# Patient Record
Sex: MALE | Race: WHITE | NOT HISPANIC OR LATINO | Employment: FULL TIME | URBAN - METROPOLITAN AREA
[De-identification: names, ages, dates, MRNs, and addresses within clinical notes are randomized per-mention and may not be internally consistent; named-entity substitution may affect disease eponyms.]

---

## 2017-01-03 ENCOUNTER — APPOINTMENT (OUTPATIENT)
Dept: PHYSICAL THERAPY | Facility: CLINIC | Age: 57
End: 2017-01-03
Payer: COMMERCIAL

## 2017-01-03 PROCEDURE — 97140 MANUAL THERAPY 1/> REGIONS: CPT

## 2017-01-03 PROCEDURE — 97110 THERAPEUTIC EXERCISES: CPT

## 2017-01-05 ENCOUNTER — APPOINTMENT (OUTPATIENT)
Dept: PHYSICAL THERAPY | Facility: CLINIC | Age: 57
End: 2017-01-05
Payer: COMMERCIAL

## 2017-01-05 PROCEDURE — 97140 MANUAL THERAPY 1/> REGIONS: CPT

## 2017-01-05 PROCEDURE — 97110 THERAPEUTIC EXERCISES: CPT

## 2017-01-10 ENCOUNTER — APPOINTMENT (OUTPATIENT)
Dept: PHYSICAL THERAPY | Facility: CLINIC | Age: 57
End: 2017-01-10
Payer: COMMERCIAL

## 2017-01-10 PROCEDURE — 97140 MANUAL THERAPY 1/> REGIONS: CPT

## 2017-01-10 PROCEDURE — 97110 THERAPEUTIC EXERCISES: CPT

## 2017-01-12 ENCOUNTER — APPOINTMENT (OUTPATIENT)
Dept: PHYSICAL THERAPY | Facility: CLINIC | Age: 57
End: 2017-01-12
Payer: COMMERCIAL

## 2017-01-16 ENCOUNTER — APPOINTMENT (OUTPATIENT)
Dept: PHYSICAL THERAPY | Facility: CLINIC | Age: 57
End: 2017-01-16
Payer: COMMERCIAL

## 2017-01-16 PROCEDURE — 97110 THERAPEUTIC EXERCISES: CPT

## 2017-01-16 PROCEDURE — 97140 MANUAL THERAPY 1/> REGIONS: CPT

## 2017-01-19 ENCOUNTER — APPOINTMENT (OUTPATIENT)
Dept: PHYSICAL THERAPY | Facility: CLINIC | Age: 57
End: 2017-01-19
Payer: COMMERCIAL

## 2017-01-19 PROCEDURE — 97140 MANUAL THERAPY 1/> REGIONS: CPT

## 2017-01-19 PROCEDURE — 97110 THERAPEUTIC EXERCISES: CPT

## 2017-01-20 ENCOUNTER — APPOINTMENT (OUTPATIENT)
Dept: PHYSICAL THERAPY | Facility: CLINIC | Age: 57
End: 2017-01-20
Payer: COMMERCIAL

## 2017-01-20 PROCEDURE — 97110 THERAPEUTIC EXERCISES: CPT

## 2017-01-20 PROCEDURE — 97140 MANUAL THERAPY 1/> REGIONS: CPT

## 2017-01-24 ENCOUNTER — APPOINTMENT (OUTPATIENT)
Dept: PHYSICAL THERAPY | Facility: CLINIC | Age: 57
End: 2017-01-24
Payer: COMMERCIAL

## 2017-01-24 PROCEDURE — 97140 MANUAL THERAPY 1/> REGIONS: CPT

## 2017-01-24 PROCEDURE — 97110 THERAPEUTIC EXERCISES: CPT

## 2017-01-26 ENCOUNTER — APPOINTMENT (OUTPATIENT)
Dept: PHYSICAL THERAPY | Facility: CLINIC | Age: 57
End: 2017-01-26
Payer: COMMERCIAL

## 2017-01-26 PROCEDURE — 97110 THERAPEUTIC EXERCISES: CPT

## 2017-01-26 PROCEDURE — 97012 MECHANICAL TRACTION THERAPY: CPT

## 2017-01-26 PROCEDURE — 97140 MANUAL THERAPY 1/> REGIONS: CPT

## 2017-02-10 ENCOUNTER — ALLSCRIPTS OFFICE VISIT (OUTPATIENT)
Dept: OTHER | Facility: OTHER | Age: 57
End: 2017-02-10

## 2017-02-10 DIAGNOSIS — R73.01 IMPAIRED FASTING GLUCOSE: ICD-10-CM

## 2017-02-10 DIAGNOSIS — E78.5 HYPERLIPIDEMIA: ICD-10-CM

## 2017-02-10 DIAGNOSIS — Z23 ENCOUNTER FOR IMMUNIZATION: ICD-10-CM

## 2017-02-10 DIAGNOSIS — Z12.5 ENCOUNTER FOR SCREENING FOR MALIGNANT NEOPLASM OF PROSTATE: ICD-10-CM

## 2017-02-11 ENCOUNTER — LAB CONVERSION - ENCOUNTER (OUTPATIENT)
Dept: OTHER | Facility: OTHER | Age: 57
End: 2017-02-11

## 2017-02-11 LAB
CHOLEST SERPL-MCNC: 265 MG/DL (ref 125–200)
CHOLEST/HDLC SERPL: 3.2 (CALC)
GLUCOSE (HISTORICAL): 88 MG/DL (ref 65–99)
HBA1C MFR BLD HPLC: 5.6 % OF TOTAL HGB
HDLC SERPL-MCNC: 83 MG/DL
LDL CHOLESTEROL (HISTORICAL): 168 MG/DL (CALC)
NON-HDL-CHOL (CHOL-HDL) (HISTORICAL): 182 MG/DL (CALC)
PROSTATE SPECIFIC ANTIGEN TOTAL (HISTORICAL): 0.6 NG/ML
TRIGL SERPL-MCNC: 70 MG/DL

## 2017-02-13 ENCOUNTER — GENERIC CONVERSION - ENCOUNTER (OUTPATIENT)
Dept: OTHER | Facility: OTHER | Age: 57
End: 2017-02-13

## 2018-01-13 NOTE — RESULT NOTES
Verified Results  (Q) CULTURE, VIRAL, BODY FLUIDS, TISSUES [id number] 12:00AM Brandi Carry     Test Name Result Flag Reference   SOURCE: NOT GIVEN     RESULT: NO VIRUS ISOLATED

## 2018-01-14 NOTE — PROGRESS NOTES
Active Problems    1  Hyperlipidemia (272 4) (E78 5)   2  Impaired fasting glucose (790 21) (R73 01)   3  Need for prophylactic vaccination and inoculation against influenza (V04 81) (Z23)   4  Screening for prostate cancer (V76 44) (Z12 5)    Current Meds   1  Advair -21 MCG/ACT Inhalation Aerosol; inhale 1 puff twice daily; Therapy: [id number] to (Last Rx:[id number])  Requested for: [id number] Ordered   2  Daily Value Multivitamin TABS; Take 1 tablet daily Recorded    Allergies    1  No Known Drug Allergies    Future Appointments    Date/Time Provider Specialty Site   02/10/2017 09:00 AM ALEXSANDER Weinstein  Family Medicine [facility]   07/15/2016 02:00 PM [facility] practice, Nurse Schedule  [facility]     Message     PT PRESENTS FOR BP CHECK,READINGS TODAY 146/98 LEFT ARM,150/100 RT ARM  HE DOES NOT TAKE ANY MEDS,HIS ONLY COMPLAINT IS FEELING A LITTLE ANXIOUS AND STRESSED  HE IS GOING ON VACATION TOMORROW AND WILL COME IN FOR A VISIT IN A FEW WEEKS  NO TX AT THIS TIME PER DR BARAJAS  Recorded as Task   Date: 06/28/2016 03:23 PM, Created By: Theron Hampton   Task Name: Medical Complaint Callback   Assigned To: [facility]   Regarding Patient: Darlin Race, Status: Complete   CommentGray Part - 28 Jun 2016 3:23 PM     TASK CREATED  Caller: SYDNEY DHALIWAL DERM; Medical Complaint  [facility] CALLED ABOUT A MUTUAL PT  HIS BP IN THE OFFICE TODAY /100  SHOULD WE BRING HIM IN FOR AN APPT OR A BP CHECK CELL [phone]   Nish Barajas - 28 Jun 2016 5:56 PM     TASK REPLIED TO: Previously Assigned To Nish Barajas  schedule nurse visit this week for BP check   Bianca Chun - 29 Jun 2016 11:15 AM     TASK EDITED  LMOM TO CALL TO SCHEDULE A BP CHECK   Suzanne Yusuf - 29 Jun 2016 11:35 AM     TASK REASSIGNED: Previously Assigned To Guille Floyd - 29 Jun 2016 11:45 AM     TASK EDITED  PT,WILL COME FOR A BP CHECK AT 1:30 TODAY     Gay Tanner - 29 Jun 2016 11:45 AM     TASK COMPLETED     Signatures   Electronically signed by : Alexandra Carrizales, ; Jun 29 2016  2:15PM EST                       (Author)    Electronically signed by : ALEXSANDER Salinas ; Jun 29 2016  3:35PM EST                       (Author)

## 2018-01-15 NOTE — RESULT NOTES
Message  Bob I have enclosed a copy of your recent labs  fasting blood sugar and PSA normal  cholesterol elevated at 265 normal less than 200  no change from 12/2015  your HDL or "good" cholesterol is high  this is beneficial  watch diet  I would not recommend cholesterol medication at this time        Results/Data     (1) LIPID PANEL, FASTING   CHOLESTEROL, TOTAL: 265 mg/dL Abnormal High Reference Range 125-200  HDL CHOLESTEROL: 83 mg/dL Reference Range > OR = 40  TRIGLICERIDES: 70 mg/dL Reference Range <150  LDL-CHOLESTEROL: 168 mg/dL (calc) Abnormal High Reference Range <130  CHOL/HDLC RATIO: 3 2 (calc) Reference Range < OR = 5 0  NON HDL CHOLESTEROL: 182 mg/dL (calc) Abnormal High  (1) PSA (SCREEN) (Dx V76 44 Screen for Prostate Cancer)   PSA, TOTAL: 0 6 ng/mL Reference Range < OR = 4 0  (Q) GLUCOSE   GLUCOSE: 88 mg/dL Reference Range 65-99  (Q) HEMOGLOBIN A1c   HEMOGLOBIN A1c: 5 6 % of total Hgb Reference Range <5 7    Signatures   Electronically signed by : ALEXSANDER Cowart ; Feb 13 2017  6:03AM EST                       (Author)

## 2018-01-15 NOTE — MISCELLANEOUS
Message  Return to work or school:   Chato Whitmore is under my professional care   He was seen in my office on 06-29-16             Signatures   Electronically signed by : Rhoda Leon, ; Jun 29 2016  1:58PM EST                       (Author)

## 2018-01-16 NOTE — PROGRESS NOTES
Chief Complaint  PT  PRESENTS FOR BP CHECK  TODAY HIS BP /80,HE DOES NOT C/O ANY SYMPTOMS AND HE DOES NOT TAKE ANY BP MEDICATION  HE HAD SOME STRESSORS AT WORK HOWEVER THAT IS RESOLVED  HE WILL CALL IF ANY CHANGES  HE DID NOT MAKE A F/U VISIT  Active Problems    1  Hyperlipidemia (272 4) (E78 5)   2  Impaired fasting glucose (790 21) (R73 01)   3  Need for prophylactic vaccination and inoculation against influenza (V04 81) (Z23)   4  Screening for prostate cancer (V76 44) (Z12 5)    Current Meds   1  Advair -21 MCG/ACT Inhalation Aerosol; inhale 1 puff twice daily; Therapy: 76QOE2459 to (Last Rx:[id number])  Requested for: [id number] Ordered   2  Daily Value Multivitamin TABS; Take 1 tablet daily Recorded    Allergies    1  No Known Drug Allergies    Vitals  Signs [Data Includes: Current Encounter]    Temperature: 98 8 F  Heart Rate: 80  Respiration: 16  Systolic: 110  Diastolic: 80  Height: 5 ft 11 in  Weight: 201 lb 0 16 oz  BMI Calculated: 28 03  BSA Calculated: 2 11    Future Appointments    Date/Time Provider Specialty Site   02/10/2017 09:00 AM ALEXSANDER Curry   Family Medicine [address] [facility]     Signatures   Electronically signed by : Angy French, ; Jul 15 2016  2:26PM EST                       (Author)    Electronically signed by : ALEXSANDER Glaser ; Jul 15 2016  2:38PM EST                       (Author)

## 2018-01-16 NOTE — PROGRESS NOTES
Assessment    1  Encounter for preventive health examination (V70 0) (Z00 00)    Plan  Hyperlipidemia, Impaired fasting glucose, Need for prophylactic vaccination and  inoculation against influenza, Screening for prostate cancer    · (1) GLUCOSE,  FASTING; Status:Active; Requested for:08Oeg4269;    · (1) HEMOGLOBIN A1C; Status:Active; Requested for:[id number];    · (1) LIPID PANEL, FASTING; Status:Active; Requested for:[id number];    · (1) PSA (SCREEN) (Dx V76 44 Screen for Prostate Cancer); Status:Active; Requested  for:62Bgo6645;     Discussion/Summary  Impression: health maintenance visit  Currently, he eats a healthy diet and has an adequate exercise regimen  Prostate cancer screening: PSA was ordered  Colorectal cancer screening: the next colonoscopy is due 2017  The immunizations are up to date  Advice and education were given regarding nutrition, aerobic exercise, weight loss and cardiovascular risk reduction  Current with flu vaccine  repeat labs  follow up visit with rehab medicine  consider chiropractor, acupuncture and/or pain management evaluation for recurrent neck pain  no treatment at this time for hyperlipidemia  History of Present Illness  HM, Adult Male: The patient is being seen for a health maintenance evaluation  The last health maintenance visit was >1 year(s) ago  Social History: Household members include spouse, 1 daughter(s) and 2 son(s)  He is   Work status: working full time and occupation:   The patient has never smoked cigarettes  He reports occasional alcohol use  General Health: The patient's health since the last visit is described as good  He has regular dental visits  He denies vision problems  Vision care includes wearing glasses and an eye examination more than a year ago  He denies hearing loss  Immunizations status: up to date  Lifestyle:  He consumes a diverse and healthy diet  He does not have any weight concerns   He exercises regularly  He exercises 3 or more times per week  Exercise includes walking  He does not use tobacco    Screening: Prostate cancer screening includes last prostate-specific antigen testing 12/2015  Colorectal cancer screening includes a colonoscopy within the past ten years  Metabolic screening includes lipid profile performed 12/2015, glucose screening performed 90/2430 and uncertain timing of his last thyroid function test      General health risks: family history of prostate cancer  HPI: 62year old male here for wellness exam  active problems and PMH see chart  labs 12/2015 see note   no change from 2014  A1c 5  6  hyperlipidemia with high HDL  total cholesterol 265 increased from 225, HDL 98, , TGs 40  non   Review of Systems    Constitutional: recent 6 lb weight gain  Eyes: no eyesight problems  ENT: no earache, no sore throat and no nasal discharge  Cardiovascular: no chest pain, no palpitations and no extremity edema  Respiratory: no cough, no orthopnea, no wheezing, no shortness of breath during exertion and no PND  Gastrointestinal: colonoscopy at age 48 , but no abdominal pain, no nausea, no vomiting, no constipation, no diarrhea and no blood in stools  Genitourinary: no urinary hesitancy and no nocturia  Musculoskeletal: recurrent neck pain and right arm radicular pain  right arm symptoms resolved with PT  x rays cervical spine 12/2016  he is being followed by rehab medicine  , but no arthralgias and no myalgias  Integumentary: no rashes and no skin lesions  Neurological: no headache and no dizziness  Psychiatric: no anxiety and no depression  Endocrine: no muscle weakness  Active Problems    1  Hyperlipidemia (272 4) (E78 5)   2  Impaired fasting glucose (790 21) (R73 01)   3   Screening for prostate cancer (V76 44) (Z12 5)    Past Medical History    · History of hemorrhoids (V13 89) (Z87 19)   · History of Wound of right lower extremity, initial encounter (894 0) (S80 36A)    Family History  Mother    · Family history of Hyperlipidemia  Father    · Family history of Acute Myocardial Infarction (V17 3)   · Family history of Diabetes Mellitus (V18 0)  Maternal Grandfather    · Family history of Multiple Sclerosis  Paternal Grandfather    · Family history of Prostate Cancer (V16 42)    Social History    · Never A Smoker    Current Meds   1  Daily Value Multivitamin TABS; Take 1 tablet daily Recorded    Allergies    1  No Known Drug Allergies    Vitals   Recorded: 10KTA3643 09:14AM   Temperature 97 5 F   Heart Rate 82   Respiration 18   Systolic 037   Diastolic 72   Height 5 ft 11 in   Weight 205 lb    BMI Calculated 28 59   BSA Calculated 2 13     Physical Exam    Constitutional   General appearance: No acute distress, well appearing and well nourished  Eyes   Conjunctiva and lids: No erythema, swelling or discharge  Pupils and irises: Equal, round, reactive to light  Ophthalmoscopic examination: Normal fundi and optic discs  Ears, Nose, Mouth, and Throat   Otoscopic examination: Tympanic membranes translucent with normal light reflex  Canals patent without erythema  Nasal mucosa, septum, and turbinates: Normal without edema or erythema  Oropharynx: Normal with no erythema, edema, exudate or lesions  Neck   Neck: Supple, symmetric, trachea midline, no masses  Thyroid: Normal, no thyromegaly  There were no thyroid nodules  Pulmonary   Auscultation of lungs: Clear to auscultation  Cardiovascular   Auscultation of heart: Normal rate and rhythm, normal S1 and S2, no murmurs  Heart sounds: no gallop heard  Carotid pulses: 2+ bilaterally  no bruit heard over the right carotid and no bruit heard over the left carotid  Abdominal aorta: Normal   Abdominal aorta: no bruit heard  Examination of extremities for edema and/or varicosities: Normal     Abdomen   Abdomen: Non-tender, no masses      Liver and spleen: No hepatomegaly or splenomegaly  Lymphatic   Palpation of lymph nodes in neck: No lymphadenopathy  Musculoskeletal   Inspection/palpation of joints, bones, and muscles: Abnormal   deformity left AC joint  Range of motion: Normal     Muscle strength/tone: Normal   Motor Strength Findings: normal upper extremity strength  Skin   Skin and subcutaneous tissue: Normal without rashes or lesions  Neurologic   Cranial nerves: Cranial nerves 2-12 intact  Reflexes: 2+ and symmetric  Sensation: No sensory loss      Psychiatric   Mood and affect: Normal        Results/Data  (Q) CULTURE, VIRAL, BODY FLUIDS, TISSUES [id number] 12:00AM Brandi Artis     Test Name Result Flag Reference   SOURCE: NOT GIVEN     RESULT: NO VIRUS ISOLATED       (1) CBC/PLT/DIFF [id number] 12:00AM Lili Coy     Test Name Result Flag Reference   WBC 6 5 x10E3/uL  3 4-10 8   RBC 4 87 x10E6/uL  4 14-5 80   Hemoglobin 14 8 g/dL  12 6-17 7   Hematocrit 45 9 %  37 5-51 0   MCV 94 fL  79-97   MCH 30 4 pg  26 6-33 0   MCHC 32 2 g/dL  31 5-35 7   RDW 13 1 %  12 3-15 4   Platelets 793 E37U1/PP  150-379   Neutrophils 58 %     Lymphs 31 %     Monocytes 10 %     Eos 1 %     Basos 0 %     Neutrophils (Absolute) 3 7 x10E3/uL  1 4-7 0   Lymphs (Absolute) 2 0 x10E3/uL  0 7-3 1   Monocytes(Absolute) 0 7 x10E3/uL  0 1-0 9   Eos (Absolute) 0 1 x10E3/uL  0 0-0 4   Baso (Absolute) 0 0 x10E3/uL  0 0-0 2   Immature Granulocytes 0 %     Immature Grans (Abs) 0 0 x10E3/uL  0 0-0 1     (1) COMPREHENSIVE METABOLIC PANEL [id number] 26:80NE Lili Coy     Test Name Result Flag Reference   Glucose, Serum 103 mg/dL H 65-99   BUN 18 mg/dL  6-24   Creatinine, Serum 0 96 mg/dL  0 76-1 27   eGFR If NonAfricn Am 89 mL/min/1 73  >59   eGFR If Africn Am 102 mL/min/1 73  >59   BUN/Creatinine Ratio 19  9-20   Sodium, Serum 141 mmol/L  134-144   Potassium, Serum 5 1 mmol/L  3 5-5 2   Chloride, Serum 99 mmol/L     Carbon Dioxide, Total 23 mmol/L  18-29   Calcium, Serum 9 6 mg/dL 8  7-10 2   Protein, Total, Serum 6 7 g/dL  6 0-8 5   Albumin, Serum 4 7 g/dL  3 5-5 5   Globulin, Total 2 0 g/dL  1 5-4 5   A/G Ratio 2 4  1 1-2 5   Bilirubin, Total 0 7 mg/dL  0 0-1 2   Alkaline Phosphatase, S 53 IU/L     AST (SGOT) 25 IU/L  0-40   ALT (SGPT) 28 IU/L  0-44     (LC) Lipid Panel [id number] 12:00AM Les Legions     Test Name Result Flag Reference   Cholesterol, Total 265 mg/dL H 100-199   Triglycerides 40 mg/dL  0-149   HDL Cholesterol 98 mg/dL  >39   According to ATP-III Guidelines, HDL-C >59 mg/dL is considered a  negative risk factor for CHD  VLDL Cholesterol Reinaldo 8 mg/dL  5-40   LDL Cholesterol Calc 159 mg/dL H 0-99     (1) HEMOGLOBIN A1C [id number] 12:00AM Les Legions     Test Name Result Flag Reference   Hemoglobin A1c 5 7 % H 4 8-5 6   Pre-diabetes: 5 7 - 6 4           Diabetes: >6 4           Glycemic control for adults with diabetes: <7 0     (1) PSA (SCREEN) (Dx V76 44 Screen for Prostate Cancer) [id number] 12:00AM Les Legions     Test Name Result Flag Reference   Prostate Specific Ag, Serum 0 6 ng/mL  0 0-4 0   Roche ECLIA methodology  According to the American Urological Association, Serum PSA should  decrease and remain at undetectable levels after radical  prostatectomy  The AUA defines biochemical recurrence as an initial  PSA value 0 2 ng/mL or greater followed by a subsequent confirmatory  PSA value 0 2 ng/mL or greater  Values obtained with different assay methods or kits cannot be used  interchangeably  Results cannot be interpreted as absolute evidence  of the presence or absence of malignant disease         Signatures   Electronically signed by : ALEXSANDER Posadas ; Feb 10 2017 10:05AM EST                       (Author)

## 2018-01-22 VITALS
TEMPERATURE: 97.5 F | BODY MASS INDEX: 28.7 KG/M2 | WEIGHT: 205 LBS | DIASTOLIC BLOOD PRESSURE: 72 MMHG | HEIGHT: 71 IN | RESPIRATION RATE: 18 BRPM | HEART RATE: 82 BPM | SYSTOLIC BLOOD PRESSURE: 122 MMHG

## 2018-01-30 ENCOUNTER — TELEPHONE (OUTPATIENT)
Dept: FAMILY MEDICINE CLINIC | Facility: CLINIC | Age: 58
End: 2018-01-30

## 2018-01-31 DIAGNOSIS — Z12.5 SCREENING FOR PROSTATE CANCER: Primary | ICD-10-CM

## 2018-01-31 DIAGNOSIS — R73.01 IMPAIRED FASTING GLUCOSE: ICD-10-CM

## 2018-01-31 DIAGNOSIS — E78.00 PURE HYPERCHOLESTEROLEMIA: ICD-10-CM

## 2018-01-31 RX ORDER — ERGOCALCIFEROL (VITAMIN D2) 10 MCG
1 TABLET ORAL DAILY
COMMUNITY
End: 2018-02-12

## 2018-01-31 RX ORDER — CLOTRIMAZOLE AND BETAMETHASONE DIPROPIONATE 10; .64 MG/G; MG/G
CREAM TOPICAL
COMMUNITY
Start: 2017-10-20 | End: 2018-02-12

## 2018-02-08 LAB
ALBUMIN SERPL-MCNC: 4.3 G/DL (ref 3.6–5.1)
ALBUMIN/GLOB SERPL: 1.8 (CALC) (ref 1–2.5)
ALP SERPL-CCNC: 54 U/L (ref 40–115)
ALT SERPL-CCNC: 24 U/L (ref 9–46)
AST SERPL-CCNC: 21 U/L (ref 10–35)
BASOPHILS # BLD AUTO: 54 CELLS/UL (ref 0–200)
BASOPHILS NFR BLD AUTO: 0.8 %
BILIRUB SERPL-MCNC: 0.9 MG/DL (ref 0.2–1.2)
BUN SERPL-MCNC: 20 MG/DL (ref 7–25)
BUN/CREAT SERPL: NORMAL (CALC) (ref 6–22)
CALCIUM SERPL-MCNC: 9.8 MG/DL (ref 8.6–10.3)
CHLORIDE SERPL-SCNC: 103 MMOL/L (ref 98–110)
CHOLEST SERPL-MCNC: 258 MG/DL
CHOLEST/HDLC SERPL: 3 (CALC)
CO2 SERPL-SCNC: 29 MMOL/L (ref 20–31)
CREAT SERPL-MCNC: 1.05 MG/DL (ref 0.7–1.33)
EOSINOPHIL # BLD AUTO: 121 CELLS/UL (ref 15–500)
EOSINOPHIL NFR BLD AUTO: 1.8 %
ERYTHROCYTE [DISTWIDTH] IN BLOOD BY AUTOMATED COUNT: 12 % (ref 11–15)
GLOBULIN SER CALC-MCNC: 2.4 G/DL (CALC) (ref 1.9–3.7)
GLUCOSE SERPL-MCNC: 98 MG/DL (ref 65–99)
HBA1C MFR BLD: 5.2 % OF TOTAL HGB
HCT VFR BLD AUTO: 45.6 % (ref 38.5–50)
HDLC SERPL-MCNC: 86 MG/DL
HGB BLD-MCNC: 15.4 G/DL (ref 13.2–17.1)
LDLC SERPL CALC-MCNC: 154 MG/DL (CALC)
LYMPHOCYTES # BLD AUTO: 1829 CELLS/UL (ref 850–3900)
LYMPHOCYTES NFR BLD AUTO: 27.3 %
MCH RBC QN AUTO: 31.2 PG (ref 27–33)
MCHC RBC AUTO-ENTMCNC: 33.8 G/DL (ref 32–36)
MCV RBC AUTO: 92.3 FL (ref 80–100)
MONOCYTES # BLD AUTO: 717 CELLS/UL (ref 200–950)
MONOCYTES NFR BLD AUTO: 10.7 %
NEUTROPHILS # BLD AUTO: 3980 CELLS/UL (ref 1500–7800)
NEUTROPHILS NFR BLD AUTO: 59.4 %
NONHDLC SERPL-MCNC: 172 MG/DL (CALC)
PLATELET # BLD AUTO: 340 THOUSAND/UL (ref 140–400)
PMV BLD REES-ECKER: 8.8 FL (ref 7.5–12.5)
POTASSIUM SERPL-SCNC: 4.5 MMOL/L (ref 3.5–5.3)
PROT SERPL-MCNC: 6.7 G/DL (ref 6.1–8.1)
PSA SERPL-MCNC: 0.6 NG/ML
RBC # BLD AUTO: 4.94 MILLION/UL (ref 4.2–5.8)
SL AMB EGFR AFRICAN AMERICAN: 90 ML/MIN/1.73M2
SL AMB EGFR NON AFRICAN AMERICAN: 78 ML/MIN/1.73M2
SODIUM SERPL-SCNC: 140 MMOL/L (ref 135–146)
TRIGL SERPL-MCNC: 74 MG/DL
WBC # BLD AUTO: 6.7 THOUSAND/UL (ref 3.8–10.8)

## 2018-02-12 ENCOUNTER — OFFICE VISIT (OUTPATIENT)
Dept: FAMILY MEDICINE CLINIC | Facility: CLINIC | Age: 58
End: 2018-02-12
Payer: COMMERCIAL

## 2018-02-12 VITALS
SYSTOLIC BLOOD PRESSURE: 130 MMHG | HEART RATE: 74 BPM | BODY MASS INDEX: 28.36 KG/M2 | WEIGHT: 202.6 LBS | TEMPERATURE: 98.6 F | HEIGHT: 71 IN | RESPIRATION RATE: 16 BRPM | DIASTOLIC BLOOD PRESSURE: 82 MMHG

## 2018-02-12 DIAGNOSIS — Z00.00 WELL ADULT EXAM: Primary | ICD-10-CM

## 2018-02-12 PROCEDURE — 99396 PREV VISIT EST AGE 40-64: CPT | Performed by: FAMILY MEDICINE

## 2018-02-12 RX ORDER — CHLORAL HYDRATE 500 MG
1000 CAPSULE ORAL DAILY
COMMUNITY
End: 2020-12-15 | Stop reason: ALTCHOICE

## 2018-02-12 NOTE — PROGRESS NOTES
Assessment/Plan:         Diagnoses and all orders for this visit:    Well adult exam    Other orders  -     Omega-3 Fatty Acids (FISH OIL) 1,000 mg; Take 1,000 mg by mouth daily        Watch diet  Continue with regular exercise  Flu vaccine at work  Patient ID: Paxton Richmond is a 62 y o  male  49-year-old male here for wellness exam   Active medical problems and past medical history see chart  Hyperlipidemia with high HDL   02/2018 Lipid profile cholesterol 238  Triglycerides 74  HDL 86    Impaired fasting glucose  FBS 98  A1c 5 2  + FH type 2 Dm  Creatinine 1 05  LFTs normal  Exercises regularly    HS in [address] [place] [address] Way   3 children  Social History     Social History    Marital status: /Civil Union     Spouse name: N/A    Number of children: N/A    Years of education: N/A     Occupational History    Not on file  Social History Main Topics    Smoking status: Never Smoker    Smokeless tobacco: Never Used    Alcohol use Not on file    Drug use: No    Sexual activity: Not on file     Other Topics Concern    Not on file     Social History Narrative    No narrative on file     Family History   Problem Relation Age of Onset    Diabetes Father        Review of Systems   Constitutional: Negative for activity change, appetite change, chills, fever and unexpected weight change  HENT: Negative for congestion, ear pain, postnasal drip, rhinorrhea, sinus pain, sinus pressure, sore throat and trouble swallowing  Eyes: Negative for visual disturbance  Current with eye exam     Respiratory: Negative for cough, chest tightness, shortness of breath and wheezing  Cardiovascular: Negative for chest pain, palpitations and leg swelling  Gastrointestinal: Negative for abdominal pain, blood in stool, constipation, diarrhea, nausea and vomiting  Colonoscopy 8 years ago  Genitourinary: Negative for difficulty urinating          02/2018 PSA 0 8 Musculoskeletal: Negative for arthralgias and myalgias  Skin: Negative for rash  Neurological: Negative for dizziness and headaches  Hematological: Negative for adenopathy  Psychiatric/Behavioral: Negative for behavioral problems, dysphoric mood and sleep disturbance  /82 (BP Location: Left arm, Patient Position: Sitting, Cuff Size: Large)   Pulse 74   Temp 98 6 °F (37 °C) (Tympanic)   Resp 16   Ht 5' 11" (1 803 m)   Wt 91 9 kg (202 lb 9 6 oz)   BMI 28 26 kg/m²        Physical Exam   Constitutional: He is oriented to person, place, and time  He appears well-developed and well-nourished  HENT:   Right Ear: External ear normal    Left Ear: External ear normal    Mouth/Throat: Oropharynx is clear and moist  No oropharyngeal exudate  Eyes: Conjunctivae and EOM are normal  Pupils are equal, round, and reactive to light  No scleral icterus  Neck: Normal range of motion  Neck supple  No JVD present  No tracheal deviation present  No thyromegaly present  Cardiovascular: Normal rate, regular rhythm, normal heart sounds and intact distal pulses  Exam reveals no gallop  No murmur heard  Pulmonary/Chest: Effort normal and breath sounds normal  No respiratory distress  He has no wheezes  He has no rales  Abdominal: Soft  Bowel sounds are normal  He exhibits no distension and no mass  There is no tenderness  There is no rebound and no guarding  Musculoskeletal: Normal range of motion  He exhibits no edema or deformity  Lymphadenopathy:     He has no cervical adenopathy  Neurological: He is alert and oriented to person, place, and time  He has normal reflexes  No cranial nerve deficit  Skin: Skin is warm and dry  No rash noted  Psychiatric: He has a normal mood and affect  His behavior is normal    Nursing note and vitals reviewed        Recent Results (from the past 1008 hour(s))   CBC and differential    Collection Time: 02/07/18  7:03 AM   Result Value Ref Range    SL AMB LAB WHITE BLOOD CELL COUNT 6 7 3 8 - 10 8 Thousand/uL    SL AMB LAB RED BLOOD CELLS 4 94 4 20 - 5 80 Million/uL    Hemoglobin 15 4 13 2 - 17 1 g/dL    Hematocrit 45 6 38 5 - 50 0 %    MCV 92 3 80 0 - 100 0 fL    MCH 31 2 27 0 - 33 0 pg    MCHC 33 8 32 0 - 36 0 g/dL    RDW 12 0 11 0 - 15 0 %    Platelet Count 147 253 - 400 Thousand/uL    SL AMB MPV 8 8 7 5 - 12 5 fL    Neutrophils (Absolute) 3,980 1,500 - 7,800 cells/uL    Lymphocytes (Absolute) 1,829 850 - 3,900 cells/uL    Monocytes (Absolute) 717 200 - 950 cells/uL    Eosinophils (Absolute) 121 15 - 500 cells/uL    Basophils (Absolute) 54 0 - 200 cells/uL    Neutrophils 59 4 %    Lymphocytes 27 3 %    Monocytes 10 7 %    Eosinophils 1 8 %    Basophils 0 8 %   Comprehensive metabolic panel    Collection Time: 02/07/18  7:03 AM   Result Value Ref Range    SL AMB GLUCOSE 98 65 - 99 mg/dL    BUN 20 7 - 25 mg/dL    Creatinine, Serum 1 05 0 70 - 1 33 mg/dL    eGFR Non  78 > OR = 60 mL/min/1 73m2    SL AMB EGFR  90 > OR = 60 mL/min/1 73m2    SL AMB BUN/CREATININE RATIO NOT APPLICABLE 6 - 22 (calc)    SL AMB SODIUM 140 135 - 146 mmol/L    SL AMB POTASSIUM 4 5 3 5 - 5 3 mmol/L    SL AMB CHLORIDE 103 98 - 110 mmol/L    SL AMB CARBON DIOXIDE 29 20 - 31 mmol/L    SL AMB CALCIUM 9 8 8 6 - 10 3 mg/dL    SL AMB PROTEIN, TOTAL 6 7 6 1 - 8 1 g/dL    Serum Albumin 4 3 3 6 - 5 1 g/dL    SL AMB GLOBULIN 2 4 1 9 - 3 7 g/dL (calc)    SL AMB ALBUMIN/GLOBULIN RATIO 1 8 1 0 - 2 5 (calc)    SL AMB BILIRUBIN, TOTAL 0 9 0 2 - 1 2 mg/dL    SL AMB ALKALINE PHOSPHATASE 54 40 - 115 U/L    SL AMB AST 21 10 - 35 U/L    SL AMB ALT 24 9 - 46 U/L   Lipid panel    Collection Time: 02/07/18  7:03 AM   Result Value Ref Range    Total Cholesterol 258 (H) <200 mg/dL    SL AMB HDL CHOLESTEROL 86 >40 mg/dL    Triglycerides 74 <150 mg/dL    SL AMB LDL-CHOLESTEROL 154 (H) mg/dL (calc)    SL AMB CHOL/HDLC RATIO 3 0 <5 0 (calc)    SL AMB NON HDL CHOLESTEROL 172 (H) <130 mg/dL (calc)   Hemoglobin A1c    Collection Time: 02/07/18  7:03 AM   Result Value Ref Range    Hemoglobin A1C 5 2 <5 7 % of total Hgb   PSA    Collection Time: 02/07/18  7:03 AM   Result Value Ref Range    Prostate Specific Antigen Total 0 6 < OR = 4 0 ng/mL

## 2018-10-11 ENCOUNTER — IMMUNIZATION (OUTPATIENT)
Dept: FAMILY MEDICINE CLINIC | Facility: CLINIC | Age: 58
End: 2018-10-11
Payer: COMMERCIAL

## 2018-10-11 DIAGNOSIS — Z23 NEED FOR IMMUNIZATION AGAINST INFLUENZA: Primary | ICD-10-CM

## 2018-10-11 PROCEDURE — 90686 IIV4 VACC NO PRSV 0.5 ML IM: CPT

## 2018-10-11 PROCEDURE — 90471 IMMUNIZATION ADMIN: CPT

## 2019-02-01 ENCOUNTER — TELEPHONE (OUTPATIENT)
Dept: FAMILY MEDICINE CLINIC | Facility: CLINIC | Age: 59
End: 2019-02-01

## 2019-02-01 NOTE — TELEPHONE ENCOUNTER
Patient called stating he has an apt with you on 02- and would like to know if he needed to have any blood work done before hand  Please advise  Patient would like a return call and he  Will  scripts    [phone]

## 2019-02-02 DIAGNOSIS — Z12.5 SCREENING FOR PROSTATE CANCER: ICD-10-CM

## 2019-02-02 DIAGNOSIS — R73.01 IMPAIRED FASTING GLUCOSE: Primary | ICD-10-CM

## 2019-02-02 DIAGNOSIS — E78.00 HYPERCHOLESTEREMIA: ICD-10-CM

## 2019-02-06 LAB
CHOLEST SERPL-MCNC: 251 MG/DL
CHOLEST/HDLC SERPL: 3.1 (CALC)
GLUCOSE P FAST SERPL-MCNC: 102 MG/DL (ref 65–99)
HBA1C MFR BLD: 5.3 % OF TOTAL HGB
HDLC SERPL-MCNC: 81 MG/DL
LDLC SERPL CALC-MCNC: 154 MG/DL (CALC)
NONHDLC SERPL-MCNC: 170 MG/DL (CALC)
PSA SERPL-MCNC: 0.5 NG/ML
TRIGL SERPL-MCNC: 68 MG/DL

## 2019-02-12 ENCOUNTER — OFFICE VISIT (OUTPATIENT)
Dept: FAMILY MEDICINE CLINIC | Facility: CLINIC | Age: 59
End: 2019-02-12
Payer: COMMERCIAL

## 2019-02-12 VITALS
BODY MASS INDEX: 29.4 KG/M2 | SYSTOLIC BLOOD PRESSURE: 140 MMHG | WEIGHT: 210 LBS | HEART RATE: 76 BPM | HEIGHT: 71 IN | DIASTOLIC BLOOD PRESSURE: 90 MMHG | TEMPERATURE: 98.1 F | RESPIRATION RATE: 16 BRPM

## 2019-02-12 DIAGNOSIS — Z00.00 WELL ADULT EXAM: Primary | ICD-10-CM

## 2019-02-12 DIAGNOSIS — B00.1 COLD SORE: ICD-10-CM

## 2019-02-12 DIAGNOSIS — Z23 NEED FOR SHINGLES VACCINE: ICD-10-CM

## 2019-02-12 DIAGNOSIS — M25.511 CHRONIC RIGHT SHOULDER PAIN: ICD-10-CM

## 2019-02-12 DIAGNOSIS — R03.0 ELEVATED BLOOD PRESSURE READING: ICD-10-CM

## 2019-02-12 DIAGNOSIS — R73.01 IMPAIRED FASTING GLUCOSE: ICD-10-CM

## 2019-02-12 DIAGNOSIS — E78.00 PURE HYPERCHOLESTEROLEMIA: ICD-10-CM

## 2019-02-12 DIAGNOSIS — G89.29 CHRONIC RIGHT SHOULDER PAIN: ICD-10-CM

## 2019-02-12 DIAGNOSIS — E66.3 OVERWEIGHT (BMI 25.0-29.9): ICD-10-CM

## 2019-02-12 PROCEDURE — 99396 PREV VISIT EST AGE 40-64: CPT | Performed by: FAMILY MEDICINE

## 2019-02-12 RX ORDER — VALACYCLOVIR HYDROCHLORIDE 1 G/1
2000 TABLET, FILM COATED ORAL 2 TIMES DAILY
Qty: 4 TABLET | Refills: 5 | Status: SHIPPED | OUTPATIENT
Start: 2019-02-12 | End: 2020-12-15 | Stop reason: ALTCHOICE

## 2019-02-12 RX ORDER — METHOCARBAMOL 750 MG/1
750 TABLET, FILM COATED ORAL 4 TIMES DAILY PRN
COMMUNITY
Start: 2017-05-16 | End: 2020-12-15 | Stop reason: ALTCHOICE

## 2019-02-12 NOTE — PROGRESS NOTES
Assessment/Plan:     Diagnoses and all orders for this visit:    Well adult exam    Elevated blood pressure reading    Pure hypercholesterolemia    Impaired fasting glucose    Overweight (BMI 25 0-29  9)    Cold sore  -     valACYclovir (VALTREX) 1,000 mg tablet; Take 2 tablets (2,000 mg total) by mouth 2 (two) times a day for 1 day    Chronic right shoulder pain    Need for shingles vaccine  -     Zoster Vac Recomb Adjuvanted 50 MCG/0 5ML SUSR; Inject 50 mcg into a muscle once for 1 dose    Other orders  -     methocarbamol (ROBAXIN) 750 mg tablet; Take 750 mg by mouth 4 (four) times a day as needed        Diet, weight loss and exercise  Monitor blood pressures at home  Repeat labs in one year  Referral for colonoscopy  Script for shingles vaccine  Up to date with flu vaccine  Script for prn Valtrex  Re right shoulder pain patient is not interested in imaging studies, PT or steroid injections  Advised to call if any changes  BMI Counseling: Body mass index is 29 29 kg/m²  Discussed the patient's BMI with him  The BMI is above average  BMI counseling and education was provided to the patient  Nutrition recommendations include reducing portion sizes, decreasing overall calorie intake, consuming healthier snacks, moderation in carbohydrate intake, reducing intake of saturated fat and trans fat and reducing intake of cholesterol  Exercise recommendations include exercising 3-5 times per week  Patient ID: Jae Vasquez is a 61 y o  male  59-year-old male here for wellness exam   Active medical problems and past medical history see chart  Hyperlipidemia with high HDL   02/2019 Lipid profile cholesterol 251  Triglycerides 68  HDL 81    Impaired fasting glucose    A1c 5 3  + FH type 2 DM father  He has not been exercising    HS in [place]   3 children  Non smoker         The following portions of the patient's history were reviewed and updated as appropriate: allergies, current medications, past family history, past medical history, past social history, past surgical history and problem list     Review of Systems   Constitutional: Negative for appetite change, chills, fever and unexpected weight change  HENT: Negative for congestion, ear pain, rhinorrhea, sore throat and trouble swallowing  Eyes: Negative for visual disturbance  Current with eye exam     Respiratory: Negative for cough, shortness of breath and wheezing  Cardiovascular: Negative for chest pain, palpitations and leg swelling  Gastrointestinal: Negative for abdominal pain, blood in stool, constipation, diarrhea, nausea and vomiting  Colonoscopy at age 48  Genitourinary: Negative for difficulty urinating  02/2019 PSA 0 5   Musculoskeletal: Positive for arthralgias  Negative for myalgias  S/p fall this summer with injury to right shoulder  Persistent pain although symptoms have gradually improved  Skin: Negative for rash  Intermittent cold sores  Allergic/Immunologic: Negative for environmental allergies  Neurological: Negative for dizziness and headaches  Hematological: Negative for adenopathy  Does not bruise/bleed easily  Psychiatric/Behavioral: Negative for behavioral problems, dysphoric mood and sleep disturbance  Objective:       /90 (BP Location: Left arm, Patient Position: Sitting, Cuff Size: Large)   Pulse 76   Temp 98 1 °F (36 7 °C)   Resp 16   Ht 5' 11" (1 803 m)   Wt 95 3 kg (210 lb)   BMI 29 29 kg/m²          Physical Exam   Constitutional: He is oriented to person, place, and time  He appears well-developed and well-nourished  No distress  HENT:   Right Ear: Tympanic membrane normal    Left Ear: Tympanic membrane normal    Mouth/Throat: Oropharynx is clear and moist    Eyes: Pupils are equal, round, and reactive to light  Conjunctivae and EOM are normal  No scleral icterus  Neck: No JVD present   Carotid bruit is not present  No tracheal deviation present  No thyroid mass and no thyromegaly present  Cardiovascular: Normal rate, regular rhythm and normal heart sounds  Exam reveals no gallop  No murmur heard  Pulses:       Carotid pulses are 2+ on the right side, and 2+ on the left side  Pulmonary/Chest: Effort normal and breath sounds normal  No respiratory distress  He has no wheezes  He has no rales  Abdominal: Soft  Bowel sounds are normal  He exhibits no distension, no abdominal bruit and no mass  There is no hepatosplenomegaly  There is no tenderness  There is no rebound and no guarding  Musculoskeletal:        Right shoulder: He exhibits tenderness and bony tenderness  He exhibits normal range of motion, no swelling, no effusion, no crepitus, no deformity and normal strength    + crepitus over right AC joint  + cross arm test  + tenderness right subacromial area  Mildly positive impingement sign  Negative empty can sign  Negative Speed test  Negative sulcus sign  Negative [place]'s    Lymphadenopathy:     He has no cervical adenopathy  Neurological: He is alert and oriented to person, place, and time  He has normal reflexes  No cranial nerve deficit  Skin: No rash noted  Psychiatric: He has a normal mood and affect  Nursing note and vitals reviewed          Recent Results (from the past 1344 hour(s))   Lipid panel    Collection Time: 02/05/19  6:41 AM   Result Value Ref Range    Total Cholesterol 251 (H) <200 mg/dL    HDL 81 >40 mg/dL    Triglycerides 68 <150 mg/dL    LDL Direct 154 (H) mg/dL (calc)    Chol HDLC Ratio 3 1 <5 0 (calc)    Non-HDL Cholesterol 170 (H) <130 mg/dL (calc)   Glucose, Plasma    Collection Time: 02/05/19  6:41 AM   Result Value Ref Range    Glucose, Plasma 102 (H) 65 - 99 mg/dL   PSA, Total Screen    Collection Time: 02/05/19  6:41 AM   Result Value Ref Range    Prostate Specific Antigen Total 0 5 < OR = 4 0 ng/mL   Hemoglobin A1c (w/out EAG)    Collection Time: 02/05/19  6:41 AM Result Value Ref Range    Hemoglobin A1C 5 3 <5 7 % of total Hgb

## 2019-03-08 ENCOUNTER — TELEPHONE (OUTPATIENT)
Dept: FAMILY MEDICINE CLINIC | Facility: CLINIC | Age: 59
End: 2019-03-08

## 2019-03-08 NOTE — TELEPHONE ENCOUNTER
Patient called wanting an antibiotic prescribed for him prior to going out of town  Stated he felt like he was getting the flu  I offered him an appt with the NP at [facility] and he declined  I suggested urgent care where he can be evaluated and he declined as well  I did tell him we would be unable to send in an antibiotic without him being seen by anyone

## 2019-10-08 ENCOUNTER — TELEPHONE (OUTPATIENT)
Dept: FAMILY MEDICINE CLINIC | Facility: CLINIC | Age: 59
End: 2019-10-08

## 2019-10-08 NOTE — TELEPHONE ENCOUNTER
Patient made phys appt for February  He wants to know if you can order labs prior   He uses [facility] [place]  I will mail them to him

## 2019-10-10 DIAGNOSIS — Z12.5 SCREENING FOR PROSTATE CANCER: ICD-10-CM

## 2019-10-10 DIAGNOSIS — R73.01 IMPAIRED FASTING GLUCOSE: ICD-10-CM

## 2019-10-10 DIAGNOSIS — E78.00 PURE HYPERCHOLESTEROLEMIA: Primary | ICD-10-CM

## 2019-10-14 ENCOUNTER — IMMUNIZATIONS (OUTPATIENT)
Dept: FAMILY MEDICINE CLINIC | Facility: CLINIC | Age: 59
End: 2019-10-14
Payer: COMMERCIAL

## 2019-10-14 DIAGNOSIS — Z23 NEED FOR IMMUNIZATION AGAINST INFLUENZA: Primary | ICD-10-CM

## 2019-10-14 PROCEDURE — 90471 IMMUNIZATION ADMIN: CPT

## 2019-10-14 PROCEDURE — 90682 RIV4 VACC RECOMBINANT DNA IM: CPT

## 2020-01-21 LAB
ALBUMIN SERPL-MCNC: 4.1 G/DL (ref 3.6–5.1)
ALBUMIN/GLOB SERPL: 1.6 (CALC) (ref 1–2.5)
ALP SERPL-CCNC: 54 U/L (ref 40–115)
ALT SERPL-CCNC: 26 U/L (ref 9–46)
AST SERPL-CCNC: 19 U/L (ref 10–35)
BILIRUB SERPL-MCNC: 0.8 MG/DL (ref 0.2–1.2)
BUN SERPL-MCNC: 16 MG/DL (ref 7–25)
BUN/CREAT SERPL: NORMAL (CALC) (ref 6–22)
CALCIUM SERPL-MCNC: 9.4 MG/DL (ref 8.6–10.3)
CHLORIDE SERPL-SCNC: 105 MMOL/L (ref 98–110)
CHOLEST SERPL-MCNC: 229 MG/DL
CHOLEST/HDLC SERPL: 3.3 (CALC)
CO2 SERPL-SCNC: 29 MMOL/L (ref 20–32)
CREAT SERPL-MCNC: 1.01 MG/DL (ref 0.7–1.33)
GLOBULIN SER CALC-MCNC: 2.5 G/DL (CALC) (ref 1.9–3.7)
GLUCOSE SERPL-MCNC: 93 MG/DL (ref 65–99)
HBA1C MFR BLD: 5.4 % OF TOTAL HGB
HDLC SERPL-MCNC: 69 MG/DL
LDLC SERPL CALC-MCNC: 145 MG/DL (CALC)
NONHDLC SERPL-MCNC: 160 MG/DL (CALC)
POTASSIUM SERPL-SCNC: 4.3 MMOL/L (ref 3.5–5.3)
PROT SERPL-MCNC: 6.6 G/DL (ref 6.1–8.1)
PSA SERPL-MCNC: 0.6 NG/ML
SL AMB EGFR AFRICAN AMERICAN: 94 ML/MIN/1.73M2
SL AMB EGFR NON AFRICAN AMERICAN: 81 ML/MIN/1.73M2
SODIUM SERPL-SCNC: 141 MMOL/L (ref 135–146)
TRIGL SERPL-MCNC: 49 MG/DL

## 2020-03-06 ENCOUNTER — OFFICE VISIT (OUTPATIENT)
Dept: FAMILY MEDICINE CLINIC | Facility: CLINIC | Age: 60
End: 2020-03-06
Payer: COMMERCIAL

## 2020-03-06 VITALS
HEIGHT: 71 IN | RESPIRATION RATE: 16 BRPM | HEART RATE: 74 BPM | BODY MASS INDEX: 29.26 KG/M2 | DIASTOLIC BLOOD PRESSURE: 78 MMHG | SYSTOLIC BLOOD PRESSURE: 132 MMHG | WEIGHT: 209 LBS | TEMPERATURE: 97.5 F

## 2020-03-06 DIAGNOSIS — Z12.11 SCREENING FOR COLON CANCER: ICD-10-CM

## 2020-03-06 DIAGNOSIS — R25.1 TREMOR: ICD-10-CM

## 2020-03-06 DIAGNOSIS — Z00.00 WELL ADULT EXAM: Primary | ICD-10-CM

## 2020-03-06 DIAGNOSIS — R73.01 IMPAIRED FASTING GLUCOSE: ICD-10-CM

## 2020-03-06 DIAGNOSIS — E78.00 PURE HYPERCHOLESTEROLEMIA: ICD-10-CM

## 2020-03-06 PROCEDURE — 99396 PREV VISIT EST AGE 40-64: CPT | Performed by: FAMILY MEDICINE

## 2020-03-06 RX ORDER — COLLAGEN, HYDROLYSATE (BOVINE) 100 %
1 POWDER (GRAM) MISCELLANEOUS DAILY
COMMUNITY

## 2020-03-06 NOTE — PROGRESS NOTES
Assessment/Plan:     Diagnoses and all orders for this visit:    Well adult exam    Pure hypercholesterolemia    Impaired fasting glucose    Tremor  -     Ambulatory referral to Neurology; Future    Screening for colon cancer  -     Ambulatory referral to Gastroenterology; Future    Other orders  -     Cancel: Cologuard; Future  -     Collagen Hydrolysate POWD; 1 Dose by Does not apply route daily            Continue with diet, weight loss and exercise  He was not interested in statin therapy  Could consider CT coronary artery Ca++ score  GI referral for colon CA screening  Neurology evaluation for tremor  Up to date with flu vaccine and shingles vaccine  BMI Counseling: Body mass index is 29 56 kg/m²  The BMI is above normal  Nutrition recommendations include reducing portion sizes, decreasing overall calorie intake, consuming healthier snacks, moderation in carbohydrate intake, reducing intake of saturated fat and trans fat and reducing intake of cholesterol  Exercise recommendations include exercising 3-5 times per week  The 10-year ASCVD risk score (Emma Koroma et al , 2013) is: 8 2%    Values used to calculate the score:      Age: 61 years      Sex: Male      Is Non- : No      Diabetic: No      Tobacco smoker: No      Systolic Blood Pressure: 286 mmHg      Is BP treated: No      HDL Cholesterol: 69 mg/dL      Total Cholesterol: 229 mg/dL       Patient ID: Leonel Erwin is a 61 y o  male  59-year-old male here for wellness exam  Medications reviewed Hyperlipidemia with high HDL   01/2020 Lipid profile cholesterol 229 decreased from 251  Triglycerides 49  HDL 69    Impaired fasting glucose  01/2020 FBS 93 decreased from 102  A1c 5 4  + FH type 2 DM father    HS in [place]   3 children  Non smoker           Current Outpatient Medications:     Collagen Hydrolysate POWD, 1 Dose by Does not apply route daily, Disp: , Rfl:     Omega-3 Fatty Acids (FISH OIL) 1,000 mg, Take 1,000 mg by mouth daily, Disp: , Rfl:     valACYclovir (VALTREX) 1,000 mg tablet, Take 2 tablets (2,000 mg total) by mouth 2 (two) times a day for 1 day, Disp: 4 tablet, Rfl: 5    methocarbamol (ROBAXIN) 750 mg tablet, Take 750 mg by mouth 4 (four) times a day as needed, Disp: , Rfl:     The following portions of the patient's history were reviewed and updated as appropriate: allergies, current medications, past family history, past medical history, past social history, past surgical history and problem list     Review of Systems   Constitutional: Negative for appetite change, chills, fever and unexpected weight change  HENT: Negative for congestion, ear pain, rhinorrhea, sore throat and trouble swallowing  Eyes: Negative for visual disturbance  Current with eye exam     Respiratory: Negative for cough, shortness of breath and wheezing  Cardiovascular: Negative for chest pain, palpitations and leg swelling  Gastrointestinal: Negative for abdominal pain, blood in stool, constipation, diarrhea, nausea and vomiting  Colonoscopy at age 48  No polyps  Endocrine: Negative for polydipsia and polyuria  Genitourinary: Negative for difficulty urinating  01/2020 PSA 0 6   Musculoskeletal: Negative for arthralgias and myalgias  S/p fall this summer with injury to right shoulder  Persistent pain although symptoms have gradually improved  Skin: Negative for rash  Intermittent cold sores  Allergic/Immunologic: Negative for environmental allergies  Neurological: Positive for tremors  Negative for dizziness and headaches  Tremor hands R > L worse with writing, holding a cup  No head tremor  + FH tremor maternal GM  At times reports feeling off balance  Episode of getting lost driving home  Another episode where he could not locate his car in the parking lot  Hematological: Negative for adenopathy  Does not bruise/bleed easily  Psychiatric/Behavioral: Negative for behavioral problems, dysphoric mood and sleep disturbance  Objective:      /78   Pulse 74   Temp 97 5 °F (36 4 °C)   Resp 16   Ht 5' 10 5" (1 791 m)   Wt 94 8 kg (209 lb)   BMI 29 56 kg/m²     Wt Readings from Last 3 Encounters:   03/06/20 94 8 kg (209 lb)   02/12/19 95 3 kg (210 lb)   02/12/18 91 9 kg (202 lb 9 6 oz)        Physical Exam   Constitutional: He is oriented to person, place, and time  He appears well-developed and well-nourished  No distress  HENT:   Right Ear: Tympanic membrane normal    Left Ear: Tympanic membrane normal    Mouth/Throat: Oropharynx is clear and moist    Eyes: Pupils are equal, round, and reactive to light  Conjunctivae and EOM are normal  No scleral icterus  Neck: No JVD present  Carotid bruit is not present  No tracheal deviation present  No thyroid mass and no thyromegaly present  Cardiovascular: Normal rate, regular rhythm and normal heart sounds  Exam reveals no gallop  No murmur heard  Pulses:       Carotid pulses are 2+ on the right side, and 2+ on the left side  Pulmonary/Chest: Effort normal and breath sounds normal  No respiratory distress  He has no wheezes  He has no rales  Abdominal: Soft  Bowel sounds are normal  He exhibits no distension, no abdominal bruit and no mass  There is no hepatosplenomegaly  There is no tenderness  There is no rebound and no guarding  Musculoskeletal: Normal range of motion  He exhibits no edema  Lymphadenopathy:     He has no cervical adenopathy  Neurological: He is alert and oriented to person, place, and time  He has normal strength  He displays no tremor and normal reflexes  No cranial nerve deficit or sensory deficit  He exhibits normal muscle tone  Coordination and gait normal    Hand  normal  No pronator drift  No rest tremor  + action tremor  No cog wheeling or rigidity  Finger to nose normal     Skin: No rash noted  No cyanosis  Nails show no clubbing  Psychiatric: He has a normal mood and affect  Nursing note and vitals reviewed          Recent Results (from the past 1344 hour(s))   Comprehensive metabolic panel    Collection Time: 01/20/20 11:35 AM   Result Value Ref Range    Glucose, Random 93 65 - 99 mg/dL    BUN 16 7 - 25 mg/dL    Creatinine 1 01 0 70 - 1 33 mg/dL    eGFR Non  81 > OR = 60 mL/min/1 73m2    eGFR  94 > OR = 60 mL/min/1 73m2    SL AMB BUN/CREATININE RATIO NOT APPLICABLE 6 - 22 (calc)    Sodium 141 135 - 146 mmol/L    Potassium 4 3 3 5 - 5 3 mmol/L    Chloride 105 98 - 110 mmol/L    CO2 29 20 - 32 mmol/L    Calcium 9 4 8 6 - 10 3 mg/dL    Protein, Total 6 6 6 1 - 8 1 g/dL    Albumin 4 1 3 6 - 5 1 g/dL    Globulin 2 5 1 9 - 3 7 g/dL (calc)    Albumin/Globulin Ratio 1 6 1 0 - 2 5 (calc)    TOTAL BILIRUBIN 0 8 0 2 - 1 2 mg/dL    Alkaline Phosphatase 54 40 - 115 U/L    AST 19 10 - 35 U/L    ALT 26 9 - 46 U/L   Lipid panel    Collection Time: 01/20/20 11:35 AM   Result Value Ref Range    Total Cholesterol 229 (H) <200 mg/dL    HDL 69 >40 mg/dL    Triglycerides 49 <150 mg/dL    LDL Direct 145 (H) mg/dL (calc)    Chol HDLC Ratio 3 3 <5 0 (calc)    Non-HDL Cholesterol 160 (H) <130 mg/dL (calc)   PSA, Total Screen    Collection Time: 01/20/20 11:35 AM   Result Value Ref Range    Prostate Specific Antigen Total 0 6 < OR = 4 0 ng/mL   Hemoglobin A1c (w/out EAG)    Collection Time: 01/20/20 11:35 AM   Result Value Ref Range    Hemoglobin A1C 5 4 <5 7 % of total Hgb         Lab Results   Component Value Date    CHOLESTEROL 229 (H) 01/20/2020    CHOLESTEROL 251 (H) 02/05/2019    CHOLESTEROL 258 (H) 02/07/2018     Lab Results   Component Value Date    HDL 69 01/20/2020    HDL 81 02/05/2019    HDL 86 02/07/2018     Lab Results   Component Value Date    TRIG 49 01/20/2020    TRIG 68 02/05/2019    TRIG 74 02/07/2018     Lab Results   Component Value Date    NONHDLC 182 (H) 02/10/2017

## 2020-04-22 ENCOUNTER — TELEPHONE (OUTPATIENT)
Dept: NEUROLOGY | Facility: CLINIC | Age: 60
End: 2020-04-22

## 2020-04-27 ENCOUNTER — APPOINTMENT (EMERGENCY)
Dept: RADIOLOGY | Facility: HOSPITAL | Age: 60
End: 2020-04-27
Payer: COMMERCIAL

## 2020-04-27 ENCOUNTER — HOSPITAL ENCOUNTER (EMERGENCY)
Facility: HOSPITAL | Age: 60
Discharge: HOME | End: 2020-04-27
Attending: EMERGENCY MEDICINE
Payer: COMMERCIAL

## 2020-04-27 VITALS
SYSTOLIC BLOOD PRESSURE: 158 MMHG | RESPIRATION RATE: 18 BRPM | OXYGEN SATURATION: 96 % | BODY MASS INDEX: 27.72 KG/M2 | WEIGHT: 195.99 LBS | HEART RATE: 76 BPM | DIASTOLIC BLOOD PRESSURE: 92 MMHG | TEMPERATURE: 97.9 F

## 2020-04-27 DIAGNOSIS — R05.9 COUGH: Primary | ICD-10-CM

## 2020-04-27 LAB
ANION GAP SERPL CALCULATED.3IONS-SCNC: 8 MMOL/L (ref 4–13)
ATRIAL RATE: 70 BPM
BASOPHILS # BLD AUTO: 0.05 THOUSANDS/ΜL (ref 0–0.1)
BASOPHILS NFR BLD AUTO: 1 % (ref 0–1)
BUN SERPL-MCNC: 13 MG/DL (ref 5–25)
CALCIUM SERPL-MCNC: 8.8 MG/DL (ref 8.3–10.1)
CHLORIDE SERPL-SCNC: 104 MMOL/L (ref 100–108)
CO2 SERPL-SCNC: 27 MMOL/L (ref 21–32)
CREAT SERPL-MCNC: 0.94 MG/DL (ref 0.6–1.3)
EOSINOPHIL # BLD AUTO: 0.2 THOUSAND/ΜL (ref 0–0.61)
EOSINOPHIL NFR BLD AUTO: 2 % (ref 0–6)
ERYTHROCYTE [DISTWIDTH] IN BLOOD BY AUTOMATED COUNT: 12.2 % (ref 11.6–15.1)
GFR SERPL CREATININE-BSD FRML MDRD: 88 ML/MIN/1.73SQ M
GLUCOSE SERPL-MCNC: 94 MG/DL (ref 65–140)
HCT VFR BLD AUTO: 43.9 % (ref 36.5–49.3)
HGB BLD-MCNC: 14.5 G/DL (ref 12–17)
IMM GRANULOCYTES # BLD AUTO: 0.06 THOUSAND/UL (ref 0–0.2)
IMM GRANULOCYTES NFR BLD AUTO: 1 % (ref 0–2)
LYMPHOCYTES # BLD AUTO: 1.51 THOUSANDS/ΜL (ref 0.6–4.47)
LYMPHOCYTES NFR BLD AUTO: 18 % (ref 14–44)
MCH RBC QN AUTO: 30.3 PG (ref 26.8–34.3)
MCHC RBC AUTO-ENTMCNC: 33 G/DL (ref 31.4–37.4)
MCV RBC AUTO: 92 FL (ref 82–98)
MONOCYTES # BLD AUTO: 0.83 THOUSAND/ΜL (ref 0.17–1.22)
MONOCYTES NFR BLD AUTO: 10 % (ref 4–12)
NEUTROPHILS # BLD AUTO: 5.85 THOUSANDS/ΜL (ref 1.85–7.62)
NEUTS SEG NFR BLD AUTO: 68 % (ref 43–75)
NRBC BLD AUTO-RTO: 0 /100 WBCS
P AXIS: 43 DEGREES
PLATELET # BLD AUTO: 420 THOUSANDS/UL (ref 149–390)
PMV BLD AUTO: 7.9 FL (ref 8.9–12.7)
POTASSIUM SERPL-SCNC: 3.8 MMOL/L (ref 3.5–5.3)
PR INTERVAL: 166 MS
QRS AXIS: 77 DEGREES
QRSD INTERVAL: 92 MS
QT INTERVAL: 378 MS
QTC INTERVAL: 408 MS
RBC # BLD AUTO: 4.78 MILLION/UL (ref 3.88–5.62)
SARS-COV-2 RNA RESP QL NAA+PROBE: NEGATIVE
SODIUM SERPL-SCNC: 139 MMOL/L (ref 136–145)
T WAVE AXIS: 32 DEGREES
TROPONIN I SERPL-MCNC: <0.02 NG/ML
VENTRICULAR RATE: 70 BPM
WBC # BLD AUTO: 8.5 THOUSAND/UL (ref 4.31–10.16)

## 2020-04-27 PROCEDURE — 99284 EMERGENCY DEPT VISIT MOD MDM: CPT

## 2020-04-27 PROCEDURE — 93005 ELECTROCARDIOGRAM TRACING: CPT

## 2020-04-27 PROCEDURE — 80048 BASIC METABOLIC PNL TOTAL CA: CPT | Performed by: NURSE PRACTITIONER

## 2020-04-27 PROCEDURE — 87635 SARS-COV-2 COVID-19 AMP PRB: CPT | Performed by: NURSE PRACTITIONER

## 2020-04-27 PROCEDURE — 93010 ELECTROCARDIOGRAM REPORT: CPT | Performed by: INTERNAL MEDICINE

## 2020-04-27 PROCEDURE — 85025 COMPLETE CBC W/AUTO DIFF WBC: CPT | Performed by: NURSE PRACTITIONER

## 2020-04-27 PROCEDURE — 99283 EMERGENCY DEPT VISIT LOW MDM: CPT | Performed by: NURSE PRACTITIONER

## 2020-04-27 PROCEDURE — 71045 X-RAY EXAM CHEST 1 VIEW: CPT

## 2020-04-27 PROCEDURE — 84484 ASSAY OF TROPONIN QUANT: CPT | Performed by: NURSE PRACTITIONER

## 2020-04-27 PROCEDURE — 36415 COLL VENOUS BLD VENIPUNCTURE: CPT | Performed by: NURSE PRACTITIONER

## 2020-05-14 ENCOUNTER — TELEPHONE (OUTPATIENT)
Dept: FAMILY MEDICINE CLINIC | Facility: CLINIC | Age: 60
End: 2020-05-14

## 2020-05-15 DIAGNOSIS — J06.9 ACUTE URI: Primary | ICD-10-CM

## 2020-05-15 RX ORDER — AZITHROMYCIN 250 MG/1
TABLET, FILM COATED ORAL
Qty: 6 TABLET | Refills: 0 | Status: SHIPPED | OUTPATIENT
Start: 2020-05-15 | End: 2020-05-19

## 2020-05-15 RX ORDER — BENZONATATE 200 MG/1
200 CAPSULE ORAL 3 TIMES DAILY PRN
Qty: 20 CAPSULE | Refills: 0 | Status: SHIPPED | OUTPATIENT
Start: 2020-05-15 | End: 2020-12-15 | Stop reason: ALTCHOICE

## 2020-06-17 ENCOUNTER — TELEPHONE (OUTPATIENT)
Dept: NEUROLOGY | Facility: CLINIC | Age: 60
End: 2020-06-17

## 2020-07-29 ENCOUNTER — CONSULT (OUTPATIENT)
Dept: NEUROLOGY | Facility: CLINIC | Age: 60
End: 2020-07-29
Payer: COMMERCIAL

## 2020-07-29 VITALS
WEIGHT: 201.7 LBS | TEMPERATURE: 98.1 F | DIASTOLIC BLOOD PRESSURE: 90 MMHG | SYSTOLIC BLOOD PRESSURE: 160 MMHG | BODY MASS INDEX: 28.88 KG/M2 | HEIGHT: 70 IN

## 2020-07-29 DIAGNOSIS — R25.1 TREMOR: ICD-10-CM

## 2020-07-29 PROCEDURE — 3008F BODY MASS INDEX DOCD: CPT | Performed by: PSYCHIATRY & NEUROLOGY

## 2020-07-29 PROCEDURE — 99204 OFFICE O/P NEW MOD 45 MIN: CPT | Performed by: PSYCHIATRY & NEUROLOGY

## 2020-07-29 PROCEDURE — 1036F TOBACCO NON-USER: CPT | Performed by: PSYCHIATRY & NEUROLOGY

## 2020-07-29 NOTE — PATIENT INSTRUCTIONS
Very nice to meet you today  Let us know if you end up wanting to go for the MARÍA scan       Before your MARÍA scan, stop taking these medicines for the length of time shown:    Name of Drug Stop Taking   Amoxapine 4 days before   Benztropine   Cogentin (anticholinergic drugs)  3 days before   Bupropion (Wellbutrin, Zyban) 48 hours before   Buspirone 15 hours before   Citalopram/Escitalopram 24 hours before   Cocaine 6 hours before   Methamphetamine 24 hours before   Methylphenidate (Concerta, Metadate, Methylin, Ritalin) 20 hours before   Paroxetine 24 hours before   Selegilene 48 hours before   Sertraline  (other antidepressants)   3 days before   Modafinil    Opioids

## 2020-07-29 NOTE — ASSESSMENT & PLAN NOTE
Marcos German is a 61year-old highschool  who presents for evaluation tremor, symptom onset around 2018 with right> left action tremor  On examination the patient had a rest and postural tremor on the right, minimal if any kinetic tremor  Also had mild rigidity on the right and slight bradykinesia bilaterally with decrement on the right  His gait was fairly normal with good postural reflexes  No typical non motor symptoms associated with Parkinson's disease  We discussed today that the patient has a mixture of tremor signs and symptoms  Some aspects of his history are suggestive of essential tremor, specifically a tremor that is most bothersome with action, such as writing and eating  However on examination he does have subtle right-sided parkinsonism  We discussed that there are few different strategies to increase our certainty regarding a diagnosis  We discussed different options regarding medication management of tremor  Eventually we agreed to acquire a MARÍA scan, if covered by his insurance, to differentiate between essential tremor and Parkinson's disease  Was not interested in any medication trials at this time which is very reasonable

## 2020-07-29 NOTE — PROGRESS NOTES
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  who presents for evaluation tremor, symptom onset around 2018 with right> left action tremor  On examination the patient had a rest and postural tremor on the right, minimal if any kinetic tremor  Also had mild rigidity on the right and slight bradykinesia bilaterally with decrement on the right  His gait was fairly normal with good postural reflexes  No typical non motor symptoms associated with Parkinson's disease  We discussed today that the patient has a mixture of tremor signs and symptoms  Some aspects of his history are suggestive of essential tremor, specifically a tremor that is most bothersome with action, such as writing and eating  However on examination he does have subtle right-sided parkinsonism  We discussed that there are few different strategies to increase our certainty regarding a diagnosis  We discussed different options regarding medication management of tremor  Eventually we agreed to acquire a ADEN scan, if covered by his insurance, to differentiate between essential tremor and Parkinson's disease  Was not interested in any medication trials at this time which is very reasonable  Diagnoses and all orders for this visit:    Tremor  -     Ambulatory referral to Neurology  -     NM brain aden scan w/Rx; Future  -     NM consultation for aden scan; Future    Other orders  -     ibuprofen (ADVIL,MOTRIN) 100 MG tablet; Take 100 mg by mouth every 6 (six) hours as needed for mild pain        Subjective:     Patient ID: Marifer Lara is a 61 y o  male  I had the pleasure of seeing your patient, Marifer Lara in the Movement Disorders Clinic at the [facility] for Neuroscience  The patient is a 61 y o  right handed male who presents for evaluation of tremor  The patient reports that he 1st noticed a tremor about 2 years ago    The tremor was evident after exerting himself such as working out or doing yard work  Slowly over time the tremor started to present more often  He now notices the tremor when holding a pen or trying to eat  The patient is a  of a high school in [place]  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 noticed the tremor and recommended he bring it up to his primary care doctor, prompting today's referral   The patient has never seen a neurologist in the past   He has never been on medications for tremor in the past   No exposure to neuroleptics in the past   No family history of tremor or other neurologic disease other than multiple sclerosis in his maternal grandfather  Regarding motor symptoms:   Tremor: bl hands  No where else  Drops things randomly  Never at rest    Slowness: balance limits dressing  Not otherwise   Stiffness: not really  Arthritis in the hands  Dystonia: maybe striatal posture on the right  Changes in facial expression: no   Changes in voice: no  Changes in writing: messy  Changes in gait: balance is a little off  Falls: close calls on the steps  Freezing: no   Trouble with swallowing: no   Clumsiness/dexterity: some limitation with buttons - arthritis in the hands      Regarding non-motor symptoms:   Anosmia: no   Constipation: no   Lightheadedness: occasionally   Urine troubles: some urgency   Changes in Mood: nothing major   Trouble with sleep: nocturia 3-4x     REM behavior Disorder: no   Memory trouble: takes notes  Coffee: 2 cups in the morning  Reduced dose helped tremor   Alcohol: 2 beers per day with dinner   Small reduction in tremor with alcohol       The following portions of the patient's history were reviewed and updated as appropriate: allergies, current medications, past family history, past medical history, past social history, past surgical history and problem list       Objective:  /90   Temp 98 1 °F (36 7 °C)   Ht 5' 10" (1 778 m)   Wt 91 5 kg (201 lb 11 2 oz)   BMI 28 94 kg/m²     Physical Exam    Neurological Exam   GENERAL MEDICAL EXAMINATION:  General appearance: alert, in no apparent distress  Appropriately dressed and groomed  Conversing and interacting appropriately  Eyes: Sclera are non-injected  Ears, nose, Mouth, Throat: Mucous membranes are moist    Resp: Breathing comfortably on RA   Musculoskeletal: No evidence of deformities  No contractures  No Edema  Skin: No visible rashes  Warm and well perfused  Psych: normal and appropriate affect     Mental Status:  Alert and oriented to person place and time  Able to relate history without difficulty  Attentive to conversation  Language is fluent and appropriate with normal prosody  There were no paraphasic errors  Speech was not dysarthric  Able to follow both midline and appendicular commands  General Neurology examination:   PERRL, EOMI, Face activates symmetrically  Normal bulk throughout  Patient moves all 4 extremities equally and antigravity  No pronator drift  Normal sensation to light touch and temperature in all 4 extremities  Finger to nose without dysmetria bilaterally  No intention tremor  JAZZ were accurate and symmetric with regard to rm and speed      UPDRS motor:                              Time since last dose:  x     Speech  0     Facial Expression  1     Rigidity - Neck  0     Rigidity - Upper Extremity (Right)  2     Rigidity - Upper Extremity (Left)   1     Rigidity - Lower Extremity (Right)  0     Rigidity - Lower Extremity (Left)   0     Finger Taps (Right)   1 dec    Finger Taps (Left)   1  hes   Hand Movement (Right)  0 mirror    Hand Movement (Left)   0     Pronation/Supination (Right)  0     Pronation/Supination (Left)   0     Toe Tapping (Right) 0     Toe Tapping (Left) 0     Leg Agility (Right)  0     Leg Agility (Left)   1     Arising from Chair   0     Gait   0     Freezing of Gait 0     Postural Stability   0     Posture 0     Global spontaneity of movement 1     Postural Tremor (Right) 2     Postural Tremor (Left) 1     Kinetic Tremor (Right)  1     Kinetic Tremor (Left)  1     Rest tremor amplitude RUE 1 distraction     Rest tremor amplitude LUE 0     Rest tremor amplitude RLE 0     Reset tremor amplitude LLE 0     Lip/Jaw Tremor  0     Consistency of tremor 1     Motor Exam Total:          Head tremor 0  Voice tremor 0    Dysmetria: none on FNF  Dyskinesia: none  Dystonia: none     Stance: narrow-based and stable   Stride: normal speed, stride length, step height, walks with no assistive device   Arm swing: symmetric, not reduced   Turn: stable, 2-3 steps     Strength:   Delt Bi Tri We FE FF    C5 C6 C7 C6 C7 C8/T1   R 5 5 5 5 5 5   L 5 5 5 5 5 5      IP Quad Hamst TA    L2 L3 L4-S1 L4   R 5 5 5 5   L 5 5 5 5     Reflexes:  2+ and symmetric throughout      Review of Systems   Constitutional: Negative  Negative for appetite change and fever  HENT: Negative  Negative for hearing loss, tinnitus, trouble swallowing and voice change  Eyes: Negative  Negative for photophobia and pain  Respiratory: Negative  Negative for shortness of breath  Cardiovascular: Negative  Negative for palpitations  Gastrointestinal: Negative  Negative for nausea and vomiting  Endocrine: Negative  Negative for cold intolerance  Genitourinary: Negative  Negative for dysuria, frequency and urgency  Musculoskeletal: Positive for gait problem  Negative for myalgias and neck pain  Skin: Negative  Negative for rash  Neurological: Positive for tremors (bilateral hands)  Negative for dizziness, seizures, syncope, facial asymmetry, speech difficulty, weakness, light-headedness, numbness and headaches  Hematological: Negative  Does not bruise/bleed easily  Psychiatric/Behavioral: Positive for confusion and sleep disturbance (trouble staying asleep)  Negative for hallucinations  The patient is nervous/anxious  The above ROS was reviewed and updated       Luciana Burks MD  Medical Director Movement Disorders Center  Movement and Memory Specialist       Current Outpatient Medications on File Prior to Visit   Medication Sig Dispense Refill    Collagen Hydrolysate POWD 1 Dose by Does not apply route daily      ibuprofen (ADVIL,MOTRIN) 100 MG tablet Take 100 mg by mouth every 6 (six) hours as needed for mild pain      benzonatate (TESSALON) 200 MG capsule Take 1 capsule (200 mg total) by mouth 3 (three) times a day as needed for cough (Patient not taking: Reported on 7/29/2020) 20 capsule 0    methocarbamol (ROBAXIN) 750 mg tablet Take 750 mg by mouth 4 (four) times a day as needed      Omega-3 Fatty Acids (FISH OIL) 1,000 mg Take 1,000 mg by mouth daily      valACYclovir (VALTREX) 1,000 mg tablet Take 2 tablets (2,000 mg total) by mouth 2 (two) times a day for 1 day 4 tablet 5     No current facility-administered medications on file prior to visit

## 2020-07-30 ENCOUNTER — HOSPITAL ENCOUNTER (OUTPATIENT)
Dept: RADIOLOGY | Facility: HOSPITAL | Age: 60
Discharge: HOME | End: 2020-07-30

## 2020-07-30 DIAGNOSIS — R25.1 TREMOR: ICD-10-CM

## 2020-07-31 ENCOUNTER — TELEPHONE (OUTPATIENT)
Dept: NEUROLOGY | Facility: CLINIC | Age: 60
End: 2020-07-31

## 2020-07-31 NOTE — TELEPHONE ENCOUNTER
pt called and states that he spoke to a dr at [facility] who treated his son and they would like office note for review    advised that he can call medical records to make this request   call transfered to medical records

## 2020-07-31 NOTE — TELEPHONE ENCOUNTER
Pt called back and states that he spoke to medical records and they told him that they don't get records for 6 months as they are changing computer systems  I made him aware that we were not told about this  They advised that he call back our office and ask to speak to an   I made him aware that he would need to sign ANNA to have our office to fax this info  He would like his office note faxed to     attn dr Khris West  [phone]  He would like an [address] [place] [address] emailed to him at  [email or web address]  Faxed blank ANNA    He will either fax back or bring signed form to the office

## 2020-08-11 ENCOUNTER — TELEPHONE (OUTPATIENT)
Dept: FAMILY MEDICINE CLINIC | Facility: CLINIC | Age: 60
End: 2020-08-11

## 2020-08-11 ENCOUNTER — CLINICAL SUPPORT (OUTPATIENT)
Dept: FAMILY MEDICINE CLINIC | Facility: CLINIC | Age: 60
End: 2020-08-11
Payer: COMMERCIAL

## 2020-08-11 DIAGNOSIS — Z23 NEED FOR TDAP VACCINATION: ICD-10-CM

## 2020-08-11 DIAGNOSIS — S61.411A LACERATION OF RIGHT HAND WITHOUT FOREIGN BODY, INITIAL ENCOUNTER: Primary | ICD-10-CM

## 2020-08-11 PROCEDURE — 90715 TDAP VACCINE 7 YRS/> IM: CPT

## 2020-08-11 PROCEDURE — 90471 IMMUNIZATION ADMIN: CPT

## 2020-08-11 NOTE — TELEPHONE ENCOUNTER
Patient was in office today to receive Tdap  Patient stated that he was cleaning up after rain and cut his hands on rust nail  Hands have small scabbed areas on them, slight redness no swelling or drainage  Patient states that he has been applying Bacitracin  Informed patient to call office if he should develop increase redness, swelling, drainage or fever  Patient agreed to instructions

## 2020-08-26 ENCOUNTER — PREP FOR PROCEDURE (OUTPATIENT)
Dept: GASTROENTEROLOGY | Facility: CLINIC | Age: 60
End: 2020-08-26

## 2020-08-26 DIAGNOSIS — Z12.11 SCREEN FOR COLON CANCER: Primary | ICD-10-CM

## 2020-08-26 NOTE — TELEPHONE ENCOUNTER
08/26/20  Screened by: Eileen Javier    Referring Provider Dr Rachana Rome: If patient answers NO to medical questions, then schedule procedure  If patient answers YES to medical questions, then schedule office appointment  Previous Colonoscopy no  Date and Facility of last colonoscopy? n/a    Comments: n/a        Pre- Screening: There is no height or weight on file to calculate BMI  Has patient been referred for a routine screening Colonoscopy? yes  Is the patient between 39-70 years old? yes    SCHEDULING STAFF   If the patient is between 45yrs-49yrs, please advise patient to confirm benefits/coverage with their insurance company for a routine screening colonoscopy, some insurance carriers will only cover at [phone] or older   If the patient is over 76years old, please schedule an office visit   If the patient had a previous colonoscopy send to the procedure  before continuing    Have you been diagnosed with a bleeding disorder or anemia? no    Do you take Other blood thinning medications no    Have you been diagnosed with Diabetes or are you taking any   Diabetic medications? no    Do you have any of the following symptoms? NO    Have you had a coronary or vascular stent within the last year? no    Have you had a heart attack or stroke in the last 6 months? no    Have you had intestinal surgery in the last 3 months? no    Do you have problems with: NO    Do you use: NO    Have you been hospitalized in the last Month? no    Have you had chest pain (angina) or breathing problems  (COPD) in the last 3 months? no    Do you have any difficulty walking up a flight of stairs? no    Have you had Kidney failure or insufficiency? no    Have you had heart valve surgery? no    Are you confined to a wheelchair?  no

## 2020-08-27 RX ORDER — SODIUM, POTASSIUM,MAG SULFATES 17.5-3.13G
SOLUTION, RECONSTITUTED, ORAL ORAL
Qty: 360 ML | Refills: 0 | Status: SHIPPED | OUTPATIENT
Start: 2020-08-27 | End: 2020-12-15 | Stop reason: ALTCHOICE

## 2020-08-28 NOTE — TELEPHONE ENCOUNTER
OA colon rescheduled to 10/19/20 with Dr Quincy Lang @[facility] due to a change in the schedule, patient aware

## 2020-09-10 ENCOUNTER — HOSPITAL ENCOUNTER (OUTPATIENT)
Dept: RADIOLOGY | Facility: HOSPITAL | Age: 60
Discharge: HOME | End: 2020-09-10
Attending: PSYCHIATRY & NEUROLOGY
Payer: COMMERCIAL

## 2020-09-10 ENCOUNTER — TELEPHONE (OUTPATIENT)
Dept: NEUROLOGY | Facility: CLINIC | Age: 60
End: 2020-09-10

## 2020-09-10 DIAGNOSIS — R25.1 TREMOR: ICD-10-CM

## 2020-09-10 PROCEDURE — 78803 RP LOCLZJ TUM SPECT 1 AREA: CPT

## 2020-09-10 PROCEDURE — A9584 IODINE I-123 IOFLUPANE: HCPCS

## 2020-09-10 RX ADMIN — POTASSIUM IODIDE 130 MG: 1 SOLUTION ORAL at 08:58

## 2020-09-10 NOTE — TELEPHONE ENCOUNTER
Patient stated he just had his MARÍA scan today  Is requesting a call back once the report is back and reviewed by Dr Yaa Feliz  Okay to leave message

## 2020-09-11 NOTE — TELEPHONE ENCOUNTER
GABBY  I called and spoke to the patient today to review his MARÍA scan which is consistent with the diagnosis of parkinson's disease  We discussed the importance of staying physically active and eating healthy diet  Answered all of his questions the best my ability  I will see him in follow-up in a couple of months to discuss medication options

## 2020-09-21 ENCOUNTER — OFFICE VISIT (OUTPATIENT)
Dept: NEUROLOGY | Facility: CLINIC | Age: 60
End: 2020-09-21
Payer: COMMERCIAL

## 2020-09-21 VITALS
HEART RATE: 73 BPM | DIASTOLIC BLOOD PRESSURE: 82 MMHG | WEIGHT: 201 LBS | TEMPERATURE: 97.3 F | SYSTOLIC BLOOD PRESSURE: 164 MMHG | BODY MASS INDEX: 28.77 KG/M2 | HEIGHT: 70 IN

## 2020-09-21 DIAGNOSIS — G20 PARKINSON DISEASE (HCC): Primary | ICD-10-CM

## 2020-09-21 PROBLEM — G20.A1 PARKINSON DISEASE: Status: ACTIVE | Noted: 2020-09-21

## 2020-09-21 PROCEDURE — 99215 OFFICE O/P EST HI 40 MIN: CPT | Performed by: PSYCHIATRY & NEUROLOGY

## 2020-09-21 PROCEDURE — 1036F TOBACCO NON-USER: CPT | Performed by: PSYCHIATRY & NEUROLOGY

## 2020-09-21 NOTE — PATIENT INSTRUCTIONS
Parkinson's Disease Resources     Helpful web sites   -  www [employer] [facility]  org   -  www parkinson  org (the [place])   -  www [employer] ([address] for Appy Couple) - Order the "Every Victory Counts" fela under the "resources" tab  Or visit [facility] org/EVC or call [phone]  - [facility]  org/resources/parkinsons-exercise-essentials   - Contact the [employer] for an "Aware in care kit" @ 4243.459.2910 (this is a kit to take with you if you ever have to go to the hospital)   - www pmdalliance  org  -  parkinsons  marisela edu/exercise_videos  html  - [address] [facility] Exercise helpline: [phone]  - [facility]

## 2020-09-21 NOTE — PROGRESS NOTES
Assessment/Plan:    Parkinson disease [facility])  71-year-old man presents in follow-up for tremor  We reviewed his MARÍA scan which does show clear decreased uptake in the bilateral basal ganglia, more so on the left  This reflects his right greater than left parkinsonism in tremor, which is very subtle on examination today  We reviewed his MARÍA scan images together and compare them to controls  We discussed the prognosis diagnosis and treatment of Parkinson's disease  We reviewed some resources literature together  We discussed conversations with his family and employer  We reviewed different options regarding medication management  This point given the patient has very mild signs and symptoms and his personal goals of minimizing prescription medications will hold off on initiating any treatment at this point  Will continue to monitor him clinically over time initiate medications when appropriate  We also discussed the patient's emerging anxiety which can be related to his Parkinson's disease  We discussed potential treatments for this is well including our mindfulness program for newly diagnosed Parkinson's patients per      Diagnoses and all orders for this visit:    Parkinson disease ([facility] [address] )    I have spent 40 minutes with Patient  today in which greater than 50% of this time was spent in counseling/coordination of care regarding Diagnostic results, Prognosis, Risks and benefits of tx options, Intructions for management, Patient and family education, Risk factor reductions and Impressions  Subjective:     Patient ID: Maria Eugenia Morillo 61 y o  male    I had the pleasure of seeing your patient, Maria Eugenia Morillo in the Movement Disorders Clinic at the [facility] for [facility]science  Ann Wadsworth is a 61year-old highschool  who presents for evaluation tremor, symptom onset around 2018 with right> left action tremor    On examination the patient had a rest and postural tremor on the right, minimal if any kinetic tremor  Also had mild rigidity on the right and slight bradykinesia bilaterally with decrement on the right  His gait was fairly normal with good postural reflexes  No typical non motor symptoms associated with Parkinson's disease  Interval history:  No changes since he was last here  The following portions of the patient's history were reviewed and updated as appropriate: allergies, current medications, past family history, past medical history, past social history, past surgical history and problem list     Objective:  /82 (BP Location: Right arm, Patient Position: Sitting, Cuff Size: Large)   Pulse 73   Temp (!) 97 3 °F (36 3 °C) (Temporal)   Ht 5' 10" (1 778 m)   Wt 91 2 kg (201 lb)   BMI 28 84 kg/m²     Physical Exam    Neurological Exam   GENERAL MEDICAL EXAMINATION:  General appearance: alert, in no apparent distress  Appropriately dressed and groomed  Conversing and interacting appropriately  Eyes: Sclera are non-injected  Ears, nose, Mouth, Throat: Mucous membranes are moist    Resp: Breathing comfortably on RA   Musculoskeletal: No evidence of deformities  No contractures  No Edema  Skin: No visible rashes  Warm and well perfused  Psych: normal and appropriate affect     Mental Status:  Alert and oriented to person place and time  Able to relate history without difficulty  Attentive to conversation  Language is fluent and appropriate with normal prosody  There were no paraphasic errors  Speech was not dysarthric  Able to follow both midline and appendicular commands       General Neurology examination:     UPDRS motor:                              Time since last dose:       Speech  0     Facial Expression  0     Rigidity - Neck       Rigidity - Upper Extremity (Right)  1     Rigidity - Upper Extremity (Left)   0     Rigidity - Lower Extremity (Right)  0     Rigidity - Lower Extremity (Left)   0     Finger Taps (Right)   1     Finger Taps (Left)   1     Hand Movement (Right)  1     Hand Movement (Left)   0     Pronation/Supination (Right)  0     Pronation/Supination (Left)   0 0    Toe Tapping (Right) 0  0   Toe Tapping (Left) 0  0   Leg Agility (Right)  0  0   Leg Agility (Left)   0     Arising from Chair   0     Gait   0     Freezing of Gait 0     Postural Stability        Posture 0     Global spontaneity of movement 0 Less spontanioius on the left     Postural Tremor (Right) 1     Postural Tremor (Left) 0     Kinetic Tremor (Right)  1     Kinetic Tremor (Left)  0     Rest tremor amplitude RUE 0     Rest tremor amplitude LUE 0     Rest tremor amplitude RLE 0     Reset tremor amplitude LLE 0     Lip/Jaw Tremor  0     Consistency of tremor      Motor Exam Total:          Dysmetria: none on FNF  Dyskinesia: none  Dystonia: none     Stance: narrow-based and stable   Stride: normal speed, stride length, step height, walks with no assistive device   Arm swing: symmetric, not reduced   Turn: stable, 2-3 steps       Review of Systems   Constitutional: Negative  Negative for appetite change and fever  HENT: Negative  Negative for hearing loss, tinnitus, trouble swallowing and voice change  Eyes: Negative  Negative for photophobia and pain  Respiratory: Negative  Negative for shortness of breath  Cardiovascular: Negative  Negative for palpitations  Gastrointestinal: Negative  Negative for nausea and vomiting  Endocrine: Negative  Negative for cold intolerance  Genitourinary: Negative  Negative for dysuria, frequency and urgency  Musculoskeletal: Positive for gait problem (balance problems)  Negative for myalgias and neck pain  Skin: Negative  Negative for rash  Allergic/Immunologic: Negative  Neurological: Positive for tremors  Negative for dizziness, seizures, syncope, facial asymmetry, speech difficulty, weakness, light-headedness, numbness and headaches  Hematological: Negative  Does not bruise/bleed easily  Psychiatric/Behavioral: Negative  Negative for confusion, hallucinations and sleep disturbance  The above ROS was reviewed and updated  Current Outpatient Medications on File Prior to Visit   Medication Sig Dispense Refill    Collagen Hydrolysate POWD 1 Dose by Does not apply route daily      ibuprofen (ADVIL,MOTRIN) 100 MG tablet Take 100 mg by mouth every 6 (six) hours as needed for mild pain      Omega-3 Fatty Acids (FISH OIL) 1,000 mg Take 1,000 mg by mouth daily      benzonatate (TESSALON) 200 MG capsule Take 1 capsule (200 mg total) by mouth 3 (three) times a day as needed for cough (Patient not taking: Reported on 7/29/2020) 20 capsule 0    methocarbamol (ROBAXIN) 750 mg tablet Take 750 mg by mouth 4 (four) times a day as needed      Na Sulfate-K Sulfate-Mg Sulf (Suprep Bowel Prep Kit) 17 5-3 13-1 6 GM/177ML SOLN Take as directed by the office (Patient not taking: Reported on 9/21/2020) 360 mL 0    valACYclovir (VALTREX) 1,000 mg tablet Take 2 tablets (2,000 mg total) by mouth 2 (two) times a day for 1 day 4 tablet 5     No current facility-administered medications on file prior to visit          Deven Solis MD  Medical Director   Movement Disorders Center  Movement and Memory Specialist

## 2020-09-21 NOTE — ASSESSMENT & PLAN NOTE
51-year-old man presents in follow-up for tremor  We reviewed his MARÍA scan which does show clear decreased uptake in the bilateral basal ganglia, more so on the left  This reflects his right greater than left parkinsonism in tremor, which is very subtle on examination today  We reviewed his MARÍA scan images together and compare them to controls  We discussed the prognosis diagnosis and treatment of Parkinson's disease  We reviewed some resources literature together  We discussed conversations with his family and employer  We reviewed different options regarding medication management  This point given the patient has very mild signs and symptoms and his personal goals of minimizing prescription medications will hold off on initiating any treatment at this point  Will continue to monitor him clinically over time initiate medications when appropriate  We also discussed the patient's emerging anxiety which can be related to his Parkinson's disease    We discussed potential treatments for this is well including our mindfulness program for newly diagnosed Parkinson's patients per

## 2020-10-09 ENCOUNTER — IMMUNIZATIONS (OUTPATIENT)
Dept: FAMILY MEDICINE CLINIC | Facility: CLINIC | Age: 60
End: 2020-10-09
Payer: COMMERCIAL

## 2020-10-09 DIAGNOSIS — Z23 NEED FOR IMMUNIZATION AGAINST INFLUENZA: Primary | ICD-10-CM

## 2020-10-09 PROCEDURE — 90471 IMMUNIZATION ADMIN: CPT

## 2020-10-09 PROCEDURE — 90682 RIV4 VACC RECOMBINANT DNA IM: CPT

## 2020-10-16 NOTE — TELEPHONE ENCOUNTER
Patient called to reschedule procedure due to a death in the family  Patient is rescheduled for 11/04/20 with Dr Maddy Sterling   Patient still has prep and instructions

## 2020-10-19 ENCOUNTER — HOSPITAL ENCOUNTER (OUTPATIENT)
Dept: GASTROENTEROLOGY | Facility: AMBULARY SURGERY CENTER | Age: 60
Setting detail: OUTPATIENT SURGERY
Discharge: HOME | End: 2020-10-19
Attending: INTERNAL MEDICINE

## 2020-10-21 ENCOUNTER — ANESTHESIA (OUTPATIENT)
Dept: ANESTHESIOLOGY | Facility: HOSPITAL | Age: 60
End: 2020-10-21

## 2020-10-21 ENCOUNTER — ANESTHESIA EVENT (OUTPATIENT)
Dept: ANESTHESIOLOGY | Facility: HOSPITAL | Age: 60
End: 2020-10-21

## 2020-11-03 ENCOUNTER — TELEPHONE (OUTPATIENT)
Dept: GASTROENTEROLOGY | Facility: AMBULARY SURGERY CENTER | Age: 60
End: 2020-11-03

## 2020-11-04 ENCOUNTER — HOSPITAL ENCOUNTER (OUTPATIENT)
Dept: GASTROENTEROLOGY | Facility: AMBULARY SURGERY CENTER | Age: 60
Setting detail: OUTPATIENT SURGERY
Discharge: HOME | End: 2020-11-04
Attending: INTERNAL MEDICINE

## 2020-11-09 NOTE — TELEPHONE ENCOUNTER
OA colon rescheduled for 12/15/20 with Dr Metcalf Model   Patient states he has prep and instructions

## 2020-12-01 ENCOUNTER — ANESTHESIA EVENT (OUTPATIENT)
Dept: GASTROENTEROLOGY | Facility: AMBULARY SURGERY CENTER | Age: 60
End: 2020-12-01

## 2020-12-01 ENCOUNTER — TELEPHONE (OUTPATIENT)
Dept: FAMILY MEDICINE CLINIC | Facility: CLINIC | Age: 60
End: 2020-12-01

## 2020-12-01 DIAGNOSIS — J31.0 CHRONIC RHINITIS: Primary | ICD-10-CM

## 2020-12-01 RX ORDER — FLUTICASONE PROPIONATE 50 MCG
2 SPRAY, SUSPENSION (ML) NASAL DAILY
Qty: 16 G | Refills: 2 | Status: SHIPPED | OUTPATIENT
Start: 2020-12-01 | End: 2022-06-20 | Stop reason: SDUPTHER

## 2020-12-15 ENCOUNTER — ANESTHESIA (OUTPATIENT)
Dept: GASTROENTEROLOGY | Facility: AMBULARY SURGERY CENTER | Age: 60
End: 2020-12-15

## 2020-12-15 ENCOUNTER — HOSPITAL ENCOUNTER (OUTPATIENT)
Dept: GASTROENTEROLOGY | Facility: AMBULARY SURGERY CENTER | Age: 60
Setting detail: OUTPATIENT SURGERY
Discharge: HOME | End: 2020-12-15
Attending: INTERNAL MEDICINE
Payer: COMMERCIAL

## 2020-12-15 VITALS
HEIGHT: 71 IN | SYSTOLIC BLOOD PRESSURE: 129 MMHG | RESPIRATION RATE: 18 BRPM | WEIGHT: 197 LBS | TEMPERATURE: 97.4 F | DIASTOLIC BLOOD PRESSURE: 74 MMHG | BODY MASS INDEX: 27.58 KG/M2 | HEART RATE: 61 BPM | OXYGEN SATURATION: 98 %

## 2020-12-15 VITALS — HEART RATE: 77 BPM

## 2020-12-15 DIAGNOSIS — Z12.11 SCREEN FOR COLON CANCER: ICD-10-CM

## 2020-12-15 PROCEDURE — 88305 TISSUE EXAM BY PATHOLOGIST: CPT | Performed by: PATHOLOGY

## 2020-12-15 PROCEDURE — 45385 COLONOSCOPY W/LESION REMOVAL: CPT | Performed by: INTERNAL MEDICINE

## 2020-12-15 PROCEDURE — 45380 COLONOSCOPY AND BIOPSY: CPT | Performed by: INTERNAL MEDICINE

## 2020-12-15 RX ORDER — SIMETHICONE 20 MG/.3ML
EMULSION ORAL CODE/TRAUMA/SEDATION MEDICATION
Status: COMPLETED | OUTPATIENT
Start: 2020-12-15 | End: 2020-12-15

## 2020-12-15 RX ORDER — PROPOFOL 10 MG/ML
INJECTION, EMULSION INTRAVENOUS AS NEEDED
Status: DISCONTINUED | OUTPATIENT
Start: 2020-12-15 | End: 2020-12-15

## 2020-12-15 RX ORDER — PROPOFOL 10 MG/ML
INJECTION, EMULSION INTRAVENOUS CONTINUOUS PRN
Status: DISCONTINUED | OUTPATIENT
Start: 2020-12-15 | End: 2020-12-15

## 2020-12-15 RX ORDER — SODIUM CHLORIDE, SODIUM LACTATE, POTASSIUM CHLORIDE, CALCIUM CHLORIDE 600; 310; 30; 20 MG/100ML; MG/100ML; MG/100ML; MG/100ML
125 INJECTION, SOLUTION INTRAVENOUS CONTINUOUS
Status: DISCONTINUED | OUTPATIENT
Start: 2020-12-15 | End: 2020-12-19 | Stop reason: HOSPADM

## 2020-12-15 RX ORDER — LIDOCAINE HYDROCHLORIDE 10 MG/ML
0.5 INJECTION, SOLUTION EPIDURAL; INFILTRATION; INTRACAUDAL; PERINEURAL ONCE AS NEEDED
Status: DISCONTINUED | OUTPATIENT
Start: 2020-12-15 | End: 2020-12-19 | Stop reason: HOSPADM

## 2020-12-15 RX ADMIN — Medication 40 MG: at 11:10

## 2020-12-15 RX ADMIN — SODIUM CHLORIDE, SODIUM LACTATE, POTASSIUM CHLORIDE, AND CALCIUM CHLORIDE: .6; .31; .03; .02 INJECTION, SOLUTION INTRAVENOUS at 10:59

## 2020-12-15 RX ADMIN — PROPOFOL 140 MCG/KG/MIN: 10 INJECTION, EMULSION INTRAVENOUS at 11:04

## 2020-12-15 RX ADMIN — PROPOFOL 120 MG: 10 INJECTION, EMULSION INTRAVENOUS at 11:04

## 2021-02-05 ENCOUNTER — VBI (OUTPATIENT)
Dept: ADMINISTRATIVE | Facility: OTHER | Age: 61
End: 2021-02-05

## 2021-02-15 ENCOUNTER — TELEPHONE (OUTPATIENT)
Dept: NEUROLOGY | Facility: CLINIC | Age: 61
End: 2021-02-15

## 2021-02-17 ENCOUNTER — OFFICE VISIT (OUTPATIENT)
Dept: NEUROLOGY | Facility: CLINIC | Age: 61
End: 2021-02-17
Payer: COMMERCIAL

## 2021-02-17 VITALS
SYSTOLIC BLOOD PRESSURE: 134 MMHG | BODY MASS INDEX: 30.01 KG/M2 | DIASTOLIC BLOOD PRESSURE: 84 MMHG | WEIGHT: 209.6 LBS | HEIGHT: 70 IN

## 2021-02-17 DIAGNOSIS — G20 PARKINSON DISEASE (HCC): Primary | ICD-10-CM

## 2021-02-17 PROCEDURE — 99214 OFFICE O/P EST MOD 30 MIN: CPT | Performed by: PSYCHIATRY & NEUROLOGY

## 2021-02-17 RX ORDER — MELATONIN
5000 DAILY
COMMUNITY

## 2021-02-17 RX ORDER — SELEGILINE HYDROCHLORIDE 5 MG/1
5 TABLET ORAL 2 TIMES DAILY WITH MEALS
Qty: 60 TABLET | Refills: 3 | Status: SHIPPED | OUTPATIENT
Start: 2021-02-17 | End: 2021-06-16

## 2021-02-17 NOTE — ASSESSMENT & PLAN NOTE
64year-old presents in follow up for Parkinson's disease  He has seen some progression in symptoms and is open to initiating a medication trial   We discussed a few options regarding medication management including an MAO B inhibitor, Sinemet and Mirapex  The patient is chiefly concerned about his cognitive faculties so we agreed on a trial of selegiline given its activating properties  They will call with an update in a few weeks      Follow up in 4 months with Frances Schneider PA-C

## 2021-02-17 NOTE — PATIENT INSTRUCTIONS
Parkinson's Disease Resources     Helpful web sites   -  www [employer]  org   -  www parkinson  org (the [place])   -  www [employer] ([address] for [employer]) - Order the "Every Victory Counts" fela under the "resources" tab  Or visit [facility] org/EVC or call 780-414-5214  - [facility]  org/resources/parkinsons-exercise-essentials   - Contact the [employer] for an "Aware in care kit" @ 0983.191.9204 (this is a kit to take with you if you ever have to go to the hospital)   - www pmdalliance  org  -  parkinsons  marisela edu/exercise_videos  html  - [address] [facility] Exercise helpline: [phone]  - [employer]    - Check out "The [address]" for help paying for your medications: www [employer]afc[employer]  org    MIND diet score:  1 point for each component      - Green leafy vegetables: at least 6 per week  - Other vegetable: at least 1 per day   - Berries: at least 2 per week  - Red meat: fewer than 4 per week   - Fish: at least 1 per week   - Poultry: at least 2 per week  - Beans: at least 3 per week  - Nuts: at least 5 per week  - Fast or fried food: less than 1 per week  - Olive oil   - Butter less: less than 1 table-spoon per day   - Cheese: less than 1 serving per week   - Pastries/sweets: less than 5 servings per week  - Alcohol: no more than 1 serving/ day

## 2021-02-17 NOTE — PROGRESS NOTES
Assessment/Plan:    Parkinson disease ([facility] [address] )  64year-old presents in follow up for Parkinson's disease  He has seen some progression in symptoms and is open to initiating a medication trial   We discussed a few options regarding medication management including an MAO B inhibitor, Sinemet and Mirapex  The patient is chiefly concerned about his cognitive faculties so we agreed on a trial of selegiline given its activating properties  They will call with an update in a few weeks  Follow up in 4 months with Gabe Carmen PA-C      Diagnoses and all orders for this visit:    Parkinson disease [facility])  -     Ambulatory referral to Physical Therapy; Future  -     selegiline (ELDEPRYL) 5 mg tablet; Take 1 tablet (5 mg total) by mouth 2 (two) times a day with meals    Other orders  -     cholecalciferol (VITAMIN D3) 1,000 units tablet; Take 5,000 Units by mouth daily        Subjective:     Patient ID: Morteza Valenzuela 64 y o  male    I had the pleasure of seeing your patient, Morteza Valenzuela in the [facility] at the [address] [place] [address] Neuroscience        To review, Dana Roblero is a 64year-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  who presents in follow up MARÍA-positive parkinsonism, symptom onset around 2018 with right>left tremor   On initial presentation the patient had a rest and postural tremor on the right, minimal if any kinetic tremor   Also had mild rigidity on the right and slight bradykinesia bilaterally with decrement on the right   His gait was fairly normal with good postural reflexes   No typical non motor symptoms associated with Parkinson's disease      Interval history: presents today with his wife (or first meeting)   Slower in general  Everything is slower   More anxious  ADLs are slower  Staying active  Stationary bike  Does something 2-3 days per week   Work has been very stressful  Noted more cognitive slowing   Trouble with rapid decision making and multitasking  The following portions of the patient's history were reviewed and updated as appropriate: allergies, current medications, past family history, past medical history, past social history, past surgical history and problem list     Objective:  /84 (BP Location: Right arm, Patient Position: Sitting, Cuff Size: Standard)   Ht 5' 10" (1 778 m)   Wt 95 1 kg (209 lb 9 6 oz)   BMI 30 07 kg/m²     Physical Exam    Neurological Exam  GENERAL MEDICAL EXAMINATION:  General appearance: alert, in no apparent distress  Appropriately dressed and groomed  Conversing and interacting appropriately  Eyes: Sclera are non-injected  Ears, nose, Mouth, Throat: Mucous membranes are moist    Resp: Breathing comfortably on RA   Musculoskeletal: No evidence of deformities  No contractures  No Edema  Skin: No visible rashes  Warm and well perfused  Psych: normal and appropriate affect     Mental Status:  Alert and oriented to person place and time  Able to relate history without difficulty  Attentive to conversation  Language is fluent and appropriate with normal prosody  There were no paraphasic errors  Speech was not dysarthric  Able to follow both midline and appendicular commands       General Neurology examination:     UPDRS motor:                              Time since last dose:       Speech  0     Facial Expression  1     Rigidity - Neck       Rigidity - Upper Extremity (Right)  1     Rigidity - Upper Extremity (Left)   1-2     Rigidity - Lower Extremity (Right)  0     Rigidity - Lower Extremity (Left)   0     Finger Taps (Right)   1     Finger Taps (Left)   2     Hand Movement (Right)  0     Hand Movement (Left)   1     Pronation/Supination (Right)  0     Pronation/Supination (Left)   1     Toe Tapping (Right) 0     Toe Tapping (Left) 1     Leg Agility (Right)  0     Leg Agility (Left)   1     Arising from Chair   0     Gait   0     Freezing of Gait 0     Postural Stability   0     Posture 0     Global spontaneity of movement 0     Postural Tremor (Right) 0     Postural Tremor (Left) 0     Kinetic Tremor (Right)  0     Kinetic Tremor (Left)  0     Rest tremor amplitude RUE 0     Rest tremor amplitude LUE 0     Rest tremor amplitude RLE 0     Reset tremor amplitude LLE 0     Lip/Jaw Tremor  0     Consistency of tremor 0     Motor Exam Total:          Dysmetria: none on FNF  Dyskinesia: none  Dystonia: none   Myoclonus: none     Stance: narrow-based and stable   Stride: normal speed, stride length, step height, walks with no assistive device   Arm swing: slightly reduced on the left   Turn: stable, 2-3 steps       Review of Systems   Constitutional: Negative  Negative for appetite change and fever  HENT: Negative  Negative for hearing loss, tinnitus, trouble swallowing and voice change  Eyes: Negative  Negative for photophobia and pain  Respiratory: Negative  Negative for shortness of breath  Cardiovascular: Negative  Negative for palpitations  Gastrointestinal: Negative  Negative for nausea and vomiting  Endocrine: Negative  Negative for cold intolerance  Genitourinary: Negative  Negative for dysuria, frequency and urgency  Musculoskeletal: Positive for gait problem  Negative for myalgias and neck pain  Freezing hands   Skin: Negative  Negative for rash  Neurological: Positive for tremors  Negative for dizziness, seizures, syncope, facial asymmetry, speech difficulty, weakness, light-headedness, numbness and headaches  Hematological: Negative  Does not bruise/bleed easily  Psychiatric/Behavioral: Negative  Negative for confusion, hallucinations and sleep disturbance  All other systems reviewed and are negative  The above ROS was reviewed and updated       Current Outpatient Medications on File Prior to Visit   Medication Sig Dispense Refill    cholecalciferol (VITAMIN D3) 1,000 units tablet Take 5,000 Units by mouth daily      Collagen Hydrolysate POWD 1 Dose by Does not apply route daily      fluticasone (FLONASE) 50 mcg/act nasal spray 2 sprays into each nostril daily (Patient taking differently: 2 sprays into each nostril as needed ) 16 g 2    ibuprofen (ADVIL,MOTRIN) 100 MG tablet Take 100 mg by mouth every 6 (six) hours as needed for mild pain       No current facility-administered medications on file prior to visit          Sahil Garber MD  Medical Director   [facility]ment [address]  [facility]

## 2021-02-19 ENCOUNTER — EVALUATION (OUTPATIENT)
Dept: PHYSICAL THERAPY | Facility: CLINIC | Age: 61
End: 2021-02-19
Payer: COMMERCIAL

## 2021-02-19 DIAGNOSIS — G20 PARKINSON DISEASE (HCC): ICD-10-CM

## 2021-02-19 PROCEDURE — 97110 THERAPEUTIC EXERCISES: CPT | Performed by: PHYSICAL THERAPIST

## 2021-02-19 PROCEDURE — 97162 PT EVAL MOD COMPLEX 30 MIN: CPT | Performed by: PHYSICAL THERAPIST

## 2021-02-19 NOTE — PROGRESS NOTES
PT Evaluation     Today's date: 2021  Patient name: Rhea Davis  : 1960  MRN: [id number]  Referring provider: Chanelle Brown MD  Dx:   Encounter Diagnosis     ICD-10-CM    1  Parkinson disease ([facility] [address] )  [address] Ambulatory referral to Physical Therapy       Start Time: 1050  Stop Time: 1150  Total time in clinic (min): 60 minutes    Assessment  Assessment details: Rhea Davis is a 64 y o  male who presents with signs and symptoms consistent of Parkinson's disease  Patient presents with abnormal coordination, dynamic mobility deficits, decreased dynamic balance, decreased reciprocal arm swing, rigidity throughout functional tasks  During all testing patient demonstrated slightly delayed balance reactions with both the hip and ankle strategies  Patient's balance is challenged the most with dynamic surfaces and with eye closed  This indicated that patient relies on an integrated balance system with all systems working together and when one is taken out patient has more difficulty with recruiting balance reactions  Patient also demonstrated decrease in mobility tasks when challenged with dual tasking as evidenced by increase in TUG time when counting backwards  Overall, patient is doing well in the initial stages of Parkinson's Disease by demosntrating only mild symptoms  Patient would benefit from progressive strengthening and balance training in order to establish a strong base as the neurodegenerative disease progresses  Patient would benefit the most from progressive LSVT Big programming as well as dual tasking with functional balance activities  Due to these impairments, Patient has difficulty performing a/iadls, recreational activities and work-related activities  Patient would benefit from skilled physical therapy to address the impairments, improve their level of function, and to improve their overall quality of life      Impairments: abnormal coordination, abnormal gait, abnormal muscle firing, abnormal muscle tone, abnormal or restricted ROM, abnormal movement, impaired balance, impaired physical strength and lacks appropriate home exercise program    Symptom irritability: moderateUnderstanding of Dx/Px/POC: good   Prognosis: good    Goals  Short Term Goals: to be achieved by 4 weeks  1) Patient to be independent with basic HEP  2) Increase LE strength by 1/2 MMT grade in all deficient planes  3) Patient will demonstrate improvement with MCTSIB indicating improvement with static and dynamic balance  Long Term Goals: to be achieved by discharge  1) FOTO equal to or greater than target score indicating improvements with overall function  2) Patient will demonstrate an improvement in FRANCIS score to 56/56 in order to decrease fall risk and improvement dynamic mobility  3) Patient will demonstrate an improvement in FGA score by 3 pts in order to decrease fall risk and improvement dynamic mobility  4) Patient will demonstrate an improvement in TUG cognitive score to 3-4 seconds in order to decrease fall risk and improvement dynamic mobility with dual tasking  5) Patient will be independent in comprehensive HEP  Plan  Plan details: 2x week for 6 weeks     Patient would benefit from: PT eval and skilled physical therapy  Planned therapy interventions: manual therapy, neuromuscular re-education, patient education, therapeutic activities, therapeutic exercise and home exercise program  Treatment plan discussed with: patient        Subjective Evaluation    History of Present Illness  Mechanism of injury: History of Current Injury: Patient notes that he was officially diagnosed with Parkinson's in September  Patient notes the tremors have been going on for a couple years  Patient notes that the occasionally he does have moments that he feels off balance especially when standing up and goes to walk or when he is standing   Patient notes that he has lost his balance sometimes with stairs and stepping out curbs  Patient denies freezing episodes  My patient notes that he is having the most difficult with multiple tasking specifically with as rapidly and spontaneously  Patient notes that he has trouble with writing, putting shirts or sweatshirts on, tying shoes, and putting socks on  Patient notes that just small activities like that he will lose his balance with these  Patient notes that this wife says that he shuffles a bit when he is walking  Pain location/Descriptors: no pain reported  Special Questions: Patient denies numbness or tingling  Patient goals: Patient reports that his goals for physical therapy would like to improve overall function  Hobbies/Interest: staying active, going for walks with his dog  Occupation: [id number] (previous )     Pain  Current pain ratin  At best pain ratin  At worst pain ratin    Social Support  Steps to enter house: yes (1-2)  Stairs in house: yes (full flight inside)   Lives in: multiple-level home  Lives with: spouse    Patient Goals  Patient goals for therapy: improved balance and independence with ADLs/IADLs  Patient goal: Patient reports that his goals for physical therapy would like to improve overall function          Objective     Strength/Myotome Testing     Left Shoulder     Planes of Motion   Flexion: 5     Right Shoulder     Planes of Motion   Flexion: 5     Left Elbow   Flexion: 4+  Extension: 4+    Right Elbow   Flexion: 4+  Extension: 4+    Left Wrist/Hand   Wrist extension: 5  Wrist flexion: 5    Right Wrist/Hand   Wrist extension: 5  Wrist flexion: 5    Left Hip   Planes of Motion   Flexion: 4+    Right Hip   Planes of Motion   Flexion: 4+    Left Knee   Flexion: 5  Extension: 5    Right Knee   Flexion: 5  Extension: 5    Left Ankle/Foot   Dorsiflexion: 4+  Plantar flexion: 4+    Right Ankle/Foot   Dorsiflexion: 4+  Plantar flexion: 4+    Ambulation     Observational Gait   Left stance time, right stance time, left swing time, right swing time, left step length and right step length within functional limits     Left foot contact pattern: heel to toe  Right foot contact pattern: heel to toe  Left arm swing: decreased  Right arm swing: decreased  Base of support: increased  Left foot contact pattern comments: more weight toward forefoot  Right foot contact pattern comments: more weight toward forefoot  Base of support comments: mild wider JOSE  Neuro Exam:     Sensation   Light touch LE: left WNL and right WNL    Coordination   Heel to shin: left WNL and right WNL  Rapid alternating movements: UE WNL and LE impaired     Functional outcomes   TU (seconds)  TUG cognitive: 12 (seconds)  Functional outcome comment: MCSTIB: biodex  Firm Eyes Open: 0 73  Firm Eyes Closed: 2 17  Foam Eyes Open: 1 13  Foam Eyes Closed:3 92  Composite Score: 1 98          Flowsheet Rows      Most Recent Value   PT/OT G-Codes   Current Score  83   Projected Score  85             Precautions: Parkinson's Disease      Neuro Re-Ed             Rock and reach (LSVT BIG)             FWD step and reach  (LSVT BIG)             BWD  step and reach  (LSVT BIG)             Lateral step and reach  (LSVT BIG)             Lateral rock and reach with twist  (LSVT BIG)             Wobble Board             Lateral walking foam with cones             FWD foam beam with cones             Dual Task Training                                                    Ther Ex             NuStep with high resistance             Side stepping with weights                                                                                            Ther Activity             Sit to stands             Step-ups                          Gait Training             Retrowalking Dierks              FWD weighted walking Dierks             Hurdles              Big walking with weights (UE and LE)             Cane walking for reciprocal arm swing             Modalities

## 2021-02-22 NOTE — PROGRESS NOTES
Daily Note     Today's date: 2021  Patient name: Sweta De Guzman  : 1960  MRN: [id number]  Referring provider: Rachid Chau MD  Dx:   Encounter Diagnosis     ICD-10-CM    1  Parkinson disease ([facility] [address] )  G20        Start Time: 1700  Stop Time: 1745  Total time in clinic (min): 45 minutes    Subjective: Patient reports, "I am doing well and the exercises were fine at home "      Objective: See treatment diary below      Assessment: Patient tolerated treatment well  Patient responded well to the introduction of exercise program this date  Introduced high level LSVT Big exercises to promote high amplitude postures in order to normalize functional movements  Patient challenge with progressive dynamic surfaces with appropriate but delayed balance reactions requiring primarily stepping balance reaction  Observed patient's gait in more detail this date which demonstrated decreased trunk rotation, decreased reciprocal armswing L>R, and decreased foot clearance on L>R, and slight knee flexion throughout stance phase  Updated HEP with LSVT exercises to be completed daily at home for continued practice of big movements  Patient would benefit from continued PT      Plan: Continue per plan of care        Precautions: Parkinson's Disease      Neuro Re-Ed             Rock and reach (LSVT BIG) 10x bi and dynadisks            FWD step and reach  (LSVT BIG) 10x bi and dynadisks            BWD  step and reach  (LSVT BIG) 10x bi and dynadisks            Lateral step and reach  (LSVT BIG) 10x bi and dynadisks            Lateral rock and reach with twist  (LSVT BIG) 10x bi and dynadisks            Wobble Board 1 min ea             Lateral walking foam with cones 4x             FWD foam beam with cones 4x            Cone taps                                                     Ther Ex             NuStep with high resistance             Side stepping with weights Ther Activity             Sit to stands On bosu with dynadisk underneath  2x10            Step-ups                          Gait Training             Jamie Henderson  20# 20x            FWD weighted walking Kenan 15# 10x             Hurdles              Big walking with weights (UE and LE)             Cane walking for reciprocal arm swing             Modalities

## 2021-02-23 ENCOUNTER — OFFICE VISIT (OUTPATIENT)
Dept: PHYSICAL THERAPY | Facility: CLINIC | Age: 61
End: 2021-02-23
Payer: COMMERCIAL

## 2021-02-23 DIAGNOSIS — G20 PARKINSON DISEASE (HCC): Primary | ICD-10-CM

## 2021-02-23 PROCEDURE — 97112 NEUROMUSCULAR REEDUCATION: CPT | Performed by: PHYSICAL THERAPIST

## 2021-02-23 PROCEDURE — 97116 GAIT TRAINING THERAPY: CPT | Performed by: PHYSICAL THERAPIST

## 2021-02-23 PROCEDURE — 97530 THERAPEUTIC ACTIVITIES: CPT | Performed by: PHYSICAL THERAPIST

## 2021-02-25 ENCOUNTER — OFFICE VISIT (OUTPATIENT)
Dept: PHYSICAL THERAPY | Facility: CLINIC | Age: 61
End: 2021-02-25
Payer: COMMERCIAL

## 2021-02-25 DIAGNOSIS — G20 PARKINSON DISEASE (HCC): Primary | ICD-10-CM

## 2021-02-25 PROCEDURE — 97530 THERAPEUTIC ACTIVITIES: CPT | Performed by: PHYSICAL THERAPIST

## 2021-02-25 PROCEDURE — 97110 THERAPEUTIC EXERCISES: CPT | Performed by: PHYSICAL THERAPIST

## 2021-02-25 PROCEDURE — 97112 NEUROMUSCULAR REEDUCATION: CPT | Performed by: PHYSICAL THERAPIST

## 2021-02-25 NOTE — PROGRESS NOTES
Daily Note     Today's date: 2021  Patient name: Sharri Graham  : 1960  MRN: [id number]  Referring provider: Moody Amaya MD  Dx:   Encounter Diagnosis     ICD-10-CM    1  Parkinson disease ([address] )  [place]        Start Time: 174  Stop Time: 1830  Total time in clinic (min): 45 minutes    Subjective: Patient reports, "I am doing well and the exercises were fine at home "      Objective: See treatment diary below      Assessment: Patient tolerated treatment well  Patient demonstrated improved stability with all dynamic balance activities this date  Patient able to self-correct 75% of the time  Patient demonstrates slight decreased joint position awareness when on a dynamic surface  Patient requires verbal cueing for consistent big amplitude movements specifically in the UE L>R during LSVT exercises  Patient would benefit from continued PT      Plan: Continue per plan of care        Precautions: Parkinson's Disease      Neuro Re-Ed            Rock and reach (LSVT BIG) 10x bi and dynadisks 10x bi and dynadisks           FWD step and reach  (LSVT BIG) 10x bi and dynadisks 10x bi and dynadisks           BWD  step and reach  (LSVT BIG) 10x bi and dynadisks 10x bi and dynadisks           Lateral step and reach  (LSVT BIG) 10x bi and dynadisks 10x bi and dynadisks           Lateral rock and reach with twist  (LSVT BIG) 10x bi and dynadisks 10x bi and dynadisks           Wobble Board 1 min ea             Lateral walking foam with cones 4x  4x           FWD foam beam with cones 4x 4x           Cone taps                                                     Ther Ex             NuStep with high resistance             Side stepping with weights                                                                                            Ther Activity             Sit to stands On bosu with dynadisk underneath  2x10 On bosu with black ovals underneath  On 6" plybox RKB  2x10           Step-ups  2x10 4# with lift 2x10                         Gait Training             Lisa Silverio  20# 20x 20# 20x           FWD weighted walking Kenan 15# 10x  15# 10x            Hurdles              Big walking with weights (UE and LE)             Cane walking for reciprocal arm swing             Modalities

## 2021-03-01 ENCOUNTER — OFFICE VISIT (OUTPATIENT)
Dept: PHYSICAL THERAPY | Facility: CLINIC | Age: 61
End: 2021-03-01
Payer: COMMERCIAL

## 2021-03-01 DIAGNOSIS — G20 PARKINSON DISEASE (HCC): Primary | ICD-10-CM

## 2021-03-01 PROCEDURE — 97110 THERAPEUTIC EXERCISES: CPT | Performed by: PHYSICAL THERAPIST

## 2021-03-01 PROCEDURE — 97530 THERAPEUTIC ACTIVITIES: CPT | Performed by: PHYSICAL THERAPIST

## 2021-03-01 PROCEDURE — 97112 NEUROMUSCULAR REEDUCATION: CPT | Performed by: PHYSICAL THERAPIST

## 2021-03-01 NOTE — PROGRESS NOTES
Daily Note     Today's date: 3/1/2021  Patient name: Annel Islas  : 1960  MRN: [id number]  Referring provider: Kyleigh Abdi MD  Dx:   Encounter Diagnosis     ICD-10-CM    1  Parkinson disease ([facility])  Jul[facility]        Start Time: 1615  Stop Time: 1700  Total time in clinic (min): 45 minutes    Subjective: Patient reports, "I am doing well and the exercises were fine at home "      Objective: See treatment diary below      Assessment: Patient tolerated treatment well  Patient demonstrates slight increased knee flexion with gait and static weight bearing exercises  Patient demonstrated improved stability with dynamic balance activities this date compared to the two previous treatment sessions  Patient being challenged appropriately  Patient fatigued post session  Patient would benefit from continued PT      Plan: Continue per plan of care        Precautions: Parkinson's Disease      Neuro Re-Ed 2/23 2/25 3/1          Rock and reach (LSVT BIG) 10x bi and dynadisks 10x bi and dynadisks 10x bi and dynadisks          FWD step and reach  (LSVT BIG) 10x bi and dynadisks 10x bi and dynadisks 10x bi and dynadisks          BWD  step and reach  (LSVT BIG) 10x bi and dynadisks 10x bi and dynadisks 10x bi and dynadisks          Lateral step and reach  (LSVT BIG) 10x bi and dynadisks 10x bi and dynadisks 10x bi and dynadisks          Lateral rock and reach with twist  (LSVT BIG) 10x bi and dynadisks 10x bi and dynadisks 10x bi and dynadisks          Wobble Board 1 min ea   1 min ea           Lateral walking foam with cones 4x  4x 4x          FWD foam beam with cones 4x 4x 4x          Cone taps                                                     Ther Ex             NuStep with high resistance             Side stepping with weights              Elliptical    4 min                                                                            Ther Activity Sit to stands On bosu with dynadisk underneath  2x10 On bosu with black ovals underneath  On 6" plybox   RKB  2x10 On bosu with black ovals underneath  On 6" plybox   RKB  2x10          Step-ups  2x10 4# with lift 2x10  2x10 4# with lift 2x10                       Gait Training             Elsa Johnson  20# 20x 20# 20x           FWD weighted walking Kenan 15# 10x  15# 10x  15# 10x           Hurdles              Big walking with weights (UE and LE) with hurdles    NV          Cane walking for reciprocal arm swing             Modalities

## 2021-03-04 ENCOUNTER — OFFICE VISIT (OUTPATIENT)
Dept: PHYSICAL THERAPY | Facility: CLINIC | Age: 61
End: 2021-03-04
Payer: COMMERCIAL

## 2021-03-04 DIAGNOSIS — G20 PARKINSON DISEASE (HCC): Primary | ICD-10-CM

## 2021-03-04 PROCEDURE — 97110 THERAPEUTIC EXERCISES: CPT | Performed by: PHYSICAL THERAPIST

## 2021-03-04 PROCEDURE — 97112 NEUROMUSCULAR REEDUCATION: CPT | Performed by: PHYSICAL THERAPIST

## 2021-03-04 PROCEDURE — 97530 THERAPEUTIC ACTIVITIES: CPT | Performed by: PHYSICAL THERAPIST

## 2021-03-04 PROCEDURE — 97116 GAIT TRAINING THERAPY: CPT | Performed by: PHYSICAL THERAPIST

## 2021-03-04 NOTE — PROGRESS NOTES
Daily Note     Today's date: 3/4/2021  Patient name: Leonel Erwin  : 1960  MRN: [id number]  Referring provider: Kurt Reyes MD  Dx:   Encounter Diagnosis     ICD-10-CM    1  Parkinson disease ([facility] [address] )  [place]        Start Time: 1610  Stop Time: 1202  Total time in clinic (min): 55 minutes    Subjective: Patient reports, "I am doing well  I have been trying to walk Big walking through the halls at school  Last session was good I felt like I got a good workout "      Objective: See treatment diary below      Assessment: Patient tolerated treatment well  Patient did well with introduction of dual tasking during some of the balance activities  Patient was able to maintain good balance however demonstrated decreased speed of tasks and slightly delayed balance reactions  Introduced big walking over hurdles for reciprocal arm swing, trunk rotation, and promote high amplitude movements  Patient did well but had a few occasions of taking time to think about the reciprocal movement  Patient being challenged appropriately  Patient fatigued post session  Patient would benefit from continued PT      Plan: Continue per plan of care        Precautions: Parkinson's Disease      Neuro Re-Ed 2/23 2/25 3/1 3/4          Rock and reach ([facility] BIG) 10x bi and dynadisks 10x bi and dynadisks 10x bi and dynadisks 10x bi and dynadisks  2# in UE          FWD step and reach  ([facility] BIG) 10x bi and dynadisks 10x bi and dynadisks 10x bi and dynadisks 10x bi and dynadisks         BWD  step and reach  ([facility] BIG) 10x bi and dynadisks 10x bi and dynadisks 10x bi and dynadisks 10x bi and dynadisks         Lateral step and reach  ([facility] BIG) 10x bi and dynadisks 10x bi and dynadisks 10x bi and dynadisks 10x bi and dynadisks         Lateral rock and reach with twist  ([facility] BIG) 10x bi and dynadisks 10x bi and dynadisks 10x bi and dynadisks 10x bi and dynadisks Wobble Board 1 min ea   1 min ea  1 min ea   Therapist says color and patient says which direction is associated with color (NSEW)         Lateral walking foam with cones 4x  4x 4x 4x with naming [employer] teams          FWD foam beam with cones 4x 4x 4x 4x         Cone taps                                                     Ther Ex             NuStep with high resistance             Side stepping with weights              Elliptical    4 min  5 min          Alt UE LE on pball     2x10                                                             Ther Activity             Sit to stands On bosu with dynadisk underneath  2x10 On bosu with black ovals underneath  On 6" plybox   RKB  2x10 On bosu with black ovals underneath  On 6" plybox   RKB  2x10 On bosu with with red dynadisk black ovals underneath  On 6" plybox   RKB  2x10         Step-ups  2x10 4# with lift 2x10  2x10 4# with lift 2x10 2x10 4# with lift 2x10                      Gait Training             Retrowalking L[place]elly[place]  20# 20x 20# 20x  20# 20x         FWD weighted walking Kenan 15# 10x  15# 10x  15# 10x  20# 20x          Hurdles              Big walking with weights (UE and LE) with hurdles    NV 6x   5# LE   2 5# UE         Cane walking for reciprocal arm swing             Modalities

## 2021-03-08 ENCOUNTER — OFFICE VISIT (OUTPATIENT)
Dept: PHYSICAL THERAPY | Facility: CLINIC | Age: 61
End: 2021-03-08
Payer: COMMERCIAL

## 2021-03-08 DIAGNOSIS — G20 PARKINSON DISEASE (HCC): Primary | ICD-10-CM

## 2021-03-08 PROCEDURE — 97110 THERAPEUTIC EXERCISES: CPT | Performed by: PHYSICAL THERAPIST

## 2021-03-08 PROCEDURE — 97530 THERAPEUTIC ACTIVITIES: CPT | Performed by: PHYSICAL THERAPIST

## 2021-03-08 PROCEDURE — 97112 NEUROMUSCULAR REEDUCATION: CPT | Performed by: PHYSICAL THERAPIST

## 2021-03-08 NOTE — PROGRESS NOTES
Daily Note     Today's date: 3/8/2021  Patient name: Ruthy Romero  : 1960  MRN: [id number]  Referring provider: Baldomero Payton MD  Dx:   Encounter Diagnosis     ICD-10-CM    1  Parkinson disease ([address] )  [address]        Start Time: 161  Stop Time: 1700  Total time in clinic (min): 45 minutes    Subjective: Patient reports, "Last session was good I felt like I got a good workout "      Objective: See treatment diary below      Assessment: Patient tolerated treatment well  Patient continues to have difficulty with reciprocal armswing with big walking and demonstrates delayed timing for how to initiate it which is consistent with Parkinson symptoms  Patient is doing well with dynamic balance challenges and demonstrated improved stability throughout  Patient responded well with progressions in resistance with most exercises this date  Patient being challenged appropriately  Patient fatigued post session  Patient would benefit from continued PT      Plan: Continue per plan of care        Precautions: Parkinson's Disease      Neuro Re-Ed 2/23 2/25 3/1 3/4  3/8        Rock and reach ([facility] BIG) 10x bi and dynadisks 10x bi and dynadisks 10x bi and dynadisks 10x bi and dynadisks  2# in UE  10x bi and dynadisks  2# in UE         FWD step and reach  ([facility] BIG) 10x bi and dynadisks 10x bi and dynadisks 10x bi and dynadisks 10x bi and dynadisks 10x bi and dynadisks Ankle # NV       BWD  step and reach  ([facility] BIG) 10x bi and dynadisks 10x bi and dynadisks 10x bi and dynadisks 10x bi and dynadisks 10x bi and dynadisks Ankle # NV       Lateral step and reach  (LS[facility] BIG) 10x bi and dynadisks 10x bi and dynadisks 10x bi and dynadisks 10x bi and dynadisks 10x bi and dynadisks Ankle # NV       Lateral rock and reach with twist  (LSVT BIG) 10x bi and dynadisks 10x bi and dynadisks 10x bi and dynadisks 10x bi and dynadisks 10x bi and dynadisks Ankle # NV       Wobble Board 1 min ea   1 min ea  1 min ea   Therapist says color and patient says which direction is associated with color (NSEW) 1 min ea   Therapist says color and patient says which direction is associated with color (NSEW)        Lateral walking foam with cones 4x  4x 4x 4x with naming [employer] teams          FWD foam beam with cones 4x 4x 4x 4x         Cone taps                                                     Ther Ex             NuStep with high resistance             Side stepping with weights              Elliptical    4 min  5 min  5 min         Alt UE LE on pball     2x10                                                             Ther Activity             Sit to stands On bosu with dynadisk underneath  2x10 On bosu with black ovals underneath  On 6" plybox   RKB  2x10 On bosu with black ovals underneath  On 6" plybox   RKB  2x10 On bosu with with red dynadisk black ovals underneath  On 6" plybox   RKB  2x10         Step-ups  2x10 4# with lift 2x10  2x10 4# with lift 2x10 2x10 4# with lift 2x10 2x10 4# in UE and 5# on LE with lift 2x10                     Gait Training             Retrowalking [place]  20# 20x 20# 20x  20# 20x 20# 20x         FWD weighted walking Kenan 15# 10x  15# 10x  15# 10x  20# 20x  20# 20x         Hurdles              Big walking with weights (UE and LE) with hurdles    NV 6x   5# LE   2 5# UE 6x   5# LE   2 5# UE    Naming [employer]         Cane walking for reciprocal arm swing             Modalities

## 2021-03-11 ENCOUNTER — TELEPHONE (OUTPATIENT)
Dept: FAMILY MEDICINE CLINIC | Facility: CLINIC | Age: 61
End: 2021-03-11

## 2021-03-11 ENCOUNTER — OFFICE VISIT (OUTPATIENT)
Dept: PHYSICAL THERAPY | Facility: CLINIC | Age: 61
End: 2021-03-11
Payer: COMMERCIAL

## 2021-03-11 DIAGNOSIS — G20 PARKINSON DISEASE (HCC): Primary | ICD-10-CM

## 2021-03-11 PROCEDURE — 97112 NEUROMUSCULAR REEDUCATION: CPT | Performed by: PHYSICAL THERAPIST

## 2021-03-11 PROCEDURE — 97530 THERAPEUTIC ACTIVITIES: CPT | Performed by: PHYSICAL THERAPIST

## 2021-03-11 PROCEDURE — 97116 GAIT TRAINING THERAPY: CPT | Performed by: PHYSICAL THERAPIST

## 2021-03-11 NOTE — PROGRESS NOTES
Daily Note     Today's date: 3/11/2021  Patient name: Tera Carlin  : 1960  MRN: [id number]  Referring provider: Joan Manzo MD  Dx:   Encounter Diagnosis     ICD-10-CM    1  Parkinson disease ([address] )  [place]        Start Time: 1615  Stop Time: 1700  Total time in clinic (min): 45 minutes    Subjective: Patient reports, "Last session was good I felt like I got a good workout "      Objective: See treatment diary below      Assessment: Patient tolerated treatment well  Patient demonstrated improved reciprocal  armswong with BIG walking this date requiring now verbal cueing for correction  Secondary to this added dual tasking with naming cities  Patient did well with maintaining proper sequence however it was evident decrease in speed of walking as well as decreased amplitude with Big movements  Patient did well with progressions in Big exercises with the addition of weights on both the UE and LE  Patient did have little more instability which may be from adjusting to the added resistance with movement  Patient being challenged appropriately  Patient fatigued post session  Patient would benefit from continued PT      Plan: Continue per plan of care        Precautions: Parkinson's Disease      Neuro Re-Ed 2/23 2/25 3/1 3/4  3/8 3/11       Rock and reach (LS[facility] BIG) 10x bi and dynadisks 10x bi and dynadisks 10x bi and dynadisks 10x bi and dynadisks  2# in UE  10x bi and dynadisks  2# in UE  10x ib and dynadisks  2# in UE        FWD step and reach  (LS[facility] BIG) 10x bi and dynadisks 10x bi and dynadisks 10x bi and dynadisks 10x bi and dynadisks 10x bi and dynadisks 10x bi and dynadisks 2# LE  2# UE       BWD  step and reach  ([facility] BIG) 10x bi and dynadisks 10x bi and dynadisks 10x bi and dynadisks 10x bi and dynadisks 10x bi and dynadisks 10x bi and dynadisks 2# LE  2# UE       Lateral step and reach  (LS[facility] BIG) 10x bi and dynadisks 10x bi and dynadisks 10x bi and dynadisks 10x bi and dynadisks 10x bi and dynadisks 10x bi and dynadisks 2# LE  2# UE       Lateral rock and reach with twist  (LS[facility] BIG) 10x bi and dynadisks 10x bi and dynadisks 10x bi and dynadisks 10x bi and dynadisks 10x bi and dynadisks 10x bi and dynadisks 2# LE  2# UE       Wobble Board 1 min ea   1 min ea  1 min ea   Therapist says color and patient says which direction is associated with color (NSEW) 1 min ea   Therapist says color and patient says which direction is associated with color (NSEW)        Lateral walking foam with cones 4x  4x 4x 4x with naming [employer] teams          FWD foam beam with cones 4x 4x 4x 4x         Cone taps                                                     Ther Ex             NuStep with high resistance             Side stepping with weights              Elliptical    4 min  5 min  5 min  3 min        Alt UE LE on pball     2x10                                                             Ther Activity             Sit to stands On bosu with dynadisk underneath  2x10 On bosu with black ovals underneath  On 6" plybox   RKB  2x10 On bosu with black ovals underneath  On 6" plybox   RKB  2x10 On bosu with with red dynadisk black ovals underneath  On 6" plybox   RKB  2x10         Step-ups  2x10 4# with lift 2x10  2x10 4# with lift 2x10 2x10 4# with lift 2x10 2x10 4# in UE and 5# on LE with lift 2x10 2x10 4# in UE and 5# on LE with lift 2x10                    Gait Training             Retrowalking Kenan  20# 20x 20# 20x  20# 20x 20# 20x         FWD weighted walking Kenan 15# 10x  15# 10x  15# 10x  20# 20x  20# 20x         Hurdles              Big walking with weights (UE and LE) with hurdles    NV 6x   5# LE   2 5# UE 6x   5# LE   2 5# UE    Naming [employer]  6x   5# LE   2# UE  Naming [facility] walking for reciprocal arm swing             Modalities

## 2021-03-12 DIAGNOSIS — R73.01 IMPAIRED FASTING GLUCOSE: ICD-10-CM

## 2021-03-12 DIAGNOSIS — E78.00 PURE HYPERCHOLESTEROLEMIA: Primary | ICD-10-CM

## 2021-03-12 DIAGNOSIS — D75.839 THROMBOCYTOSIS: ICD-10-CM

## 2021-03-12 DIAGNOSIS — Z12.5 SCREENING FOR PROSTATE CANCER: ICD-10-CM

## 2021-03-15 ENCOUNTER — OFFICE VISIT (OUTPATIENT)
Dept: FAMILY MEDICINE CLINIC | Facility: CLINIC | Age: 61
End: 2021-03-15
Payer: COMMERCIAL

## 2021-03-15 VITALS
TEMPERATURE: 97.2 F | WEIGHT: 204 LBS | HEART RATE: 62 BPM | SYSTOLIC BLOOD PRESSURE: 142 MMHG | BODY MASS INDEX: 29.2 KG/M2 | RESPIRATION RATE: 16 BRPM | HEIGHT: 70 IN | DIASTOLIC BLOOD PRESSURE: 70 MMHG

## 2021-03-15 DIAGNOSIS — E78.00 PURE HYPERCHOLESTEROLEMIA: ICD-10-CM

## 2021-03-15 DIAGNOSIS — Z00.00 WELL ADULT EXAM: Primary | ICD-10-CM

## 2021-03-15 DIAGNOSIS — R73.01 IMPAIRED FASTING GLUCOSE: ICD-10-CM

## 2021-03-15 DIAGNOSIS — G20 PARKINSON DISEASE (HCC): ICD-10-CM

## 2021-03-15 DIAGNOSIS — R68.82 LOW LIBIDO: ICD-10-CM

## 2021-03-15 DIAGNOSIS — D75.839 THROMBOCYTOSIS: ICD-10-CM

## 2021-03-15 DIAGNOSIS — M17.12 PRIMARY OSTEOARTHRITIS OF LEFT KNEE: ICD-10-CM

## 2021-03-15 PROBLEM — R25.1 TREMOR: Status: RESOLVED | Noted: 2020-07-29 | Resolved: 2021-03-15

## 2021-03-15 PROCEDURE — 3008F BODY MASS INDEX DOCD: CPT | Performed by: FAMILY MEDICINE

## 2021-03-15 PROCEDURE — 3725F SCREEN DEPRESSION PERFORMED: CPT | Performed by: FAMILY MEDICINE

## 2021-03-15 PROCEDURE — 1036F TOBACCO NON-USER: CPT | Performed by: FAMILY MEDICINE

## 2021-03-15 PROCEDURE — 99396 PREV VISIT EST AGE 40-64: CPT | Performed by: FAMILY MEDICINE

## 2021-03-15 NOTE — PROGRESS NOTES
Assessment/Plan:     Diagnoses and all orders for this visit:    Well adult exam    Pure hypercholesterolemia    Impaired fasting glucose    Parkinson disease ([address] )    Primary osteoarthritis of left knee    Thrombocytosis (HCC)    Low libido  -     Testosterone; Future  -     Testosterone          Continue with current medications, PT  Follow up with Neurology  Repeat labs  He is not interested in x rays left knee at this time  I discussed the use of visco supplementation  He has completed the COV 19 vaccine series  BMI Counseling: Body mass index is 29 27 kg/m²  The BMI is above normal  Nutrition recommendations include reducing portion sizes, decreasing overall calorie intake, consuming healthier snacks, moderation in carbohydrate intake, reducing intake of saturated fat and trans fat and reducing intake of cholesterol  Exercise recommendations include exercising 3-5 times per week  The 10-year ASCVD risk score (Junaid Kahn et al , 2013) is: 10%    Values used to calculate the score:      Age: 64 years      Sex: Male      Is Non- : No      Diabetic: No      Tobacco smoker: No      Systolic Blood Pressure: 445 mmHg      Is BP treated: No      HDL Cholesterol: 69 mg/dL      Total Cholesterol: 229 mg/dL       Patient ID: Lakeisha Matthew is a 64 y o  male  51-year-old male here for wellness exam  Medications reviewed  Hospitalizations/surgeries s/p reconstructive left knee surgery  + FH type 2 DM father  CAD father  Hyperlipidemia mother  MS maternal GF  Prostate CA paternal GF  [address] [facility]    HS in [address] [place] [address]   3 children  Non smoker  Hyperlipidemia with high HDL   01/2020 Lipid profile cholesterol 229 decreased from 251  Triglycerides 49  HDL 69    Impaired fasting glucose  01/2020 FBS 93 decreased from 102  A1c 5 4      Lab Results   Component Value Date    WBC 8 50 04/27/2020    HGB 14 5 04/27/2020    HCT 43 9 04/27/2020    MCV 92 04/27/2020     (H) 04/27/2020     Lab Results   Component Value Date    SODIUM 139 04/27/2020    K 3 8 04/27/2020     04/27/2020    CO2 27 04/27/2020    BUN 13 04/27/2020    CREATININE 0 94 04/27/2020    GLUC 94 04/27/2020    CALCIUM 8 8 04/27/2020     Lab Results   Component Value Date    CHOLESTEROL 229 (H) 01/20/2020    CHOLESTEROL 251 (H) 02/05/2019    CHOLESTEROL 258 (H) 02/07/2018     Lab Results   Component Value Date    HDL 69 01/20/2020    HDL 81 02/05/2019    HDL 86 02/07/2018     Lab Results   Component Value Date    TRIG 49 01/20/2020    TRIG 68 02/05/2019    TRIG 74 02/07/2018     Lab Results   Component Value Date    LDLCALC 145 (H) 01/20/2020     Lab Results   Component Value Date    HGBA1C 5 4 01/20/2020             The following portions of the patient's history were reviewed and updated as appropriate: allergies, current medications, past family history, past medical history, past social history, past surgical history and problem list     Review of Systems   Constitutional: Negative for appetite change, chills, fever and unexpected weight change  HENT: Negative for congestion, ear pain, rhinorrhea, sore throat and trouble swallowing  Eyes: Negative for visual disturbance  Respiratory: Negative for cough, shortness of breath and wheezing  Cardiovascular: Negative for chest pain, palpitations and leg swelling  12/2020 colonoscopy    Gastrointestinal: Negative for abdominal pain, blood in stool, constipation, diarrhea, nausea and vomiting  Colonoscopy at age 48  No polyps  Endocrine: Negative for polydipsia and polyuria  Genitourinary: Negative for difficulty urinating          Lab Results       Component                Value               Date                       PSA                      0 6                 01/20/2020                 PSA                      0 5                 02/05/2019                 PSA                      0 6                 02/07/2018            + decreased libido    Musculoskeletal: Positive for arthralgias and gait problem  Negative for myalgias  Balance problems/falls secondary to Parkinson's  He is currently receiving PT  Chronic left knee pain  Remote history of left knee reconstructive surgery  Skin: Negative for rash  Allergic/Immunologic: Negative for environmental allergies  Neurological: Positive for tremors  Negative for dizziness and headaches  Patient was seen and evaluated by Neurology 07/2020 for tremor hands R > L  09/2020 Nuclear medicine aden brain scan showed abnormal decreased radiotracer activity in the posterior basal ganglia bilaterally  Asymmetrically decreased activity is also noted in the left anterior basal ganglia  Radiotracer distribution is otherwise unremarkable  Abnormal examination most concerning for underlying parkinsonian syndrome  He has been started on Selegeline  He is receiving PT for gait and balance  He reports difficulty with his writing-right handed  He finds he has difficulty multi tasking  Hematological: Negative for adenopathy  Does not bruise/bleed easily          Lab Results       Component                Value               Date                       WBC                      8 50                04/27/2020                 HGB                      14 5                04/27/2020                 HCT                      43 9                04/27/2020                 MCV                      92                  04/27/2020                 PLT                      420 (H)             04/27/2020              Platelet Count       Date                     Value               Ref Range           Status                02/07/2018               340                 140 - 400 Th[facility]*     Final              Platelets       Date                     Value               Ref Range           Status                04/27/2020               420 (H)             149 - 390 [facility]*     Final                 11/25/2014 304                 149 - 390 [facility]*     Final                 Psychiatric/Behavioral: Negative for behavioral problems, dysphoric mood and sleep disturbance  Objective:      /70   Pulse 62   Temp (!) 97 2 °F (36 2 °C)   Resp 16   Ht 5' 10" (1 778 m)   Wt 92 5 kg (204 lb)   BMI 29 27 kg/m²     BP Readings from Last 3 Encounters:   03/15/21 142/70   02/17/21 134/84   12/15/20 129/74     Wt Readings from Last 3 Encounters:   03/15/21 92 5 kg (204 lb)   02/17/21 95 1 kg (209 lb 9 6 oz)   12/15/20 89 4 kg (197 lb)        Physical Exam  Vitals signs and nursing note reviewed  Constitutional:       General: He is not in acute distress  Appearance: He is well-developed  HENT:      Right Ear: Tympanic membrane normal       Left Ear: Tympanic membrane normal    Eyes:      General: No scleral icterus  Extraocular Movements: Extraocular movements intact  Conjunctiva/sclera: Conjunctivae normal       Pupils: Pupils are equal, round, and reactive to light  Neck:      Thyroid: No thyroid mass or thyromegaly  Vascular: No carotid bruit or JVD  Trachea: No tracheal deviation  Cardiovascular:      Rate and Rhythm: Normal rate and regular rhythm  Pulses:           Carotid pulses are 2+ on the right side and 2+ on the left side  Heart sounds: Normal heart sounds  No murmur  No gallop  Pulmonary:      Effort: Pulmonary effort is normal  No respiratory distress  Breath sounds: Normal breath sounds  No wheezing or rales  Abdominal:      General: Bowel sounds are normal  There is no distension or abdominal bruit  Palpations: Abdomen is soft  There is no hepatomegaly, splenomegaly or mass  Tenderness: There is no abdominal tenderness  There is no guarding or rebound  Musculoskeletal:         General: Deformity present  No tenderness  Right lower leg: No edema  Left lower leg: No edema  Comments: + varus deformity left knee  + crepitus   No joint line tenderness or effusion  No warmth or redness  Full ROM  Lymphadenopathy:      Cervical: No cervical adenopathy  Upper Body:      Right upper body: No supraclavicular adenopathy  Left upper body: No supraclavicular adenopathy  Skin:     Findings: No rash  Nails: There is no clubbing  Neurological:      General: No focal deficit present  Mental Status: He is alert and oriented to person, place, and time  Motor: Tremor present  Gait: Gait is intact  Comments: Fine rest tremor hands  No rigidity       Psychiatric:         Mood and Affect: Mood normal

## 2021-03-16 ENCOUNTER — OFFICE VISIT (OUTPATIENT)
Dept: PHYSICAL THERAPY | Facility: CLINIC | Age: 61
End: 2021-03-16
Payer: COMMERCIAL

## 2021-03-16 DIAGNOSIS — G20 PARKINSON DISEASE (HCC): Primary | ICD-10-CM

## 2021-03-16 PROCEDURE — 97112 NEUROMUSCULAR REEDUCATION: CPT | Performed by: PHYSICAL THERAPIST

## 2021-03-16 PROCEDURE — 97530 THERAPEUTIC ACTIVITIES: CPT | Performed by: PHYSICAL THERAPIST

## 2021-03-16 PROCEDURE — 97110 THERAPEUTIC EXERCISES: CPT | Performed by: PHYSICAL THERAPIST

## 2021-03-16 PROCEDURE — 97116 GAIT TRAINING THERAPY: CPT | Performed by: PHYSICAL THERAPIST

## 2021-03-16 NOTE — PROGRESS NOTES
PT Evaluation     Today's date: 3/18/2021  Patient name: Merissa Sandoval  : 1960  MRN: [id number]  Referring provider: Maya Neal MD  Dx:   Encounter Diagnosis     ICD-10-CM    1  Parkinson disease ([address] )  Adis Martinez        Start Time: 1615  Stop Time: 1700  Total time in clinic (min): 45 minutes    Assessment  Assessment details: Re-Evaluation 3/16/2021:  Merissa Sandoval is a 64 y o  male who presents with signs and symptoms consistent of Parkinson's disease  Upon evaluation, patient demonstrated improvements with dynamic balance throughout the MCSTIB and dynamic movement screenings  Patient demonstrated improvements with internal balance reactions with being able to strategically recruit the ankle reactions in order to hold balance more steady  Patient has demonstrated slight increases in amplitude in movement specifically the reciprocal armswing and stepping  However, patient does continue to demonstrated asymetrical armswing with less swing on the R>L  Patient continues to have difficultly with trunk rotation and occasionally narrowed and shorten steps thorughout gait on forefoot  Secondary to the progressive nature of his diagnosis, if his deficits are left unaddressed, these symptoms will markedly worsen, leading to decreased independence with functional tasks an inability to maintain current roles that life including employment  Patient is appropriate for continued LSVT BIG treatment and dynamic balance treatment in order to promote movement with increased (more normal) amplitude and improved quality of movement in everyday lives  Patient has met all of his short term goals and is progressing appropriately toward long term goals  Patient would benefit from continued skilled physical therapy to address the impairments, improve their level of function, and to improve their overall quality of life            Impairments: abnormal coordination, abnormal gait, abnormal muscle firing, abnormal muscle tone, abnormal or restricted ROM, abnormal movement, impaired balance, impaired physical strength and lacks appropriate home exercise program    Symptom irritability: moderateUnderstanding of Dx/Px/POC: good   Prognosis: good    Goals  Short Term Goals: to be achieved by 4 weeks  1) Patient to be independent with basic HEP  MET  2) Increase LE strength by 1/2 MMT grade in all deficient planes  MET  3) Patient will demonstrate improvement with MCTSIB indicating improvement with static and dynamic balance  MET    Long Term Goals: to be achieved by discharge  1) FOTO equal to or greater than target score indicating improvements with overall function  PARTIALLY MET   2) Patient will demonstrate an improvement in FRANCIS score to 56/56 in order to decrease fall risk and improvement dynamic mobility  MET   3) Patient will demonstrate an improvement in FGA score by 3 pts in order to decrease fall risk and improvement dynamic mobility  MET   4) Patient will demonstrate an improvement in TUG cognitive score to 3-4 seconds in order to decrease fall risk and improvement dynamic mobility with dual tasking  MET   5) Patient will be independent in comprehensive HEP  PARTIALLY MET     NEW GOALS 3/18/2021:   Long Term Goals: to be achieved by discharge  1) Patient will demonstrate an improvement in 6 MWT by the SONYA of 269 ft in order to decrease fall risk and improvement dynamic mobility     2) Patient will demonstrate improvement in gait mechanics such as reciprocal armswing and increased trunk rotation 75% of the time or greater             Plan  Plan details: 2x week for 6 weeks     Patient would benefit from: PT eval and skilled physical therapy  Planned therapy interventions: manual therapy, neuromuscular re-education, patient education, therapeutic activities, therapeutic exercise and home exercise program  Treatment plan discussed with: patient        Subjective Evaluation  I have been doing my exercises and trying to be conscious of my armswing especially at work  I am concerned that its get worse since people at work are noticing things with my get and my facial expressions  But I do feel my balance getting better  Pain  Current pain ratin  At best pain ratin  At worst pain ratin    Social Support  Steps to enter house: yes (1-2)  Stairs in house: yes (full flight inside)   Lives in: multiple-level home  Lives with: spouse    Patient Goals  Patient goals for therapy: improved balance and independence with ADLs/IADLs  Patient goal: Patient reports that his goals for physical therapy would like to improve overall function  Objective     Strength/Myotome Testing     Left Shoulder     Planes of Motion   Flexion: 5     Right Shoulder     Planes of Motion   Flexion: 5     Left Elbow   Flexion: 5  Extension: 5    Right Elbow   Flexion: 5  Extension: 5    Left Wrist/Hand   Wrist extension: 5  Wrist flexion: 5    Right Wrist/Hand   Wrist extension: 5  Wrist flexion: 5    Left Hip   Planes of Motion   Flexion: 5    Right Hip   Planes of Motion   Flexion: 5    Left Knee   Flexion: 5  Extension: 5    Right Knee   Flexion: 5  Extension: 5    Left Ankle/Foot   Dorsiflexion: 5  Plantar flexion: 5    Right Ankle/Foot   Dorsiflexion: 5  Plantar flexion: 5    Ambulation     Observational Gait   Left stance time, right stance time, left swing time, right swing time, left step length and right step length within functional limits     Left foot contact pattern: heel to toe  Right foot contact pattern: heel to toe  Left arm swing: decreased (still decreased but improved from initial evaluation)  Right arm swing: decreased (right are decreased posterior swing compared to left)  Base of support: increased  Left foot contact pattern comments: more weight toward forefoot; decreased foot clearance   Right foot contact pattern comments: more weight toward forefoot; decreased foot clearance   Base of support comments: mild wider JOSE; increased knee flexion during stance phases      Neuro Exam:     Sensation   Light touch LE: left WNL and right WNL    Coordination   Heel to shin: left WNL and right WNL  Rapid alternating movements: UE WNL and LE impaired     IE 2021 Functional outcomes   TU (seconds)  TUG cognitive: 12 (seconds)  Functional outcome comment: MCSTIB: biodex  Firm Eyes Open: 0 73  Firm Eyes Closed: 2 17  Foam Eyes Open: 1 13  Foam Eyes Closed:3 92  Composite Score: 1 98    MiniBest: 21  FGA:   FRANCIS/56    Functional outcomes RE-EVAL 3/18/2021:   TU (seconds)  TUG cognitive: 9 (seconds)    Functional outcome comment: MCSTIB: biodex  Firm Eyes Open: 0 64  Firm Eyes Closed: 1 43  Foam Eyes Open: 0 99  Foam Eyes Closed: 2 74  Composite Score: 1 45    MiniBest:28  FGA:   FRANCIS/56    6 MWT: 1,636 ft               Precautions: Parkinson's Disease    Neuro Re-Ed 2/23 2/25 3/1 3/4  3/8 3/11       Rock and reach (LSVT BIG) 10x bi and dynadisks 10x bi and dynadisks 10x bi and dynadisks 10x bi and dynadisks  2# in UE  10x bi and dynadisks  2# in UE  10x bi and dynadisks  2# in UE        FWD step and reach  (LSVT BIG) 10x bi and dynadisks 10x bi and dynadisks 10x bi and dynadisks 10x bi and dynadisks 10x bi and dynadisks 10x bi and dynadisks 2# LE  2# UE       BWD  step and reach  (LSVT BIG) 10x bi and dynadisks 10x bi and dynadisks 10x bi and dynadisks 10x bi and dynadisks 10x bi and dynadisks 10x bi and dynadisks 2# LE  2# UE       Lateral step and reach  (LSVT BIG) 10x bi and dynadisks 10x bi and dynadisks 10x bi and dynadisks 10x bi and dynadisks 10x bi and dynadisks 10x bi and dynadisks 2# LE  2# UE       Lateral rock and reach with twist  (LSVT BIG) 10x bi and dynadisks 10x bi and dynadisks 10x bi and dynadisks 10x bi and dynadisks 10x bi and dynadisks 10x bi and dynadisks 2# LE  2# UE Wobble Board 1 min ea   1 min ea  1 min ea   Therapist says color and patient says which direction is associated with color (NSEW) 1 min ea   Therapist says color and patient says which direction is associated with color (NSEW)        Lateral walking foam with cones 4x  4x 4x 4x with naming [employer] teams          FWD foam beam with cones 4x 4x 4x 4x         Cone taps                                                     Ther Ex             NuStep with high resistance             Side stepping with weights              Elliptical    4 min  5 min  5 min  3 min        Alt UE LE on pball     2x10                                                             Ther Activity             Sit to stands On bosu with dynadisk underneath  2x10 On bosu with black ovals underneath  On 6" plybox   RKB  2x10 On bosu with black ovals underneath  On 6" plybox   RKB  2x10 On bosu with with red dynadisk black ovals underneath  On 6" plybox   RKB  2x10         Step-ups  2x10 4# with lift 2x10  2x10 4# with lift 2x10 2x10 4# with lift 2x10 2x10 4# in UE and 5# on LE with lift 2x10 2x10 4# in UE and 5# on LE with lift 2x10                    Gait Training             Retrowalking Kenan  20# 20x 20# 20x  20# 20x 20# 20x         FWD weighted walking Kenan 15# 10x  15# 10x  15# 10x  20# 20x  20# 20x         Hurdles              Big walking with weights (UE and LE) with hurdles    [place] 6x   5# LE   2 5# UE 6x   5# LE   2 5# UE    Naming [employer]  6x   5# LE   2# UE  Naming [employer]       Susan walking for reciprocal arm swing             Modalities

## 2021-03-16 NOTE — PROGRESS NOTES
Daily Note     Today's date: 3/16/2021  Patient name: Niels Atkinson  : 1960  MRN: [id number]  Referring provider: Florencia Andrews MD  Dx:   Encounter Diagnosis     ICD-10-CM    1  Parkinson disease ([address] )  Chepe Jacinto        Start Time: 1615  Stop Time: 1700  Total time in clinic (min): 45 minutes    Subjective: Patient reports, "I am doing well "      Objective: See treatment diary below      Assessment: Patient tolerated treatment well  Patient demonstrated improved reciprocal armswing with BIG walking this date requiring now verbal cueing for correction  Challenged patient with this task by adding more obstacles and changing environment  Patient had some instances of delayed sequencing as well as decreased speed of walking  If patient did sequence incorrectly he was able to self-correct with maybe once instance of verbal cueing  Patient showing greater amplitude with Big exercises with only initial cueing and then able to maintain throughout the remaining sets  Patient being challenged appropriately  Patient fatigued post session  Patient would benefit from continued PT      Plan: Continue per plan of care        Precautions: Parkinson's Disease      Neuro Re-Ed 2/23 2/25 3/1 3/4  3/8 3/11 3/16      Rock and reach (LSVT BIG) 10x bi and dynadisks 10x bi and dynadisks 10x bi and dynadisks 10x bi and dynadisks  2# in UE  10x bi and dynadisks  2# in UE  10x bi and dynadisks  2# in UE  10x bi and dynadisks  2# in UE      FWD step and reach  (LSVT BIG) 10x bi and dynadisks 10x bi and dynadisks 10x bi and dynadisks 10x bi and dynadisks 10x bi and dynadisks 10x bi and dynadisks 2# LE  2# UE 10x bi and dynadisks   2# LE  2# UE      BWD  step and reach  (LSVT BIG) 10x bi and dynadisks 10x bi and dynadisks 10x bi and dynadisks 10x bi and dynadisks 10x bi and dynadisks 10x bi and dynadisks 2# LE  2# UE 10x bi and dynadisks   2# LE  2# UE Lateral step and reach  (LSVT BIG) 10x bi and dynadisks 10x bi and dynadisks 10x bi and dynadisks 10x bi and dynadisks 10x bi and dynadisks 10x ib and dynadisks 2# LE  2# UE 10x bi and dynadisks   2# LE  2# UE      Lateral rock and reach with twist  (LSVT BIG) 10x bi and dynadisks 10x bi and dynadisks 10x bi and dynadisks 10x bi and dynadisks 10x bi and dynadisks 10x bi and dynadisks 2# LE  2# UE 10x bi and dynadisks   2# LE  2# UE      Wobble Board 1 min ea   1 min ea  1 min ea   Therapist says color and patient says which direction is associated with color (NSEW) 1 min ea   Therapist says color and patient says which direction is associated with color (NSEW)  1 min ea   With cone reaching outside JOSE and multidirectional reaching     NV use cane for more dynamics      Lateral walking foam with cones 4x  4x 4x 4x with naming [employer] teams          FWD foam beam with cones 4x 4x 4x 4x   6x      Cone taps                                                     Ther Ex             NuStep with high resistance             Side stepping with weights              Elliptical    4 min  5 min  5 min  3 min  5 min       Alt UE LE on pball     2x10                                                             Ther Activity             Sit to stands On bosu with dynadisk underneath  2x10 On bosu with black ovals underneath  On 6" plybox   RKB  2x10 On bosu with black ovals underneath  On 6" plybox   RKB  2x10 On bosu with with red dynadisk black ovals underneath  On 6" plybox   RKB  2x10         Step-ups  2x10 4# with lift 2x10  2x10 4# with lift 2x10 2x10 4# with lift 2x10 2x10 4# in UE and 5# on LE with lift 2x10 2x10 4# in UE and 5# on LE with lift 2x10 2x10 4# in UE and 5# on LE with lift 2x10                   Gait Training             Retrowalking Sylva  20# 20x 20# 20x  20# 20x 20# 20x   20# 20x      FWD weighted walking Syl[place] 15# 10x  15# 10x  15# 10x  20# 20x  20# 20x   20# 20x      Hurdles              Big walking with weights (UE and LE) with hurdles    [place] 6x   5# LE   2 5# UE 6x   5# LE   2 5# UE    Naming [employer]  6x   5# LE   2# UE  Naming cities 8x   5# LE   2# UE  With bi; weabing thru cones; and up incline         Cane walking for reciprocal arm swing             Modalities

## 2021-03-18 ENCOUNTER — EVALUATION (OUTPATIENT)
Dept: PHYSICAL THERAPY | Facility: CLINIC | Age: 61
End: 2021-03-18
Payer: COMMERCIAL

## 2021-03-18 DIAGNOSIS — G20 PARKINSON DISEASE (HCC): Primary | ICD-10-CM

## 2021-03-18 LAB
ALBUMIN SERPL-MCNC: 4.2 G/DL (ref 3.6–5.1)
ALBUMIN/GLOB SERPL: 1.8 (CALC) (ref 1–2.5)
ALP SERPL-CCNC: 58 U/L (ref 35–144)
ALT SERPL-CCNC: 24 U/L (ref 9–46)
AST SERPL-CCNC: 19 U/L (ref 10–35)
BASOPHILS # BLD AUTO: 42 CELLS/UL (ref 0–200)
BASOPHILS NFR BLD AUTO: 0.7 %
BILIRUB SERPL-MCNC: 0.8 MG/DL (ref 0.2–1.2)
BUN SERPL-MCNC: 19 MG/DL (ref 7–25)
BUN/CREAT SERPL: NORMAL (CALC) (ref 6–22)
CALCIUM SERPL-MCNC: 9.5 MG/DL (ref 8.6–10.3)
CHLORIDE SERPL-SCNC: 104 MMOL/L (ref 98–110)
CHOLEST SERPL-MCNC: 243 MG/DL
CHOLEST/HDLC SERPL: 3.4 (CALC)
CO2 SERPL-SCNC: 29 MMOL/L (ref 20–32)
CREAT SERPL-MCNC: 0.94 MG/DL (ref 0.7–1.25)
EOSINOPHIL # BLD AUTO: 72 CELLS/UL (ref 15–500)
EOSINOPHIL NFR BLD AUTO: 1.2 %
ERYTHROCYTE [DISTWIDTH] IN BLOOD BY AUTOMATED COUNT: 12.3 % (ref 11–15)
GLOBULIN SER CALC-MCNC: 2.4 G/DL (CALC) (ref 1.9–3.7)
GLUCOSE SERPL-MCNC: 99 MG/DL (ref 65–99)
HBA1C MFR BLD: 5.3 % OF TOTAL HGB
HCT VFR BLD AUTO: 46.1 % (ref 38.5–50)
HDLC SERPL-MCNC: 72 MG/DL
HGB BLD-MCNC: 15.4 G/DL (ref 13.2–17.1)
LDLC SERPL CALC-MCNC: 153 MG/DL (CALC)
LYMPHOCYTES # BLD AUTO: 1680 CELLS/UL (ref 850–3900)
LYMPHOCYTES NFR BLD AUTO: 28 %
MCH RBC QN AUTO: 31 PG (ref 27–33)
MCHC RBC AUTO-ENTMCNC: 33.4 G/DL (ref 32–36)
MCV RBC AUTO: 92.8 FL (ref 80–100)
MONOCYTES # BLD AUTO: 666 CELLS/UL (ref 200–950)
MONOCYTES NFR BLD AUTO: 11.1 %
NEUTROPHILS # BLD AUTO: 3540 CELLS/UL (ref 1500–7800)
NEUTROPHILS NFR BLD AUTO: 59 %
NONHDLC SERPL-MCNC: 171 MG/DL (CALC)
PLATELET # BLD AUTO: 314 THOUSAND/UL (ref 140–400)
PMV BLD REES-ECKER: 8.7 FL (ref 7.5–12.5)
POTASSIUM SERPL-SCNC: 4.4 MMOL/L (ref 3.5–5.3)
PROT SERPL-MCNC: 6.6 G/DL (ref 6.1–8.1)
PSA SERPL-MCNC: 0.6 NG/ML
RBC # BLD AUTO: 4.97 MILLION/UL (ref 4.2–5.8)
SL AMB EGFR AFRICAN AMERICAN: 101 ML/MIN/1.73M2
SL AMB EGFR NON AFRICAN AMERICAN: 87 ML/MIN/1.73M2
SODIUM SERPL-SCNC: 140 MMOL/L (ref 135–146)
TESTOST SERPL-MCNC: 564 NG/DL (ref 250–827)
TRIGL SERPL-MCNC: 76 MG/DL
WBC # BLD AUTO: 6 THOUSAND/UL (ref 3.8–10.8)

## 2021-03-18 PROCEDURE — 97110 THERAPEUTIC EXERCISES: CPT | Performed by: PHYSICAL THERAPIST

## 2021-03-18 PROCEDURE — 97112 NEUROMUSCULAR REEDUCATION: CPT | Performed by: PHYSICAL THERAPIST

## 2021-03-18 PROCEDURE — 97530 THERAPEUTIC ACTIVITIES: CPT | Performed by: PHYSICAL THERAPIST

## 2021-03-18 PROCEDURE — 97116 GAIT TRAINING THERAPY: CPT | Performed by: PHYSICAL THERAPIST

## 2021-03-23 ENCOUNTER — OFFICE VISIT (OUTPATIENT)
Dept: PHYSICAL THERAPY | Facility: CLINIC | Age: 61
End: 2021-03-23
Payer: COMMERCIAL

## 2021-03-23 DIAGNOSIS — G20 PARKINSON DISEASE (HCC): Primary | ICD-10-CM

## 2021-03-23 PROCEDURE — 97112 NEUROMUSCULAR REEDUCATION: CPT | Performed by: PHYSICAL THERAPIST

## 2021-03-23 PROCEDURE — 97116 GAIT TRAINING THERAPY: CPT | Performed by: PHYSICAL THERAPIST

## 2021-03-23 PROCEDURE — 97530 THERAPEUTIC ACTIVITIES: CPT | Performed by: PHYSICAL THERAPIST

## 2021-03-23 PROCEDURE — 97110 THERAPEUTIC EXERCISES: CPT | Performed by: PHYSICAL THERAPIST

## 2021-03-23 NOTE — PROGRESS NOTES
Daily Note     Today's date: 3/23/2021  Patient name: Dilshad Marcano  : 1960  MRN: [id number]  Referring provider: Naya Guzman MD  Dx:   Encounter Diagnosis     ICD-10-CM    1  Parkinson disease ([address] )  Dieudonne Juarez        Start Time: 1615  Stop Time: 1700  Total time in clinic (min): 45 minutes    Subjective: Patient reports, "I am doing well "      Objective: See treatment diary below      Assessment: Patient tolerated treatment well  Patient incorporated hand-eye coordination into dynamic balance exercises this date secondary to complaints with these recreational activities that he has trouble with  Patient being challenged appropriately  Patient fatigued post session  Patient would benefit from continued PT      Plan: Continue per plan of care        Precautions: Parkinson's Disease      Neuro Re-Ed 2/23 2/25 3/1 3/4  3/8 3/11 3/16 3/23     Rock and reach (LSVT BIG) 10x bi and dynadisks 10x bi and dynadisks 10x bi and dynadisks 10x bi and dynadisks  2# in UE  10x bi and dynadisks  2# in UE  10x bi and dynadisks  2# in UE  10x bi and dynadisks  2# in UE 10x bi and dynadisks   2# LE  2# UE     FWD step and reach  (LSVT BIG) 10x bi and dynadisks 10x bi and dynadisks 10x bi and dynadisks 10x bi and dynadisks 10x bi and dynadisks 10x bi and dynadisks 2# LE  2# UE 10x bi and dynadisks   2# LE  2# UE 10x bi and dynadisks   2# LE  2# UE     BWD  step and reach  (LSVT BIG) 10x bi and dynadisks 10x bi and dynadisks 10x bi and dynadisks 10x bi and dynadisks 10x bi and dynadisks 10x bi and dynadisks 2# LE  2# UE 10x bi and dynadisks   2# LE  2# UE 10x bi and dynadisks   2# LE  2# UE     Lateral step and reach  (LSVT BIG) 10x bi and dynadisks 10x bi and dynadisks 10x bi and dynadisks 10x bi and dynadisks 10x bi and dynadisks 10x bi and dynadisks 2# LE  2# UE 10x bi and dynadisks   2# LE  2# UE 10x bi and dynadisks   2# LE  2# UE     Lateral rock and reach with twist  (LSVT BIG) 10x bi and dynadisks 10x bi and dynadisks 10x bi and dynadisks 10x bi and dynadisks 10x bi and dynadisks 10x bi and dynadisks 2# LE  2# UE 10x bi and dynadisks   2# LE  2# UE 10x bi and dynadisks   2# LE  2# UE     Wobble Board 1 min ea   1 min ea  1 min ea   Therapist says color and patient says which direction is associated with color (NSEW) 1 min ea   Therapist says color and patient says which direction is associated with color (NSEW)  1 min ea   With cone reaching outside JOSE and multidirectional reaching     NV use cane for more dynamics 1 min ea   With cone reaching outside JOSE and multidirectional reaching with cane     Lateral walking foam with cones 4x  4x 4x 4x with naming [employer] teams          FWD foam beam with cones 4x 4x 4x 4x   6x      Cone taps              Corn hole toss on bosu         2x1 min Incline bolster with basket and 1# weight bean bags                                Ther Ex             NuStep with high resistance             Side stepping with weights              Elliptical    4 min  5 min  5 min  3 min  5 min  6 min      Alt UE LE on pball     2x10                                                             Ther Activity             Sit to stands On bosu with dynadisk underneath  2x10 On bosu with black ovals underneath  On 6" plybox   RKB  2x10 On bosu with black ovals underneath  On 6" plybox   RKB  2x10 On bosu with with red dynadisk black ovals underneath  On 6" plybox   RKB  2x10         Step-ups  2x10 4# with lift 2x10  2x10 4# with lift 2x10 2x10 4# with lift 2x10 2x10 4# in UE and 5# on LE with lift 2x10 2x10 4# in UE and 5# on LE with lift 2x10 2x10 4# in UE and 5# on LE with lift 2x10 2x10 4# in UE and 5# on LE with lift 2x10                  Gait Training             Retrowalking Kenan  20# 20x 20# 20x  20# 20x 20# 20x   20# 20x 25# 20x     FWD weighted walking Kenan 15# 10x  15# 10x  15# 10x  20# 20x  20# 20x   20# 20x 25# 20x     Hurdles              Big walking with weights (UE and LE) with hurdles    NV 6x   5# LE   2 5# UE 6x   5# LE   2 5# UE    Naming [employer]  6x   5# LE   2# UE  Naming cities [place]      [place] walking for reciprocal arm swing             Modalities

## 2021-03-30 ENCOUNTER — OFFICE VISIT (OUTPATIENT)
Dept: PHYSICAL THERAPY | Facility: CLINIC | Age: 61
End: 2021-03-30
Payer: COMMERCIAL

## 2021-03-30 DIAGNOSIS — G20 PARKINSON DISEASE (HCC): Primary | ICD-10-CM

## 2021-03-30 PROCEDURE — 97116 GAIT TRAINING THERAPY: CPT | Performed by: PHYSICAL THERAPIST

## 2021-03-30 PROCEDURE — 97530 THERAPEUTIC ACTIVITIES: CPT | Performed by: PHYSICAL THERAPIST

## 2021-03-30 PROCEDURE — 97110 THERAPEUTIC EXERCISES: CPT | Performed by: PHYSICAL THERAPIST

## 2021-03-30 PROCEDURE — 97112 NEUROMUSCULAR REEDUCATION: CPT | Performed by: PHYSICAL THERAPIST

## 2021-03-30 NOTE — PROGRESS NOTES
Daily Note     Today's date: 3/30/2021  Patient name: Wade Lanier  : 1960  MRN: [id number]  Referring provider: Kisha Bowden MD  Dx:   Encounter Diagnosis     ICD-10-CM    1  Parkinson disease ([address] )  Freeman Lanes        Start Time: 1615  Stop Time: 1700  Total time in clinic (min): 45 minutes    Subjective: Patient reports, "I am doing well  My knee is bothering me a little bit "      Objective: See treatment diary below      Assessment: Patient tolerated treatment well  Continued with BIG exercises and focus on trunk rotation and reciprocal arm swing  With increased obstacles patient demonstrated delayed patterning with a few seconds until self-correction  Patient continues to demonstrated slight shuffling and forefoot contact during gait  Patient fatigued post session  Patient would benefit from continued PT      Plan: Continue per plan of care        Precautions: Parkinson's Disease      Neuro Re-Ed 2/23 2/25 3/1 3/4  3/8 3/11 3/16 3/23 3/30    Rock and reach (LSVT BIG) 10x bi and dynadisks 10x bi and dynadisks 10x bi and dynadisks 10x bi and dynadisks  2# in UE  10x bi and dynadisks  2# in UE  10x bi and dynadisks  2# in UE  10x bi and dynadisks  2# in UE 10x bi and dynadisks   2# LE  2# UE 10x bi and dynadisks   2# LE  4# UE    FWD step and reach  (LSVT BIG) 10x bi and dynadisks 10x bi and dynadisks 10x bi and dynadisks 10x bi and dynadisks 10x bi and dynadisks 10x bi and dynadisks 2# LE  2# UE 10x bi and dynadisks   2# LE  2# UE 10x bi and dynadisks   2# LE  2# UE 10x bi and dynadisks   2# LE  4# UE    BWD  step and reach  (LSVT BIG) 10x bi and dynadisks 10x bi and dynadisks 10x bi and dynadisks 10x bi and dynadisks 10x bi and dynadisks 10x bi and dynadisks 2# LE  2# UE 10x bi and dynadisks   2# LE  2# UE 10x bi and dynadisks   2# LE  2# UE 10x bi and dynadisks   2# LE  4# UE    Lateral step and reach  (LSVT BIG) 10x bi and dynadisks 10x bi and dynadisks 10x bi and dynadisks 10x bi and dynadisks 10x bi and dynadisks 10x bi and dynadisks 2# LE  2# UE 10x bi and dynadisks   2# LE  2# UE 10x bi and dynadisks   2# LE  2# UE 10x bi and dynadisks   2# LE  4# UE    Lateral rock and reach with twist  (LSVT BIG) 10x bi and dynadisks 10x bi and dynadisks 10x bi and dynadisks 10x bi and dynadisks 10x bi and dynadisks 10x bi and dynadisks 2# LE  2# UE 10x bi and dynadisks   2# LE  2# UE 10x bi and dynadisks   2# LE  2# UE 10x bi and dynadisks   2# LE  4# UE    Wobble Board 1 min ea   1 min ea  1 min ea   Therapist says color and patient says which direction is associated with color (NSEW) 1 min ea   Therapist says color and patient says which direction is associated with color (NSEW)  1 min ea   With cone reaching outside JOSE and multidirectional reaching     NV use cane for more dynamics 1 min ea   With cone reaching outside JOSE and multidirectional reaching with cane 1 min ea s    Lateral walking foam with cones 4x  4x 4x 4x with naming [employer] teams          FWD foam beam with cones 4x 4x 4x 4x   6x      Cone taps              Corn hole toss on bosu         2x1 min Incline bolster with basket and 1# weight bean bags                                Ther Ex             NuStep with high resistance             Side stepping with weights              Elliptical    4 min  5 min  5 min  3 min  5 min  6 min  6 min     Alt UE LE on pball     2x10                                                             Ther Activity             Sit to stands On bosu with dynadisk underneath  2x10 On bosu with black ovals underneath  On 6" plybox   RKB  2x10 On bosu with black ovals underneath  On 6" plybox   RKB  2x10 On bosu with with red dynadisk black ovals underneath  On 6" plybox   RKB  2x10         Step-ups  2x10 4# with lift 2x10  2x10 4# with lift 2x10 2x10 4# with lift 2x10 2x10 4# in UE and 5# on LE with lift 2x10 2x10 4# in UE and 5# on LE with lift 2x10 2x10 4# in UE and 5# on LE with lift 2x10 2x10 4# in UE and 5# on LE with lift 2x10 2x10 4# in UE and 5# on LE with lift 2x10                 Gait Training             Retrowalking [place]  20# 20x 20# 20x  20# 20x 20# 20x   20# 20x 25# 20x 25# 20x    FWD weighted walking Kenan 15# 10x  15# 10x  15# 10x  20# 20x  20# 20x   20# 20x 25# 20x 25# 20x    Hurdles              Big walking with weights (UE and LE) with hurdles    NV 6x   5# LE   2 5# UE 6x   5# LE   2 5# UE    Naming [employer]  6x   5# LE   2# UE  Naming cities [place]  6x   5# LE   2# UE  With hurdles cone weaving and step up and down     Cane walking for reciprocal arm swing             Modalities

## 2021-04-01 ENCOUNTER — OFFICE VISIT (OUTPATIENT)
Dept: PHYSICAL THERAPY | Facility: CLINIC | Age: 61
End: 2021-04-01
Payer: COMMERCIAL

## 2021-04-01 DIAGNOSIS — G20 PARKINSON DISEASE (HCC): Primary | ICD-10-CM

## 2021-04-01 PROCEDURE — 97112 NEUROMUSCULAR REEDUCATION: CPT | Performed by: PHYSICAL THERAPIST

## 2021-04-01 PROCEDURE — 97530 THERAPEUTIC ACTIVITIES: CPT | Performed by: PHYSICAL THERAPIST

## 2021-04-01 PROCEDURE — 97110 THERAPEUTIC EXERCISES: CPT | Performed by: PHYSICAL THERAPIST

## 2021-04-13 ENCOUNTER — APPOINTMENT (OUTPATIENT)
Dept: PHYSICAL THERAPY | Facility: CLINIC | Age: 61
End: 2021-04-13
Payer: COMMERCIAL

## 2021-04-15 ENCOUNTER — OFFICE VISIT (OUTPATIENT)
Dept: PHYSICAL THERAPY | Facility: CLINIC | Age: 61
End: 2021-04-15
Payer: COMMERCIAL

## 2021-04-15 DIAGNOSIS — G20 PARKINSON DISEASE (HCC): Primary | ICD-10-CM

## 2021-04-15 PROCEDURE — 97530 THERAPEUTIC ACTIVITIES: CPT | Performed by: PHYSICAL THERAPIST

## 2021-04-15 PROCEDURE — 97110 THERAPEUTIC EXERCISES: CPT | Performed by: PHYSICAL THERAPIST

## 2021-04-15 PROCEDURE — 97112 NEUROMUSCULAR REEDUCATION: CPT | Performed by: PHYSICAL THERAPIST

## 2021-04-15 PROCEDURE — 97116 GAIT TRAINING THERAPY: CPT | Performed by: PHYSICAL THERAPIST

## 2021-04-15 NOTE — PROGRESS NOTES
Daily Note     Today's date: 4/15/2021  Patient name: Hector Muñoz  : 1960  MRN: [id number]  Referring provider: Rocco Morton MD  Dx:   Encounter Diagnosis     ICD-10-CM    1  Parkinson disease ([address] )  Shirly Snellen        Start Time: 161  Stop Time: 1700  Total time in clinic (min): 45 minutes    Subjective: Patient reports, "I am doing well  I brought the quoit boards out the other day and it was disappointing with how bad my coordination was "      Objective: See treatment diary below      Assessment: Patient tolerated treatment well  Continued with BIG exercises and focus on trunk rotation and reciprocal arm swing  Introduced self ball toss this day for dual tasking with UE manipulation and ambulation  Patient demonstrated difficulty with coordinating throwing and walking with occasional misjudgment of distance as well as decrease speed of ambulation  Patient fatigued post session  Patient would benefit from continued PT       Plan: Continue per plan of care        Precautions: Parkinson's Disease      Neuro Re-Ed 4/15     3/11 3/16 3/23 3/30 4/1   Rock and reach (LSVT BIG) 10x bi and dynadisks   3# LE  5# UE     10x bi and dynadisks  2# in UE  10x bi and dynadisks  2# in UE 10x bi and dynadisks   2# LE  2# UE 10x bi and dynadisks   2# LE  4# UE 10x bi and dynadisks   3# LE  5# UE   FWD step and reach  (LSVT BIG) 10x bi and dynadisks   3# LE  5# UE     10x bi and dynadisks 2# LE  2# UE 10x bi and dynadisks   2# LE  2# UE 10x bi and dynadisks   2# LE  2# UE 10x bi and dynadisks   2# LE  4# UE 10x bi and dynadisks   3# LE  5# UE   BWD  step and reach  (LSVT BIG) 10x bi and dynadisks   3# LE  5# UE     10x bi and dynadisks 2# LE  2# UE 10x bi and dynadisks   2# LE  2# UE 10x bi and dynadisks   2# LE  2# UE 10x bi and dynadisks   2# LE  4# UE 10x bi and dynadisks   3# LE  5# UE   Lateral step and reach  (LSVT BIG) 10x bi and dynadisks 3# LE  5# UE     10x bi and dynadisks 2# LE  2# UE 10x bi and dynadisks   2# LE  2# UE 10x bi and dynadisks   2# LE  2# UE 10x bi and dynadisks   2# LE  4# UE 10x bi and dynadisks   3# LE  5# UE   Lateral rock and reach with twist  (LSVT BIG) 10x bi and dynadisks   3# LE  5# UE     10x bi and dynadisks 2# LE  2# UE 10x bi and dynadisks   2# LE  2# UE 10x bi and dynadisks   2# LE  2# UE 10x bi and dynadisks   2# LE  4# UE 10x bi and dynadisks   3# LE  5# UE   Wobble Board       1 min ea   With cone reaching outside OJSE and multidirectional reaching     NV use cane for more dynamics 1 min ea   With cone reaching outside JOSE and multidirectional reaching with cane 1 min ea     Lateral walking foam with cones             FWD foam beam with cones       6x      Cone taps              Corn hole toss on bosu         2x1 min Incline bolster with basket and 1# weight bean bags                                Ther Ex             NuStep with high resistance             Side stepping with weights              Elliptical  6 min      3 min  5 min  6 min  6 min  6 min    Alt UE LE on pball                                                                  Ther Activity             Sit to stands             Step-ups      2x10 4# in UE and 5# on LE with lift 2x10 2x10 4# in UE and 5# on LE with lift 2x10 2x10 4# in UE and 5# on LE with lift 2x10 2x10 4# in UE and 5# on LE with lift 2x10 2x10 4# in UE and 5# on LE with lift 2x10                Gait Training             Retrowalking [place]        20# 20x 25# 20x 25# 20x    FWD weighted walking [place] 25# 20x      20# 20x 25# 20x 25# 20x    Hurdles              Big walking with weights (UE and LE) with hurdles  8x   5# LE   3# UE   Hurdles only      6x   5# LE   2# UE  [place]  6x   5# LE   2# UE  With hurdles cone weaving and step up and down  8x   5# LE   3# UE  Cone weaving     Cane walking for reciprocal arm swing Modalities

## 2021-04-20 ENCOUNTER — OFFICE VISIT (OUTPATIENT)
Dept: PHYSICAL THERAPY | Facility: CLINIC | Age: 61
End: 2021-04-20
Payer: COMMERCIAL

## 2021-04-20 DIAGNOSIS — G20 PARKINSON DISEASE (HCC): Primary | ICD-10-CM

## 2021-04-20 PROCEDURE — 97530 THERAPEUTIC ACTIVITIES: CPT | Performed by: PHYSICAL THERAPIST

## 2021-04-20 PROCEDURE — 97112 NEUROMUSCULAR REEDUCATION: CPT | Performed by: PHYSICAL THERAPIST

## 2021-04-20 PROCEDURE — 97110 THERAPEUTIC EXERCISES: CPT | Performed by: PHYSICAL THERAPIST

## 2021-04-20 PROCEDURE — 97116 GAIT TRAINING THERAPY: CPT | Performed by: PHYSICAL THERAPIST

## 2021-04-20 NOTE — PROGRESS NOTES
Daily Note     Today's date: 2021  Patient name: Phani Bhatt  : 1960  MRN: [id number]  Referring provider: Lake Negrete MD  Dx:   Encounter Diagnosis     ICD-10-CM    1  Parkinson disease ([address] )  Kim Steele        Start Time: 1615  Stop Time: 1700  Total time in clinic (min): 45 minutes    Subjective: Patient reports, "I am doing well  I brought the quoit boards out the other day and it was disappointing with how bad my coordination was "      Objective: See treatment diary below      Assessment: Patient tolerated treatment well  Continued with BIG exercises and focus on trunk rotation, reciprocal arm swing, and dynamic balance  Patient occasionally challenged with coordinating height over hurdles but doing well  Patient challenged with strength training this date  Patient fatigued post session  Patient would benefit from continued PT       Plan: Continue per plan of care        Precautions: Parkinson's Disease      Neuro Re-Ed 4/15 4/20    3/11 3/16 3/23 3/30 4/1   Rock and reach (LSVT BIG) 10x bi and dynadisks   3# LE  5# UE 10x bi and dynadisks   3# LE  5# UE    10x bi and dynadisks  2# in UE  10x bi and dynadisks  2# in UE 10x bi and dynadisks   2# LE  2# UE 10x bi and dynadisks   2# LE  4# UE 10x bi and dynadisks   3# LE  5# UE   FWD step and reach  (LSVT BIG) 10x bi and dynadisks   3# LE  5# UE 10x bi and dynadisks   3# LE  5# UE    10x bi and dynadisks 2# LE  2# UE 10x bi and dynadisks   2# LE  2# UE 10x bi and dynadisks   2# LE  2# UE 10x bi and dynadisks   2# LE  4# UE 10x bi and dynadisks   3# LE  5# UE   BWD  step and reach  (LSVT BIG) 10x bi and dynadisks   3# LE  5# UE 10x bi and dynadisks   3# LE  5# UE    10x bi and dynadisks 2# LE  2# UE 10x bi and dynadisks   2# LE  2# UE 10x bi and dynadisks   2# LE  2# UE 10x bi and dynadisks   2# LE  4# UE 10x bi and dynadisks   3# LE  5# UE   Lateral step and reach (LSVT BIG) 10x bi and dynadisks   3# LE  5# UE 10x bi and dynadisks   3# LE  5# UE    10x bi and dynadisks 2# LE  2# UE 10x bi and dynadisks   2# LE  2# UE 10x bi and dynadisks   2# LE  2# UE 10x bi and dynadisks   2# LE  4# UE 10x bi and dynadisks   3# LE  5# UE   Lateral rock and reach with twist  (LSVT BIG) 10x bi and dynadisks   3# LE  5# UE 10x bi and dynadisks   3# LE  5# UE    10x bi and dynadisks 2# LE  2# UE 10x bi and dynadisks   2# LE  2# UE 10x bi and dynadisks   2# LE  2# UE 10x bi and dynadisks   2# LE  4# UE 10x bi and dynadisks   3# LE  5# UE   Wobble Board       1 min ea   With cone reaching outside JOSE and multidirectional reaching     NV use cane for more dynamics 1 min ea   With cone reaching outside JOSE and multidirectional reaching with cane 1 min ea     Lateral walking foam with cones             FWD foam beam with cones       6x      Cone taps              Corn hole toss on bosu         2x1 min Incline bolster with basket and 1# weight bean bags                                Ther Ex             NuStep with high resistance             Side stepping with weights              Elliptical  6 min  6 min     3 min  5 min  6 min  6 min  6 min    Alt UE LE on pball                                                                  Ther Activity             Sit to stands  On bosu with with red dynadisk black ovals underneath  On 6" plybox   RKB  2x10           Step-ups  2x10 4# in UE and 5# on LE with lift 2x10    2x10 4# in UE and 5# on LE with lift 2x10 2x10 4# in UE and 5# on LE with lift 2x10 2x10 4# in UE and 5# on LE with lift 2x10 2x10 4# in UE and 5# on LE with lift 2x10 2x10 4# in UE and 5# on LE with lift 2x10                Gait Training             Retrowalking [place]        20# 20x 25# 20x 25# 20x    FWD weighted walking [place] 25# 20x 25# 20x     20# 20x 25# 20x 25# 20x    Hurdles              Big walking with weights (UE and LE) with hurdles  8x   5# LE   3# UE   Hurdles only      6x   5# LE   2# UE  [place] [facility] [place]  6x   5# LE   2# UE  With hurdles cone weaving and step up and down  8x   5# LE   3# UE  Cone weaving     Cane walking for reciprocal arm swing             Modalities

## 2021-04-22 ENCOUNTER — EVALUATION (OUTPATIENT)
Dept: PHYSICAL THERAPY | Facility: CLINIC | Age: 61
End: 2021-04-22
Payer: COMMERCIAL

## 2021-04-22 DIAGNOSIS — G20 PARKINSON DISEASE (HCC): Primary | ICD-10-CM

## 2021-04-22 PROCEDURE — 97116 GAIT TRAINING THERAPY: CPT | Performed by: PHYSICAL THERAPIST

## 2021-04-22 PROCEDURE — 97110 THERAPEUTIC EXERCISES: CPT | Performed by: PHYSICAL THERAPIST

## 2021-04-22 PROCEDURE — 97112 NEUROMUSCULAR REEDUCATION: CPT | Performed by: PHYSICAL THERAPIST

## 2021-04-22 NOTE — LETTER
2021    Jose Mejia, Rosio [address]    Patient: Elijah Prince   YOB: 1960   Date of Visit: 2021     Encounter Diagnosis     ICD-10-CM    1  Parkinson disease [facility])  G20        Dear Dr Amee Chin: Thank you for your recent referral of Elijah Prince  Please review the attached evaluation summary from Bob's recent visit  Please verify that you agree with the plan of care by signing the attached order  If you have any questions or concerns, please do not hesitate to call  I sincerely appreciate the opportunity to share in the care of one of your patients and hope to have another opportunity to work with you in the near future  Sincerely,    Iveth Moctezuma, PT      Referring Provider:      I certify that I have read the below Plan of Care and certify the need for these services furnished under this plan of treatment while under my care  Jose Mejia MD  [address] [place] [address]  Via Fax: [phone]          PT Re-Evaluation    Today's date: 2021  Patient name: Elijah Prince  : 1960  MRN: [id number]  Referring provider: Joycelyn Cleaning MD   Dx:   Encounter Diagnosis     ICD-10-CM    1  Parkinson disease ([facility] )  Elvia Record        Start Time: 1630  Stop Time: 5939  Total time in clinic (min): 45 minutes    Assessment  Assessment details: Re-Evaluation 2021:  Elijah Prince is a 64 y o  male who presents with signs and symptoms consistent of Parkinson's disease  Upon evaluation, patient demonstrated slight improvements with dynamic balance throughout the MCSTIB and dynamic movement screenings  Patient demonstrated improvements with internal balance reactions with being able to strategically recruit the ankle reactions in order to hold balance more steady  Patient has demonstrated slight increases in amplitude in movement specifically the reciprocal armswing and stepping  However, patient does continue to demonstrated asymetrical armswing with less swing on the R>L  Patient continues to have difficulty with dual tasking activities  When challenged with a mental taks patient looses reciprocal armswing or speed of performance  Secondary to the progressive nature of his diagnosis, if his deficits are left unaddressed, these symptoms will markedly worsen, leading to decreased independence with functional tasks an inability to maintain current roles that life including employment  Patient is appropriate for continued LSVT BIG treatment and dynamic balance treatment in order to promote movement with increased (more normal) amplitude and improved quality of movement in everyday lives  Patient has met all of his short term goals and is progressing appropriately toward long term goals  Patient would benefit from continued skilled physical therapy to address the impairments, improve their level of function, and to improve their overall quality of life  Impairments: abnormal coordination, abnormal gait, abnormal muscle firing, abnormal muscle tone, abnormal or restricted ROM, abnormal movement, impaired balance, impaired physical strength and lacks appropriate home exercise program    Symptom irritability: moderateUnderstanding of Dx/Px/POC: good   Prognosis: good    Goals  Short Term Goals: to be achieved by 4 weeks  1) Patient to be independent with basic HEP  MET  2) Increase LE strength by 1/2 MMT grade in all deficient planes  MET  3) Patient will demonstrate improvement with MCTSIB indicating improvement with static and dynamic balance  MET    Long Term Goals: to be achieved by discharge  1) FOTO equal to or greater than target score indicating improvements with overall function  PARTIALLY MET   2) Patient will demonstrate an improvement in FRANCIS score to 56/56 in order to decrease fall risk and improvement dynamic mobility    MET   3) Patient will demonstrate an improvement in FGA score by 3 pts in order to decrease fall risk and improvement dynamic mobility  MET   4) Patient will demonstrate an improvement in TUG cognitive score to 3-4 seconds in order to decrease fall risk and improvement dynamic mobility with dual tasking  MET   5) Patient will be independent in comprehensive HEP  PARTIALLY MET     NEW GOALS 3/18/2021:   Long Term Goals: to be achieved by discharge  1) Patient will demonstrate an improvement in 6 MWT by the SONYA of 269 ft in order to decrease fall risk and improvement dynamic mobility  PROGRESSING  2) Patient will demonstrate improvement in gait mechanics such as reciprocal armswing and increased trunk rotation 75% of the time or greater [address] details: 2x week for 6 weeks     Patient would benefit from: PT eval and skilled physical therapy  Planned therapy interventions: manual therapy, neuromuscular re-education, patient education, therapeutic activities, therapeutic exercise and home exercise program  Treatment plan discussed with: patient        Subjective Evaluation  I have been doing my exercises and trying to be conscious of my armswing especially at work  I am concerned that its get worse since people at work are noticing things with my get and my facial expressions  But I do feel my balance getting better  Pain  Current pain ratin  At best pain ratin  At worst pain ratin    Social Support  Steps to enter house: yes (1-2)  Stairs in house: yes (full flight inside)   Lives in: multiple-level home  Lives with: spouse    Patient Goals  Patient goals for therapy: improved balance and independence with ADLs/IADLs  Patient goal: Patient reports that his goals for physical therapy would like to improve overall function          Objective     Strength/Myotome Testing     Left Shoulder     Planes of Motion   Flexion: 5     Right Shoulder     Planes of Motion   Flexion: 5     Left Elbow   Flexion: 5  Extension: 5    Right Elbow Flexion: 5  Extension: 5    Left Wrist/Hand   Wrist extension: 5  Wrist flexion: 5    Right Wrist/Hand   Wrist extension: 5  Wrist flexion: 5    Left Hip   Planes of Motion   Flexion: 5    Right Hip   Planes of Motion   Flexion: 5    Left Knee   Flexion: 5  Extension: 5    Right Knee   Flexion: 5  Extension: 5    Left Ankle/Foot   Dorsiflexion: 5  Plantar flexion: 5    Right Ankle/Foot   Dorsiflexion: 5  Plantar flexion: 5    Ambulation     Observational Gait   Left stance time, right stance time, left swing time, right swing time, left step length and right step length within functional limits     Left foot contact pattern: heel to toe  Right foot contact pattern: heel to toe  Left arm swing: decreased (still decreased but improved from initial evaluation)  Right arm swing: decreased (right are decreased posterior swing compared to left)  Base of support: increased  Left foot contact pattern comments: more weight toward forefoot; decreased foot clearance   Right foot contact pattern comments: more weight toward forefoot; decreased foot clearance   Base of support comments: mild wider JOSE; increased knee flexion during stance phases      Neuro Exam:     Sensation   Light touch LE: left WNL and right WNL    Coordination   Heel to shin: left WNL and right WNL  Rapid alternating movements: UE WNL and LE impaired     IE 2021 Functional outcomes   TU (seconds)  TUG cognitive: 12 (seconds)  Functional outcome comment: MCSTIB: biodex  Firm Eyes Open: 0 73  Firm Eyes Closed: 2 17  Foam Eyes Open: 1 13  Foam Eyes Closed:3 92  Composite Score: 1 98    MiniBest: 21  FGA:   FRANCIS/56    --------------------------------------------------------------  Functional outcomes RE-EVAL 3/18/2021:   TU (seconds)  TUG cognitive: 9 (seconds)    Functional outcome comment: MCSTIB: biodex  Firm Eyes Open: 0 63  Firm Eyes Closed: 1 42  Foam Eyes Open: 1 02  Foam Eyes Closed: 2 73  Composite Score: 1 48    MiniBest:28  FGA:   FRANCIS56    6 MWT: 1,636 ft    --------------------------------------------------------------  Functional outcomes RE-EVAL 2021:   TU (seconds)  TUG cognitive: 9 (seconds)     Functional outcome comment: MCSTIB: biodex  Firm Eyes Open: 0 64  Firm Eyes Closed: 1 43  Foam Eyes Open: 0 99  Foam Eyes Closed: 2 74   Composite Score: 1 45    MiniBest: 28  FGA:   FRANCIS/56     6 MWT: 1,698 ft         Precautions: Parkinson's Disease      Neuro Re-Ed 4/15 4/20 4/22   3/11 3/16 3/23 3/30 4/1   Rock and reach (LSVT BIG) 10x bi and dynadisks   3# LE  5# UE 10x bi and dynadisks   3# LE  5# UE 10x bi and dynadisks   3# LE  5# UE   10x bi and dynadisks  2# in UE  10x bi and dynadisks  2# in UE 10x bi and dynadisks   2# LE  2# UE 10x bi and dynadisks   2# LE  4# UE 10x bi and dynadisks   3# LE  5# UE   FWD step and reach  (LSVT BIG) 10x bi and dynadisks   3# LE  5# UE 10x bi and dynadisks   3# LE  5# UE 10x bi and dynadisks   3# LE  5# UE   10x bi and dynadisks 2# LE  2# UE 10x bi and dynadisks   2# LE  2# UE 10x bi and dynadisks   2# LE  2# UE 10x bi and dynadisks   2# LE  4# UE 10x ib and dynadisks   3# LE  5# UE   BWD  step and reach  (LSVT BIG) 10x bi and dynadisks   3# LE  5# UE 10x bi and dynadisks   3# LE  5# UE 10x bi and dynadisks   3# LE  5# UE   10x bi and dynadisks 2# LE  2# UE 10x bi and dynadisks   2# LE  2# UE 10x bi and dynadisks   2# LE  2# UE 10x bi and dynadisks   2# LE  4# UE 10x bi and dynadisks   3# LE  5# UE   Lateral step and reach  (LSVT BIG) 10x bi and dynadisks   3# LE  5# UE 10x bi and dynadisks   3# LE  5# UE 10x bi and dynadisks   3# LE  5# UE   10x bi and dynadisks 2# LE  2# UE 10x bi and dynadisks   2# LE  2# UE 10x bi and dynadisks   2# LE  2# UE 10x bi and dynadisks   2# LE  4# UE 10x bi and dynadisks   3# LE  5# UE Lateral rock and reach with twist  (LSVT BIG) 10x bi and dynadisks   3# LE  5# UE 10x bi and dynadisks   3# LE  5# UE 10x bi and dynadisks   3# LE  5# UE   10x bi and dynadisks 2# LE  2# UE 10x bi and dynadisks   2# LE  2# UE 10x bi and dynadisks   2# LE  2# UE 10x bi and dynadisks   2# LE  4# UE 10x bi and dynadisks   3# LE  5# UE   Wobble Board       1 min ea   With cone reaching outside JOSE and multidirectional reaching     NV use cane for more dynamics 1 min ea   With cone reaching outside JOSE and multidirectional reaching with cane 1 min ea     Lateral walking foam with cones   6x with 5# LE          FWD foam beam with cones   6x with 5# LE    6x      Cone taps              Corn hole toss on bosu         2x1 min Incline bolster with basket and 1# weight bean bags                                Ther Ex             NuStep with high resistance             Side stepping with weights              Elliptical  6 min  6 min  5 min    3 min  5 min  6 min  6 min  6 min    Alt UE LE on pball                                                                  Ther Activity             Sit to stands  On bosu with with red dynadisk black ovals underneath  On 6" plybox   RKB  2x10           Step-ups  2x10 4# in UE and 5# on LE with lift 2x10 2x10 4# in UE and 5# on LE with lift 2x10    In 12" step    2x10 4# in UE and 5# on LE with lift 2x10 2x10 4# in UE and 5# on LE with lift 2x10 2x10 4# in UE and 5# on LE with lift 2x10 2x10 4# in UE and 5# on LE with lift 2x10 2x10 4# in UE and 5# on LE with lift 2x10                Gait Training             Retrowalking [place]   30# 20x     20# 20x 25# 20x 25# 20x    FWD weighted walking [place] 25# 20x 30# 20x     20# 20x 25# 20x 25# 20x    Hurdles              Big walking with weights (UE and LE) with hurdles  8x   5# LE   3# UE   Hurdles only      6x   5# LE   2# UE  [place]  6x   5# LE   2# UE  With hurdles cone weaving and step up and down  8x 5# LE   3# UE  Cone weaving     Cane walking for reciprocal arm swing             Modalities

## 2021-04-22 NOTE — PROGRESS NOTES
PT Re-Evaluation    Today's date: 2021  Patient name: Sagrario Alejo  : 1960  MRN: [id number]  Referring provider: Quinn Can MD   Dx:   Encounter Diagnosis     ICD-10-CM    1  Parkinson disease ([facility] [address] )  Zainab Chacon        Start Time: 1630  Stop Time: 3523  Total time in clinic (min): 45 minutes    Assessment  Assessment details: Re-Evaluation 2021:  Sagrario Alejo is a 64 y o  male who presents with signs and symptoms consistent of Parkinson's disease  Upon evaluation, patient demonstrated slight improvements with dynamic balance throughout the MCSTIB and dynamic movement screenings  Patient demonstrated improvements with internal balance reactions with being able to strategically recruit the ankle reactions in order to hold balance more steady  Patient has demonstrated slight increases in amplitude in movement specifically the reciprocal armswing and stepping  However, patient does continue to demonstrated asymetrical armswing with less swing on the R>L  Patient continues to have difficulty with dual tasking activities  When challenged with a mental taks patient looses reciprocal armswing or speed of performance  Secondary to the progressive nature of his diagnosis, if his deficits are left unaddressed, these symptoms will markedly worsen, leading to decreased independence with functional tasks an inability to maintain current roles that life including employment  Patient is appropriate for continued LSVT BIG treatment and dynamic balance treatment in order to promote movement with increased (more normal) amplitude and improved quality of movement in everyday lives  Patient has met all of his short term goals and is progressing appropriately toward long term goals  Patient would benefit from continued skilled physical therapy to address the impairments, improve their level of function, and to improve their overall quality of life            Impairments: abnormal coordination, abnormal gait, abnormal muscle firing, abnormal muscle tone, abnormal or restricted ROM, abnormal movement, impaired balance, impaired physical strength and lacks appropriate home exercise program    Symptom irritability: moderateUnderstanding of Dx/Px/POC: good   Prognosis: good    Goals  Short Term Goals: to be achieved by 4 weeks  1) Patient to be independent with basic HEP  MET  2) Increase LE strength by 1/2 MMT grade in all deficient planes  MET  3) Patient will demonstrate improvement with MCTSIB indicating improvement with static and dynamic balance  MET    Long Term Goals: to be achieved by discharge  1) FOTO equal to or greater than target score indicating improvements with overall function  PARTIALLY MET   2) Patient will demonstrate an improvement in FRANCIS score to 56/56 in order to decrease fall risk and improvement dynamic mobility  MET   3) Patient will demonstrate an improvement in FGA score by 3 pts in order to decrease fall risk and improvement dynamic mobility  MET   4) Patient will demonstrate an improvement in TUG cognitive score to 3-4 seconds in order to decrease fall risk and improvement dynamic mobility with dual tasking  MET   5) Patient will be independent in comprehensive HEP  PARTIALLY MET     NEW GOALS 3/18/2021:   Long Term Goals: to be achieved by discharge  1) Patient will demonstrate an improvement in 6 MWT by the SONYA of 269 ft in order to decrease fall risk and improvement dynamic mobility   PROGRESSING  2) Patient will demonstrate improvement in gait mechanics such as reciprocal armswing and increased trunk rotation 75% of the time or greater [address] details: 2x week for 6 weeks     Patient would benefit from: PT eval and skilled physical therapy  Planned therapy interventions: manual therapy, neuromuscular re-education, patient education, therapeutic activities, therapeutic exercise and home exercise program  Treatment plan discussed with: patient        Subjective Evaluation  I have been doing my exercises and trying to be conscious of my armswing especially at work  I am concerned that its get worse since people at work are noticing things with my get and my facial expressions  But I do feel my balance getting better  Pain  Current pain ratin  At best pain ratin  At worst pain ratin    Social Support  Steps to enter house: yes (1-2)  Stairs in house: yes (full flight inside)   Lives in: multiple-level home  Lives with: spouse    Patient Goals  Patient goals for therapy: improved balance and independence with ADLs/IADLs  Patient goal: Patient reports that his goals for physical therapy would like to improve overall function  Objective     Strength/Myotome Testing     Left Shoulder     Planes of Motion   Flexion: 5     Right Shoulder     Planes of Motion   Flexion: 5     Left Elbow   Flexion: 5  Extension: 5    Right Elbow   Flexion: 5  Extension: 5    Left Wrist/Hand   Wrist extension: 5  Wrist flexion: 5    Right Wrist/Hand   Wrist extension: 5  Wrist flexion: 5    Left Hip   Planes of Motion   Flexion: 5    Right Hip   Planes of Motion   Flexion: 5    Left Knee   Flexion: 5  Extension: 5    Right Knee   Flexion: 5  Extension: 5    Left Ankle/Foot   Dorsiflexion: 5  Plantar flexion: 5    Right Ankle/Foot   Dorsiflexion: 5  Plantar flexion: 5    Ambulation     Observational Gait   Left stance time, right stance time, left swing time, right swing time, left step length and right step length within functional limits     Left foot contact pattern: heel to toe  Right foot contact pattern: heel to toe  Left arm swing: decreased (still decreased but improved from initial evaluation)  Right arm swing: decreased (right are decreased posterior swing compared to left)  Base of support: increased  Left foot contact pattern comments: more weight toward forefoot; decreased foot clearance   Right foot contact pattern comments: more weight toward forefoot; decreased foot clearance   Base of support comments: mild wider JOSE; increased knee flexion during stance phases      Neuro Exam:     Sensation   Light touch LE: left WNL and right WNL    Coordination   Heel to shin: left WNL and right WNL  Rapid alternating movements: UE WNL and LE impaired     IE 2021 Functional outcomes   TU (seconds)  TUG cognitive: 12 (seconds)  Functional outcome comment: MCSTIB: biodex  Firm Eyes Open: 0 73  Firm Eyes Closed: 2 17  Foam Eyes Open: 1 13  Foam Eyes Closed:3 92  Composite Score: 1 98    MiniBest: 21  FGA:   FRANCIS/56    --------------------------------------------------------------  Functional outcomes RE-EVAL 3/18/2021:   TU (seconds)  TUG cognitive: 9 (seconds)    Functional outcome comment: MCSTIB: biodex  Firm Eyes Open: 0 63  Firm Eyes Closed: 1 42  Foam Eyes Open: 1 02  Foam Eyes Closed: 2 73  Composite Score: 1 48    MiniBest:28  FGA:   FRANCIS/56    6 MWT: 1,636 ft    --------------------------------------------------------------  Functional outcomes RE-EVAL 2021:   TU (seconds)  TUG cognitive: 9 (seconds)     Functional outcome comment: MCSTIB: biodex  Firm Eyes Open: 0 64  Firm Eyes Closed: 1 43  Foam Eyes Open: 0 99  Foam Eyes Closed: 2 74   Composite Score: 1 45    MiniBest: 28  FGA:   FRANCIS56     6 MWT: 1,698 ft         Precautions: Parkinson's Disease      Neuro Re-Ed 4/15 4/20 4/22   3/11 3/16 3/23 3/30 4/1   Rock and reach (LSVT BIG) 10x bi and dynadisks   3# LE  5# UE 10x bi and dynadisks   3# LE  5# UE 10x bi and dynadisks   3# LE  5# UE   10x bi and dynadisks  2# in UE  10x bi and dynadisks  2# in UE 10x bi and dynadisks   2# LE  2# UE 10x bi and dynadisks   2# LE  4# UE 10x bi and dynadisks   3# LE  5# UE   FWD step and reach  (LSVT BIG) 10x bi and dynadisks   3# LE  5# UE 10x bi and dynadisks   3# LE  5# UE 10x bi and dynadisks   3# LE  5# UE   10x bi and dynadisks 2# LE  2# UE 10x bi and dynadisks   2# LE  2# UE 10x bi and dynadisks   2# LE  2# UE 10x bi and dynadisks   2# LE  4# UE 10x bi and dynadisks   3# LE  5# UE   BWD  step and reach  (LSVT BIG) 10x bi and dynadisks   3# LE  5# UE 10x bi and dynadisks   3# LE  5# UE 10x bi and dynadisks   3# LE  5# UE   10x bi and dynadisks 2# LE  2# UE 10x bi and dynadisks   2# LE  2# UE 10x bi and dynadisks   2# LE  2# UE 10x bi and dynadisks   2# LE  4# UE 10x bi and dynadisks   3# LE  5# UE   Lateral step and reach  (LSVT BIG) 10x bi and dynadisks   3# LE  5# UE 10x bi and dynadisks   3# LE  5# UE 10x bi and dynadisks   3# LE  5# UE   10x bi and dynadisks 2# LE  2# UE 10x bi and dynadisks   2# LE  2# UE 10x bi and dynadisks   2# LE  2# UE 10x bi and dynadisks   2# LE  4# UE 10x bi and dynadisks   3# LE  5# UE   Lateral rock and reach with twist  (LSVT BIG) 10x bi and dynadisks   3# LE  5# UE 10x bi and dynadisks   3# LE  5# UE 10x bi and dynadisks   3# LE  5# UE   10x bi and dynadisks 2# LE  2# UE 10x bi and dynadisks   2# LE  2# UE 10x bi and dynadisks   2# LE  2# UE 10x bi and dynadisks   2# LE  4# UE 10x bi and dynadisks   3# LE  5# UE   Wobble Board       1 min ea   With cone reaching outside JOSE and multidirectional reaching     NV use cane for more dynamics 1 min ea   With cone reaching outside JOSE and multidirectional reaching with cane 1 min ea     Lateral walking foam with cones   6x with 5# LE          FWD foam beam with cones   6x with 5# LE    6x      Cone taps              Corn hole toss on bosu         2x1 min Incline bolster with basket and 1# weight bean bags                                Ther Ex             NuStep with high resistance             Side stepping with weights              Elliptical  6 min  6 min  5 min    3 min  5 min  6 min  6 min  6 min    Alt UE LE on pball Ther Activity             Sit to stands  On bosu with with red dynadisk black ovals underneath  On 6" plybox   RKB  2x10           Step-ups  2x10 4# in UE and 5# on LE with lift 2x10 2x10 4# in UE and 5# on LE with lift 2x10    In 12" step    2x10 4# in UE and 5# on LE with lift 2x10 2x10 4# in UE and 5# on LE with lift 2x10 2x10 4# in UE and 5# on LE with lift 2x10 2x10 4# in UE and 5# on LE with lift 2x10 2x10 4# in UE and 5# on LE with lift 2x10                Gait Training             Retrowalking [place]   30# 20x     20# 20x 25# 20x 25# 20x    FWD weighted walking [place] 25# 20x 30# 20x     20# 20x 25# 20x 25# 20x    Hurdles              Big walking with weights (UE and LE) with hurdles  8x   5# LE   3# UE   Hurdles only      6x   5# LE   2# UE  [place]  6x   5# LE   2# UE  With hurdles cone weaving and step up and down  8x   5# LE   3# UE  Cone weaving     Cane walking for reciprocal arm swing             Modalities

## 2021-04-23 ENCOUNTER — TRANSCRIBE ORDERS (OUTPATIENT)
Dept: PHYSICAL THERAPY | Facility: CLINIC | Age: 61
End: 2021-04-23

## 2021-04-23 DIAGNOSIS — G20 PARKINSON DISEASE (HCC): Primary | ICD-10-CM

## 2021-04-29 ENCOUNTER — OFFICE VISIT (OUTPATIENT)
Dept: PHYSICAL THERAPY | Facility: CLINIC | Age: 61
End: 2021-04-29
Payer: COMMERCIAL

## 2021-04-29 DIAGNOSIS — G20 PARKINSON DISEASE (HCC): Primary | ICD-10-CM

## 2021-04-29 PROCEDURE — 97112 NEUROMUSCULAR REEDUCATION: CPT | Performed by: PHYSICAL THERAPIST

## 2021-04-29 PROCEDURE — 97530 THERAPEUTIC ACTIVITIES: CPT | Performed by: PHYSICAL THERAPIST

## 2021-04-29 PROCEDURE — 97110 THERAPEUTIC EXERCISES: CPT | Performed by: PHYSICAL THERAPIST

## 2021-04-29 PROCEDURE — 97116 GAIT TRAINING THERAPY: CPT | Performed by: PHYSICAL THERAPIST

## 2021-04-29 NOTE — PROGRESS NOTES
Daily Note     Today's date: 2021  Patient name: Matthew Bowser  : 1960  MRN: [id number]  Referring provider: Chung Wiley MD  Dx:   Encounter Diagnosis     ICD-10-CM    1  Parkinson disease ([address] )  Liam Escboar        Start Time: 1615  Stop Time: 1700  Total time in clinic (min): 45 minutes    Subjective: Patient reports, "I am pretty well "      Objective: See treatment diary below      Assessment: Patient tolerated treatment well  Patient challenged with strength training this date  Patient fatigued post session  Patient would benefit from continued PT       Plan: Continue per plan of care        Precautions: Parkinson's Disease      Neuro Re-Ed 4/15 4/20 4/22 4/29     3/30 4/1   Rock and reach (LSVT BIG) 10x bi and dynadisks   3# LE  5# UE 10x bi and dynadisks   3# LE  5# UE 10x bi and dynadisks   3# LE  5# UE 10x bi and dynadisks   3# LE  5# UE     10x bi and dynadisks   2# LE  4# UE 10x bi and dynadisks   3# LE  5# UE   FWD step and reach  (LSVT BIG) 10x bi and dynadisks   3# LE  5# UE 10x bi and dynadisks   3# LE  5# UE 10x bi and dynadisks   3# LE  5# UE 10x bi and dynadisks   3# LE  5# UE     10x bi and dynadisks   2# LE  4# UE 10x bi and dynadisks   3# LE  5# UE   BWD  step and reach  (LSVT BIG) 10x bi and dynadisks   3# LE  5# UE 10x bi and dynadisks   3# LE  5# UE 10x bi and dynadisks   3# LE  5# UE 10x bi and dynadisks   3# LE  5# UE     10x bi and dynadisks   2# LE  4# UE 10x bi and dynadisks   3# LE  5# UE   Lateral step and reach  (LSVT BIG) 10x bi and dynadisks   3# LE  5# UE 10x bi and dynadisks   3# LE  5# UE 10x bi and dynadisks   3# LE  5# UE 10x bi and dynadisks   3# LE  5# UE     10x bi and dynadisks   2# LE  4# UE 10x bi and dynadisks   3# LE  5# UE   Lateral rock and reach with twist  (LSVT BIG) 10x bi and dynadisks   3# LE  5# UE 10x bi and dynadisks   3# LE  5# UE 10x bi and dynadisks   3# LE  5# UE 10x bi and dynadisks   3# LE  5# UE     10x bi and dynadisks   2# LE  4# UE 10x bi and dynadisks   3# LE  5# UE   Wobble Board         1 min ea     Lateral walking foam with cones   6x with 5# LE 6x with 5# LE         FWD foam beam with cones   6x with 5# LE 6x with 5# LE         Cone taps              Corn hole toss on bosu                                        Ther Ex             NuStep with high resistance             Side stepping with weights              Elliptical  6 min  6 min  5 min  9 min      6 min  6 min    Alt UE LE on pball                                                                  Ther Activity             Sit to stands  On bosu with with red dynadisk black ovals underneath  On 6" plybox   RKB  2x10           Step-ups  2x10 4# in UE and 5# on LE with lift 2x10 2x10 4# in UE and 5# on LE with lift 2x10    In 12" step  2x10 4# in UE and 5# on LE with lift 2x10    In 12" step      2x10 4# in UE and 5# on LE with lift 2x10 2x10 4# in UE and 5# on LE with lift 2x10                Gait Training             Retrowalking [place]   30# 20x  30# 20x     25# 20x    FWD weighted walking [place] 25# 20x 30# 20x  30# 20x     25# 20x    Hurdles              Big walking with weights (UE and LE) with hurdles  8x   5# LE   3# UE   Hurdles only         6x   5# LE   2# UE  With hurdles cone weaving and step up and down  8x   5# LE   3# UE  Cone weaving     Cane walking for reciprocal arm swing             Modalities

## 2021-06-08 NOTE — PROGRESS NOTES
PT DISCHARGE    Patient canceled 2x in a row secondary to schedule conflicts and being sick  Patient did not call back for re-schedule  Progress noted on last re-evaluation  Patient appropriate for DC this date

## 2021-06-12 DIAGNOSIS — G20 PARKINSON DISEASE (HCC): ICD-10-CM

## 2021-06-16 RX ORDER — SELEGILINE HYDROCHLORIDE 5 MG/1
TABLET ORAL
Qty: 60 TABLET | Refills: 3 | Status: SHIPPED | OUTPATIENT
Start: 2021-06-16 | End: 2021-10-05

## 2021-07-13 ENCOUNTER — TELEPHONE (OUTPATIENT)
Dept: NEUROLOGY | Facility: CLINIC | Age: 61
End: 2021-07-13

## 2021-07-13 ENCOUNTER — OFFICE VISIT (OUTPATIENT)
Dept: NEUROLOGY | Facility: CLINIC | Age: 61
End: 2021-07-13
Payer: COMMERCIAL

## 2021-07-13 VITALS
SYSTOLIC BLOOD PRESSURE: 136 MMHG | BODY MASS INDEX: 28.88 KG/M2 | HEART RATE: 84 BPM | WEIGHT: 201.3 LBS | DIASTOLIC BLOOD PRESSURE: 85 MMHG

## 2021-07-13 DIAGNOSIS — G20 PARKINSON DISEASE (HCC): Primary | ICD-10-CM

## 2021-07-13 PROCEDURE — 99215 OFFICE O/P EST HI 40 MIN: CPT | Performed by: PHYSICIAN ASSISTANT

## 2021-07-13 NOTE — ASSESSMENT & PLAN NOTE
Patient with some overall progression since his last visit  He did have some slight benefit with the addition of selegiline especially in regards to fatigue throughout the day  Otherwise however he feels that he is slower, stiffer, and having more difficulties with his day-to-day functions  This worsening has become very bothersome in regards to social situations and his work  We had a long discussion in regards to the treatment options for Parkinson's disease  At this time I feel like he would benefit from dopaminergic replacement  Will 1st have him start a low dose of Sinemet to see if there is any benefit  He will start by taking half a tab 3 times a day about 30 minutes prior to meals for a week  If only slight benefit with no side effects he can further increase to 1 tab 3 times a day  If however he feels good on half a tab he will remain on this dose  At his follow-up if he continues to have progression then we could certainly consider the addition of a low-dose dopamine agonist such as pramipexole or ropinirole given I would like to try and keep him on the lowest dose of Sinemet at this time  For now he will continue with selegiline given he has had some benefit with this medication  At his follow-up visit we can get a baseline MoCA given his complaints of some cognitive slowing  Perhaps however he will have some improvement of this with dopamine replacement as well  He was encouraged to remain active  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

## 2021-07-13 NOTE — PROGRESS NOTES
Patient ID: Elías Fisher is a 64 y o  male  Assessment/Plan:    Parkinson disease [facility])  Patient with some overall progression since his last visit  He did have some slight benefit with the addition of selegiline especially in regards to fatigue throughout the day  Otherwise however he feels that he is slower, stiffer, and having more difficulties with his day-to-day functions  This worsening has become very bothersome in regards to social situations and his work  We had a long discussion in regards to the treatment options for Parkinson's disease  At this time I feel like he would benefit from dopaminergic replacement  Will 1st have him start a low dose of Sinemet to see if there is any benefit  He will start by taking half a tab 3 times a day about 30 minutes prior to meals for a week  If only slight benefit with no side effects he can further increase to 1 tab 3 times a day  If however he feels good on half a tab he will remain on this dose  At his follow-up if he continues to have progression then we could certainly consider the addition of a low-dose dopamine agonist such as pramipexole or ropinirole given I would like to try and keep him on the lowest dose of Sinemet at this time  For now he will continue with selegiline given he has had some benefit with this medication  At his follow-up visit we can get a baseline MoCA given his complaints of some cognitive slowing  Perhaps however he will have some improvement of this with dopamine replacement as well  He was encouraged to remain active  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  May consider formal PT in the future  Also discussed the [facility] [place]      He was very tearful in the office today and we discussed perhaps some Mindfullness techniques or a referral to a therapist   He has a good support system at home and would like to hold off for now   We could consider a trial of an antidepressant in the future as well  Subjective:    Lesley Parker 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 presents in follow up MARÍA-positive parkinsonism  To review, symptom onset around 2018 with right>left tremor  On initial presentation the patient had a rest and postural tremor on the right, minimal if any kinetic tremor  Also had mild rigidity on the right and slight bradykinesia bilaterally with decrement on the right  His gait was fairly normal with good postural reflexes  No typical non motor symptoms associated with Parkinson's disease  At his last visit he had some progression and was open to starting a trial of selegiline  INTERVAL HISTORY:  He feels that he has some improvement with the afternoon dose of Selegiline  He feels this helps with his afternoon fatigue  Overall he is having some trouble with social situations  He is having issues with word finding and having conversations  This is making it very hard for him to be in social situations  He is still working  He is finding it harder for him to perform his job duties at this time  He feels weak and slow  He had been doing PT and is now doing things on his own  He struggled with swimming when on vacation the other week  He struggles with dressing at times  He has trouble with socks and shoes  He is slow and things are taking him longer in general    He shuffles and his wife is noticing this more  He does lose his balance but no falls  He uses Melatonin to fall asleep  He does have to urinate often in the middle of the night which can keep him awake  Tremors come and go  Some days are better than others  When present they are in both hands  I personally reviewed and updated the ROS  Total time spent today was 55 minutes  Greater than 50% of total time was spent with the patient and / or family counseling and / or coordinating plan of care           Objective:    Blood pressure 136/85, pulse 84, weight 91 3 kg (201 lb 4 8 oz)  Physical Exam  Constitutional:       Appearance: Normal appearance  HENT:      Right Ear: Hearing normal       Left Ear: Hearing normal    Eyes:      General: Lids are normal       Extraocular Movements: Extraocular movements intact  Pupils: Pupils are equal, round, and reactive to light  Pulmonary:      Effort: Pulmonary effort is normal    Neurological:      Deep Tendon Reflexes: Strength normal    Psychiatric:         Speech: Speech normal          Neurological Exam  Mental Status  Awake, alert and oriented to person, place and time  Oriented to person, place and time  Speech is normal     Cranial Nerves  CN III, IV, VI: Extraocular movements intact bilaterally  Normal lids and orbits bilaterally  Pupils equal round and reactive to light bilaterally  CN V:  Right: Facial sensation is normal   Left: Facial sensation is normal on the left  CN VIII:  Right: Hearing is normal   Left: Hearing is normal   CN XI: Shoulder shrug strength is normal     Motor   Strength is 5/5 throughout all four extremities  Sensory  Light touch is normal in upper and lower extremities  Coordination  Right: Finger-to-nose normal   Left: Finger-to-nose normal     Gait  Arose from the chair without difficulty  Overall good stride  Christiane Shields        UPDRS motor:     7/13/21                              Time since last dose:        Speech  0  0   Facial Expression  1     Rigidity - Neck        Rigidity - Upper Extremity (Right)  1  1   Rigidity - Upper Extremity (Left)   1-2  1   Rigidity - Lower Extremity (Right)  0  0   Rigidity - Lower Extremity (Left)   0  0   Finger Taps (Right)   1  2   Finger Taps (Left)   2  2   Hand Movement (Right)  0  1   Hand Movement (Left)   1  1   Pronation/Supination (Right)  0  0   Pronation/Supination (Left)   1  1   Toe Tapping (Right) 0  0   Toe Tapping (Left) 1  1   Leg Agility (Right)  0  1   Leg Agility (Left)   1  1   Arising from Chair   0  0   Gait   0  0   Freezing of Gait 0  0   Postural Stability   0     Posture 0  0   Global spontaneity of movement 0  0   Postural Tremor (Right) 0  0   Postural Tremor (Left) 0  0   Kinetic Tremor (Right)  0  0   Kinetic Tremor (Left)  0  0   Rest tremor amplitude RUE 0  1   Rest tremor amplitude LUE 0  0   Rest tremor amplitude RLE 0  0   Reset tremor amplitude LLE 0  0   Lip/Jaw Tremor  0     Consistency of tremor 0  0   Motor Exam Total:              ROS:    Review of Systems   Constitutional: Positive for fatigue  Negative for appetite change and fever  HENT: Positive for voice change (at times)  Negative for hearing loss, tinnitus and trouble swallowing  Eyes: Negative  Negative for photophobia and pain  Respiratory: Negative  Negative for shortness of breath  Cardiovascular: Negative  Negative for palpitations  Gastrointestinal: Negative  Negative for nausea and vomiting  Endocrine: Negative  Negative for cold intolerance  Genitourinary: Positive for frequency (at night)  Negative for dysuria and urgency  Incontinence has recently started     Musculoskeletal: Positive for gait problem and myalgias (legs and hands)  Negative for neck pain  Balance issues     Skin: Negative  Negative for rash  Allergic/Immunologic: Negative  Neurological: Positive for dizziness, tremors (hands which causes issues with writing), speech difficulty (at times takes a little bit to get words out), weakness and numbness (occasional in fingers)  Negative for seizures, syncope, facial asymmetry, light-headedness and headaches  Trouble signing name so he uses a stamp now     Hematological: Negative  Does not bruise/bleed easily  Psychiatric/Behavioral: Negative  Negative for confusion, hallucinations and sleep disturbance  All other systems reviewed and are negative

## 2021-07-13 NOTE — PATIENT INSTRUCTIONS
Continue Selegiline  START low dose Sinemet - take 1/2tab three times a day (about 30 min prior to meals) for a week then increase to 1tab three times a day  If doing well on 1/2 tab can stay on this dose, if a little better or no better then increase to 1tab  May consider adding a dopamine agonist(Pramipexole, Ropinirole) to the Selegiline and Sinemet in the future  Continue with daily exercises and activity     Could look into the [facility] [place]

## 2021-08-17 ENCOUNTER — OFFICE VISIT (OUTPATIENT)
Dept: NEUROLOGY | Facility: CLINIC | Age: 61
End: 2021-08-17
Payer: COMMERCIAL

## 2021-08-17 VITALS
WEIGHT: 194 LBS | HEART RATE: 80 BPM | BODY MASS INDEX: 27.84 KG/M2 | DIASTOLIC BLOOD PRESSURE: 84 MMHG | SYSTOLIC BLOOD PRESSURE: 128 MMHG

## 2021-08-17 DIAGNOSIS — G20 PARKINSON DISEASE (HCC): ICD-10-CM

## 2021-08-17 DIAGNOSIS — R41.89 COGNITIVE CHANGES: Primary | ICD-10-CM

## 2021-08-17 PROCEDURE — 99214 OFFICE O/P EST MOD 30 MIN: CPT | Performed by: PHYSICIAN ASSISTANT

## 2021-08-17 RX ORDER — CARBIDOPA AND LEVODOPA 25; 100 MG/1; MG/1
1 TABLET, EXTENDED RELEASE ORAL
COMMUNITY

## 2021-08-17 NOTE — PROGRESS NOTES
Patient ID: Dwayne Kumar is a 64 y o  male  Assessment/Plan:    Parkinson disease [facility])  Patient has had some overall benefit with the addition of Sinemet  He does have some wearing off between doses and he is currently trying to extend his doses over a relatively longer period of time for the day  Because of this we discussed having him change the Sinemet to 1tab four times a day (every 4 hours)  He will also remain on Selegiline given this helped when initially stared and Sinemet CR which was recently started by Dr Esperanza Waller for symptom control in the AM   IF he continues to have issues with wearing off in the future then would like to first consider the addition of a low dose dopamine agonist, comtan or Ronen Face  He was encouraged to remain active  He is doing BIG exercises on his own  Once again encouraged him to look into the [facility] [place]      He does also continue to have issues with cognitive slowing  No clear improvement of this with the addition of Sinemet  He scored a 25/30 on MoCA testing  Will get some baseline labs including B12 and TSH to look for any underlying cause  In the meantime he was also encouraged to keep his mind stimulated  Discussed a trial of cognitive therapy in the future as well  Patient was encouraged to increase mind stimulating activities such as reading, crosswords, word searches, puzzles, Soduku, solitaire, coloring and other brain games  We also discussed the importance of staying physically active and eating a health diet such as the Mediterranean or MIND diet  Subjective:    Gloria Bryant 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 presents in follow up for MARÍA-positive parkinsonism  To review, symptom onset around 2018 with right>left tremor  On initial presentation the patient had a rest and postural tremor on the right, minimal if any kinetic tremor    Also had mild rigidity on the right and slight bradykinesia bilaterally with decrement on the right  His gait was fairly normal with good postural reflexes  No typical non motor symptoms associated with Parkinson's disease  At his last visit he had some progression and he was started on low dose Sinemet  INTERVAL HISTORY:  He has had benefit with the addition of Sinemet  He feels this helps with the fogginess, tremor, dizziness for about 5 hours  He will feel the next dose kick in about 30 min later   He is currently taking the Sinemet three times a day about every 5 hours   He just recently started using Sinemet CR before bed (prescribd by Dr Dee Dee Mascorro)  He is now back at school  He is trying to avoid people when he is wearing off  He is slower with dressing however showering is not an issue  He has the PT and he has had been doing exercises on his own at the gym at school  He continues to have issues with word finding and will often lose track of what he is trying to say  Current medications:  Selegiline 5mg bid   Sinemet 25/100mg 1tab tid        I personally reviewed and updated the ROS  Objective:    Blood pressure 128/84, pulse 80, weight 88 kg (194 lb)  Physical Exam  Constitutional:       Appearance: Normal appearance  HENT:      Right Ear: Hearing normal       Left Ear: Hearing normal    Eyes:      General: Lids are normal       Extraocular Movements: Extraocular movements intact  Pupils: Pupils are equal, round, and reactive to light  Pulmonary:      Effort: Pulmonary effort is normal    Neurological:      Mental Status: He is alert  Deep Tendon Reflexes: Strength normal    Psychiatric:         Speech: Speech normal          Neurological Exam  Mental Status  Alert  Oriented to person, place and time  Speech is normal  Able to name objects  MoCA 24/30 - 8/17/21  Cranial Nerves  CN III, IV, VI: Extraocular movements intact bilaterally  Normal lids and orbits bilaterally   Pupils equal round and reactive to light bilaterally  CN V:  Right: Facial sensation is normal   Left: Facial sensation is normal on the left  CN VIII:  Right: Hearing is normal   Left: Hearing is normal   CN XI: Shoulder shrug strength is normal     Motor   Strength is 5/5 throughout all four extremities  Sensory  Light touch is normal in upper and lower extremities  Coordination  Right: Finger-to-nose normal   Left: Finger-to-nose normal     Gait  Arose from the chair without difficulty  Overall good stride  [place] Minerva UPDRS motor:      8/17/21 7/13/21                              Time since last dose:        Speech  0  0   Facial Expression       Rigidity - Neck        Rigidity - Upper Extremity (Right)  1  1   Rigidity - Upper Extremity (Left)   0  1   Rigidity - Lower Extremity (Right)  0  0   Rigidity - Lower Extremity (Left)   0  0   Finger Taps (Right)   1  2   Finger Taps (Left)   1  2   Hand Movement (Right)  0  1   Hand Movement (Left)   1  1   Pronation/Supination (Right)  0  0   Pronation/Supination (Left)   1  1   Toe Tapping (Right) 0  0   Toe Tapping (Left) 1  1   Leg Agility (Right)  0  1   Leg Agility (Left)   1  1   Arising from Chair   0  0   Gait   0  0   Freezing of Gait 0  0   Postural Stability   0     Posture 0  0   Global spontaneity of movement 0  0   Postural Tremor (Right) 0  0   Postural Tremor (Left) 0  0   Kinetic Tremor (Right)  0  0   Kinetic Tremor (Left)  0  0   Rest tremor amplitude RUE 0  1   Rest tremor amplitude LUE 0  0   Rest tremor amplitude RLE 0  0   Reset tremor amplitude LLE 0  0   Lip/Jaw Tremor       Consistency of tremor 0  0   Motor Exam Total:              ROS:    Review of Systems   Constitutional: Positive for fatigue  Negative for appetite change and fever  HENT: Positive for voice change  Negative for hearing loss, tinnitus and trouble swallowing  Eyes: Negative  Negative for photophobia and pain  Respiratory: Negative  Negative for shortness of breath  Cardiovascular: Negative  Negative for palpitations  Gastrointestinal: Negative  Negative for nausea and vomiting  Endocrine: Negative  Negative for cold intolerance  Genitourinary: Negative  Negative for dysuria, frequency and urgency  Musculoskeletal: Positive for gait problem, myalgias (Lower legs) and neck pain (Occasionally which has recently started)  Balance issues     Skin: Negative  Negative for rash  Allergic/Immunologic: Negative  Neurological: Positive for dizziness (In between doses), tremors (Hands) and weakness (In arms and legs)  Negative for seizures, syncope, facial asymmetry, speech difficulty, light-headedness, numbness and headaches  Hematological: Negative  Does not bruise/bleed easily  Psychiatric/Behavioral: Negative  Negative for confusion, hallucinations and sleep disturbance  All other systems reviewed and are negative

## 2021-08-17 NOTE — ASSESSMENT & PLAN NOTE
Patient has had some overall benefit with the addition of Sinemet  He does have some wearing off between doses and he is currently trying to extend his doses over a relatively longer period of time for the day  Because of this we discussed having him change the Sinemet to 1tab four times a day (every 4 hours)  He will also remain on Selegiline given this helped when initially stared and Sinemet CR which was recently started by Dr Olga Vargas for symptom control in the AM   IF he continues to have issues with wearing off in the future then would like to first consider the addition of a low dose dopamine agonist, comtan or Sindhu Adalberto  He was encouraged to remain active  He is doing BIG exercises on his own  Once again encouraged him to look into the [facility] [place]      He does also continue to have issues with cognitive slowing  No clear improvement of this with the addition of Sinemet  He scored a 25/30 on MoCA testing  Will get some baseline labs including B12 and TSH to look for any underlying cause  In the meantime he was also encouraged to keep his mind stimulated  Discussed a trial of cognitive therapy in the future as well  Patient was encouraged to increase mind stimulating activities such as reading, crosswords, word searches, puzzles, Soduku, solitaire, coloring and other brain games  We also discussed the importance of staying physically active and eating a health diet such as the Mediterranean or MIND diet

## 2021-08-17 NOTE — PATIENT INSTRUCTIONS
Patient has had some overall benefit with the addition of Sinemet  He does have some wearing off between doses and he is currently trying to extend his doses over a relatively longer period of time for the day  Because of this we discussed having him change the Sinemet to 1tab four times a day (every 4 hours)  He will also remain on Selegiline given this helped when initially stared and Sinemet CR which was recently started by Dr Esperanza Waller for symptom control in the AM   IF he continues to have issues with wearing off in the future then would like to first consider the addition of a low dose dopamine agonist, comtan or [place] Face  He was encouraged to remain active  He is doing BIG exercises on his own  Once again encouraged him to look into the [facility] [place]      He does also continue to have issues with cognitive slowing  No clear improvement of this with the addition of Sinemet  He scored a 25/30 on MoCA testing  Will get some baseline labs including B12 and TSH to look for any underlying cause  In the meantime he was also encouraged to keep his mind stimulated  Discussed a trial of cognitive therapy in the future as well  Things that we know are helpful for thinking and memory   1  Exercise program: gradually increase your physical activity over time  Start small and be patient  Aerobic (cardio) activity is best but incorporate balance, strength and flexibility training as well    a  Try to get at least 30min 3 times per week   2  Diet: Mediterranean diet (colorful fruits and vegetables, olive oil, fish, whole grains, very little red meet is any), MIND diet, anti-inflammatory diet  a  Stay well hydrated: drink 6-8 glasses of water per day   b  What's good for your heart is good for your brain  c  Avoid high-salt foods   3  Sleep: aim for at least 7-8 hours per night   a  Avoid alcohol and medications like Benadryl (Tylenol PM) or other sedating drugs  4   Stress management/mindfulness practice:   a  Talk with our social workers about finding a cognitive behavioral therapists  b  Try a smart phone richelle like Upworthy or mindfulness for beginners   i  Try curable for pain    c  Take a course in Mindfulness Based Stress Reduction (MBSR)   i  Melody morris  Read or listen to an audiobook about it:  i  Mindfulness for beginners   ii  10% happier  iii  The happiness advantage   5  Social engagement:   a  Stay in touch with family and friends   b  Plan a few specific activities for your social health every week   c  Tian butts local support group   d  Volunteer! www [email or web address] or call [phone]     MIND diet score:  1 point for each component      · Green leafy vegetables: at least 6 per week  · Other vegetable: at least 1 per day   · Berries: at least 2 per week  · Red meat: fewer than 4 per week   · Fish: at least 1 per week   · Poultry: at least 2 per week  · Beans: at least 3 per week  · Nuts: at least 5 per week  · Fast or fried food: less than 1 per week  · Olive oil   · Butter less: less than 1 table-spoon per day   · Cheese: less than 1 serving per week   · Pastries/sweets: less than 5 servings per week  · Alcohol: no more than 1 serving/ day

## 2021-08-19 ENCOUNTER — TELEPHONE (OUTPATIENT)
Dept: NEUROLOGY | Facility: CLINIC | Age: 61
End: 2021-08-19

## 2021-08-19 NOTE — TELEPHONE ENCOUNTER
----- Message from Carlos Mathews, Rajwinder Mukesh Thorpe sent at 8/19/2021  7:59 AM EDT -----  B12 is slightly low, this can contribute to memory issues, we like above 400 and his is 354-he can start sublingual vitamin b12 1000 mcg daily   All other labs were normal

## 2021-08-19 NOTE — TELEPHONE ENCOUNTER
Called patient and reviewed results/ recommendations  Patient verbalized understanding and requested I sent him an e-mail with this information as he is driving and cannot write it down     Sent email to [email or web address]

## 2021-08-21 LAB
FOLATE SERPL-MCNC: 13.4 NG/ML
METHYLMALONATE SERPL-SCNC: 195 NMOL/L (ref 87–318)
RPR SER QL: NORMAL
TSH SERPL-ACNC: 1.85 MIU/L (ref 0.4–4.5)
VIT B12 SERPL-MCNC: 354 PG/ML (ref 200–1100)

## 2021-10-03 DIAGNOSIS — G20 PARKINSON DISEASE (HCC): ICD-10-CM

## 2021-10-05 RX ORDER — SELEGILINE HYDROCHLORIDE 5 MG/1
TABLET ORAL
Qty: 60 TABLET | Refills: 3 | Status: SHIPPED | OUTPATIENT
Start: 2021-10-05 | End: 2022-01-06 | Stop reason: SDUPTHER

## 2021-11-04 DIAGNOSIS — G20 PARKINSON DISEASE (HCC): ICD-10-CM

## 2021-12-20 ENCOUNTER — APPOINTMENT (OUTPATIENT)
Dept: RADIOLOGY | Facility: MEDICAL CENTER | Age: 61
End: 2021-12-20
Payer: COMMERCIAL

## 2021-12-20 ENCOUNTER — OFFICE VISIT (OUTPATIENT)
Dept: URGENT CARE | Facility: MEDICAL CENTER | Age: 61
End: 2021-12-20
Payer: COMMERCIAL

## 2021-12-20 VITALS
BODY MASS INDEX: 29.35 KG/M2 | TEMPERATURE: 97.4 F | HEIGHT: 70 IN | DIASTOLIC BLOOD PRESSURE: 94 MMHG | HEART RATE: 68 BPM | WEIGHT: 205 LBS | OXYGEN SATURATION: 97 % | SYSTOLIC BLOOD PRESSURE: 190 MMHG

## 2021-12-20 DIAGNOSIS — M79.671 RIGHT FOOT PAIN: ICD-10-CM

## 2021-12-20 DIAGNOSIS — M79.671 RIGHT FOOT PAIN: Primary | ICD-10-CM

## 2021-12-20 PROCEDURE — G0382 LEV 3 HOSP TYPE B ED VISIT: HCPCS

## 2021-12-20 PROCEDURE — 73630 X-RAY EXAM OF FOOT: CPT

## 2022-01-02 DIAGNOSIS — G20 PARKINSON DISEASE (HCC): ICD-10-CM

## 2022-01-06 RX ORDER — SELEGILINE HYDROCHLORIDE 5 MG/1
TABLET ORAL
Qty: 60 TABLET | Refills: 3 | OUTPATIENT
Start: 2022-01-06

## 2022-01-18 ENCOUNTER — TELEPHONE (OUTPATIENT)
Dept: NEUROLOGY | Facility: CLINIC | Age: 62
End: 2022-01-18

## 2022-01-26 ENCOUNTER — OFFICE VISIT (OUTPATIENT)
Dept: NEUROLOGY | Facility: CLINIC | Age: 62
End: 2022-01-26
Payer: COMMERCIAL

## 2022-01-26 VITALS
WEIGHT: 204.5 LBS | BODY MASS INDEX: 29.34 KG/M2 | DIASTOLIC BLOOD PRESSURE: 95 MMHG | HEART RATE: 73 BPM | TEMPERATURE: 95.9 F | SYSTOLIC BLOOD PRESSURE: 171 MMHG

## 2022-01-26 DIAGNOSIS — R41.89 COGNITIVE CHANGES: ICD-10-CM

## 2022-01-26 DIAGNOSIS — G20 PARKINSON DISEASE (HCC): Primary | ICD-10-CM

## 2022-01-26 PROCEDURE — 99214 OFFICE O/P EST MOD 30 MIN: CPT | Performed by: PHYSICIAN ASSISTANT

## 2022-01-26 NOTE — ASSESSMENT & PLAN NOTE
Patient with Samantha positive Parkinson's disease  He is overall doing better on higher Sinemet dosing  He will feel some wearing off but only if he is late for a dose of medication  We discussed other medication options including adding Comtan, a dopamine agonist such as Ropinirole, Pramipexole or Neupro or a new drug for wearing off called Nourianz  For now however given he is feels he is doing well we will hold off on making any changes  He was encouraged to remain active  He unfortunately was finding it difficult to stay in physical therapy given time constraints with his work schedule  We did discuss in detail the importance of trying to make time for exercise and activity given this is certainly one of the best things that he can do for himself with Parkinson's disease  He does have some swallowing issues with certain foods such as hard boiled eggs  For now he will try and take smaller bites and drink more water with meals  If this becomes more of an issues may consider a swallow study  He is also more emotional than he had been in the past   He will find himself crying for no reason at times  We did discuss that he may find some benefit with a trial of an antidepressant however he does not wish to start anything at this time  He will be looking into potential studies at [facility]  He does still follow with Dr Dasha Hines as well

## 2022-01-26 NOTE — PROGRESS NOTES
Patient ID: Chen Moss is a 64 y o  male  Assessment/Plan:    Parkinson disease [facility])  Patient with Samantha positive Parkinson's disease  He is overall doing better on higher Sinemet dosing  He will feel some wearing off but only if he is late for a dose of medication  We discussed other medication options including adding Comtan, a dopamine agonist such as Ropinirole, Pramipexole or Neupro or a new drug for wearing off called Nourianz  For now however given he is feels he is doing well we will hold off on making any changes  He was encouraged to remain active  He unfortunately was finding it difficult to stay in physical therapy given time constraints with his work schedule  We did discuss in detail the importance of trying to make time for exercise and activity given this is certainly one of the best things that he can do for himself with Parkinson's disease  He does have some swallowing issues with certain foods such as hard boiled eggs  For now he will try and take smaller bites and drink more water with meals  If this becomes more of an issues may consider a swallow study  He is also more emotional than he had been in the past   He will find himself crying for no reason at times  We did discuss that he may find some benefit with a trial of an antidepressant however he does not wish to start anything at this time  He will be looking into potential studies [facility]  He does still follow with Dr Sheridan Heredia as well  Subjective:    Valentina Salguero 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 presents in follow up for SAMANTHA-positive parkinsonism   To review, symptom onset around 2018 with right>left tremor   On initial presentation the patient had a rest and postural tremor on the right, minimal if any kinetic tremor   Also had mild rigidity on the right and slight bradykinesia bilaterally with decrement on the right   His gait was fairly normal with good postural reflexes   No typical non motor symptoms associated with Parkinson's disease  At his last visit he had some benefit with Sinemet but wearing off, because of this his Sinemet dose was changed to 1tab qid  Scored a 25/30 on MoCA  He was sent for labs  INTERVAL HISTORY:  B12 was slightly low and he was told to start supplemental B12 vitamins  He was seen by Dr Jag Calhoun at [facility] 11/12/21 and no changes were made to his medications   They did discuss work in detail given this is becoming harder for him  Overall he feels about the same   He is "super emotional" at times which can be disturbing   He is still working and would like to continue for now more for a financial status   He was not able to make it to the BIG therapy because of work constraints   He is able to perform his ADLs on his own  He feels that he is sleeping well   He has been noticing some issues with eating certain things such as hard boiled eggs   He does still try and walk and do some body weight lifting exercises   He feels that overall he is doing well on his current Sinemet dose, he may feels some off if he is late for dose   He will at times think that he may see something moving in the corner of his eye, when he looks however there is nothing there     Current medications:  Selegiline 5mg bid   Sinemet 25/100mg 1tab qid (5am, 9am, 1pm, 5pm)  Sinemet CR before bed       I personally reviewed and updated the ROS  Objective:    Blood pressure (!) 171/95, pulse 73, temperature (!) 95 9 °F (35 5 °C), weight 92 8 kg (204 lb 8 oz)  Physical Exam  Constitutional:       Appearance: Normal appearance  HENT:      Right Ear: Hearing normal       Left Ear: Hearing normal    Eyes:      General: Lids are normal       Extraocular Movements: Extraocular movements intact  Pupils: Pupils are equal, round, and reactive to light  Pulmonary:      Effort: Pulmonary effort is normal    Neurological:      Mental Status: He is alert        Deep Tendon Reflexes: Strength normal    Psychiatric:         Speech: Speech normal          Neurological Exam  Mental Status  Alert  Oriented to person, place and time  Unable to copy figure  Clock drawing is normal  Speech is normal  Able to name objects  Unable to perform serial calculations  MoCA 1/26/22 - 24/30     MoCA 24/30 - 8/17/21  Cranial Nerves  CN III, IV, VI: Extraocular movements intact bilaterally  Normal lids and orbits bilaterally  Pupils equal round and reactive to light bilaterally  CN V:  Right: Facial sensation is normal   Left: Facial sensation is normal on the left  CN VIII:  Right: Hearing is normal   Left: Hearing is normal   CN XI: Shoulder shrug strength is normal     Motor   Strength is 5/5 throughout all four extremities  Sensory  Light touch is normal in upper and lower extremities  Coordination  Right: Finger-to-nose normal   Left: Finger-to-nose normal     Gait  Arose from the chair without difficulty  Overall good stride  Ken Rizzo         UPDRS motor:      8/17/21 1/26/22                              Time since last dose:        Speech  0  0   Facial Expression        Rigidity - Neck        Rigidity - Upper Extremity (Right)  1  1   Rigidity - Upper Extremity (Left)   0  1   Rigidity - Lower Extremity (Right)  0  0   Rigidity - Lower Extremity (Left)   0  0   Finger Taps (Right)   1  2   Finger Taps (Left)   1  2   Hand Movement (Right)  0  1   Hand Movement (Left)   1  1   Pronation/Supination (Right)  0  0   Pronation/Supination (Left)   1  1   Toe Tapping (Right) 0  0   Toe Tapping (Left) 1  1   Leg Agility (Right)  0  1   Leg Agility (Left)   1  1   Arising from Chair   0  0   Gait   0  0   Freezing of Gait 0  0   Postural Stability   0     Posture 0  0   Global spontaneity of movement 0  0   Postural Tremor (Right) 0  0   Postural Tremor (Left) 0  0   Kinetic Tremor (Right)  0  0   Kinetic Tremor (Left)  0  0   Rest tremor amplitude RUE 0  1   Rest tremor amplitude LUE 0  0 Rest tremor amplitude RLE 0  0   Reset tremor amplitude LLE 0  0   Lip/Jaw Tremor        Consistency of tremor 0  0   Motor Exam Total:             ROS:    Review of Systems   Constitutional: Negative  Negative for appetite change and fever  HENT: Negative  Negative for hearing loss, tinnitus, trouble swallowing and voice change  Eyes: Negative  Negative for photophobia and pain  Respiratory: Negative  Negative for shortness of breath  Cardiovascular: Negative  Negative for palpitations  Gastrointestinal: Negative  Negative for nausea and vomiting  Endocrine: Negative  Negative for cold intolerance  Genitourinary: Negative  Negative for dysuria, frequency and urgency  Musculoskeletal: Negative  Negative for myalgias and neck pain  Skin: Negative  Negative for rash  Neurological: Negative  Negative for dizziness, tremors, seizures, syncope, facial asymmetry, speech difficulty, weakness, light-headedness, numbness and headaches  Hematological: Negative  Does not bruise/bleed easily  Psychiatric/Behavioral: Positive for confusion (memory issues)  Negative for hallucinations and sleep disturbance  All other systems reviewed and are negative

## 2022-01-26 NOTE — PATIENT INSTRUCTIONS
Patient with Samantha positive Parkinson's disease  He is overall doing better on higher Sinemet dosing  He will feel some wearing off but only if he is late for a dose of medication  We discussed other medication options including adding Comtan, a dopamine agonist such as Ropinirole, Pramipexole or Neupro or a new drug for wearing off called Nourianz  For now however given he is feels he is doing well we will hold off on making any changes  He was encouraged to remain active  He unfortunately was finding it difficult to stay in physical therapy given time constraints with his work schedule  We did discuss in detail the importance of trying to make time for exercise and activity given this is certainly one of the best things that he can do for himself with Parkinson's disease  He does have some swallowing issues with certain foods such as hard boiled eggs  For now he will try and take smaller bites and drink more water with meals  If this becomes more of an issues may consider a swallow study  He is also more emotional than he had been in the past   He will find himself crying for no reason at times  We did discuss that he may find some benefit with a trial of an antidepressant however he does not wish to start anything at this time  He will be looking into potential studies at [facility]  He does still follow with Dr Glen Carvajal as well

## 2022-01-26 NOTE — ASSESSMENT & PLAN NOTE
Cognitive issues are overall stable  He is still able to perform his duties at work  He scored a 24/30 on MoCA in the office today (24/30 at the last visit)  Patient was encouraged to increase mind stimulating activities such as reading, crosswords, word searches, puzzles, Soduku, solitaire, coloring and other brain games  We also discussed the importance of staying physically active and eating a health diet such as the Mediterranean or MIND diet

## 2022-03-10 ENCOUNTER — RA CDI HCC (OUTPATIENT)
Dept: OTHER | Facility: HOSPITAL | Age: 62
End: 2022-03-10

## 2022-03-10 NOTE — PROGRESS NOTES
[facility]  coding opportunities       Chart reviewed, no opportunity found: CHART REVIEWED, NO OPPORTUNITY FOUND                        Patients insurance company: [employer] (Medicare Advantage and Commercial)

## 2022-03-16 ENCOUNTER — OFFICE VISIT (OUTPATIENT)
Dept: FAMILY MEDICINE CLINIC | Facility: CLINIC | Age: 62
End: 2022-03-16
Payer: COMMERCIAL

## 2022-03-16 VITALS
TEMPERATURE: 96.7 F | WEIGHT: 204 LBS | DIASTOLIC BLOOD PRESSURE: 78 MMHG | BODY MASS INDEX: 29.2 KG/M2 | HEIGHT: 70 IN | RESPIRATION RATE: 16 BRPM | SYSTOLIC BLOOD PRESSURE: 132 MMHG | HEART RATE: 84 BPM

## 2022-03-16 DIAGNOSIS — M79.604 LEG PAIN, BILATERAL: ICD-10-CM

## 2022-03-16 DIAGNOSIS — Z00.00 WELL ADULT EXAM: Primary | ICD-10-CM

## 2022-03-16 DIAGNOSIS — E78.00 PURE HYPERCHOLESTEROLEMIA: ICD-10-CM

## 2022-03-16 DIAGNOSIS — R73.01 IMPAIRED FASTING GLUCOSE: ICD-10-CM

## 2022-03-16 DIAGNOSIS — N52.9 MALE ERECTILE DISORDER: ICD-10-CM

## 2022-03-16 DIAGNOSIS — D75.839 THROMBOCYTOSIS: ICD-10-CM

## 2022-03-16 DIAGNOSIS — M79.605 LEG PAIN, BILATERAL: ICD-10-CM

## 2022-03-16 DIAGNOSIS — M17.0 PRIMARY OSTEOARTHRITIS OF BOTH KNEES: ICD-10-CM

## 2022-03-16 DIAGNOSIS — Z12.5 SCREENING FOR PROSTATE CANCER: ICD-10-CM

## 2022-03-16 PROCEDURE — 3725F SCREEN DEPRESSION PERFORMED: CPT | Performed by: FAMILY MEDICINE

## 2022-03-16 PROCEDURE — 99396 PREV VISIT EST AGE 40-64: CPT | Performed by: FAMILY MEDICINE

## 2022-03-16 PROCEDURE — 1036F TOBACCO NON-USER: CPT | Performed by: FAMILY MEDICINE

## 2022-03-16 PROCEDURE — 3008F BODY MASS INDEX DOCD: CPT | Performed by: FAMILY MEDICINE

## 2022-03-16 RX ORDER — SILDENAFIL 100 MG/1
100 TABLET, FILM COATED ORAL DAILY PRN
Qty: 6 TABLET | Refills: 5 | Status: SHIPPED | OUTPATIENT
Start: 2022-03-16

## 2022-03-16 NOTE — PROGRESS NOTES
Assessment/Plan:         Diagnoses and all orders for this visit:    Well adult exam    Pure hypercholesterolemia  -     Lipid panel    Impaired fasting glucose  -     Comprehensive metabolic panel    Primary osteoarthritis of both knees    Leg pain, bilateral  -     VAS lower limb arterial duplex, complete bilateral; Future    Thrombocytosis  -     CBC and differential    Male erectile disorder  -     sildenafil (VIAGRA) 100 mg tablet; Take 1 tablet (100 mg total) by mouth daily as needed for erectile dysfunction    Screening for prostate cancer  -     PSA, Total Screen; Future  -     PSA, Total Screen          Continue with current medications  Script for p r n  Viagra 100 mg  Repeat labs including lipid profile-consider CT coronary Ca++ score  He is contemplating viscosupplementation for knee arthritis  X rays viewed from Ortho-severe medial compartment OA  Arterial Dopplers lower extremities to evaluate bilateral leg pain    BMI Counseling: Body mass index is 29 27 kg/m²  The BMI is above normal  Nutrition recommendations include reducing portion sizes, 3-5 servings of fruits/vegetables daily, consuming healthier snacks, moderation in carbohydrate intake, reducing intake of saturated fat and trans fat and reducing intake of cholesterol  Exercise recommendations include exercising 3-5 times per week  The 10-year ASCVD risk score (Jordan Myrick , et al , 2013) is: 9 8%    Values used to calculate the score:      Age: 58 years      Sex: Male      Is Non- : No      Diabetic: No      Tobacco smoker: No      Systolic Blood Pressure: 544 mmHg      Is BP treated: No      HDL Cholesterol: 72 mg/dL      Total Cholesterol: 243 mg/dL       Patient ID: Yann Ray is a 58 y o  male  58-year-old male here for wellness exam  Medications reviewed  Hospitalizations/surgeries s/p reconstructive left knee surgery  + FH type 2 DM father  CAD father  Hyperlipidemia mother  MS maternal GF  Prostate CA paternal GF  [facility]    HS in [place]   3 children  Non smoker  Hyperlipidemia with high HDL   03/2021 Lipid profile cholesterol 243 increased from 229  Triglycerides 76  HDL 72    LFTs normal    Impaired fasting glucose  03/2021 FBS 99    A1c 5 3      Lab Results   Component Value Date    WBC 6 0 03/17/2021    HGB 15 4 03/17/2021    HCT 46 1 03/17/2021    MCV 92 8 03/17/2021     03/17/2021     Lab Results   Component Value Date     11/25/2014    SODIUM 140 03/17/2021    K 4 4 03/17/2021     03/17/2021    CO2 29 03/17/2021    ANIONGAP 7 11/25/2014    AGAP 8 04/27/2020    BUN 19 03/17/2021    CREATININE 0 94 03/17/2021    GLUC 99 03/17/2021    CALCIUM 9 5 03/17/2021    AST 19 03/17/2021    ALT 24 03/17/2021    ALKPHOS 58 03/17/2021    PROT 7 0 11/25/2014    TP 6 6 03/17/2021    BILITOT 0 92 11/25/2014    TBILI 0 8 03/17/2021    EGFR 88 04/27/2020     Lab Results   Component Value Date    CHOLESTEROL 243 (H) 03/17/2021    CHOLESTEROL 229 (H) 01/20/2020    CHOLESTEROL 251 (H) 02/05/2019     Lab Results   Component Value Date    HDL 72 03/17/2021    HDL 69 01/20/2020    HDL 81 02/05/2019     Lab Results   Component Value Date    TRIG 76 03/17/2021    TRIG 49 01/20/2020    TRIG 68 02/05/2019     Lab Results   Component Value Date    LDLCALC 153 (H) 03/17/2021     Lab Results   Component Value Date    HGBA1C 5 3 03/17/2021           No Known Allergies       Current Outpatient Medications:     carbidopa-levodopa (SINEMET CR)  mg TBCR per ER tablet, Take 1 tablet by mouth daily at bedtime, Disp: , Rfl:     carbidopa-levodopa (SINEMET)  mg per tablet, TAKE 1/2 TAB 3 TIMES A DAY FOR 1 WEEK THEN 1 TAB 3 TIMES A DAY (Patient taking differently: Take 1 tablet by mouth 4 (four) times a day THEN 1 TAB 4 TIMES A DAY ), Disp: 90 tablet, Rfl: 3    cholecalciferol (VITAMIN D3) 1,000 units tablet, Take 5,000 Units by mouth daily, Disp: , Rfl:     Collagen Hydrolysate POWD, 1 Dose by Does not apply route daily, Disp: , Rfl:     fluticasone (FLONASE) 50 mcg/act nasal spray, 2 sprays into each nostril daily (Patient taking differently: 2 sprays into each nostril as needed ), Disp: 16 g, Rfl: 2    ibuprofen (ADVIL,MOTRIN) 100 MG tablet, Take 100 mg by mouth every 6 (six) hours as needed for mild pain, Disp: , Rfl:     MAGNESIUM PO, Take by mouth daily at bedtime, Disp: , Rfl:     MELATONIN PO, Take by mouth daily at bedtime, Disp: , Rfl:     selegiline (ELDEPRYL) 5 mg tablet, Take 1 tablet (5 mg total) by mouth 2 (two) times a day with meals, Disp: 60 tablet, Rfl: 5    sildenafil (VIAGRA) 100 mg tablet, Take 1 tablet (100 mg total) by mouth daily as needed for erectile dysfunction, Disp: 6 tablet, Rfl: 5     Social History     Tobacco Use    Smoking status: Never Smoker    Smokeless tobacco: Never Used   Vaping Use    Vaping Use: Former   Substance Use Topics    Alcohol use: Yes     Alcohol/week: 14 0 standard drinks     Types: 14 Cans of beer per week    Drug use: No     Family History   Problem Relation Age of Onset    Diabetes Father         mellitus    Heart attack Father         acute myocardial infarction    Hyperlipidemia Mother     Multiple sclerosis Maternal Grandfather     Prostate cancer Paternal Grandfather      Past Surgical History:   Procedure Laterality Date    KNEE SURGERY         The following portions of the patient's history were reviewed and updated as appropriate: allergies, current medications, past family history, past medical history, past social history, past surgical history and problem list     Review of Systems   Constitutional: Negative for appetite change, chills, fever and unexpected weight change  HENT: Negative for congestion, ear pain, rhinorrhea, sore throat and trouble swallowing  Eyes: Negative for visual disturbance  Respiratory: Negative for cough, shortness of breath and wheezing      Cardiovascular: Negative for chest pain, palpitations and leg swelling  Gastrointestinal: Negative for abdominal pain, blood in stool, constipation, diarrhea, nausea and vomiting         12/2020 colonoscopy  Endocrine: Negative for polydipsia and polyuria  Genitourinary: Negative for difficulty urinating         + ED  03/2021 testosterone level 564          Component                Value               Date                       PSA                      0 6                 03/17/2021                 PSA                      0 6                 01/20/2020                 PSA                      0 5                 02/05/2019               Musculoskeletal: Positive for gait problem  Negative for arthralgias and myalgias  Balance problems secondary to Parkinson's  He completed PT  OA knees with chronic knee pain/bilateral leg pains  Orthopedic surgery evaluation 03/2022  S/p steroid injections  He is using prn Advil  Remote history of left knee reconstructive surgery  Skin: Negative for rash  Allergic/Immunologic: Negative for environmental allergies  Neurological: Positive for tremors  Negative for dizziness and headaches  Diagnosed with Parkinson's disease by Neurology 07/2020 +  tremor hands R > L  09/2020 Nuclear medicine aden brain scan showed abnormal decreased radiotracer activity in the posterior basal ganglia bilaterally  Asymmetrically decreased activity is also noted in the left anterior basal ganglia  Radiotracer distribution is otherwise unremarkable  Abnormal examination most concerning for underlying parkinsonian syndrome  He is currently on Sinemet and Selegeline  Mild cognitive issues  08/2021 RPR negative  MMA level normal  TSH 1 85   He is now on B12 supplement     Lab Results       Component                Value               Date                       [id number]               119                 08/17/2021              Lab Results       Component                Value               Date FOLATE                   13 4                08/17/2021                Hematological: Negative for adenopathy  Does not bruise/bleed easily  History of mild thrombocytosis         Component                Value               Date                       WBC                      6 0                 03/17/2021                 HGB                      15 4                03/17/2021                 HCT                      46 1                03/17/2021                 MCV                      92 8                03/17/2021                 PLT                      314                 03/17/2021                      Platelet Count       Date                     Value               Ref Range           Status                02/07/2018               340                 140 - 400 Thou*     Final              Platelets       Date                     Value               Ref Range           Status                04/27/2020               420 (H)             149 - 390 Thou*     Final                 11/25/2014               304                 149 - 390 Thou*     Final                 Psychiatric/Behavioral: Negative for behavioral problems, dysphoric mood and sleep disturbance  Objective:    /78   Pulse 84   Temp (!) 96 7 °F (35 9 °C)   Resp 16   Ht 5' 10" (1 778 m)   Wt 92 5 kg (204 lb)   BMI 29 27 kg/m²     BP Readings from Last 3 Encounters:   03/16/22 132/78   01/26/22 (!) 171/95   12/20/21 (!) 190/94     Wt Readings from Last 3 Encounters:   03/16/22 92 5 kg (204 lb)   01/26/22 92 8 kg (204 lb 8 oz)   12/20/21 93 kg (205 lb)          Physical Exam  Vitals and nursing note reviewed  Constitutional:       General: He is not in acute distress  Appearance: He is well-developed  HENT:      Right Ear: Tympanic membrane normal       Left Ear: Tympanic membrane normal    Eyes:      General: No scleral icterus  Extraocular Movements: Extraocular movements intact  Conjunctiva/sclera: Conjunctivae normal       Pupils: Pupils are equal, round, and reactive to light  Neck:      Thyroid: No thyroid mass or thyromegaly  Vascular: No carotid bruit or JVD  Trachea: No tracheal deviation  Cardiovascular:      Rate and Rhythm: Normal rate and regular rhythm  Pulses:           Carotid pulses are 2+ on the right side and 2+ on the left side  Heart sounds: Normal heart sounds  No murmur heard  No gallop  Pulmonary:      Effort: Pulmonary effort is normal  No respiratory distress  Breath sounds: Normal breath sounds  No wheezing or rales  Chest:   Breasts:      Right: No supraclavicular adenopathy  Left: No supraclavicular adenopathy  Abdominal:      General: Bowel sounds are normal  There is no distension or abdominal bruit  Palpations: Abdomen is soft  There is no hepatomegaly, splenomegaly or mass  Tenderness: There is no abdominal tenderness  There is no guarding or rebound  Musculoskeletal:         General: Deformity present  Right lower leg: No edema  Left lower leg: No edema  Comments: Bilateral knee varus deformities   Lymphadenopathy:      Cervical: No cervical adenopathy  Upper Body:      Right upper body: No supraclavicular adenopathy  Left upper body: No supraclavicular adenopathy  Skin:     Findings: No rash  Nails: There is no clubbing  Neurological:      General: No focal deficit present  Mental Status: He is alert and oriented to person, place, and time  Motor: Tremor present  Comments: Mild rest tremor right upper arm   No cogwheeling or rigidity    Psychiatric:         Mood and Affect: Mood normal          Behavior: Behavior normal          Cognition and Memory: Cognition normal

## 2022-03-18 LAB
ALBUMIN SERPL-MCNC: 4.3 G/DL (ref 3.6–5.1)
ALBUMIN/GLOB SERPL: 1.9 (CALC) (ref 1–2.5)
ALP SERPL-CCNC: 53 U/L (ref 35–144)
ALT SERPL-CCNC: 31 U/L (ref 9–46)
AST SERPL-CCNC: 16 U/L (ref 10–35)
BASOPHILS # BLD AUTO: 48 CELLS/UL (ref 0–200)
BASOPHILS NFR BLD AUTO: 0.6 %
BILIRUB SERPL-MCNC: 0.8 MG/DL (ref 0.2–1.2)
BUN SERPL-MCNC: 21 MG/DL (ref 7–25)
BUN/CREAT SERPL: ABNORMAL (CALC) (ref 6–22)
CALCIUM SERPL-MCNC: 9.5 MG/DL (ref 8.6–10.3)
CHLORIDE SERPL-SCNC: 103 MMOL/L (ref 98–110)
CHOLEST SERPL-MCNC: 243 MG/DL
CHOLEST/HDLC SERPL: 2.8 (CALC)
CO2 SERPL-SCNC: 30 MMOL/L (ref 20–32)
CREAT SERPL-MCNC: 0.94 MG/DL (ref 0.7–1.25)
EOSINOPHIL # BLD AUTO: 64 CELLS/UL (ref 15–500)
EOSINOPHIL NFR BLD AUTO: 0.8 %
ERYTHROCYTE [DISTWIDTH] IN BLOOD BY AUTOMATED COUNT: 11.8 % (ref 11–15)
GLOBULIN SER CALC-MCNC: 2.3 G/DL (CALC) (ref 1.9–3.7)
GLUCOSE SERPL-MCNC: 104 MG/DL (ref 65–99)
HCT VFR BLD AUTO: 42.8 % (ref 38.5–50)
HDLC SERPL-MCNC: 86 MG/DL
HGB BLD-MCNC: 14.8 G/DL (ref 13.2–17.1)
LDLC SERPL CALC-MCNC: 142 MG/DL (CALC)
LYMPHOCYTES # BLD AUTO: 1888 CELLS/UL (ref 850–3900)
LYMPHOCYTES NFR BLD AUTO: 23.6 %
MCH RBC QN AUTO: 31.5 PG (ref 27–33)
MCHC RBC AUTO-ENTMCNC: 34.6 G/DL (ref 32–36)
MCV RBC AUTO: 91.1 FL (ref 80–100)
MONOCYTES # BLD AUTO: 904 CELLS/UL (ref 200–950)
MONOCYTES NFR BLD AUTO: 11.3 %
NEUTROPHILS # BLD AUTO: 5096 CELLS/UL (ref 1500–7800)
NEUTROPHILS NFR BLD AUTO: 63.7 %
NONHDLC SERPL-MCNC: 157 MG/DL (CALC)
PLATELET # BLD AUTO: 340 THOUSAND/UL (ref 140–400)
PMV BLD REES-ECKER: 8.4 FL (ref 7.5–12.5)
POTASSIUM SERPL-SCNC: 4.6 MMOL/L (ref 3.5–5.3)
PROT SERPL-MCNC: 6.6 G/DL (ref 6.1–8.1)
PSA SERPL-MCNC: 0.51 NG/ML
RBC # BLD AUTO: 4.7 MILLION/UL (ref 4.2–5.8)
SL AMB EGFR AFRICAN AMERICAN: 100 ML/MIN/1.73M2
SL AMB EGFR NON AFRICAN AMERICAN: 87 ML/MIN/1.73M2
SODIUM SERPL-SCNC: 138 MMOL/L (ref 135–146)
TRIGL SERPL-MCNC: 60 MG/DL
WBC # BLD AUTO: 8 THOUSAND/UL (ref 3.8–10.8)

## 2022-04-21 ENCOUNTER — OFFICE VISIT (OUTPATIENT)
Dept: NEUROLOGY | Facility: CLINIC | Age: 62
End: 2022-04-21
Payer: COMMERCIAL

## 2022-04-21 VITALS — HEART RATE: 78 BPM | SYSTOLIC BLOOD PRESSURE: 136 MMHG | DIASTOLIC BLOOD PRESSURE: 74 MMHG

## 2022-04-21 DIAGNOSIS — R41.89 COGNITIVE CHANGES: ICD-10-CM

## 2022-04-21 DIAGNOSIS — G20 PARKINSON DISEASE (HCC): Primary | ICD-10-CM

## 2022-04-21 PROCEDURE — 99214 OFFICE O/P EST MOD 30 MIN: CPT | Performed by: PSYCHIATRY & NEUROLOGY

## 2022-04-21 PROCEDURE — 1036F TOBACCO NON-USER: CPT | Performed by: PSYCHIATRY & NEUROLOGY

## 2022-04-21 NOTE — PROGRESS NOTES
Patient ID: Ginger Farias is a 58 y o  male    Assessment/Plan:    Parkinson disease ([facility] [address] )  Parkinsonian symptoms appear fairly well controlled on today's exam   Unfortunately today's exam was abbreviated given patient arrived 35 minutes after his scheduled appointment time  He has less wearing off with q i d  dosing of Sinemet and Sinemet CR q h s  along with his selegiline  I do not think that any further increases in levodopa are needed  He did report periods of emotional lability which was apparent during exam   He does not feel he has consistent depression or anxiety  We did discuss that if this were to persist, we could consider treatment with an antidepressant which may help with the emotional lability  He has mild cognitive changes that he has noted while at work  It is taking him longer to do routine tasks  This is not affecting his work performance  No clear changes with normal daily activities at home  Some of this may be him reestablishing work routine routine as his job had changed during the pandemic  Will continue to monitor for signs of progression         Diagnoses and all orders for this visit:    Parkinson disease [facility])    Cognitive changes        Subjective:      Nixon Jaime 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 presents in follow up for MARÍA-positive parkinsonism  To review, symptom onset around 2018 with right>left tremor   On initial presentation the patient had a rest and postural tremor on the right, minimal if any kinetic tremor   Also had mild rigidity on the right and slight bradykinesia bilaterally with decrement on the right   His gait was fairly normal with good postural reflexes   No typical non motor symptoms associated with Parkinson's disease  At his last visit he had some benefit with Sinemet but wearing off, because of this his Sinemet dose was changed to 1tab qid  Scored a 25/30 on MoCA  He was sent for labs        Current medications:  Selegiline 5mg bid   Sinemet 25/100mg 1tab qid (5am, 9am, 1pm, 5pm)  Sinemet CR before bed     He denies any wearing off  He stopped PT due to knee pain   He was seen by an orthopedic surgeon and they recommended knee replacement  They are trying gel treatment first  He continues to have gait difficulty due to this  He has intermittent tremors of his right hand when using his hands (measuring or pushing a button)  He is able to perform his ADLs on his own  Sleeps well  Take longer to do routine task at work  He has to think more about things that previously were routine  Objective:    /74 (BP Location: Left arm, Patient Position: Standing, Cuff Size: Standard)   Pulse 78       Physical Exam  Vitals reviewed  Eyes:      Extraocular Movements: Extraocular movements intact  Pupils: Pupils are equal, round, and reactive to light  Neurological:      Mental Status: He is alert  Deep Tendon Reflexes: Strength normal          Neurological Exam  Mental Status  Alert  Oriented to person, place, time and situation  Speech: hypophonia  Language is fluent with no aphasia  Attention and concentration are normal     Cranial Nerves  CN III, IV, VI: Extraocular movements intact bilaterally  Pupils equal round and reactive to light bilaterally  CN V: Facial sensation is normal   CN VII: Full and symmetric facial movement  CN VIII: Hearing is normal   CN IX, X: Palate elevates symmetrically  CN XI: Shoulder shrug strength is normal   CN XII: Tongue midline without atrophy or fasciculations  Motor   Normal muscle tone  Strength is 5/5 throughout all four extremities  Sensory  Light touch is normal in upper and lower extremities       Coordination  Right: Finger-to-nose normal  Rapid alternating movement abnormality:  Left: Finger-to-nose normal  Rapid alternating movement abnormality:  Mild decrement on hand more pronounced on the right  Left hand reemergence rest tremor noted while holding the phone       Gait  Casual gait: Able to rise from chair without using arms  Mild reduction in stride      UPDRS motor:     4/21/22                              Time since last dose:     Speech  0   Facial Expression  1   Rigidity - Neck  0   Rigidity - Upper Extremity (Right)  1   Rigidity - Upper Extremity (Left)   1   Rigidity - Lower Extremity (Right)  0   Rigidity - Lower Extremity (Left)   0   Finger Taps (Right)   2   Finger Taps (Left)   1   Hand Movement (Right)  1   Hand Movement (Left)   1   Pronation/Supination (Right)  2   Pronation/Supination (Left)   2   Toe Tapping (Right) 0   Toe Tapping (Left) 1   Leg Agility (Right)  1   Leg Agility (Left)   1   Arising from Chair   0   Gait   1   Freezing of Gait 0   Postural Stability      Posture 0   Global spontaneity of movement 1   Postural Tremor (Right) 0   Postural Tremor (Left) 0   Kinetic Tremor (Right)  0   Kinetic Tremor (Left)  0   Rest tremor amplitude RUE 0   Rest tremor amplitude LUE 1 holding phone   Rest tremor amplitude RLE 0   Reset tremor amplitude LLE 0   Lip/Jaw Tremor  0   Consistency of tremor 0   Motor Exam Total:                     Current Outpatient Medications on File Prior to Visit   Medication Sig Dispense Refill    carbidopa-levodopa (SINEMET CR)  mg TBCR per ER tablet Take 1 tablet by mouth daily at bedtime      carbidopa-levodopa (SINEMET)  mg per tablet TAKE 1/2 TAB 3 TIMES A DAY FOR 1 WEEK THEN 1 TAB 3 TIMES A DAY (Patient taking differently: Take 1 tablet by mouth 4 (four) times a day THEN 1 TAB 4 TIMES A DAY ) 90 tablet 3    cholecalciferol (VITAMIN D3) 1,000 units tablet Take 5,000 Units by mouth daily      Collagen Hydrolysate POWD 1 Dose by Does not apply route daily      fluticasone (FLONASE) 50 mcg/act nasal spray 2 sprays into each nostril daily (Patient taking differently: 2 sprays into each nostril as needed ) 16 g 2    ibuprofen (ADVIL,MOTRIN) 100 MG tablet Take 100 mg by mouth every 6 (six) hours as needed for mild pain      MAGNESIUM PO Take by mouth daily at bedtime      MELATONIN PO Take by mouth daily at bedtime      selegiline (ELDEPRYL) 5 mg tablet Take 1 tablet (5 mg total) by mouth 2 (two) times a day with meals 60 tablet 5    sildenafil (VIAGRA) 100 mg tablet Take 1 tablet (100 mg total) by mouth daily as needed for erectile dysfunction 6 tablet 5     No current facility-administered medications on file prior to visit           Kourtney Escudero MD  Movement disorder physician  [address] [place] [address]

## 2022-04-22 NOTE — ASSESSMENT & PLAN NOTE
Parkinsonian symptoms appear fairly well controlled on today's exam   Unfortunately today's exam was abbreviated given patient arrived 35 minutes after his scheduled appointment time  He has less wearing off with q i d  dosing of Sinemet and Sinemet CR q h s  along with his selegiline  I do not think that any further increases in levodopa are needed  He did report periods of emotional lability which was apparent during exam   He does not feel he has consistent depression or anxiety  We did discuss that if this were to persist, we could consider treatment with an antidepressant which may help with the emotional lability  He has mild cognitive changes that he has noted while at work  It is taking him longer to do routine tasks  This is not affecting his work performance  No clear changes with normal daily activities at home  Some of this may be him reestablishing work routine routine as his job had changed during the pandemic  Will continue to monitor for signs of progression  Elisa Mark

## 2022-04-29 ENCOUNTER — OFFICE VISIT (OUTPATIENT)
Dept: URGENT CARE | Facility: MEDICAL CENTER | Age: 62
End: 2022-04-29
Payer: COMMERCIAL

## 2022-04-29 ENCOUNTER — TELEPHONE (OUTPATIENT)
Dept: FAMILY MEDICINE CLINIC | Facility: CLINIC | Age: 62
End: 2022-04-29

## 2022-04-29 VITALS
SYSTOLIC BLOOD PRESSURE: 142 MMHG | OXYGEN SATURATION: 99 % | TEMPERATURE: 98.2 F | HEIGHT: 70 IN | WEIGHT: 203.93 LBS | BODY MASS INDEX: 29.19 KG/M2 | DIASTOLIC BLOOD PRESSURE: 72 MMHG | HEART RATE: 80 BPM | RESPIRATION RATE: 18 BRPM

## 2022-04-29 DIAGNOSIS — R31.0 GROSS HEMATURIA: Primary | ICD-10-CM

## 2022-04-29 LAB
SL AMB  POCT GLUCOSE, UA: NORMAL
SL AMB LEUKOCYTE ESTERASE,UA: NORMAL
SL AMB POCT BILIRUBIN,UA: NORMAL
SL AMB POCT BLOOD,UA: NORMAL
SL AMB POCT CLARITY,UA: YELLOW
SL AMB POCT COLOR,UA: CLEAR
SL AMB POCT KETONES,UA: NORMAL
SL AMB POCT NITRITE,UA: NORMAL
SL AMB POCT PH,UA: 6
SL AMB POCT SPECIFIC GRAVITY,UA: 1.01
SL AMB POCT URINE PROTEIN: NORMAL
SL AMB POCT UROBILINOGEN: 0.2

## 2022-04-29 PROCEDURE — 81002 URINALYSIS NONAUTO W/O SCOPE: CPT

## 2022-04-29 PROCEDURE — G0382 LEV 3 HOSP TYPE B ED VISIT: HCPCS | Performed by: PHYSICIAN ASSISTANT

## 2022-04-29 PROCEDURE — 87086 URINE CULTURE/COLONY COUNT: CPT | Performed by: PHYSICIAN ASSISTANT

## 2022-04-29 RX ORDER — CIPROFLOXACIN 500 MG/1
500 TABLET, FILM COATED ORAL EVERY 12 HOURS SCHEDULED
Qty: 14 TABLET | Refills: 0 | Status: SHIPPED | OUTPATIENT
Start: 2022-04-29 | End: 2022-05-06

## 2022-04-29 NOTE — PATIENT INSTRUCTIONS
Hematuria  cipro as directed    Follow up with PCP in 3-5 days  Proceed to  ER if symptoms worsen  Hematuria   WHAT YOU NEED TO KNOW:   Hematuria is blood in your urine  Your urine may be bright red to dark brown  DISCHARGE INSTRUCTIONS:   Return to the emergency department if:   · You have blood in your urine after a new injury, such as a fall  · You have severe back or side pain that does not go away with treatment  Call your doctor if:   · You are urinating very small amounts or not at all  · You feel like you cannot empty your bladder  · You have a fever that gets worse or does not go away with treatment  · You cannot keep liquids or medicines down  · Your urine gets darker, even after you drink extra liquids  · You have questions or concerns about your condition, treatment, or care  Drink liquids as directed: You may need to drink extra liquids to help flush the blood from your body through your urine  Water is the best liquid to drink  Ask how much liquid to drink each day and which liquids are best for you  Follow up with your doctor as directed:  Write down your questions so you remember to ask them during your visits  © Copyright [employer] 2022 Information is for End User's use only and may not be sold, redistributed or otherwise used for commercial purposes  All illustrations and images included in CareNotes® are the copyrighted property of [employer]  or [facility] [employer]   The above information is an  only  It is not intended as medical advice for individual conditions or treatments  Talk to your doctor, nurse or pharmacist before following any medical regimen to see if it is safe and effective for you

## 2022-04-29 NOTE — PROGRESS NOTES
[facility]        NAME: Dewayne De La Rosa is a 58 y o  male  : 1960    MRN: [id number]  DATE: 2022  TIME: 6:09 PM    Assessment and Plan   Gross hematuria [R31 0]  1  Gross hematuria  POCT urine dip    ciprofloxacin (CIPRO) 500 mg tablet         Patient Instructions     Hematuria  cipro as directed    Follow up with PCP in 3-5 days  Proceed to  ER if symptoms worsen  Chief Complaint     Chief Complaint   Patient presents with    Hematuria     hematuria since today; patient states he saw blood in urinal after urinating twice today; associated with bilateral flank pain and painful urination          History of Present Illness       58-year-old male who presents complaining of hematuria x1 day  Patient states he has no abdominal pain, no flank pain, no fevers, no nausea, no vomiting      Review of Systems   Review of Systems   Constitutional: Negative  HENT: Negative  Eyes: Negative  Respiratory: Negative  Negative for apnea, cough, choking, chest tightness, shortness of breath, wheezing and stridor  Cardiovascular: Negative  Negative for chest pain  Genitourinary: Positive for hematuria           Current Medications       Current Outpatient Medications:     carbidopa-levodopa (SINEMET CR)  mg TBCR per ER tablet, Take 1 tablet by mouth daily at bedtime, Disp: , Rfl:     carbidopa-levodopa (SINEMET)  mg per tablet, TAKE 1/2 TAB 3 TIMES A DAY FOR 1 WEEK THEN 1 TAB 3 TIMES A DAY (Patient taking differently: Take 1 tablet by mouth 4 (four) times a day THEN 1 TAB 4 TIMES A DAY ), Disp: 90 tablet, Rfl: 3    cholecalciferol (VITAMIN D3) 1,000 units tablet, Take 5,000 Units by mouth daily, Disp: , Rfl:     ciprofloxacin (CIPRO) 500 mg tablet, Take 1 tablet (500 mg total) by mouth every 12 (twelve) hours for 7 days, Disp: 14 tablet, Rfl: 0    Collagen Hydrolysate POWD, 1 Dose by Does not apply route daily, Disp: , Rfl:     fluticasone (FLONASE) 50 mcg/act nasal spray, 2 sprays into each nostril daily (Patient taking differently: 2 sprays into each nostril as needed ), Disp: 16 g, Rfl: 2    ibuprofen (ADVIL,MOTRIN) 100 MG tablet, Take 100 mg by mouth every 6 (six) hours as needed for mild pain, Disp: , Rfl:     MAGNESIUM PO, Take by mouth daily at bedtime, Disp: , Rfl:     MELATONIN PO, Take by mouth daily at bedtime, Disp: , Rfl:     selegiline (ELDEPRYL) 5 mg tablet, Take 1 tablet (5 mg total) by mouth 2 (two) times a day with meals, Disp: 60 tablet, Rfl: 5    sildenafil (VIAGRA) 100 mg tablet, Take 1 tablet (100 mg total) by mouth daily as needed for erectile dysfunction, Disp: 6 tablet, Rfl: 5    Current Allergies     Allergies as of 04/29/2022    (No Known Allergies)            The following portions of the patient's history were reviewed and updated as appropriate: allergies, current medications, past family history, past medical history, past social history, past surgical history and problem list      Past Medical History:   Diagnosis Date    Hemorrhoids     Parkinson disease ([facility] [address] )        Past Surgical History:   Procedure Laterality Date    KNEE SURGERY         Family History   Problem Relation Age of Onset    Diabetes Father         mellitus    Heart attack Father         acute myocardial infarction    Hyperlipidemia Mother     Multiple sclerosis Maternal Grandfather     Prostate cancer Paternal Grandfather          Medications have been verified  Objective   /72   Pulse 80   Temp 98 2 °F (36 8 °C)   Resp 18   Ht 5' 10" (1 778 m)   Wt 92 5 kg (203 lb 14 8 oz)   SpO2 99%   BMI 29 26 kg/m²        Physical Exam     Physical Exam  Constitutional:       General: He is not in acute distress  Appearance: Normal appearance  He is well-developed  He is not diaphoretic  HENT:      Head: Normocephalic  Cardiovascular:      Rate and Rhythm: Normal rate and regular rhythm  Heart sounds: Normal heart sounds     Pulmonary: Effort: Pulmonary effort is normal  No respiratory distress  Breath sounds: Normal breath sounds  No wheezing or rales  Chest:      Chest wall: No tenderness  Abdominal:      General: Abdomen is flat  Bowel sounds are normal  There is no distension  Palpations: Abdomen is soft  There is no mass  Tenderness: There is no abdominal tenderness  There is no right CVA tenderness, left CVA tenderness, guarding or rebound  Musculoskeletal:      Cervical back: Normal range of motion and neck supple  Lymphadenopathy:      Cervical: No cervical adenopathy  Neurological:      Mental Status: He is alert

## 2022-04-29 NOTE — TELEPHONE ENCOUNTER
Patient called today    he states he has blood in his urine  Maryann Cannon He said the urine is clear but there is blood after the affect         he was told to go to the ER or Urgent Care and he understands

## 2022-04-30 LAB — BACTERIA UR CULT: NORMAL

## 2022-05-19 ENCOUNTER — HOSPITAL ENCOUNTER (OUTPATIENT)
Dept: NON INVASIVE DIAGNOSTICS | Facility: CLINIC | Age: 62
Discharge: HOME | End: 2022-05-19
Payer: COMMERCIAL

## 2022-05-19 DIAGNOSIS — M79.604 LEG PAIN, BILATERAL: ICD-10-CM

## 2022-05-19 DIAGNOSIS — M79.605 LEG PAIN, BILATERAL: ICD-10-CM

## 2022-05-19 PROCEDURE — 93922 UPR/L XTREMITY ART 2 LEVELS: CPT | Performed by: SURGERY

## 2022-05-19 PROCEDURE — 93925 LOWER EXTREMITY STUDY: CPT | Performed by: SURGERY

## 2022-05-19 PROCEDURE — 93925 LOWER EXTREMITY STUDY: CPT

## 2022-05-19 PROCEDURE — 93923 UPR/LXTR ART STDY 3+ LVLS: CPT

## 2022-06-20 DIAGNOSIS — J31.0 CHRONIC RHINITIS: ICD-10-CM

## 2022-06-20 NOTE — TELEPHONE ENCOUNTER
Pt has been using his son's fluticasone and it's been clearing his sinus congestion  He is asking for an updated script be sent to [facility] on [address]  Please advise

## 2022-06-21 RX ORDER — FLUTICASONE PROPIONATE 50 MCG
2 SPRAY, SUSPENSION (ML) NASAL DAILY
Qty: 16 G | Refills: 2 | Status: SHIPPED | OUTPATIENT
Start: 2022-06-21

## 2022-08-15 ENCOUNTER — OFFICE VISIT (OUTPATIENT)
Dept: FAMILY MEDICINE CLINIC | Facility: CLINIC | Age: 62
End: 2022-08-15
Payer: COMMERCIAL

## 2022-08-15 VITALS
HEIGHT: 70 IN | BODY MASS INDEX: 28.13 KG/M2 | TEMPERATURE: 96.2 F | OXYGEN SATURATION: 96 % | DIASTOLIC BLOOD PRESSURE: 94 MMHG | WEIGHT: 196.5 LBS | SYSTOLIC BLOOD PRESSURE: 142 MMHG | HEART RATE: 82 BPM

## 2022-08-15 DIAGNOSIS — Z01.818 PRE-OP EXAMINATION: Primary | ICD-10-CM

## 2022-08-15 PROCEDURE — 99214 OFFICE O/P EST MOD 30 MIN: CPT | Performed by: FAMILY MEDICINE

## 2022-08-15 NOTE — ASSESSMENT & PLAN NOTE
Scheduled for total knee arthroplasty of the left knee  EKG performed in office per Orthopedic surgery request   Normal sinus rhythm normal EKG  Patient can proceed with surgery  Per orthopedic surgery he can continue carbidopa levodopa morning of surgery

## 2022-08-15 NOTE — PROGRESS NOTES
PRE-OPERATIVE EVALUATION  [facility]    NAME: Tika Simeon  AGE: 58 y o  SEX: male  : 1960     DATE: 8/15/2022    Family Medicine Pre-Operative Evaluation      Chief Complaint: had concerns including Pre-op Exam (Left knee replacement  [facility])  Surgery: Total knee arthroplasty condyle and plateau medial and lateral compartment  Anticipated Date of Surgery:  2022  Referring Provider:  Zak Silva MD- [facility]     History of Present Illness:     Tika Simeon is a 58 y o  male who presents to the office today for a preoperative consultation at the request of the surgeon for the procedure above  Assessment of Cardiac Risk:  · Denies unstable or severe angina or MI in the last 6 weeks or history of stent placement in the last year   · Denies decompensated heart failure (e g  New onset heart failure, NYHA functional class IV heart failure, or worsening existing heart failure)  · Denies significant arrhythmias such as high grade AV block, symptomatic ventricular arrhythmia, newly recognized ventricular tachycardia, supraventricular tachycardia with resting heart rate >100, or symptomatic bradycardia  · Denies severe heart valve disease including aortic stenosis or symptomatic mitral stenosis     Exercise Capacity:  · Able to walk 4 blocks without symptoms?: Yes  · Able to walk 2 flights without symptoms?: Yes    Prior Anesthesia Reactions: No     Personal history of venous thromboembolic disease? No    History of steroid use for >2 weeks within last year? No    Review of Systems:     Review of Systems   Constitutional: Negative for fatigue and fever  HENT: Negative for sore throat  Eyes: Negative for visual disturbance  Respiratory: Negative for cough, chest tightness and shortness of breath  Cardiovascular: Negative for chest pain, palpitations and leg swelling     Gastrointestinal: Negative for abdominal pain, constipation, diarrhea and nausea  Endocrine: Negative for cold intolerance and heat intolerance  Genitourinary: Negative for flank pain  Musculoskeletal: Negative for back pain and neck pain  Skin: Negative for rash  Neurological: Negative for headaches  Psychiatric/Behavioral: Negative for behavioral problems and confusion       Current Problem List:     Patient Active Problem List   Diagnosis    Hyperlipidemia    Impaired fasting glucose    Parkinson disease ([facility] [address] )    Primary osteoarthritis of both knees    Thrombocytosis    Cognitive changes     Allergies:     No Known Allergies  Medications:       Current Outpatient Medications:     carbidopa-levodopa (SINEMET CR)  mg TBCR per ER tablet, Take 1 tablet by mouth daily at bedtime, Disp: , Rfl:     carbidopa-levodopa (SINEMET)  mg per tablet, TAKE 1/2 TAB 3 TIMES A DAY FOR 1 WEEK THEN 1 TAB 3 TIMES A DAY (Patient taking differently: Take 1 tablet by mouth 4 (four) times a day THEN 1 TAB 4 TIMES A DAY), Disp: 90 tablet, Rfl: 3    cholecalciferol (VITAMIN D3) 1,000 units tablet, Take 5,000 Units by mouth daily, Disp: , Rfl:     Collagen Hydrolysate POWD, 1 Dose by Does not apply route daily, Disp: , Rfl:     fluticasone (FLONASE) 50 mcg/act nasal spray, 2 sprays into each nostril daily, Disp: 16 g, Rfl: 2    MAGNESIUM PO, Take by mouth daily at bedtime, Disp: , Rfl:     MELATONIN PO, Take by mouth daily at bedtime, Disp: , Rfl:     selegiline (ELDEPRYL) 5 mg tablet, Take 1 tablet (5 mg total) by mouth 2 (two) times a day with meals, Disp: 60 tablet, Rfl: 5    ibuprofen (ADVIL,MOTRIN) 100 MG tablet, Take 100 mg by mouth every 6 (six) hours as needed for mild pain (Patient not taking: Reported on 8/15/2022), Disp: , Rfl:     sildenafil (VIAGRA) 100 mg tablet, Take 1 tablet (100 mg total) by mouth daily as needed for erectile dysfunction, Disp: 6 tablet, Rfl: 5  Past Medical History:       Past Medical History:   Diagnosis Date    Hemorrhoids     Parkinson disease [facility])         Past Surgical History:   Procedure Laterality Date    KNEE SURGERY          Family History   Problem Relation Age of Onset    Diabetes Father         mellitus    Heart attack Father         acute myocardial infarction    Hyperlipidemia Mother     Multiple sclerosis Maternal Grandfather     Prostate cancer Paternal Grandfather         Social History     Socioeconomic History    Marital status: /Civil Union     Spouse name: Not on file    Number of children: Not on file    Years of education: Not on file    Highest education level: Not on file   Occupational History    Not on file   Tobacco Use    Smoking status: Never Smoker    Smokeless tobacco: Never Used   Vaping Use    Vaping Use: Former   Substance and Sexual Activity    Alcohol use: Yes     Alcohol/week: 14 0 standard drinks     Types: 14 Cans of beer per week    Drug use: No    Sexual activity: Not Currently   Other Topics Concern    Not on file   Social History Narrative    Not on file     Social Determinants of Health     Financial Resource Strain: Not on file   Food Insecurity: Not on file   Transportation Needs: Not on file   Physical Activity: Not on file   Stress: Not on file   Social Connections: Not on file   Intimate Partner Violence: Not on file   Housing Stability: Not on file      Physical Exam:     Pulse 82   Temp (!) 96 2 °F (35 7 °C)   Ht 5' 10" (1 778 m)   Wt 89 1 kg (196 lb 8 oz)   SpO2 96%   BMI 28 19 kg/m²     Physical Exam  Vitals and nursing note reviewed  Constitutional:       Appearance: He is well-developed  HENT:      Head: Normocephalic and atraumatic  Nose: Nose normal    Eyes:      Pupils: Pupils are equal, round, and reactive to light  Cardiovascular:      Rate and Rhythm: Normal rate and regular rhythm  Pulmonary:      Effort: Pulmonary effort is normal       Breath sounds: Normal breath sounds     Abdominal:      General: Bowel sounds are normal       Palpations: Abdomen is soft  Musculoskeletal:         General: Tenderness (Left knee) present  Cervical back: Normal range of motion  Skin:     General: Skin is warm and dry  Neurological:      Mental Status: He is alert and oriented to person, place, and time  Psychiatric:         Mood and Affect: Mood normal          Behavior: Behavior normal         Data:     Pre-operative work-up    Laboratory Results: I have personally reviewed the pertinent laboratory results/reports   Lab Results   Component Value Date    CREATININE 0 94 03/18/2022       EKG: I have personally reviewed pertinent reports  EKG performed in office  Normal sinus rhythm normal EKG        Assessment & Recommendations:     Pre-Op Evaluation Assessment  Cardiac Risk Estimation: per the Revised Cardiac Risk Index (Circ  100:1043, 1999), the patient's risk factors for cardiac complications include None, putting him in: RCI RISK CLASS I (0 risk factors, risk of major cardiac compl  appr  0 5%)  Justine Landau is undergoing an elective Moderate Risk surgery  They are at [address] for major adverse cardiac event (MACE)  They may proceed with surgery as planned without further workup  Pre-Op Evaluation Plan  1  Further preoperative workup as follows:   - ECG    2  Medication Management/Recommendations:   - Patient has been instructed to avoid aspirin containing medications or non-steroidal anti-inflammatory drugs for the week preceding surgery  3  Patient requires further consultation with: None    4  Assessment of Chronic Conditions:  Pre-op examination  Scheduled for total knee arthroplasty of the left knee  EKG performed in office per Orthopedic surgery request   Normal sinus rhythm normal EKG  Patient can proceed with surgery  Per orthopedic surgery he can continue carbidopa levodopa morning of surgery  Pre-op form completed and faxed to referring physician noted above      Payton CHEATHAM Dung Abbott MD  [facility]  [address] [facility] [address]  Phone#  [phone]  Fax#  [phone]    Medications to Discontinue Prior to Surgery  ACE/ARB: day of surgery  Clopidogrel/prasugrel/ticagrelor:  5-7 days  Apixaban:  48 hours  Dabigatran: 2-5 days  Rivaroxaban: 24-72 hours  NSAIDs: 1-3 days  Gilbert-2 agents: 2-3 days  Diabetes medications except basal insulin  Biologics-hip/knee surgery-1 dosing interval  OTC medications

## 2022-08-17 ENCOUNTER — TELEPHONE (OUTPATIENT)
Dept: FAMILY MEDICINE CLINIC | Facility: CLINIC | Age: 62
End: 2022-08-17

## 2022-08-17 NOTE — TELEPHONE ENCOUNTER
Please order covid PCR swab test for pt's surgery on 8/26/2022  Surgeon is not a [facility] provider  Contact pt once complete

## 2022-08-18 DIAGNOSIS — Z11.9 ENCOUNTER FOR SCREENING FOR INFECTIOUS AND PARASITIC DISEASES, UNSPECIFIED: Primary | ICD-10-CM

## 2022-08-18 NOTE — TELEPHONE ENCOUNTER
Patient informed, states that he will have it done at [facility], he already spoke to them   Order placed

## 2022-08-24 DIAGNOSIS — Z11.9 ENCOUNTER FOR SCREENING FOR INFECTIOUS AND PARASITIC DISEASES, UNSPECIFIED: ICD-10-CM

## 2022-08-24 LAB — SARS-COV-2 RNA RESP QL NAA+PROBE: NEGATIVE

## 2022-08-24 PROCEDURE — U0005 INFEC AGEN DETEC AMPLI PROBE: HCPCS | Performed by: FAMILY MEDICINE

## 2022-08-24 PROCEDURE — U0003 INFECTIOUS AGENT DETECTION BY NUCLEIC ACID (DNA OR RNA); SEVERE ACUTE RESPIRATORY SYNDROME CORONAVIRUS 2 (SARS-COV-2) (CORONAVIRUS DISEASE [COVID-19]), AMPLIFIED PROBE TECHNIQUE, MAKING USE OF HIGH THROUGHPUT TECHNOLOGIES AS DESCRIBED BY CMS-2020-01-R: HCPCS | Performed by: FAMILY MEDICINE

## 2022-08-29 ENCOUNTER — EVALUATION (OUTPATIENT)
Dept: PHYSICAL THERAPY | Facility: CLINIC | Age: 62
End: 2022-08-29
Payer: COMMERCIAL

## 2022-08-29 DIAGNOSIS — Z96.652 TOTAL KNEE REPLACEMENT STATUS, LEFT: Primary | ICD-10-CM

## 2022-08-29 PROCEDURE — 97162 PT EVAL MOD COMPLEX 30 MIN: CPT | Performed by: PHYSICAL THERAPIST

## 2022-08-29 PROCEDURE — 97110 THERAPEUTIC EXERCISES: CPT | Performed by: PHYSICAL THERAPIST

## 2022-08-29 NOTE — PROGRESS NOTES
PT EVALUATION/1st Post-op    Today's date: 22  Patient name: Jae Vasquez  : 1960  MRN: [id number]  Referring provider: Nissa Whitehead  Dx:   1  Total knee replacement status, left        ASSESSMENT:    Jae Vasquez is a 58 y o  male who presents s/p TKA on 22 with Dr Tru Huertas through [address] [place] [address]  Patient is showing normal healing and expected measurements at this point in recovery  Patient's pain is well controlled  Patient is neurologically and vascularly intact  Overall, patient presents with pain, decreased strength, decreased ROM, decreased joint mobility and decreased sensation  Due to these impairments, Patient has difficulty performing a/iadls, recreational activities and work-related activities  Patient would benefit from skilled physical therapy to address the impairments, improve their level of function, and to improve their overall quality of life  Impairments:    restricted ROM    decreased strength   pain with function   activity intolerance   weight bearing intolerance   abnormal gait   imbalance     Prognosis:  Good  Positive and negative prognostic indicator(s):  none    Goals:    Short Term Goals: to be achieved by 4 weeks  1) Patient to be independent with basic HEP  2) Decrease pain to 3 and 4/10 at its worst   3) Increase knee ROM by 5-10 degrees   4) Increase LE strength by 1/2 MMT grade in all deficient planes  Long Term Goals: to be achieved by discharge  1) FOTO equal to or greater than target score indicating improvements with overall function    2) Ambulation to improve to maximal level of function  3) Stair negotiation will improve to reciprocal   4) Sit to stand transfers will improve to maximal level of function     Planned interventions:  home exercise program, patient education, manual therapy, graded activity, flexibility, functional range of motion exercises, strengthening, abdominal trunk stabilization, balance and weight bearing training and gait training    Duration in visits:  8-12  Frequency: 2 visits per week  Duration in weeks:  6-8    History of Current Injury: Patient reports that he received a left TKA on 8/26/22 with Dr Kristina Waldrop  Patient notes that surgery went well  Patient notes that he had a day of therapy in the hospital  Patient notes that he is getting around at home  Patient notes that the pain is well controlled  Patient notes that he had some increased pain yesterday since he was up and walking around on it a little more  Patient notes that he hasn't gotten much information on what he should be doing the last few days but notes that he has been doing quad sets and ankle pumps at home  Patient notes that he has been icing  Patient notes that he has a follow up with the doctor on September 8th  Patient denies SOB and calf pain  Pain location: global knee pain   Pain descriptors:  Pulling; tightness  Pain at Currently: 0/10   Pain at Best: 0/10   Pain at Worst: 8/10       Aggravating factors: general post op movements   Easing factors: rest     Imaging: see imaging tab   Special Questions: Denies numbness and tingling  Hobbies/Interests: staying active, walking dog on different surfaces  Occupation: teacher   Patient goals: Patient reports goals for physical therapy would be decreased pain, increased mobility, increased strength and improved balance        Objective     Observations     Right Knee   Positive for edema and incision  Additional Observation Details  Patient arrives with dressing intact and stockings donned  Dressing was dry with no signs of drainage or odor  Mild to moderate leg swelling       WELLS CRITERIA:  DVT risk  +1 point for each of the following  Active cancer within last 6 months negative  Paralysis, paresis or recent plaster immobilization of legs  negative  Recent Bedridden for >3 days or major surgery within last 12 weeks  positive  Localized tenderness along deep venous system  negative  Entire leg swelling  positive  Calf swelling > 3cm compared with asymptomatic leg (10cm below tibial tuberosity)  negative  Pitting Edema  negative  Collateral supervicial veins (non-varicose)  negative  Previously documented DVT  negative  -2 points if an alternative diagnosis more likely (muscle tear, cellulitis, etc)  negative    DVT considered likely in patients with score of 2 or more, unlikely if less than 2      Tenderness     Additional Tenderness Details  Global knee  Very minimal tenderness with palpation  Neurological Testing     Sensation     Knee   Left Knee   Intact: light touch    Right Knee   Intact: light touch     Passive Range of Motion   Left Knee   Flexion: 70 degrees   Extension: -3 degrees     Right Knee   Normal passive range of motion    Mobility   Patellar Mobility:   Left Knee   WFL: medial, lateral, superior and inferior  Strength/Myotome Testing     Left Hip   Planes of Motion   Flexion: 4+  Extension: 4  Abduction: 4  Adduction: 4+    Right Hip   Planes of Motion   Flexion: 4+  Extension: 4  Abduction: 4  Adduction: 4+    Additional Strength Details  Not tested this date  At least a 3+/5 with maintain weight bearing status             Precautions: Parkinson's Disease, post-op TKA 8/26/22       Manuals             Manual knee stretching flex and ext              Patellar ROM                                       Neuro Re-Ed             Quad set with strap              SLR              TKE in standing                                                                  Ther Ex             Heel slides              Seated heel slides              Standing hamstring curls              Heel raises              3-way                                        Nustep---recumbent bike              Ther Activity             Mini squats                           Gait Training                                       Modalities

## 2022-08-29 NOTE — LETTER
2022    [facility]  [address]    Patient: Diamantina Aschoff   YOB: 1960   Date of Visit: 2022     Encounter Diagnosis     ICD-10-CM    1  Total knee replacement status, left  [id number]        Dear Dr Perico Burt:    Thank you for your recent referral of Diamantina Aschoff  Please review the attached evaluation summary from Bob's recent visit  Please verify that you agree with the plan of care by signing the attached order  If you have any questions or concerns, please do not hesitate to call  I sincerely appreciate the opportunity to share in the care of one of your patients and hope to have another opportunity to work with you in the near future  Sincerely,    Dee Dee Goncalves, PT      Referring Provider:      I certify that I have read the below Plan of Care and certify the need for these services furnished under this plan of treatment while under my care  [facility]  [address]  Via Fax: [phone]          PT EVALUATION/1st Post-op    Today's date: 22  Patient name: Diamantina Aschoff  : 1960  MRN: [id number]  Referring provider: Richar Nowak  Dx:   1  Total knee replacement status, left        ASSESSMENT:    Diamantina Aschoff is a 58 y o  male who presents s/p TKA on 22 with Dr Berta Monroe through [address] [place] [address]  Patient is showing normal healing and expected measurements at this point in recovery  Patient's pain is well controlled  Patient is neurologically and vascularly intact  Overall, patient presents with pain, decreased strength, decreased ROM, decreased joint mobility and decreased sensation  Due to these impairments, Patient has difficulty performing a/iadls, recreational activities and work-related activities   Patient would benefit from skilled physical therapy to address the impairments, improve their level of function, and to improve their overall quality of life  Impairments:    restricted ROM    decreased strength   pain with function   activity intolerance   weight bearing intolerance   abnormal gait   imbalance     Prognosis:  Good  Positive and negative prognostic indicator(s):  none    Goals:    Short Term Goals: to be achieved by 4 weeks  1) Patient to be independent with basic HEP  2) Decrease pain to 3 and 4/10 at its worst   3) Increase knee ROM by 5-10 degrees   4) Increase LE strength by 1/2 MMT grade in all deficient planes  Long Term Goals: to be achieved by discharge  1) FOTO equal to or greater than target score indicating improvements with overall function  2) Ambulation to improve to maximal level of function  3) Stair negotiation will improve to reciprocal   4) Sit to stand transfers will improve to maximal level of function     Planned interventions:  home exercise program, patient education, manual therapy, graded activity, flexibility, functional range of motion exercises, strengthening, abdominal trunk stabilization, balance and weight bearing training and gait training    Duration in visits:  8-12  Frequency: 2 visits per week  Duration in weeks:  6-8    History of Current Injury: Patient reports that he received a left TKA on 8/26/22 with Dr Kristina Waldrop  Patient notes that surgery went well  Patient notes that he had a day of therapy in the hospital  Patient notes that he is getting around at home  Patient notes that the pain is well controlled  Patient notes that he had some increased pain yesterday since he was up and walking around on it a little more  Patient notes that he hasn't gotten much information on what he should be doing the last few days but notes that he has been doing quad sets and ankle pumps at home  Patient notes that he has been icing  Patient notes that he has a follow up with the doctor on September 8th  Patient denies SOB and calf pain          Pain location: global knee pain   Pain descriptors:  Pulling; tightness  Pain at Currently: 0/10   Pain at Best: 0/10   Pain at Worst: 8/10       Aggravating factors: general post op movements   Easing factors: rest     Imaging: see imaging tab   Special Questions: Denies numbness and tingling  Hobbies/Interests: staying active, walking dog on different surfaces  Occupation: teacher   Patient goals: Patient reports goals for physical therapy would be decreased pain, increased mobility, increased strength and improved balance        Objective     Observations     Right Knee   Positive for edema and incision  Additional Observation Details  Patient arrives with dressing intact and stockings donned  Dressing was dry with no signs of drainage or odor  Mild to moderate leg swelling  WELLS CRITERIA:  DVT risk  +1 point for each of the following  Active cancer within last 6 months negative  Paralysis, paresis or recent plaster immobilization of legs  negative  Recent Bedridden for >3 days or major surgery within last 12 weeks  positive  Localized tenderness along deep venous system  negative  Entire leg swelling  positive  Calf swelling > 3cm compared with asymptomatic leg (10cm below tibial tuberosity)  negative  Pitting Edema  negative  Collateral supervicial veins (non-varicose)  negative  Previously documented DVT  negative  -2 points if an alternative diagnosis more likely (muscle tear, cellulitis, etc)  negative    DVT considered likely in patients with score of 2 or more, unlikely if less than 2      Tenderness     Additional Tenderness Details  Global knee  Very minimal tenderness with palpation  Neurological Testing     Sensation     Knee   Left Knee   Intact: light touch    Right Knee   Intact: light touch     Passive Range of Motion   Left Knee   Flexion: 70 degrees   Extension: -3 degrees     Right Knee   Normal passive range of motion    Mobility   Patellar Mobility:   Left Knee   WFL: medial, lateral, superior and inferior  Strength/Myotome Testing     Left Hip   Planes of Motion   Flexion: 4+  Extension: 4  Abduction: 4  Adduction: 4+    Right Hip   Planes of Motion   Flexion: 4+  Extension: 4  Abduction: 4  Adduction: 4+    Additional Strength Details  Not tested this date  At least a 3+/5 with maintain weight bearing status             Precautions: Parkinson's Disease, post-op TKA 8/26/22       Manuals             Manual knee stretching flex and ext              Patellar ROM                                       Neuro Re-Ed             Quad set with strap              SLR              TKE in standing                                                                  Ther Ex             Heel slides              Seated heel slides              Standing hamstring curls              Heel raises              3-way                                        Nustep---recumbent bike              Ther Activity             Mini squats                           Gait Training                                       Modalities

## 2022-09-01 ENCOUNTER — TELEPHONE (OUTPATIENT)
Dept: FAMILY MEDICINE CLINIC | Facility: CLINIC | Age: 62
End: 2022-09-01

## 2022-09-01 DIAGNOSIS — B00.1 COLD SORE: Primary | ICD-10-CM

## 2022-09-01 RX ORDER — VALACYCLOVIR HYDROCHLORIDE 1 G/1
2000 TABLET, FILM COATED ORAL 2 TIMES DAILY
Qty: 4 TABLET | Refills: 0 | Status: SHIPPED | OUTPATIENT
Start: 2022-09-01 | End: 2022-11-30

## 2022-09-01 NOTE — TELEPHONE ENCOUNTER
Pt is developing a cold sore and would like to know if you can prescribe a medication and send to [facility] on [address]  Please advise

## 2022-09-02 ENCOUNTER — OFFICE VISIT (OUTPATIENT)
Dept: PHYSICAL THERAPY | Facility: CLINIC | Age: 62
End: 2022-09-02
Payer: COMMERCIAL

## 2022-09-02 DIAGNOSIS — Z96.652 TOTAL KNEE REPLACEMENT STATUS, LEFT: Primary | ICD-10-CM

## 2022-09-02 PROCEDURE — 97112 NEUROMUSCULAR REEDUCATION: CPT

## 2022-09-02 PROCEDURE — 97110 THERAPEUTIC EXERCISES: CPT

## 2022-09-02 PROCEDURE — 97140 MANUAL THERAPY 1/> REGIONS: CPT

## 2022-09-02 PROCEDURE — 97530 THERAPEUTIC ACTIVITIES: CPT

## 2022-09-02 NOTE — PROGRESS NOTES
Daily Note     Today's date: 2022  Patient name: Paxton Richmond  : 1960  MRN: [id number]  Referring provider: Kathy Xavier  Dx:   Encounter Diagnosis     ICD-10-CM    1  Total knee replacement status, left  [id number]                   Subjective: Patient reports compliance with HEP  He has not take pain medication and is sleeping well at night  He continues to ambulate with RW though has taken some steps with SPC  He notes increased swelling in LLE which is limiting is ROM  Objective: See treatment diary below      Assessment: Patient is now 1 week post op L TKA and tolerated treatment well  Patient exhibited good technique with therapeutic exercises and would benefit from continued PT  L knee flexion to 75 and extension to 3 degrees post manual stretching  Good tolerance to implemented exercise program with no adverse effects throughout treatment  Patient follows up with surgeon next week 2022  Plan: Progress treatment as tolerated  Precautions: Parkinson's Disease, post-op TKA 22       Manuals             Manual knee stretching flex and ext  [facility]            Patellar ROM [facility]                                      Neuro Re-Ed             Quad set with strap  :05x20            SLR              TKE in standing                                                                  Ther Ex             Heel slides              Seated heel slides  :05x2'            Standing hamstring curls  2x10            Heel raises  20            3-way  2x10 L only today            Sidestepping  Full // bar 3x                         Nustep---recumbent bike  nv?             Ther Activity             Mini squats  2x10                         Gait Training                                       Modalities

## 2022-09-06 ENCOUNTER — APPOINTMENT (OUTPATIENT)
Dept: PHYSICAL THERAPY | Facility: CLINIC | Age: 62
End: 2022-09-06
Payer: COMMERCIAL

## 2022-09-07 ENCOUNTER — OFFICE VISIT (OUTPATIENT)
Dept: PHYSICAL THERAPY | Facility: CLINIC | Age: 62
End: 2022-09-07
Payer: COMMERCIAL

## 2022-09-07 ENCOUNTER — TELEPHONE (OUTPATIENT)
Dept: FAMILY MEDICINE CLINIC | Facility: CLINIC | Age: 62
End: 2022-09-07

## 2022-09-07 ENCOUNTER — APPOINTMENT (OUTPATIENT)
Dept: PHYSICAL THERAPY | Facility: CLINIC | Age: 62
End: 2022-09-07
Payer: COMMERCIAL

## 2022-09-07 DIAGNOSIS — Z96.652 TOTAL KNEE REPLACEMENT STATUS, LEFT: Primary | ICD-10-CM

## 2022-09-07 PROCEDURE — 97140 MANUAL THERAPY 1/> REGIONS: CPT

## 2022-09-07 PROCEDURE — 97530 THERAPEUTIC ACTIVITIES: CPT

## 2022-09-07 PROCEDURE — 97112 NEUROMUSCULAR REEDUCATION: CPT

## 2022-09-07 PROCEDURE — 97110 THERAPEUTIC EXERCISES: CPT

## 2022-09-07 NOTE — TELEPHONE ENCOUNTER
FYI       Patient called to let us know that he had knee replacement on 8/26/2022 at [place]  His blood pressure was 171/99 on 9/1/2022 and the surgeon put him on lisinopril to get his blood pressure down  Sandeep Tony He also told him he does not have to take his BP pills  if he can keep it down to 130        He will call if he runs into a problem and if any change

## 2022-09-07 NOTE — PROGRESS NOTES
Daily Note     Today's date: 2022  Patient name: Jonathon Mehta  : 1960  MRN: [id number]  Referring provider: Priti Rivers  Dx:   Encounter Diagnosis     ICD-10-CM    1  Total knee replacement status, left  [id number]                   Subjective: Patient reports transient muscular soreness after last visit and he started using SPC when walking short distances at home  He had one incident since last visit where his dog pulled on the leash which caused soreness in L hip  Objective: See treatment diary below      Assessment: Progressed exercise program as charted and patient tolerated treatment well  Patient demonstrated fatigue post treatment and would benefit from continued PT  PROM is progressing well with good tolerance to manual stretching  Initiated gait training with SPC and patient required VC for proper sequencing and demonstrated a step to pattern with good stability overall  Patient follows up with ortho tomorrow  Plan: Progress treatment as tolerated  Precautions: Parkinson's Disease, post-op TKA 22       Manuals            Manual knee stretching flex and ext  Antoine Yunquera 12           Patellar ROM [facility]                                     Neuro Re-Ed             Quad set with strap  :05x20 :05x20           SLR   /c quad set 2x10           TKE in standing                                                                  Ther Ex             Heel slides   :10x3'           Seated heel slides  :05x2' :10x3'           Standing hamstring curls  2x10 30           Heel raises  20 30           3-way  2x10 L only today 3x10 ea bilateral           Sidestepping  Full // bar 3x                         Nustep---recumbent bike  nv?  Nu step 6' L2           Ther Activity             Mini squats  2x10 2x10           Step ups  2" 5x, 4" 15x           Gait Training             [facility]                          Modalities

## 2022-09-09 ENCOUNTER — OFFICE VISIT (OUTPATIENT)
Dept: PHYSICAL THERAPY | Facility: CLINIC | Age: 62
End: 2022-09-09
Payer: COMMERCIAL

## 2022-09-09 DIAGNOSIS — Z96.652 TOTAL KNEE REPLACEMENT STATUS, LEFT: Primary | ICD-10-CM

## 2022-09-09 PROCEDURE — 97110 THERAPEUTIC EXERCISES: CPT | Performed by: PHYSICAL THERAPIST

## 2022-09-09 PROCEDURE — 97530 THERAPEUTIC ACTIVITIES: CPT | Performed by: PHYSICAL THERAPIST

## 2022-09-09 PROCEDURE — 97112 NEUROMUSCULAR REEDUCATION: CPT | Performed by: PHYSICAL THERAPIST

## 2022-09-09 NOTE — PROGRESS NOTES
Daily Note     Today's date: 2022  Patient name: Robby Blanchard  : 1960  MRN: [id number]  Referring provider: Wally Burden  Dx:   Encounter Diagnosis     ICD-10-CM    1  Total knee replacement status, left  [id number]        Start Time: 900  Stop Time: 945  Total time in clinic (min): 45 minutes    Subjective: Patient reports the knee feels better than the old one  Patient notes that they are going to wait to do the other knee for a few months or even a year to let this one completely heal  Patient notes that he is compliant with exercises and is icing at home as well  Objective: See treatment diary below      Assessment: Progressed exercise program as charted and patient tolerated treatment well  Patient responding well to progression in step up height this date with little to no trunk or hip compensations  Patient doing very well with extension PROM and AROM with proper quad activation and superior patellar translation  Patient limited in knee flexion ROM however continues to progress range each session and responds well to manual treatment  Patient demonstrated fatigue post treatment and would benefit from continued PT  Plan: Progress treatment as tolerated         Precautions: Parkinson's Disease, post-op TKA 22       Manuals           Manual knee stretching flex and ext  Antoine Yunquera 12 ACL          Patellar ROM [facility]                                     Neuro Re-Ed             Quad set with strap  :05x20 :05x20 :05x20          SLR   /c quad set 2x10           TKE in standing                                                                  Ther Ex             Heel slides   :10x3' :10x3'          Seated heel slides  :05x2' :10x3' :10x3'          Standing hamstring curls  2x10 30 30x          Heel raises  20 30 30x          3-way  2x10 L only today 3x10 ea bilateral 3x10 ea bilateral          Sidestepping  Full // bar 3x  Full // bar 3x                       Nustep---recumbent bike  nv?  Nu step 6' L2 Nu step 6' L2          Ther Activity             Mini squats  2x10 2x10 2x10          Step ups  2" 5x, 4" 15x 4" 2x10   6" 10x           Gait Training             SPC                          Modalities

## 2022-09-12 ENCOUNTER — OFFICE VISIT (OUTPATIENT)
Dept: PHYSICAL THERAPY | Facility: CLINIC | Age: 62
End: 2022-09-12
Payer: COMMERCIAL

## 2022-09-12 DIAGNOSIS — Z96.652 TOTAL KNEE REPLACEMENT STATUS, LEFT: Primary | ICD-10-CM

## 2022-09-12 PROCEDURE — 97140 MANUAL THERAPY 1/> REGIONS: CPT

## 2022-09-12 PROCEDURE — 97112 NEUROMUSCULAR REEDUCATION: CPT

## 2022-09-12 PROCEDURE — 97110 THERAPEUTIC EXERCISES: CPT

## 2022-09-12 NOTE — PROGRESS NOTES
Daily Note     Today's date: 2022  Patient name: Jonathon Mehta  : 1960  MRN: [id number]  Referring provider: Priti Rivers  Dx:   Encounter Diagnosis     ICD-10-CM    1  Total knee replacement status, left  [id number]                   Subjective: Patient reports L knee is stiff today  He has been walking with SPC full time and notes he feels "a good sore" following PT sessions  Objective: See treatment diary below      Assessment: Tolerated treatment well  Patient exhibited good technique with therapeutic exercises and would benefit from continued PT  PROM continues to progress with knee flexion to 90 degrees and full knee extension following manual stretching  Added ankle weights to standing hip 3-way and sidestepping with good tolerance  Patient would like to begin using recumbent bike at home; conside recumbent bike warm up NV  Plan: Progress treatment as tolerated  Precautions: Parkinson's Disease, post-op TKA 22       Manuals          Manual knee stretching flex and ext  Antoine Yunquera 12 ACL [facility]         Patellar ROM [facility]                                     Neuro Re-Ed             Quad set with strap  :05x20 :05x20 :05x20 :05x20         SLR   /c quad set 2x10  /c quad set 2x10         TKE in standing                                                                  Ther Ex             Heel slides   :10x3' :10x3' :10x4'         Seated heel slides  :05x2' :10x3' :10x3' :10x3'         Standing hamstring curls  2x10 30 30x 30x         Heel raises  20 30 30x 30x         3-way  2x10 L only today 3x10 ea bilateral 3x10 ea bilateral 2x10 ea B/L 2#         Sidestepping  Full // bar 3x  Full // bar 3x Full bar 3x 2#                      Nustep---recumbent bike  nv? Nu step 6' L2 Nu step 6' L2 Nu step 6' L2 Bike?         Ther Activity             Mini squats  2x10 2x10 2x10 2x10         Step ups  2" 5x, 4" 15x 4" 2x10   6" 10x  6" 20x         Gait Training             SPC Modalities

## 2022-09-13 ENCOUNTER — OFFICE VISIT (OUTPATIENT)
Dept: PHYSICAL THERAPY | Facility: CLINIC | Age: 62
End: 2022-09-13
Payer: COMMERCIAL

## 2022-09-13 DIAGNOSIS — Z96.652 TOTAL KNEE REPLACEMENT STATUS, LEFT: Primary | ICD-10-CM

## 2022-09-13 PROCEDURE — 97110 THERAPEUTIC EXERCISES: CPT | Performed by: PHYSICAL THERAPIST

## 2022-09-13 PROCEDURE — 97112 NEUROMUSCULAR REEDUCATION: CPT | Performed by: PHYSICAL THERAPIST

## 2022-09-13 PROCEDURE — 97140 MANUAL THERAPY 1/> REGIONS: CPT | Performed by: PHYSICAL THERAPIST

## 2022-09-13 PROCEDURE — 97530 THERAPEUTIC ACTIVITIES: CPT | Performed by: PHYSICAL THERAPIST

## 2022-09-13 NOTE — PROGRESS NOTES
Daily Note     Today's date: 2022  Patient name: Gisela Bee  : 1960  MRN: [id number]  Referring provider: Jonatan Husain  Dx:   Encounter Diagnosis     ICD-10-CM    1  Total knee replacement status, left  [id number]        Start Time: 730  Stop Time: 830  Total time in clinic (min): 60 minutes    Subjective: Patient reports he had some little shocks of pain last night kind of felt like nerve pain but overall its not to bad  Objective: See treatment diary below      Assessment: Patient tolerated treatment well  Patient continues to have full extension ROM and improving flexion beyond 90 degrees sitting EOB with over pressure  Patient had minimal complaints of pain throughout session  Progressed to 8" step ups this date with minimal compensations  Secondary to decreased knee flexion in AROM patient had slight tibial rotation in order to bring the foot up onto the step  However, will improve over time with increased knee ROM  Patient continues to demonstrate good quad contraction with superior patellar translation  Will continue to progress as able  Encourage continued icing with elevation for swelling control  Patient Patient exhibited good technique with therapeutic exercises and would benefit from continued PT  Plan: Progress treatment as tolerated         Precautions: Parkinson's Disease, post-op TKA 22       Manuals         Manual knee stretching flex and ext  Antoine Jackson 12 ACL [facility] ACL        Patellar ROM [facility]                                     Neuro Re-Ed             Quad set with strap  :05x20 :05x20 :05x20 :05x20 :05x20        SLR   /c quad set 2x10  /c quad set 2x10 /c quad set 2x10        TKE in standing                                                                  Ther Ex             Heel slides   :10x3' :10x3' :10x4' :10x4'        Seated heel slides  :05x2' :10x3' :10x3' :10x3' 10" 2x2 min        Standing hamstring curls  2x10 30 30x 30x 30x        Heel raises  20 30 30x 30x 30x        3-way  2x10 L only today 3x10 ea bilateral 3x10 ea bilateral 2x10 ea B/L 2# 2x10 ea B/L 2#        Sidestepping  Full // bar 3x  Full // bar 3x Full bar 3x 2#                      Nustep---recumbent bike  nv?  Nu step 6' L2 Nu step 6' L2 Nu step 6' L2 Rocking to half range     5 mi         Ther Activity             Mini squats  2x10 2x10 2x10 2x10 2x10        Step ups  2" 5x, 4" 15x 4" 2x10   6" 10x  6" 20x 6" 20x    8" 20x         Gait Training             SPC                          Modalities

## 2022-09-16 ENCOUNTER — OFFICE VISIT (OUTPATIENT)
Dept: PHYSICAL THERAPY | Facility: CLINIC | Age: 62
End: 2022-09-16
Payer: COMMERCIAL

## 2022-09-16 DIAGNOSIS — Z96.652 TOTAL KNEE REPLACEMENT STATUS, LEFT: Primary | ICD-10-CM

## 2022-09-16 PROCEDURE — 97140 MANUAL THERAPY 1/> REGIONS: CPT | Performed by: PHYSICAL THERAPIST

## 2022-09-16 PROCEDURE — 97110 THERAPEUTIC EXERCISES: CPT | Performed by: PHYSICAL THERAPIST

## 2022-09-16 PROCEDURE — 97112 NEUROMUSCULAR REEDUCATION: CPT | Performed by: PHYSICAL THERAPIST

## 2022-09-16 PROCEDURE — 97530 THERAPEUTIC ACTIVITIES: CPT | Performed by: PHYSICAL THERAPIST

## 2022-09-16 NOTE — PROGRESS NOTES
Daily Note     Today's date: 2022  Patient name: Timmy Maldonado  : 1960  MRN: [id number]  Referring provider: Azael Day  Dx:   Encounter Diagnosis     ICD-10-CM    1  Total knee replacement status, left  [id number] [id number]        Start Time: 09  Stop Time: 945  Total time in clinic (min): 45 minutes    Subjective: Patient reports it feels a little tight this morning for some reason  Patient notes that its sore too and he might have over done it the last few days  Objective: See treatment diary below      Assessment: Patient tolerated treatment well  Patient continues to have improvements with flexion  Able to get to 105 degrees this date with over pressure  Patient also able to progress to full revolutions on the bike with good response  Patient continues to have normal extension with quad contraction  Encourage continued icing with elevation for swelling control  Patient Patient exhibited good technique with therapeutic exercises and would benefit from continued PT  Plan: Progress treatment as tolerated         Precautions: Parkinson's Disease, post-op TKA 22       Manuals        Manual knee stretching flex and ext  Antoine Yunquera 12 ACL [facility] ACL ACL       Patellar ROM [facility]                                     Neuro Re-Ed             Quad set with strap  :05x20 :05x20 :05x20 :05x20 :05x20        SLR   /c quad set 2x10  /c quad set 2x10 /c quad set 2x10 /c quad set 2x10       TKE in standing                                                                  Ther Ex             Heel slides   :10x3' :10x3' :10x4' :10x4' :10x4'       Seated heel slides  :05x2' :10x3' :10x3' :10x3' 10" 2x2 min 10" 2x2 min       Standing hamstring curls  2x10 30 30x 30x 30x 30x       Heel raises  20 30 30x 30x 30x 30x       3-way  2x10 L only today 3x10 ea bilateral 3x10 ea bilateral 2x10 ea B/L 2# 2x10 ea B/L 2#        Sidestepping  Full // bar 3x  Full // bar 3x Full bar 3x 2#         LAQ with #       LLE   3# 2x10 slight hold at end range        Knee flex/ext machine       NV? B/L at least        Nustep---recumbent bike  nv?  Nu step 6' L2 Nu step 6' L2 Nu step 6' L2 Rocking to half range     5 min Full revolution     5 min       Ther Activity             Mini squats  2x10 2x10 2x10 2x10 2x10 2x10       Step ups  2" 5x, 4" 15x 4" 2x10   6" 10x  6" 20x 6" 20x    8" 20x  8" 20x        Gait Training             SPC             Hurdles       NV       Modalities

## 2022-09-19 ENCOUNTER — OFFICE VISIT (OUTPATIENT)
Dept: PHYSICAL THERAPY | Facility: CLINIC | Age: 62
End: 2022-09-19
Payer: COMMERCIAL

## 2022-09-19 DIAGNOSIS — Z96.652 TOTAL KNEE REPLACEMENT STATUS, LEFT: Primary | ICD-10-CM

## 2022-09-19 PROCEDURE — 97112 NEUROMUSCULAR REEDUCATION: CPT

## 2022-09-19 PROCEDURE — 97140 MANUAL THERAPY 1/> REGIONS: CPT

## 2022-09-19 PROCEDURE — 97110 THERAPEUTIC EXERCISES: CPT

## 2022-09-19 PROCEDURE — 97530 THERAPEUTIC ACTIVITIES: CPT

## 2022-09-19 NOTE — PROGRESS NOTES
Daily Note     Today's date: 2022  Patient name: Jose Stephenson  : 1960  MRN: [id number]  Referring provider: Pranav Abel  Dx:   Encounter Diagnosis     ICD-10-CM    1  Total knee replacement status, left  Z96 [id number]                   Subjective: Patient reports general stiffness in L knee today  He was able to ascend stairs reciprocally yesterday for the first time since surgery  Objective: See treatment diary below      Assessment: Tolerated treatment well  Patient demonstrated fatigue post treatment and would benefit from continued PT  Increased stiffness noted with bike warm up though patient achieved full revolutions after several minutes of rocking  Initiated knee extension machine focusing on brief isometric hold at end range extension with good tolerance  Focused on heel strike and knee flexion with hurdles to normalize gait mechanics  Plan: Progress treatment as tolerated         Precautions: Parkinson's Disease, post-op TKA 22       Manuals       Manual knee stretching flex and ext  Antoine Yunquera 12 ACL [facility] ACL ACL [facility]      Patellar ROM [facility]                                     Neuro Re-Ed             Quad set with strap  :05x20 :05x20 :05x20 :05x20 :05x20        SLR   /c quad set 2x10  /c quad set 2x10 /c quad set 2x10 /c quad set 2x10 /c quad set 2x10      TKE in standing                                                                  Ther Ex             Heel slides   :10x3' :10x3' :10x4' :10x4' :10x4' :10x4'      Seated heel slides  :05x2' :10x3' :10x3' :10x3' 10" 2x2 min 10" 2x2 min :10x4'      Standing hamstring curls  2x10 30 30x 30x 30x 30x 2 5# 30x      Heel raises  20 30 30x 30x 30x 30x       3-way  2x10 L only today 3x10 ea bilateral 3x10 ea bilateral 2x10 ea B/L 2# 2x10 ea B/L 2#        Sidestepping  Full // bar 3x  Full // bar 3x Full bar 3x 2#   2 5# 4 laps      LAQ with #       LLE   3# 2x10 slight hold at end range        Knee flex/ext machine       NV? B/L at least  Ext only 22# B/L 2x10      Nustep---recumbent bike  nv?  Nu step 6' L2 Nu step 6' L2 Nu step 6' L2 Rocking to half range     5 min Full revolution     5 min rocking to full revolution 5'      Ther Activity             Mini squats  2x10 2x10 2x10 2x10 2x10 2x10 2x10      Step ups  2" 5x, 4" 15x 4" 2x10   6" 10x  6" 20x 6" 20x    8" 20x  8" 20x  8" 20x      Gait Training             SPC             Hurdles       NV 6 laps       Modalities

## 2022-09-20 ENCOUNTER — OFFICE VISIT (OUTPATIENT)
Dept: PHYSICAL THERAPY | Facility: CLINIC | Age: 62
End: 2022-09-20
Payer: COMMERCIAL

## 2022-09-20 DIAGNOSIS — Z96.652 TOTAL KNEE REPLACEMENT STATUS, LEFT: Primary | ICD-10-CM

## 2022-09-20 PROCEDURE — 97116 GAIT TRAINING THERAPY: CPT | Performed by: PHYSICAL THERAPIST

## 2022-09-20 PROCEDURE — 97140 MANUAL THERAPY 1/> REGIONS: CPT | Performed by: PHYSICAL THERAPIST

## 2022-09-20 PROCEDURE — 97112 NEUROMUSCULAR REEDUCATION: CPT | Performed by: PHYSICAL THERAPIST

## 2022-09-20 PROCEDURE — 97530 THERAPEUTIC ACTIVITIES: CPT | Performed by: PHYSICAL THERAPIST

## 2022-09-20 PROCEDURE — 97110 THERAPEUTIC EXERCISES: CPT | Performed by: PHYSICAL THERAPIST

## 2022-09-20 NOTE — PROGRESS NOTES
Daily Note     Today's date: 2022  Patient name: Henny Montez  : 1960  MRN: [id number]  Referring provider: Addie Funk  Dx:   Encounter Diagnosis     ICD-10-CM    1  Total knee replacement status, left  [id number]        Start Time: 730  Stop Time: 815  Total time in clinic (min): 45 minutes    Subjective: Patient reports the knee is a little sore and stiffness today  Objective: See treatment diary below    Knee extension: 0 degrees  Knee flexion: 110 degrees     Assessment: Patient tolerated treatment well  Introduced pball roll ins on the table for knee flexion ROM  Patient is progressing with with ROM and continues to have improvements each session  Patient demonstrated mild compensations with hurdles this date  Secondary to decreased knee flexion with swing through patient demonstrates slight hip external rotation and foot eversion in order to proper clear the bi  However decreased compensations since last session  Patient demonstrated fatigue post treatment and would benefit from continued PT  Plan: Progress treatment as tolerated         Precautions: Parkinson's Disease, post-op TKA 22       Manuals      Manual knee stretching flex and ext  [address]  ACL [facility] ACL ACL [address] ACL     Patellar ROM [facility]                                     Neuro Re-Ed             Quad set with strap  :05x20 :05x20 :05x20 :05x20 :05x20        SLR   /c quad set 2x10  /c quad set 2x10 /c quad set 2x10 /c quad set 2x10 /c quad set 2x10 /c quad set 2x10     TKE in standing                                                                  Ther Ex             Heel slides   :10x3' :10x3' :10x4' :10x4' :10x4' :10x4' pball roll ins for flexion 10" hold 3 min     Seated heel slides  :05x2' :10x3' :10x3' :10x3' 10" 2x2 min 10" 2x2 min :10x4'      Standing hamstring curls  2x10 30 30x 30x 30x 30x 2 5# 30x 2 5# 30x     Heel raises  20 30 30x 30x 30x 30x       3-way  2x10 L only today 3x10 ea bilateral 3x10 ea bilateral 2x10 ea B/L 2# 2x10 ea B/L 2#        Sidestepping  Full // bar 3x  Full // bar 3x Full bar 3x 2#   2 5# 4 laps 2 5# 4 laps     LAQ with #       LLE   3# 2x10 slight hold at end range        Knee flex/ext machine       NV? B/L at least  Ext only 22# B/L 2x10 Ext only 22# B/L 2x10     Nustep---recumbent bike  nv?  Nu step 6' L2 Nu step 6' L2 Nu step 6' L2 Rocking to half range     5 min Full revolution     5 min rocking to full revolution 5' rocking to full revolution 5'     Ther Activity             Mini squats  2x10 2x10 2x10 2x10 2x10 2x10 2x10 2x10     Step ups  2" 5x, 4" 15x 4" 2x10   6" 10x  6" 20x 6" 20x    8" 20x  8" 20x  8" 20x 8" 20x     Gait Training             SPC             Hurdles       NV 6 laps  6 laps      Modalities

## 2022-09-22 ENCOUNTER — OFFICE VISIT (OUTPATIENT)
Dept: PHYSICAL THERAPY | Facility: CLINIC | Age: 62
End: 2022-09-22
Payer: COMMERCIAL

## 2022-09-22 DIAGNOSIS — Z96.652 TOTAL KNEE REPLACEMENT STATUS, LEFT: Primary | ICD-10-CM

## 2022-09-22 PROCEDURE — 97112 NEUROMUSCULAR REEDUCATION: CPT

## 2022-09-22 PROCEDURE — 97530 THERAPEUTIC ACTIVITIES: CPT

## 2022-09-22 PROCEDURE — 97140 MANUAL THERAPY 1/> REGIONS: CPT

## 2022-09-22 PROCEDURE — 97110 THERAPEUTIC EXERCISES: CPT

## 2022-09-22 NOTE — PROGRESS NOTES
Daily Note     Today's date: 2022  Patient name: Catalina Cm  : 1960  MRN: [id number]  Referring provider: Kierra Horn  Dx:   Encounter Diagnosis     ICD-10-CM    1  Total knee replacement status, left  [id number] [id number]                   Subjective: Patient reports increased L knee stiffness today attributed to scheduling PT  He continues to ambulate with [facility] though notes he doesn't rely on it  Objective: See treatment diary below      Assessment: Patient is almost 4 weeks s/p L TKA and tolerated treatment well  Patient demonstrated fatigue post treatment and would benefit from continued PT  Progressed to SL knee extensions with quad fatigue noted during last few reps of second set  PROM continues to progress well with no pain during manual stretching  Plan: Progress treatment as tolerated         Precautions: Parkinson's Disease, post-op TKA 22       Manuals     Manual knee stretching flex and ext  [address] ACL  ACL ACL [facility] ACL [facility]    Patellar ROM [facility]                                     Neuro Re-Ed             Quad set with strap  :05x20 :05x20 :05x20 :05x20 :05x20        SLR   /c quad set 2x10  /c quad set 2x10 /c quad set 2x10 /c quad set 2x10 /c quad set 2x10 /c quad set 2x10 /c quad set 2x10    TKE in standing                                                                  Ther Ex             Heel slides   :10x3' :10x3' :10x4' :10x4' :10x4' :10x4' pball roll ins for flexion 10" hold 3 min pball curls :10x2'    Seated heel slides  :05x2' :10x3' :10x3' :10x3' 10" 2x2 min 10" 2x2 min :10x4'      Standing hamstring curls  2x10 30 30x 30x 30x 30x 2 5# 30x 2 5# 30x 3# 30x    Heel raises  20 30 30x 30x 30x 30x       3-way  2x10 L only today 3x10 ea bilateral 3x10 ea bilateral 2x10 ea B/L 2# 2x10 ea B/L 2#    3# 2x10 ea B/L    Sidestepping  Full // bar 3x  Full // bar 3x Full bar 3x 2#   2 5# 4 laps 2 5# 4 laps 3# 3 laps    LAQ with #       LLE 3# 2x10 slight hold at end range        Knee flex/ext machine       NV? B/L at least  Ext only 22# B/L 2x10 Ext only 22# B/L 2x10 Ext only 22# S/L 2x10    Nustep---recumbent bike  nv?  Nu step 6' L2 Nu step 6' L2 Nu step 6' L2 Rocking to half range     5 min Full revolution     5 min rocking to full revolution 5' rocking to full revolution 5' 6' full revolution    Ther Activity             Mini squats  2x10 2x10 2x10 2x10 2x10 2x10 2x10 2x10 3x10    Step ups  2" 5x, 4" 15x 4" 2x10   6" 10x  6" 20x 6" 20x    8" 20x  8" 20x  8" 20x 8" 20x 8" 20x    Gait Training             SPC             Hurdles       NV 6 laps  6 laps  nv    Modalities

## 2022-09-23 ENCOUNTER — APPOINTMENT (OUTPATIENT)
Dept: PHYSICAL THERAPY | Facility: CLINIC | Age: 62
End: 2022-09-23
Payer: COMMERCIAL

## 2022-09-26 ENCOUNTER — OFFICE VISIT (OUTPATIENT)
Dept: PHYSICAL THERAPY | Facility: CLINIC | Age: 62
End: 2022-09-26
Payer: COMMERCIAL

## 2022-09-26 DIAGNOSIS — Z96.652 TOTAL KNEE REPLACEMENT STATUS, LEFT: Primary | ICD-10-CM

## 2022-09-26 PROCEDURE — 97112 NEUROMUSCULAR REEDUCATION: CPT

## 2022-09-26 PROCEDURE — 97110 THERAPEUTIC EXERCISES: CPT

## 2022-09-26 PROCEDURE — 97530 THERAPEUTIC ACTIVITIES: CPT

## 2022-09-26 PROCEDURE — 97140 MANUAL THERAPY 1/> REGIONS: CPT

## 2022-09-26 RX ORDER — OXYCODONE HYDROCHLORIDE 5 MG/1
5-10 TABLET ORAL
COMMUNITY
Start: 2022-08-26

## 2022-09-26 RX ORDER — CELECOXIB 200 MG/1
200 CAPSULE ORAL 2 TIMES DAILY
COMMUNITY
Start: 2022-08-26

## 2022-09-26 RX ORDER — PSEUDOEPHED/ACETAMINOPH/DIPHEN 30MG-500MG
1000 TABLET ORAL EVERY 8 HOURS
COMMUNITY
Start: 2022-08-26 | End: 2022-09-26

## 2022-09-26 RX ORDER — LANSOPRAZOLE 30 MG/1
1 CAPSULE, DELAYED RELEASE ORAL
COMMUNITY
Start: 2022-08-26

## 2022-09-26 RX ORDER — LANSOPRAZOLE 30 MG/1
30 CAPSULE, DELAYED RELEASE ORAL
COMMUNITY
Start: 2022-08-26 | End: 2022-09-26

## 2022-09-26 RX ORDER — COVID-19 ANTIGEN TEST
KIT MISCELLANEOUS
COMMUNITY
Start: 2022-09-09

## 2022-09-26 RX ORDER — ASPIRIN 81 MG/1
TABLET, COATED ORAL
COMMUNITY
Start: 2022-08-26 | End: 2022-09-26

## 2022-09-26 RX ORDER — ACETAMINOPHEN 500 MG
1000 TABLET ORAL 3 TIMES DAILY
COMMUNITY
Start: 2022-08-26

## 2022-09-26 RX ORDER — CELECOXIB 200 MG/1
CAPSULE ORAL
COMMUNITY
Start: 2022-08-26 | End: 2022-09-26

## 2022-09-26 RX ORDER — PREGABALIN 50 MG/1
50 CAPSULE ORAL 2 TIMES DAILY
COMMUNITY
Start: 2022-08-26

## 2022-09-26 RX ORDER — LISINOPRIL 10 MG/1
TABLET ORAL
COMMUNITY
Start: 2022-08-27 | End: 2022-09-26

## 2022-09-26 RX ORDER — ASPIRIN 81 MG/1
81 TABLET ORAL 2 TIMES DAILY
COMMUNITY
Start: 2022-08-26 | End: 2022-09-28

## 2022-09-26 RX ORDER — SENNOSIDES 8.6 MG
17.2 TABLET ORAL
COMMUNITY
Start: 2022-08-26 | End: 2022-09-26

## 2022-09-26 RX ORDER — SENNA PLUS 8.6 MG/1
2 TABLET ORAL
COMMUNITY
Start: 2022-08-26

## 2022-09-26 RX ORDER — LISINOPRIL 10 MG/1
10 TABLET ORAL DAILY
COMMUNITY
Start: 2022-08-27 | End: 2022-09-28

## 2022-09-26 RX ORDER — PREGABALIN 50 MG/1
50 CAPSULE ORAL EVERY 12 HOURS
COMMUNITY
Start: 2022-08-26 | End: 2022-09-26

## 2022-09-26 RX ORDER — OXYCODONE HYDROCHLORIDE 5 MG/1
TABLET ORAL
COMMUNITY
Start: 2022-08-26 | End: 2022-09-26

## 2022-09-26 NOTE — PROGRESS NOTES
Daily Note     Today's date: 2022  Patient name: Catalina Cm  : 1960  MRN: [id number]  Referring provider: Kierra Horn  Dx:   Encounter Diagnosis     ICD-10-CM    1  Total knee replacement status, left  [id number]                   Subjective: Patient reports increased stiffness in L knee after excessive walking over the weekend  Objective: See treatment diary below      Assessment: Tolerated treatment well  Patient demonstrated fatigue post treatment, exhibited good technique with therapeutic exercises and would benefit from continued PT  PROM continues to progress well with knee flexion to 109 (without OP) and full extension following manual stretching  Progressed strengthening as charted and initiated resisted knee flexion  No L knee pain reported throughout treatment  Plan: Progress treatment as tolerated         Precautions: Parkinson's Disease, post-op TKA 22       Manuals    Manual knee stretching flex and ext  [address] ACL MC ACL ACL [employer] ACL [employer] MC   Patellar ROM [employer] MC                                     Neuro Re-Ed             Quad set with strap  :05x20 :05x20 :05x20 :05x20 :05x20        SLR   /c quad set 2x10  /c quad set 2x10 /c quad set 2x10 /c quad set 2x10 /c quad set 2x10 /c quad set 2x10 /c quad set 2x10 /c quad set 3x10   TKE in standing                                                                  Ther Ex             Heel slides   :10x3' :10x3' :10x4' :10x4' :10x4' :10x4' pball roll ins for flexion 10" hold 3 min pball curls :10x2' pball curls :10x2'   Seated heel slides  :05x2' :10x3' :10x3' :10x3' 10" 2x2 min 10" 2x2 min :10x4'      Standing hamstring curls  2x10 30 30x 30x 30x 30x 2 5# 30x 2 5# 30x 3# 30x 3# 30x   Heel raises  20 30 30x 30x 30x 30x       3-way  2x10 L only today 3x10 ea bilateral 3x10 ea bilateral 2x10 ea B/L 2# 2x10 ea B/L 2#    3# 2x10 ea B/L 3# 2x10 ea B/L   Sidestepping  Full // bar 3x Full // bar 3x Full bar 3x 2#   2 5# 4 laps 2 5# 4 laps 3# 3 laps 3# 3 laps   LAQ with #       LLE   3# 2x10 slight hold at end range        Knee flex/ext machine       NV? B/L at least  Ext only 22# B/L 2x10 Ext only 22# B/L 2x10 Ext only 22# S/L 2x10 Ext 22# S/L 3x10, flex 33# B/L 3x10   Nustep---recumbent bike  nv?  Nu step 6' L2 Nu step 6' L2 Nu step 6' L2 Rocking to half range     5 min Full revolution     5 min rocking to full revolution 5' rocking to full revolution 5' 6' full revolution 6' full revolution   Ther Activity             Mini squats  2x10 2x10 2x10 2x10 2x10 2x10 2x10 2x10 3x10 3x10   Step ups  2" 5x, 4" 15x 4" 2x10   6" 10x  6" 20x 6" 20x    8" 20x  8" 20x  8" 20x 8" 20x 8" 20x 8" 20x   Gait Training             SPC             Hurdles       NV 6 laps  6 laps  nv 6 laps   Modalities

## 2022-09-27 ENCOUNTER — OFFICE VISIT (OUTPATIENT)
Dept: PHYSICAL THERAPY | Facility: CLINIC | Age: 62
End: 2022-09-27
Payer: COMMERCIAL

## 2022-09-27 DIAGNOSIS — Z96.652 TOTAL KNEE REPLACEMENT STATUS, LEFT: Primary | ICD-10-CM

## 2022-09-27 PROCEDURE — 97530 THERAPEUTIC ACTIVITIES: CPT | Performed by: PHYSICAL THERAPIST

## 2022-09-27 PROCEDURE — 97140 MANUAL THERAPY 1/> REGIONS: CPT | Performed by: PHYSICAL THERAPIST

## 2022-09-27 PROCEDURE — 97116 GAIT TRAINING THERAPY: CPT | Performed by: PHYSICAL THERAPIST

## 2022-09-27 PROCEDURE — 97110 THERAPEUTIC EXERCISES: CPT | Performed by: PHYSICAL THERAPIST

## 2022-09-27 NOTE — PROGRESS NOTES
Re-Evaluation/Daily Note     Today's date: 2022  Patient name: Gisela Bee  : 1960  MRN: [id number]  Referring provider: Jonatan Husain  Dx:   Encounter Diagnosis     ICD-10-CM    1  Total knee replacement status, left  [id number] [id number]        Start Time: 730  Stop Time:   Total time in clinic (min): 47 minutes  ASSESSMENT:    Gisela Bee is a 58 y o  male who presents s/p TKA on 22 with Dr July Londono through [address] [place] [address]  Upon evaluation, patient demonstrates improvements with PROM/AROM in both knee flexion and extension, LE strength, and overall pain  Patient is having minimal limitations with functional activities and is improving with ambulation appropriately  Will continue with focus on normalizing knee flexion ROM in order to maximize both functional and recreational activities  Patient has met all of his short term goals and is progressing appropriately toward long term goals  Patient would benefit from continued skilled physical therapy to address the impairments, improve their level of function, and to improve their overall quality of life  Impairments:    restricted ROM    decreased strength   pain with function   activity intolerance   weight bearing intolerance   abnormal gait   imbalance     Prognosis:  Good  Positive and negative prognostic indicator(s):  none    Goals:    Short Term Goals: to be achieved by 4 weeks  1) Patient to be independent with basic HEP  MET  2) Decrease pain to 3 and 4/10 at its worst   MET  3) Increase knee ROM by 5-10 degrees  MET  4) Increase LE strength by 1/2 MMT grade in all deficient planes  MET    Long Term Goals: to be achieved by discharge  1) FOTO equal to or greater than target score indicating improvements with overall function   PROGRESSING  2) Ambulation to improve to maximal level of function PROGRESSING  3) Stair negotiation will improve to reciprocal  MET  4) Sit to stand transfers will improve to maximal level of function PROGRESSING    Planned interventions:  home exercise program, patient education, manual therapy, graded activity, flexibility, functional range of motion exercises, strengthening, abdominal trunk stabilization, balance and weight bearing training and gait training    Duration in visits:  8-12  Frequency: 2 visits per week  Duration in weeks:  6-8    History of Current Injury: Patient reports overall he is doing well and has been making progress  Patient notes that he feels better than before if got the knee done  Patient notes that she is able to walk around easier and is not as winded  Patient notes that his steps at home are giving him the most trouble  Pain location: global knee pain   Pain descriptors:  Pulling; tightness  Pain at Currently: 0/10   Pain at Best: 0/10   Pain at Worst: 1-2/10 (FROM 8/10)       Aggravating factors: general post op movements   Easing factors: rest     Imaging: see imaging tab   Special Questions: Denies numbness and tingling  Hobbies/Interests: staying active, walking dog on different surfaces  Occupation: teacher   Patient goals: Patient reports goals for physical therapy would be decreased pain, increased mobility, increased strength and improved balance        Objective     Observations     Right Knee   Positive for edema and incision  Additional Observation Details  Patient arrives with dressing intact and stockings donned  Dressing was dry with no signs of drainage or odor  Mild to moderate leg swelling  Tenderness     Additional Tenderness Details  Global knee  Very minimal tenderness with palpation       Neurological Testing     Sensation     Knee   Left Knee   Intact: light touch    Right Knee   Intact: light touch     Passive Range of Motion   Left Knee   Flexion: 116 degree (FROM 70 degrees)  Extension: 0 degrees (FROM -3 degrees)     Right Knee   Normal passive range of motion    Mobility   Patellar Mobility:   Left Knee   WFL: medial, lateral, superior and inferior  Strength/Myotome Testing     Left Hip   Planes of Motion   Flexion: 4+  Extension: 4+  Abduction: 4+  Adduction: 4+    Right Hip   Planes of Motion   Flexion: 4+  Extension: 4+  Abduction: 4+  Adduction: 4+    Left Knee  Flexion: 4  Extension: 4  Quad contraction: good with proper superior patellar translation  No extension leg  Steps:   Reciprocal stepping on 6" steps with no railings  No compensations  Ambulation:   Ambulating with SPC  Minimal compensations or antalgic gait  Patient has some decreased knee flexion during swing phase secondary to available range at this point               Precautions: Parkinson's Disease, post-op TKA 8/26/22       Manuals 9/27 9/7 9/9 9/12 9/13 9/16 9/19 9/20 9/22 9/26   Manual knee stretching flex and ext  ACL     Flexion focus in supine MC ACL [facility] ACL ACL [facility] ACL [facility]   Patellar ROM  [facility]                                     Neuro Re-Ed             Quad set with strap   :05x20 :05x20 :05x20 :05x20        SLR  /c quad set 3x10 /c quad set 2x10  /c quad set 2x10 /c quad set 2x10 /c quad set 2x10 /c quad set 2x10 /c quad set 2x10 /c quad set 2x10 /c quad set 3x10   TKE in standing              SLS NV                                                   Ther Ex             Heel slides  pball curls :10x2' :10x3' :10x3' :10x4' :10x4' :10x4' :10x4' pball roll ins for flexion 10" hold 3 min pball curls :10x2' pball curls :10x2'   Seated heel slides   :10x3' :10x3' :10x3' 10" 2x2 min 10" 2x2 min :10x4'      Standing hamstring curls  3# 30x 30 30x 30x 30x 30x 2 5# 30x 2 5# 30x 3# 30x 3# 30x   Heel raises   30 30x 30x 30x 30x       3-way  3# 2x10 ea B/L 3x10 ea bilateral 3x10 ea bilateral 2x10 ea B/L 2# 2x10 ea B/L 2#    3# 2x10 ea B/L 3# 2x10 ea B/L   Sidestepping    Full // bar 3x Full bar 3x 2#   2 5# 4 laps 2 5# 4 laps 3# 3 laps 3# 3 laps   LAQ with #       LLE   3# 2x10 slight hold at end range        Knee flex/ext machine  Ext 22# S/L 3x10, flex 33# B/L 3x10     NV?   B/L at least  Ext only 22# B/L 2x10 Ext only 22# B/L 2x10 Ext only 22# S/L 2x10 Ext 22# S/L 3x10, flex 33# B/L 3x10   Nustep---recumbent bike  6' full revolution Nu step 6' L2 Nu step 6' L2 Nu step 6' L2 Rocking to half range     5 min Full revolution     5 min rocking to full revolution 5' rocking to full revolution 5' 6' full revolution 6' full revolution   Ther Activity             Mini squats  3x10 2x10 2x10 2x10 2x10 2x10 2x10 2x10 3x10 3x10   Step ups 8" 20x    reciprocal stepping in clinic steps   4x no railing 2" 5x, 4" 15x 4" 2x10   6" 10x  6" 20x 6" 20x    8" 20x  8" 20x  8" 20x 8" 20x 8" 20x 8" 20x   Gait Training             SPC             Hurdles  6 laps      NV 6 laps  6 laps  nv 6 laps   Modalities

## 2022-09-27 NOTE — LETTER
2022    [facility]    Patient: Jae Vasquez   YOB: 1960   Date of Visit: 2022     Encounter Diagnosis     ICD-10-CM    1  Total knee replacement status, left  [id number]        Dear Dr Jez Leone:    Thank you for your recent referral of Jae Vasquez  Please review the attached evaluation summary from Bob's recent visit  Please verify that you agree with the plan of care by signing the attached order  If you have any questions or concerns, please do not hesitate to call  I sincerely appreciate the opportunity to share in the care of one of your patients and hope to have another opportunity to work with you in the near future  Sincerely,    Barbra Stockton, PT      Referring Provider:      I certify that I have read the below Plan of Care and certify the need for these services furnished under this plan of treatment while under my care  [facility] [id number]  Via Mail          Re-Evaluation/Daily Note     Today's date: 2022  Patient name: Jae Vasquez  : 1960  MRN: [id number]  Referring provider: Nissa Whitehead  Dx:   Encounter Diagnosis     ICD-10-CM    1  Total knee replacement status, left  Z96 [id number]        Start Time: 730  Stop Time:   Total time in clinic (min): 47 minutes  ASSESSMENT:    Jae Vasquez is a 58 y o  male who presents s/p TKA on 22 with Dr Tru Huertas through [address] [place] [address]  Upon evaluation, patient demonstrates improvements with PROM/AROM in both knee flexion and extension, LE strength, and overall pain  Patient is having minimal limitations with functional activities and is improving with ambulation appropriately  Will continue with focus on normalizing knee flexion ROM in order to maximize both functional and recreational activities   Patient has met all of his short term goals and is progressing appropriately toward long term goals  Patient would benefit from continued skilled physical therapy to address the impairments, improve their level of function, and to improve their overall quality of life  Impairments:    restricted ROM    decreased strength   pain with function   activity intolerance   weight bearing intolerance   abnormal gait   imbalance     Prognosis:  Good  Positive and negative prognostic indicator(s):  none    Goals:    Short Term Goals: to be achieved by 4 weeks  1) Patient to be independent with basic HEP  MET  2) Decrease pain to 3 and 4/10 at its worst   MET  3) Increase knee ROM by 5-10 degrees  MET  4) Increase LE strength by 1/2 MMT grade in all deficient planes  MET    Long Term Goals: to be achieved by discharge  1) FOTO equal to or greater than target score indicating improvements with overall function  PROGRESSING  2) Ambulation to improve to maximal level of function PROGRESSING  3) Stair negotiation will improve to reciprocal  MET  4) Sit to stand transfers will improve to maximal level of function PROGRESSING    Planned interventions:  home exercise program, patient education, manual therapy, graded activity, flexibility, functional range of motion exercises, strengthening, abdominal trunk stabilization, balance and weight bearing training and gait training    Duration in visits:  8-12  Frequency: 2 visits per week  Duration in weeks:  6-8    History of Current Injury: Patient reports overall he is doing well and has been making progress  Patient notes that he feels better than before if got the knee done  Patient notes that she is able to walk around easier and is not as winded  Patient notes that his steps at home are giving him the most trouble         Pain location: global knee pain   Pain descriptors:  Pulling; tightness  Pain at Currently: 0/10   Pain at Best: 0/10   Pain at Worst: 1-2/10 (FROM 8/10)       Aggravating factors: general post op movements Easing factors: rest     Imaging: see imaging tab   Special Questions: Denies numbness and tingling  Hobbies/Interests: staying active, walking dog on different surfaces  Occupation: teacher   Patient goals: Patient reports goals for physical therapy would be decreased pain, increased mobility, increased strength and improved balance        Objective     Observations     Right Knee   Positive for edema and incision  Additional Observation Details  Patient arrives with dressing intact and stockings donned  Dressing was dry with no signs of drainage or odor  Mild to moderate leg swelling  Tenderness     Additional Tenderness Details  Global knee  Very minimal tenderness with palpation  Neurological Testing     Sensation     Knee   Left Knee   Intact: light touch    Right Knee   Intact: light touch     Passive Range of Motion   Left Knee   Flexion: 116 degree (FROM 70 degrees)  Extension: 0 degrees (FROM -3 degrees)     Right Knee   Normal passive range of motion    Mobility   Patellar Mobility:   Left Knee   WFL: medial, lateral, superior and inferior  Strength/Myotome Testing     Left Hip   Planes of Motion   Flexion: 4+  Extension: 4+  Abduction: 4+  Adduction: 4+    Right Hip   Planes of Motion   Flexion: 4+  Extension: 4+  Abduction: 4+  Adduction: 4+    Left Knee  Flexion: 4  Extension: 4  Quad contraction: good with proper superior patellar translation  No extension leg  Steps:   Reciprocal stepping on 6" steps with no railings  No compensations  Ambulation:   Ambulating with SPC  Minimal compensations or antalgic gait  Patient has some decreased knee flexion during swing phase secondary to available range at this point               Precautions: Parkinson's Disease, post-op TKA 8/26/22       Manuals 9/27 9/7 9/9 9/12 9/13 9/16 9/19 9/20 9/22 9/26   Manual knee stretching flex and ext  ACL     Flexion focus in supine  ACL MC ACL ACL [facility] ACL [facility] MC   Patellar ROM  [facility] Neuro Re-Ed             Quad set with strap   :05x20 :05x20 :05x20 :05x20        SLR  /c quad set 3x10 /c quad set 2x10  /c quad set 2x10 /c quad set 2x10 /c quad set 2x10 /c quad set 2x10 /c quad set 2x10 /c quad set 2x10 /c quad set 3x10   TKE in standing              SLS NV                                                   Ther Ex             Heel slides  pball curls :10x2' :10x3' :10x3' :10x4' :10x4' :10x4' :10x4' pball roll ins for flexion 10" hold 3 min pball curls :10x2' pball curls :10x2'   Seated heel slides   :10x3' :10x3' :10x3' 10" 2x2 min 10" 2x2 min :10x4'      Standing hamstring curls  3# 30x 30 30x 30x 30x 30x 2 5# 30x 2 5# 30x 3# 30x 3# 30x   Heel raises   30 30x 30x 30x 30x       3-way  3# 2x10 ea B/L 3x10 ea bilateral 3x10 ea bilateral 2x10 ea B/L 2# 2x10 ea B/L 2#    3# 2x10 ea B/L 3# 2x10 ea B/L   Sidestepping    Full // bar 3x Full bar 3x 2#   2 5# 4 laps 2 5# 4 laps 3# 3 laps 3# 3 laps   LAQ with #       LLE   3# 2x10 slight hold at end range        Knee flex/ext machine  Ext 22# S/L 3x10, flex 33# B/L 3x10     NV?   B/L at least  Ext only 22# B/L 2x10 Ext only 22# B/L 2x10 Ext only 22# S/L 2x10 Ext 22# S/L 3x10, flex 33# B/L 3x10   Nustep---recumbent bike  6' full revolution Nu step 6' L2 Nu step 6' L2 Nu step 6' L2 Rocking to half range     5 min Full revolution     5 min rocking to full revolution 5' rocking to full revolution 5' 6' full revolution 6' full revolution   Ther Activity             Mini squats  3x10 2x10 2x10 2x10 2x10 2x10 2x10 2x10 3x10 3x10   Step ups 8" 20x    reciprocal stepping in clinic steps   4x no railing 2" 5x, 4" 15x 4" 2x10   6" 10x  6" 20x 6" 20x    8" 20x  8" 20x  8" 20x 8" 20x 8" 20x 8" 20x   Gait Training             SPC             Hurdles  6 laps      NV 6 laps  6 laps  nv 6 laps   Modalities

## 2022-09-28 ENCOUNTER — OFFICE VISIT (OUTPATIENT)
Dept: CARDIOLOGY CLINIC | Facility: CLINIC | Age: 62
End: 2022-09-28
Payer: COMMERCIAL

## 2022-09-28 VITALS
OXYGEN SATURATION: 98 % | TEMPERATURE: 97.7 F | BODY MASS INDEX: 27.72 KG/M2 | SYSTOLIC BLOOD PRESSURE: 170 MMHG | HEART RATE: 77 BPM | DIASTOLIC BLOOD PRESSURE: 100 MMHG | HEIGHT: 70 IN | WEIGHT: 193.6 LBS

## 2022-09-28 DIAGNOSIS — I10 ESSENTIAL HYPERTENSION: Primary | ICD-10-CM

## 2022-09-28 DIAGNOSIS — G20 PARKINSON DISEASE (HCC): ICD-10-CM

## 2022-09-28 DIAGNOSIS — E78.2 MIXED HYPERLIPIDEMIA: ICD-10-CM

## 2022-09-28 PROCEDURE — 99205 OFFICE O/P NEW HI 60 MIN: CPT | Performed by: INTERNAL MEDICINE

## 2022-09-28 RX ORDER — LOSARTAN POTASSIUM AND HYDROCHLOROTHIAZIDE 12.5; 1 MG/1; MG/1
1 TABLET ORAL DAILY
Qty: 30 TABLET | Refills: 3 | Status: SHIPPED | OUTPATIENT
Start: 2022-09-28 | End: 2022-10-21

## 2022-09-28 NOTE — PATIENT INSTRUCTIONS
Stop lisinopril  Start losartan//12 5mg daily  Echocardiogram planned to evaluate heart function and rule out significant valvular heart disease  Schedule lab: BMP in 7-14 days  Return to clinic in 2 months  LDL or bad cholesterol is also elevated  You will benefit from obtaining a coronary artery calcium score and will discuss that at a next visit  Following healthy lifestyle will help lower your blood pressure:   Increase physical activity  Low-salt diet rich in fruits and vegetables  Avoid alcohol intake  Maintain healthy weight  Do not smoke or use tobacco     Take medications as instructed  Practice meditation and stress reduction

## 2022-09-28 NOTE — ASSESSMENT & PLAN NOTE
Stage II hypertension was likely longstanding  Previous hesitancy to start antihypertensive medications  Discussed the importance of medical therapy on a consistent basis  Discontinue lisinopril  Start losartan//12 5 mg daily  Obtain BMP in 7-14 days  Echo requested to evaluate LV systolic function and rule out significant LVH

## 2022-09-28 NOTE — LETTER
2022     Devon Taylor MD  [address] [place] [address]    Patient: Paxton Richmond   YOB: 1960   Date of Visit: 2022       Dear Dr Pitts Salts:    Thank you for referring Oneil Barillasier to me for evaluation  Below are my notes for this consultation  If you have questions, please do not hesitate to call me  I look forward to following your patient along with you  Sincerely,        Chato Lynn MD        CC: No Recipients  Chato Lynn MD  2022  4:57 PM  Incomplete  [facility] [place] [address] [address]  Phone#  [phone]  Fax#  [phone]  [facility] Cardiology Office Consultation             NAME: Paxton Richmond  AGE: 58 y o  SEX: male   : 1960   MRN: [id number]    DATE: 2022  TIME: 4:55 PM    Assessment and plan:    Essential hypertension  Stage II hypertension was likely longstanding  Previous hesitancy to start antihypertensive medications  Discussed the importance of medical therapy on a consistent basis  Discontinue lisinopril  Start losartan//12 5 mg daily  Obtain BMP in 7-14 days  Echo requested to evaluate LV systolic function and rule out significant LVH  Hyperlipidemia  Lab Results   Component Value Date    LDLCALC 142 (H) 2022   The 10-year ASCVD risk score (Ada Nichole et al , 2013) is: 15 6%    Values used to calculate the score:      Age: 58 years      Sex: Male      Is Non- : No      Diabetic: No      Tobacco smoker: No      Systolic Blood Pressure: 392 mmHg      Is BP treated: Yes      HDL Cholesterol: 86 mg/dL      Total Cholesterol: 243 mg/dL  He does meet criteria for statin therapy  I would probably plan a calcium score at a later visit for further risk stratification to see if statins are indicated  Mediterranean style low-sodium diet to be beneficial   Initial step will be lifestyle modification        Parkinson disease ([address] )  Fairly well controlled on carbidopa/levodopa combination  Follows up with Neurology  Chief Complaint   Patient presents with    New Patient Visit       HPI:    Jose Stephenson is a 58y o -year-old male who presents to the cardiology clinic for evaluation of recent HTN  States he may have had high blood pressure for several years  Over the last few months control has worsened  During recent hospitalization for left knee replacement, he was found to have severe hypertension  His discharge was delayed because of this  He was sent home on lisinopril for p r n  use and also on NSAIDs  Currently he has stop taking the NSAIDs  He is not taking any aspirin  He admits to low-sodium diet  He has not drank any alcohol in over a month  No prior history of cigarette smoking or heavy alcohol use  There is a family history of hypertension in his father  Father also had diabetes  No premature CAD in the family  Father lived to age 80  Home BP log reviewed  Blood pressure consistently elevated  Last 4 days BP lo/90  153/77  160/87  149/89  156/91    Highest recent recorded blood pressure was about 190/110    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  Over the last few years the nurses at the school have reported that his blood pressure has been elevated  He has hesitated to start any medications  He had a fairly healthy lifestyle before his surgery  He was walking on a regular basis  He does not eat out very much  He does not add much salt to his diet  He has history of mild dyslipidemia  Last LDL was 140  Previous EKG was normal   BP high as school as well  Denies chest pain on exertion  Denies worsening shortness of breath  Denies sustained palpitations  His left knee is still swollen but his pain is now resolved  He still has residual left lower extremity edema from the surgery    He has no orthopnea or PND       Cardiology Problem list:  Hypertension:  Stage II  Parkinson disease    Past history, family history, social history, current medications, vital signs, recent lab and imaging studies and  prior cardiology studies reviewed independently on this visit      BP Readings from Last 4 Encounters:   09/28/22 170/100   08/15/22 142/94   04/29/22 142/72   04/21/22 136/74      Wt Readings from Last 3 Encounters:   09/28/22 87 8 kg (193 lb 9 6 oz)   08/15/22 89 1 kg (196 lb 8 oz)   04/29/22 92 5 kg (203 lb 14 8 oz)       Lab Results   Component Value Date    LDLCALC 142 (H) 03/18/2022     Lab Results   Component Value Date    CREATININE 0 94 03/18/2022          ALLERGIES:  No Known Allergies      Current Outpatient Medications:     acetaminophen (TYLENOL) 500 mg tablet, Take 1,000 mg by mouth Three times a day, Disp: , Rfl:     carbidopa-levodopa (SINEMET CR)  mg TBCR per ER tablet, Take 1 tablet by mouth daily at bedtime, Disp: , Rfl:     carbidopa-levodopa (SINEMET)  mg per tablet, TAKE 1/2 TAB 3 TIMES A DAY FOR 1 WEEK THEN 1 TAB 3 TIMES A DAY (Patient taking differently: Take 1 tablet by mouth 4 (four) times a day THEN 1 TAB 4 TIMES A DAY), Disp: 90 tablet, Rfl: 3    cholecalciferol (VITAMIN D3) 1,000 units tablet, Take 5,000 Units by mouth daily, Disp: , Rfl:     Collagen Hydrolysate POWD, 1 Dose by Does not apply route daily, Disp: , Rfl:     fluticasone (FLONASE) 50 mcg/act nasal spray, 2 sprays into each nostril daily, Disp: 16 g, Rfl: 2    ibuprofen (ADVIL,MOTRIN) 100 MG tablet, Take 100 mg by mouth every 6 (six) hours as needed for mild pain, Disp: , Rfl:     losartan-hydrochlorothiazide (HYZAAR) 100-12 5 MG per tablet, Take 1 tablet by mouth daily, Disp: 30 tablet, Rfl: 3    MAGNESIUM PO, Take by mouth daily at bedtime, Disp: , Rfl:     MELATONIN PO, Take by mouth daily at bedtime, Disp: , Rfl:     selegiline (ELDEPRYL) 5 mg tablet, Take 1 tablet (5 mg total) by mouth 2 (two) times a day with meals, Disp: 60 tablet, Rfl: 5    sildenafil (VIAGRA) 100 mg tablet, Take 1 tablet (100 mg total) by mouth daily as needed for erectile dysfunction, Disp: 6 tablet, Rfl: 5    celecoxib (CeleBREX) 200 mg capsule, Take 200 mg by mouth 2 (two) times a day (Patient not taking: Reported on 9/28/2022), Disp: , Rfl:     Flowflex COVID-19 Ag Home Test KIT, , Disp: , Rfl:     lansoprazole (PREVACID) 30 mg capsule, Take 1 capsule by mouth daily before breakfast (Patient not taking: Reported on 9/28/2022), Disp: , Rfl:     oxyCODONE (ROXICODONE) 5 immediate release tablet, Take 5-10 mg by mouth (Patient not taking: Reported on 9/28/2022), Disp: , Rfl:     pregabalin (LYRICA) 50 mg capsule, Take 50 mg by mouth 2 (two) times a day (Patient not taking: Reported on 9/28/2022), Disp: , Rfl:     senna (SENOKOT) 8 6 MG tablet, Take 2 tablets by mouth daily at bedtime (Patient not taking: Reported on 9/28/2022), Disp: , Rfl:     valACYclovir (VALTREX) 1,000 mg tablet, Take 2 tablets (2,000 mg total) by mouth 2 (two) times a day for 1 day, Disp: 4 tablet, Rfl: 0      Review of Systems   Constitutional: Negative  HENT: Negative  Eyes: Negative  Respiratory: Negative  Cardiovascular: Positive for leg swelling  Gastrointestinal: Negative  Endocrine: Negative  Genitourinary: Negative  Musculoskeletal: Positive for arthralgias and gait problem  Skin: Negative  Allergic/Immunologic: Negative  Neurological: Positive for tremors  Hematological: Negative  Psychiatric/Behavioral: Negative for sleep disturbance  The patient is nervous/anxious          Past Medical History:   Diagnosis Date    Hemorrhoids     Parkinson disease [facility])        Past Surgical History:   Procedure Laterality Date    KNEE SURGERY         Family History   Problem Relation Age of Onset    Diabetes Father         mellitus    Heart attack Father         acute myocardial infarction    Hyperlipidemia Mother     Multiple sclerosis Maternal Grandfather     Prostate cancer Paternal Grandfather        Social History   reports that he has never smoked  He has never used smokeless tobacco  He reports current alcohol use of about 14 0 standard drinks of alcohol per week  He reports that he does not use drugs  Objective:     Vitals:    09/28/22 1616   BP: 170/100   Pulse: 77   Temp: 97 7 °F (36 5 °C)   SpO2: 98%     Wt Readings from Last 3 Encounters:   09/28/22 87 8 kg (193 lb 9 6 oz)   08/15/22 89 1 kg (196 lb 8 oz)   04/29/22 92 5 kg (203 lb 14 8 oz)     Pulse Readings from Last 3 Encounters:   09/28/22 77   08/15/22 82   04/29/22 80     BP Readings from Last 3 Encounters:   09/28/22 170/100   08/15/22 142/94   04/29/22 142/72         Physical Exam  Vitals reviewed  Constitutional:       General: He is not in acute distress  HENT:      Head: Normocephalic  Right Ear: External ear normal       Left Ear: External ear normal    Eyes:      General: No scleral icterus  Neck:      Vascular: No carotid bruit  Cardiovascular:      Rate and Rhythm: Normal rate and regular rhythm  Heart sounds: S1 normal and S2 normal  No murmur heard  Pulmonary:      Breath sounds: Normal breath sounds  No wheezing or rhonchi  Abdominal:      General: There is no distension  Musculoskeletal:         General: Swelling and deformity present  Right lower leg: No edema  Left lower leg: Edema present  Skin:     General: Skin is warm  Findings: No lesion  Comments: Knee incision well healed   Neurological:      General: No focal deficit present  Mental Status: He is alert     Psychiatric:         Mood and Affect: Mood normal          Pertinent Laboratory/Diagnostic Studies:    Laboratory studies reviewed personally by Chung Dodson    BMP:   Lab Results   Component Value Date    SODIUM 138 03/18/2022    K 4 6 03/18/2022     03/18/2022    CO2 30 03/18/2022    BUN 21 03/18/2022    CREATININE 0 94 03/18/2022 GLUC 104 (H) 03/18/2022    CALCIUM 9 5 03/18/2022     CBC:  Lab Results   Component Value Date    WBC 8 0 03/18/2022    WBC 8 50 04/27/2020    WBC 6 58 11/25/2014    RBC 4 70 03/18/2022    RBC 4 78 04/27/2020    RBC 4 64 11/25/2014    HGB 14 8 03/18/2022    HGB 14 5 04/27/2020    HGB 14 3 11/25/2014    HCT 42 8 03/18/2022    HCT 43 9 04/27/2020    HCT 42 5 11/25/2014    MCV 91 1 03/18/2022    MCV 92 04/27/2020    MCV 92 11/25/2014    MCH 31 5 03/18/2022    MCH 30 3 04/27/2020    MCH 30 8 11/25/2014    RDW 11 8 03/18/2022    RDW 12 2 04/27/2020    RDW 12 8 11/25/2014     03/18/2022     (H) 04/27/2020     11/25/2014     Coags:    Lipid Profile:   Lab Results   Component Value Date    CHOL 265 (H) 02/10/2017     Lab Results   Component Value Date    HDL 86 03/18/2022     Lab Results   Component Value Date    LDLCALC 142 (H) 03/18/2022     Lab Results   Component Value Date    TRIG 60 03/18/2022      No results found for: [id number], TSH  Lab Results   Component Value Date    ALT 31 03/18/2022    AST 16 03/18/2022         Imaging Studies:   No results found  Orders Placed This Encounter   Procedures    Basic metabolic panel    Echo complete w/ contrast if indicated           Maxine Metcalf MD, [facility] of the record may have been created with voice recognition software  Occasional wrong word or "sound a like" substitutions may have occurred due to the inherent limitations of voice recognition software  Read the chart carefully and recognize, using context, where substitutions have occurred  If you have any questions or concerns about the context, text or information contained within the body of this dictation, please contact myself, the provider, for further clarification        Rosa De Jesus MD  9/28/2022  4:44 PM  Sign when Signing Visit  [address]  [address] [address]  Phone#  [phone]  Fax#  [phone]  [address]  Cardiology Office Consultation             NAME: Tolu Martin  AGE: 58 y o  SEX: male   : 1960   MRN: [id number]    DATE: 2022  TIME: 4:44 PM    Assessment and plan:    No problem-specific Assessment & Plan notes found for this encounter  Chief Complaint   Patient presents with    New Patient Visit       HPI:    Tolu Martin is a 58y o -year-old male who presents to the cardiology clinic for evaluation of recent HTN  Found to have HTN during recent hos    BP lo/90  153/77  160/87  149/89  156/91    Highest number 197/107    BP was high last year as well  Works at school  BP high as school as well  Father had DM and HTN  Non smoker no recent alcohol use  Current symptoms:     No side effects reported  Denies chest pain on exertion  Denies worsening shortness of breath  Denies sustained palpitations  Cardiology Problem list:  Hypertension  Parkinson disease    Past history, family history, social history, current medications, vital signs, recent lab and imaging studies and  prior cardiology studies reviewed independently on this visit      BP Readings from Last 4 Encounters:   22 170/100   08/15/22 142/94   22 142/72   22 136/74      Wt Readings from Last 3 Encounters:   22 87 8 kg (193 lb 9 6 oz)   08/15/22 89 1 kg (196 lb 8 oz)   22 92 5 kg (203 lb 14 8 oz)       Lab Results   Component Value Date    LDLCALC 142 (H) 2022     Lab Results   Component Value Date    CREATININE 0 94 2022          ALLERGIES:  No Known Allergies      Current Outpatient Medications:     acetaminophen (TYLENOL) 500 mg tablet, Take 1,000 mg by mouth Three times a day, Disp: , Rfl:     carbidopa-levodopa (SINEMET CR)  mg TBCR per ER tablet, Take 1 tablet by mouth daily at bedtime, Disp: , Rfl:     carbidopa-levodopa (SINEMET)  mg per tablet, TAKE 1/2 TAB 3 TIMES A DAY FOR 1 WEEK THEN 1 TAB 3 TIMES A DAY (Patient taking differently: Take 1 tablet by mouth 4 (four) times a day THEN 1 TAB 4 TIMES A DAY), Disp: 90 tablet, Rfl: 3    cholecalciferol (VITAMIN D3) 1,000 units tablet, Take 5,000 Units by mouth daily, Disp: , Rfl:     Collagen Hydrolysate POWD, 1 Dose by Does not apply route daily, Disp: , Rfl:     fluticasone (FLONASE) 50 mcg/act nasal spray, 2 sprays into each nostril daily, Disp: 16 g, Rfl: 2    ibuprofen (ADVIL,MOTRIN) 100 MG tablet, Take 100 mg by mouth every 6 (six) hours as needed for mild pain, Disp: , Rfl:     losartan-hydrochlorothiazide (HYZAAR) 100-12 5 MG per tablet, Take 1 tablet by mouth daily, Disp: 30 tablet, Rfl: 3    MAGNESIUM PO, Take by mouth daily at bedtime, Disp: , Rfl:     MELATONIN PO, Take by mouth daily at bedtime, Disp: , Rfl:     selegiline (ELDEPRYL) 5 mg tablet, Take 1 tablet (5 mg total) by mouth 2 (two) times a day with meals, Disp: 60 tablet, Rfl: 5    sildenafil (VIAGRA) 100 mg tablet, Take 1 tablet (100 mg total) by mouth daily as needed for erectile dysfunction, Disp: 6 tablet, Rfl: 5    celecoxib (CeleBREX) 200 mg capsule, Take 200 mg by mouth 2 (two) times a day (Patient not taking: Reported on 9/28/2022), Disp: , Rfl:     Flowflex COVID-19 Ag Home Test KIT, , Disp: , Rfl:     lansoprazole (PREVACID) 30 mg capsule, Take 1 capsule by mouth daily before breakfast (Patient not taking: Reported on 9/28/2022), Disp: , Rfl:     oxyCODONE (ROXICODONE) 5 immediate release tablet, Take 5-10 mg by mouth (Patient not taking: Reported on 9/28/2022), Disp: , Rfl:     pregabalin (LYRICA) 50 mg capsule, Take 50 mg by mouth 2 (two) times a day (Patient not taking: Reported on 9/28/2022), Disp: , Rfl:     senna (SENOKOT) 8 6 MG tablet, Take 2 tablets by mouth daily at bedtime (Patient not taking: Reported on 9/28/2022), Disp: , Rfl:     valACYclovir (VALTREX) 1,000 mg tablet, Take 2 tablets (2,000 mg total) by mouth 2 (two) times a day for 1 day, Disp: 4 tablet, Rfl: 0      Review of Systems Constitutional: Negative  HENT: Negative  Eyes: Negative  Respiratory: Negative  Cardiovascular: Negative  Gastrointestinal: Negative  Endocrine: Negative  Genitourinary: Negative  Skin: Negative  Allergic/Immunologic: Negative  Neurological: Negative  Hematological: Negative  Psychiatric/Behavioral: Negative for sleep disturbance  The patient is nervous/anxious  Past Medical History:   Diagnosis Date    Hemorrhoids     Parkinson disease ([address] )        Past Surgical History:   Procedure Laterality Date    KNEE SURGERY         Family History   Problem Relation Age of Onset    Diabetes Father         mellitus    Heart attack Father         acute myocardial infarction    Hyperlipidemia Mother     Multiple sclerosis Maternal Grandfather     Prostate cancer Paternal Grandfather        Social History   reports that he has never smoked  He has never used smokeless tobacco  He reports current alcohol use of about 14 0 standard drinks of alcohol per week  He reports that he does not use drugs         Objective:     Vitals:    09/28/22 1616   BP: 170/100   Pulse: 77   Temp: 97 7 °F (36 5 °C)   SpO2: 98%     Wt Readings from Last 3 Encounters:   09/28/22 87 8 kg (193 lb 9 6 oz)   08/15/22 89 1 kg (196 lb 8 oz)   04/29/22 92 5 kg (203 lb 14 8 oz)     Pulse Readings from Last 3 Encounters:   09/28/22 77   08/15/22 82   04/29/22 80     BP Readings from Last 3 Encounters:   09/28/22 170/100   08/15/22 142/94   04/29/22 142/72         Physical Exam    Pertinent Laboratory/Diagnostic Studies:    Laboratory studies reviewed personally by Halie Mary    BMP:   Lab Results   Component Value Date    SODIUM 138 03/18/2022    K 4 6 03/18/2022     03/18/2022    CO2 30 03/18/2022    BUN 21 03/18/2022    CREATININE 0 94 03/18/2022    GLUC 104 (H) 03/18/2022    CALCIUM 9 5 03/18/2022     CBC:  Lab Results   Component Value Date    WBC 8 0 03/18/2022    WBC 8 50 04/27/2020    WBC 6 58 11/25/2014    RBC 4 70 03/18/2022    RBC 4 78 04/27/2020    RBC 4 64 11/25/2014    HGB 14 8 03/18/2022    HGB 14 5 04/27/2020    HGB 14 3 11/25/2014    HCT 42 8 03/18/2022    HCT 43 9 04/27/2020    HCT 42 5 11/25/2014    MCV 91 1 03/18/2022    MCV 92 04/27/2020    MCV 92 11/25/2014    MCH 31 5 03/18/2022    MCH 30 3 04/27/2020    MCH 30 8 11/25/2014    RDW 11 8 03/18/2022    RDW 12 2 04/27/2020    RDW 12 8 11/25/2014     03/18/2022     (H) 04/27/2020     11/25/2014     Coags:    Lipid Profile:   Lab Results   Component Value Date    CHOL 265 (H) 02/10/2017     Lab Results   Component Value Date    HDL 86 03/18/2022     Lab Results   Component Value Date    LDLCALC 142 (H) 03/18/2022     Lab Results   Component Value Date    TRIG 60 03/18/2022      No results found for: [id number], TSH  Lab Results   Component Value Date    ALT 31 03/18/2022    AST 16 03/18/2022         Imaging Studies:   No results found  Cardiac testing:   EKG reviewed personally: {ekg [id number]::"normal EKG, normal sinus rhythm","unchanged from previous tracings"}  Orders Placed This Encounter   Procedures    Basic metabolic panel    Echo complete w/ contrast if indicated           Shirin Baum MD, [facility] of the record may have been created with voice recognition software  Occasional wrong word or "sound a like" substitutions may have occurred due to the inherent limitations of voice recognition software  Read the chart carefully and recognize, using context, where substitutions have occurred  If you have any questions or concerns about the context, text or information contained within the body of this dictation, please contact myself, the provider, for further clarification

## 2022-09-28 NOTE — ASSESSMENT & PLAN NOTE
Lab Results   Component Value Date    LDLCALC 142 (H) 03/18/2022   The 10-year ASCVD risk score (Jennifer Ramírez et al , 2013) is: 15 6%    Values used to calculate the score:      Age: 58 years      Sex: Male      Is Non- : No      Diabetic: No      Tobacco smoker: No      Systolic Blood Pressure: 519 mmHg      Is BP treated: Yes      HDL Cholesterol: 86 mg/dL      Total Cholesterol: 243 mg/dL  He does meet criteria for statin therapy  I would probably plan a calcium score at a later visit for further risk stratification to see if statins are indicated  Mediterranean style low-sodium diet to be beneficial   Initial step will be lifestyle modification

## 2022-09-28 NOTE — PROGRESS NOTES
[facility] [place] [address] [place] [address]  Phone#  [phone]  Fax#  [phone]  [facility] Consultation             NAME: Gisela Bee  AGE: 58 y o  SEX: male   : 1960   MRN: [id number]    DATE: 2022  TIME: 5:01 PM    Assessment and plan:    Essential hypertension  Stage II hypertension is likely longstanding  Previous hesitancy to start antihypertensive medications  Discussed the importance of medical therapy on a consistent basis  Discontinue lisinopril  Start losartan//12 5 mg daily  Obtain BMP in 7-14 days  Echo requested to evaluate LV systolic function and rule out significant LVH  Hyperlipidemia  Lab Results   Component Value Date    LDLCALC 142 (H) 2022   The 10-year ASCVD risk score (Cornelio Cardona et al , 2013) is: 15 6%    Values used to calculate the score:      Age: 58 years      Sex: Male      Is Non- : No      Diabetic: No      Tobacco smoker: No      Systolic Blood Pressure: 408 mmHg      Is BP treated: Yes      HDL Cholesterol: 86 mg/dL      Total Cholesterol: 243 mg/dL  He does meet criteria for statin therapy  I would probably plan a calcium score at a later visit for further risk stratification to see if statins are indicated  Mediterranean style low-sodium diet will be beneficial   Initial step will be lifestyle modification  Parkinson disease ([address] )  Fairly well controlled on carbidopa/levodopa combination  Follows up with Neurology  Chief Complaint   Patient presents with    New Patient Visit       HPI:    Gisela Bee is a 58y o -year-old male who presents to the cardiology clinic for evaluation of recent HTN  States he may have had high blood pressure for several years  Over the last few months control has worsened  During recent hospitalization for left knee replacement, he was found to have severe hypertension    His discharge was delayed because of this  He was sent home on lisinopril for p r n  use and also on NSAIDs  Currently he has stop taking the NSAIDs  He is not taking any aspirin  He admits to low-sodium diet  He has not drank any alcohol in over a month  No prior history of cigarette smoking or heavy alcohol use  There is a family history of hypertension in his father  Father also had diabetes  No premature CAD in the family  Father lived to age 80  Home BP log reviewed  Blood pressure consistently elevated  Last 4 days BP lo/90  153/77  160/87  149/89  156/91    Highest recent recorded blood pressure was about 190/110    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  Over the last few years the nurses at the school have reported that his blood pressure has been elevated  He has hesitated to start any medications  He had a fairly healthy lifestyle before his surgery  He was walking on a regular basis  He does not eat out very much  He does not add much salt to his diet  He has history of mild dyslipidemia  Last LDL was 140  Previous EKG was normal   BP high as school as well  Denies chest pain on exertion  Denies worsening shortness of breath  Denies sustained palpitations  His left knee is still swollen but his pain is now resolved  He still has residual left lower extremity edema from the surgery  He has no orthopnea or PND  Cardiology Problem list:  Hypertension:  Stage II  Parkinson disease:  Mild tremor and cognitive changes  Dyslipidemia:  ASCVD score over 15%-need calcium score    Past history, family history, social history, current medications, vital signs, recent lab and imaging studies and  prior cardiology studies reviewed independently on this visit      BP Readings from Last 4 Encounters:   22 170/100   08/15/22 142/94   22 142/72   22 136/74      Wt Readings from Last 3 Encounters:   22 87 8 kg (193 lb 9 6 oz)   08/15/22 89 1 kg (196 lb 8 oz)   04/29/22 92 5 kg (203 lb 14 8 oz)       Lab Results   Component Value Date    LDLCALC 142 (H) 03/18/2022     Lab Results   Component Value Date    CREATININE 0 94 03/18/2022          ALLERGIES:  No Known Allergies      Current Outpatient Medications:     acetaminophen (TYLENOL) 500 mg tablet, Take 1,000 mg by mouth Three times a day, Disp: , Rfl:     carbidopa-levodopa (SINEMET CR)  mg TBCR per ER tablet, Take 1 tablet by mouth daily at bedtime, Disp: , Rfl:     carbidopa-levodopa (SINEMET)  mg per tablet, TAKE 1/2 TAB 3 TIMES A DAY FOR 1 WEEK THEN 1 TAB 3 TIMES A DAY (Patient taking differently: Take 1 tablet by mouth 4 (four) times a day THEN 1 TAB 4 TIMES A DAY), Disp: 90 tablet, Rfl: 3    cholecalciferol (VITAMIN D3) 1,000 units tablet, Take 5,000 Units by mouth daily, Disp: , Rfl:     Collagen Hydrolysate POWD, 1 Dose by Does not apply route daily, Disp: , Rfl:     fluticasone (FLONASE) 50 mcg/act nasal spray, 2 sprays into each nostril daily, Disp: 16 g, Rfl: 2    ibuprofen (ADVIL,MOTRIN) 100 MG tablet, Take 100 mg by mouth every 6 (six) hours as needed for mild pain, Disp: , Rfl:     losartan-hydrochlorothiazide (HYZAAR) 100-12 5 MG per tablet, Take 1 tablet by mouth daily, Disp: 30 tablet, Rfl: 3    MAGNESIUM PO, Take by mouth daily at bedtime, Disp: , Rfl:     MELATONIN PO, Take by mouth daily at bedtime, Disp: , Rfl:     selegiline (ELDEPRYL) 5 mg tablet, Take 1 tablet (5 mg total) by mouth 2 (two) times a day with meals, Disp: 60 tablet, Rfl: 5    sildenafil (VIAGRA) 100 mg tablet, Take 1 tablet (100 mg total) by mouth daily as needed for erectile dysfunction, Disp: 6 tablet, Rfl: 5    celecoxib (CeleBREX) 200 mg capsule, Take 200 mg by mouth 2 (two) times a day (Patient not taking: Reported on 9/28/2022), Disp: , Rfl:     Flowflex COVID-19 Ag Home Test KIT, , Disp: , Rfl:     lansoprazole (PREVACID) 30 mg capsule, Take 1 capsule by mouth daily before breakfast (Patient not taking: Reported on 9/28/2022), Disp: , Rfl:     oxyCODONE (ROXICODONE) 5 immediate release tablet, Take 5-10 mg by mouth (Patient not taking: Reported on 9/28/2022), Disp: , Rfl:     pregabalin (LYRICA) 50 mg capsule, Take 50 mg by mouth 2 (two) times a day (Patient not taking: Reported on 9/28/2022), Disp: , Rfl:     senna (SENOKOT) 8 6 MG tablet, Take 2 tablets by mouth daily at bedtime (Patient not taking: Reported on 9/28/2022), Disp: , Rfl:     valACYclovir (VALTREX) 1,000 mg tablet, Take 2 tablets (2,000 mg total) by mouth 2 (two) times a day for 1 day, Disp: 4 tablet, Rfl: 0      Review of Systems   Constitutional: Negative  HENT: Negative  Eyes: Negative  Respiratory: Negative  Cardiovascular: Positive for leg swelling  Gastrointestinal: Negative  Endocrine: Negative  Genitourinary: Negative  Musculoskeletal: Positive for arthralgias and gait problem  Skin: Negative  Allergic/Immunologic: Negative  Neurological: Positive for tremors  Hematological: Negative  Psychiatric/Behavioral: Negative for sleep disturbance  The patient is nervous/anxious  Past Medical History:   Diagnosis Date    Hemorrhoids     Parkinson disease ([facility] [address] )        Past Surgical History:   Procedure Laterality Date    KNEE SURGERY         Family History   Problem Relation Age of Onset    Diabetes Father         mellitus    Heart attack Father         acute myocardial infarction    Hyperlipidemia Mother     Multiple sclerosis Maternal Grandfather     Prostate cancer Paternal Grandfather        Social History   reports that he has never smoked  He has never used smokeless tobacco  He reports current alcohol use of about 14 0 standard drinks of alcohol per week  He reports that he does not use drugs         Objective:     Vitals:    09/28/22 1616   BP: 170/100   Pulse: 77   Temp: 97 7 °F (36 5 °C)   SpO2: 98%     Wt Readings from Last 3 Encounters:   09/28/22 87 8 kg (193 lb 9 6 oz)   08/15/22 89 1 kg (196 lb 8 oz)   04/29/22 92 5 kg (203 lb 14 8 oz)     Pulse Readings from Last 3 Encounters:   09/28/22 77   08/15/22 82   04/29/22 80     BP Readings from Last 3 Encounters:   09/28/22 170/100   08/15/22 142/94   04/29/22 142/72         Physical Exam  Vitals reviewed  Constitutional:       General: He is not in acute distress  HENT:      Head: Normocephalic  Right Ear: External ear normal       Left Ear: External ear normal    Eyes:      General: No scleral icterus  Neck:      Vascular: No carotid bruit  Cardiovascular:      Rate and Rhythm: Normal rate and regular rhythm  Heart sounds: S1 normal and S2 normal  No murmur heard  Pulmonary:      Breath sounds: Normal breath sounds  No wheezing or rhonchi  Abdominal:      General: There is no distension  Musculoskeletal:         General: Swelling and deformity present  Right lower leg: No edema  Left lower leg: Edema present  Skin:     General: Skin is warm  Findings: No lesion  Comments: Knee incision well healed   Neurological:      General: No focal deficit present  Mental Status: He is alert     Psychiatric:         Mood and Affect: Mood normal          Pertinent Laboratory/Diagnostic Studies:    Laboratory studies reviewed personally by Edwin Covarrubias    BMP:   Lab Results   Component Value Date    SODIUM 138 03/18/2022    K 4 6 03/18/2022     03/18/2022    CO2 30 03/18/2022    BUN 21 03/18/2022    CREATININE 0 94 03/18/2022    GLUC 104 (H) 03/18/2022    CALCIUM 9 5 03/18/2022     CBC:  Lab Results   Component Value Date    WBC 8 0 03/18/2022    WBC 8 50 04/27/2020    WBC 6 58 11/25/2014    RBC 4 70 03/18/2022    RBC 4 78 04/27/2020    RBC 4 64 11/25/2014    HGB 14 8 03/18/2022    HGB 14 5 04/27/2020    HGB 14 3 11/25/2014    HCT 42 8 03/18/2022    HCT 43 9 04/27/2020    HCT 42 5 11/25/2014    MCV 91 1 03/18/2022    MCV 92 04/27/2020    MCV 92 11/25/2014    MCH 31 5 03/18/2022 MCH 30 3 04/27/2020    MCH 30 8 11/25/2014    RDW 11 8 03/18/2022    RDW 12 2 04/27/2020    RDW 12 8 11/25/2014     03/18/2022     (H) 04/27/2020     11/25/2014     Coags:    Lipid Profile:   Lab Results   Component Value Date    CHOL 265 (H) 02/10/2017     Lab Results   Component Value Date    HDL 86 03/18/2022     Lab Results   Component Value Date    LDLCALC 142 (H) 03/18/2022     Lab Results   Component Value Date    TRIG 60 03/18/2022      No results found for: [id number], TSH  Lab Results   Component Value Date    ALT 31 03/18/2022    AST 16 03/18/2022         Imaging Studies:   No results found  Orders Placed This Encounter   Procedures    Basic metabolic panel    Echo complete w/ contrast if indicated           Kera Jarvis MD, [facility] of the record may have been created with voice recognition software  Occasional wrong word or "sound a like" substitutions may have occurred due to the inherent limitations of voice recognition software  Read the chart carefully and recognize, using context, where substitutions have occurred  If you have any questions or concerns about the context, text or information contained within the body of this dictation, please contact myself, the provider, for further clarification

## 2022-09-28 NOTE — LETTER
2022     Slick Martínez MD  [address] [place] [address]    Patient: Chen Moss   YOB: 1960   Date of Visit: 2022       Dear Dr Abbe Bueno:    Thank you for referring Valentina Salguero to me for evaluation  Below are my notes for this consultation  If you have questions, please do not hesitate to call me  I look forward to following your patient along with you  Sincerely,        Sapphire Tesfaye MD        CC: No Recipients  Sapphire Tesfaye MD  2022  5:01 PM  Incomplete  [facility] [address] [address]  Phone#  [phone]  Fax#  [phone]  [facility] [place] Cardiology Office Consultation             NAME: Chen Moss  AGE: 58 y o  SEX: male   : 1960   MRN: [id number]    DATE: 2022  TIME: 4:59 PM    Assessment and plan:    Essential hypertension  Stage II hypertension is likely longstanding  Previous hesitancy to start antihypertensive medications  Discussed the importance of medical therapy on a consistent basis  Discontinue lisinopril  Start losartan//12 5 mg daily  Obtain BMP in 7-14 days  Echo requested to evaluate LV systolic function and rule out significant LVH  Hyperlipidemia  Lab Results   Component Value Date    LDLCALC 142 (H) 2022   The 10-year ASCVD risk score (Renan Jamison et al , 2013) is: 15 6%    Values used to calculate the score:      Age: 58 years      Sex: Male      Is Non- : No      Diabetic: No      Tobacco smoker: No      Systolic Blood Pressure: 761 mmHg      Is BP treated: Yes      HDL Cholesterol: 86 mg/dL      Total Cholesterol: 243 mg/dL  He does meet criteria for statin therapy  I would probably plan a calcium score at a later visit for further risk stratification to see if statins are indicated  Mediterranean style low-sodium diet will be beneficial   Initial step will be lifestyle modification        Parkinson disease ([address] )  Fairly well controlled on carbidopa/levodopa combination  Follows up with Neurology  Chief Complaint   Patient presents with    New Patient Visit       HPI:    Jonathon Mehta is a 58y o -year-old male who presents to the cardiology clinic for evaluation of recent HTN  States he may have had high blood pressure for several years  Over the last few months control has worsened  During recent hospitalization for left knee replacement, he was found to have severe hypertension  His discharge was delayed because of this  He was sent home on lisinopril for p r n  use and also on NSAIDs  Currently he has stop taking the NSAIDs  He is not taking any aspirin  He admits to low-sodium diet  He has not drank any alcohol in over a month  No prior history of cigarette smoking or heavy alcohol use  There is a family history of hypertension in his father  Father also had diabetes  No premature CAD in the family  Father lived to age 80  Home BP log reviewed  Blood pressure consistently elevated  Last 4 days BP lo/90  153/77  160/87  149/89  156/91    Highest recent recorded blood pressure was about 190/110    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  Over the last few years the nurses at the school have reported that his blood pressure has been elevated  He has hesitated to start any medications  He had a fairly healthy lifestyle before his surgery  He was walking on a regular basis  He does not eat out very much  He does not add much salt to his diet  He has history of mild dyslipidemia  Last LDL was 140  Previous EKG was normal   BP high as school as well  Denies chest pain on exertion  Denies worsening shortness of breath  Denies sustained palpitations  His left knee is still swollen but his pain is now resolved  He still has residual left lower extremity edema from the surgery    He has no orthopnea or PND       Cardiology Problem list:  Hypertension:  Stage II  Parkinson disease:  Mild tremor and cognitive changes  Dyslipidemia:  ASCVD score over 15%-need calcium score    Past history, family history, social history, current medications, vital signs, recent lab and imaging studies and  prior cardiology studies reviewed independently on this visit      BP Readings from Last 4 Encounters:   09/28/22 170/100   08/15/22 142/94   04/29/22 142/72   04/21/22 136/74      Wt Readings from Last 3 Encounters:   09/28/22 87 8 kg (193 lb 9 6 oz)   08/15/22 89 1 kg (196 lb 8 oz)   04/29/22 92 5 kg (203 lb 14 8 oz)       Lab Results   Component Value Date    LDLCALC 142 (H) 03/18/2022     Lab Results   Component Value Date    CREATININE 0 94 03/18/2022          ALLERGIES:  No Known Allergies      Current Outpatient Medications:     acetaminophen (TYLENOL) 500 mg tablet, Take 1,000 mg by mouth Three times a day, Disp: , Rfl:     carbidopa-levodopa (SINEMET CR)  mg TBCR per ER tablet, Take 1 tablet by mouth daily at bedtime, Disp: , Rfl:     carbidopa-levodopa (SINEMET)  mg per tablet, TAKE 1/2 TAB 3 TIMES A DAY FOR 1 WEEK THEN 1 TAB 3 TIMES A DAY (Patient taking differently: Take 1 tablet by mouth 4 (four) times a day THEN 1 TAB 4 TIMES A DAY), Disp: 90 tablet, Rfl: 3    cholecalciferol (VITAMIN D3) 1,000 units tablet, Take 5,000 Units by mouth daily, Disp: , Rfl:     Collagen Hydrolysate POWD, 1 Dose by Does not apply route daily, Disp: , Rfl:     fluticasone (FLONASE) 50 mcg/act nasal spray, 2 sprays into each nostril daily, Disp: 16 g, Rfl: 2    ibuprofen (ADVIL,MOTRIN) 100 MG tablet, Take 100 mg by mouth every 6 (six) hours as needed for mild pain, Disp: , Rfl:     losartan-hydrochlorothiazide (HYZAAR) 100-12 5 MG per tablet, Take 1 tablet by mouth daily, Disp: 30 tablet, Rfl: 3    MAGNESIUM PO, Take by mouth daily at bedtime, Disp: , Rfl:     MELATONIN PO, Take by mouth daily at bedtime, Disp: , Rfl:   selegiline (ELDEPRYL) 5 mg tablet, Take 1 tablet (5 mg total) by mouth 2 (two) times a day with meals, Disp: 60 tablet, Rfl: 5    sildenafil (VIAGRA) 100 mg tablet, Take 1 tablet (100 mg total) by mouth daily as needed for erectile dysfunction, Disp: 6 tablet, Rfl: 5    celecoxib (CeleBREX) 200 mg capsule, Take 200 mg by mouth 2 (two) times a day (Patient not taking: Reported on 9/28/2022), Disp: , Rfl:     Flowflex COVID-19 Ag Home Test KIT, , Disp: , Rfl:     lansoprazole (PREVACID) 30 mg capsule, Take 1 capsule by mouth daily before breakfast (Patient not taking: Reported on 9/28/2022), Disp: , Rfl:     oxyCODONE (ROXICODONE) 5 immediate release tablet, Take 5-10 mg by mouth (Patient not taking: Reported on 9/28/2022), Disp: , Rfl:     pregabalin (LYRICA) 50 mg capsule, Take 50 mg by mouth 2 (two) times a day (Patient not taking: Reported on 9/28/2022), Disp: , Rfl:     senna (SENOKOT) 8 6 MG tablet, Take 2 tablets by mouth daily at bedtime (Patient not taking: Reported on 9/28/2022), Disp: , Rfl:     valACYclovir (VALTREX) 1,000 mg tablet, Take 2 tablets (2,000 mg total) by mouth 2 (two) times a day for 1 day, Disp: 4 tablet, Rfl: 0      Review of Systems   Constitutional: Negative  HENT: Negative  Eyes: Negative  Respiratory: Negative  Cardiovascular: Positive for leg swelling  Gastrointestinal: Negative  Endocrine: Negative  Genitourinary: Negative  Musculoskeletal: Positive for arthralgias and gait problem  Skin: Negative  Allergic/Immunologic: Negative  Neurological: Positive for tremors  Hematological: Negative  Psychiatric/Behavioral: Negative for sleep disturbance  The patient is nervous/anxious          Past Medical History:   Diagnosis Date    Hemorrhoids     Parkinson disease [facility])        Past Surgical History:   Procedure Laterality Date    KNEE SURGERY         Family History   Problem Relation Age of Onset    Diabetes Father         mellitus    Heart attack Father         acute myocardial infarction    Hyperlipidemia Mother     Multiple sclerosis Maternal Grandfather     Prostate cancer Paternal Grandfather        Social History   reports that he has never smoked  He has never used smokeless tobacco  He reports current alcohol use of about 14 0 standard drinks of alcohol per week  He reports that he does not use drugs  Objective:     Vitals:    09/28/22 1616   BP: 170/100   Pulse: 77   Temp: 97 7 °F (36 5 °C)   SpO2: 98%     Wt Readings from Last 3 Encounters:   09/28/22 87 8 kg (193 lb 9 6 oz)   08/15/22 89 1 kg (196 lb 8 oz)   04/29/22 92 5 kg (203 lb 14 8 oz)     Pulse Readings from Last 3 Encounters:   09/28/22 77   08/15/22 82   04/29/22 80     BP Readings from Last 3 Encounters:   09/28/22 170/100   08/15/22 142/94   04/29/22 142/72         Physical Exam  Vitals reviewed  Constitutional:       General: He is not in acute distress  HENT:      Head: Normocephalic  Right Ear: External ear normal       Left Ear: External ear normal    Eyes:      General: No scleral icterus  Neck:      Vascular: No carotid bruit  Cardiovascular:      Rate and Rhythm: Normal rate and regular rhythm  Heart sounds: S1 normal and S2 normal  No murmur heard  Pulmonary:      Breath sounds: Normal breath sounds  No wheezing or rhonchi  Abdominal:      General: There is no distension  Musculoskeletal:         General: Swelling and deformity present  Right lower leg: No edema  Left lower leg: Edema present  Skin:     General: Skin is warm  Findings: No lesion  Comments: Knee incision well healed   Neurological:      General: No focal deficit present  Mental Status: He is alert     Psychiatric:         Mood and Affect: Mood normal          Pertinent Laboratory/Diagnostic Studies:    Laboratory studies reviewed personally by Cata Herman    BMP:   Lab Results   Component Value Date    SODIUM 138 03/18/2022    K 4 6 03/18/2022  03/18/2022    CO2 30 03/18/2022    BUN 21 03/18/2022    CREATININE 0 94 03/18/2022    GLUC 104 (H) 03/18/2022    CALCIUM 9 5 03/18/2022     CBC:  Lab Results   Component Value Date    WBC 8 0 03/18/2022    WBC 8 50 04/27/2020    WBC 6 58 11/25/2014    RBC 4 70 03/18/2022    RBC 4 78 04/27/2020    RBC 4 64 11/25/2014    HGB 14 8 03/18/2022    HGB 14 5 04/27/2020    HGB 14 3 11/25/2014    HCT 42 8 03/18/2022    HCT 43 9 04/27/2020    HCT 42 5 11/25/2014    MCV 91 1 03/18/2022    MCV 92 04/27/2020    MCV 92 11/25/2014    MCH 31 5 03/18/2022    MCH 30 3 04/27/2020    MCH 30 8 11/25/2014    RDW 11 8 03/18/2022    RDW 12 2 04/27/2020    RDW 12 8 11/25/2014     03/18/2022     (H) 04/27/2020     11/25/2014     Coags:    Lipid Profile:   Lab Results   Component Value Date    CHOL 265 (H) 02/10/2017     Lab Results   Component Value Date    HDL 86 03/18/2022     Lab Results   Component Value Date    LDLCALC 142 (H) 03/18/2022     Lab Results   Component Value Date    TRIG 60 03/18/2022      No results found for: [id number], TSH  Lab Results   Component Value Date    ALT 31 03/18/2022    AST 16 03/18/2022         Imaging Studies:   No results found  Orders Placed This Encounter   Procedures    Basic metabolic panel    Echo complete w/ contrast if indicated           Karely Brooks MD, [facility] of the record may have been created with voice recognition software  Occasional wrong word or "sound a like" substitutions may have occurred due to the inherent limitations of voice recognition software  Read the chart carefully and recognize, using context, where substitutions have occurred  If you have any questions or concerns about the context, text or information contained within the body of this dictation, please contact myself, the provider, for further clarification        Cas Reyna MD  9/28/2022  4:58 PM  Sign when Signing Visit  [facility]  [address] [address]  Phone#  [phone]  Fax#  [phone]  [facility] Card[facility] Office Consultation             NAME: Artie Tapia  AGE: 58 y o  SEX: male   : 1960   MRN: [id number]    DATE: 2022  TIME: 4:58 PM    Assessment and plan:    Essential hypertension  Stage II hypertension was likely longstanding  Previous hesitancy to start antihypertensive medications  Discussed the importance of medical therapy on a consistent basis  Discontinue lisinopril  Start losartan//12 5 mg daily  Obtain BMP in 7-14 days  Echo requested to evaluate LV systolic function and rule out significant LVH  Hyperlipidemia  Lab Results   Component Value Date    LDLCALC 142 (H) 2022   The 10-year ASCVD risk score (Kenya Clarke et al , 2013) is: 15 6%    Values used to calculate the score:      Age: 58 years      Sex: Male      Is Non- : No      Diabetic: No      Tobacco smoker: No      Systolic Blood Pressure: 837 mmHg      Is BP treated: Yes      HDL Cholesterol: 86 mg/dL      Total Cholesterol: 243 mg/dL  He does meet criteria for statin therapy  I would probably plan a calcium score at a later visit for further risk stratification to see if statins are indicated  Mediterranean style low-sodium diet to be beneficial   Initial step will be lifestyle modification  Parkinson disease ([address] )  Fairly well controlled on carbidopa/levodopa combination  Follows up with Neurology  Chief Complaint   Patient presents with    New Patient Visit       HPI:    Artie Tapia is a 58y o -year-old male who presents to the cardiology clinic for evaluation of recent HTN  States he may have had high blood pressure for several years  Over the last few months control has worsened  During recent hospitalization for left knee replacement, he was found to have severe hypertension  His discharge was delayed because of this    He was sent home on lisinopril for p r n  use and also on NSAIDs  Currently he has stop taking the NSAIDs  He is not taking any aspirin  He admits to low-sodium diet  He has not drank any alcohol in over a month  No prior history of cigarette smoking or heavy alcohol use  There is a family history of hypertension in his father  Father also had diabetes  No premature CAD in the family  Father lived to age 80  Home BP log reviewed  Blood pressure consistently elevated  Last 4 days BP lo/90  153/77  160/87  149/89  156/91    Highest recent recorded blood pressure was about 190/110    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  Over the last few years the nurses at the school have reported that his blood pressure has been elevated  He has hesitated to start any medications  He had a fairly healthy lifestyle before his surgery  He was walking on a regular basis  He does not eat out very much  He does not add much salt to his diet  He has history of mild dyslipidemia  Last LDL was 140  Previous EKG was normal   BP high as school as well  Denies chest pain on exertion  Denies worsening shortness of breath  Denies sustained palpitations  His left knee is still swollen but his pain is now resolved  He still has residual left lower extremity edema from the surgery  He has no orthopnea or PND  Cardiology Problem list:  Hypertension:  Stage II  Parkinson disease:  Mild tremor and cognitive changes  Dyslipidemia:  ASCVD score over 15%-need calcium score    Past history, family history, social history, current medications, vital signs, recent lab and imaging studies and  prior cardiology studies reviewed independently on this visit      BP Readings from Last 4 Encounters:   22 170/100   08/15/22 142/94   22 142/72   22 136/74      Wt Readings from Last 3 Encounters:   22 87 8 kg (193 lb 9 6 oz)   08/15/22 89 1 kg (196 lb 8 oz)   22 92 5 kg (203 lb 14 8 oz)       Lab Results   Component Value Date    LDLCALC 142 (H) 03/18/2022     Lab Results   Component Value Date    CREATININE 0 94 03/18/2022          ALLERGIES:  No Known Allergies      Current Outpatient Medications:     acetaminophen (TYLENOL) 500 mg tablet, Take 1,000 mg by mouth Three times a day, Disp: , Rfl:     carbidopa-levodopa (SINEMET CR)  mg TBCR per ER tablet, Take 1 tablet by mouth daily at bedtime, Disp: , Rfl:     carbidopa-levodopa (SINEMET)  mg per tablet, TAKE 1/2 TAB 3 TIMES A DAY FOR 1 WEEK THEN 1 TAB 3 TIMES A DAY (Patient taking differently: Take 1 tablet by mouth 4 (four) times a day THEN 1 TAB 4 TIMES A DAY), Disp: 90 tablet, Rfl: 3    cholecalciferol (VITAMIN D3) 1,000 units tablet, Take 5,000 Units by mouth daily, Disp: , Rfl:     Collagen Hydrolysate POWD, 1 Dose by Does not apply route daily, Disp: , Rfl:     fluticasone (FLONASE) 50 mcg/act nasal spray, 2 sprays into each nostril daily, Disp: 16 g, Rfl: 2    ibuprofen (ADVIL,MOTRIN) 100 MG tablet, Take 100 mg by mouth every 6 (six) hours as needed for mild pain, Disp: , Rfl:     losartan-hydrochlorothiazide (HYZAAR) 100-12 5 MG per tablet, Take 1 tablet by mouth daily, Disp: 30 tablet, Rfl: 3    MAGNESIUM PO, Take by mouth daily at bedtime, Disp: , Rfl:     MELATONIN PO, Take by mouth daily at bedtime, Disp: , Rfl:     selegiline (ELDEPRYL) 5 mg tablet, Take 1 tablet (5 mg total) by mouth 2 (two) times a day with meals, Disp: 60 tablet, Rfl: 5    sildenafil (VIAGRA) 100 mg tablet, Take 1 tablet (100 mg total) by mouth daily as needed for erectile dysfunction, Disp: 6 tablet, Rfl: 5    celecoxib (CeleBREX) 200 mg capsule, Take 200 mg by mouth 2 (two) times a day (Patient not taking: Reported on 9/28/2022), Disp: , Rfl:     Flowflex COVID-19 Ag Home Test KIT, , Disp: , Rfl:     lansoprazole (PREVACID) 30 mg capsule, Take 1 capsule by mouth daily before breakfast (Patient not taking: Reported on 9/28/2022), Disp: , Rfl:     oxyCODONE (ROXICODONE) 5 immediate release tablet, Take 5-10 mg by mouth (Patient not taking: Reported on 9/28/2022), Disp: , Rfl:     pregabalin (LYRICA) 50 mg capsule, Take 50 mg by mouth 2 (two) times a day (Patient not taking: Reported on 9/28/2022), Disp: , Rfl:     senna (SENOKOT) 8 6 MG tablet, Take 2 tablets by mouth daily at bedtime (Patient not taking: Reported on 9/28/2022), Disp: , Rfl:     valACYclovir (VALTREX) 1,000 mg tablet, Take 2 tablets (2,000 mg total) by mouth 2 (two) times a day for 1 day, Disp: 4 tablet, Rfl: 0      Review of Systems   Constitutional: Negative  HENT: Negative  Eyes: Negative  Respiratory: Negative  Cardiovascular: Positive for leg swelling  Gastrointestinal: Negative  Endocrine: Negative  Genitourinary: Negative  Musculoskeletal: Positive for arthralgias and gait problem  Skin: Negative  Allergic/Immunologic: Negative  Neurological: Positive for tremors  Hematological: Negative  Psychiatric/Behavioral: Negative for sleep disturbance  The patient is nervous/anxious  Past Medical History:   Diagnosis Date    Hemorrhoids     [facility] [address] [address] )        Past Surgical History:   Procedure Laterality Date    KNEE SURGERY         Family History   Problem Relation Age of Onset    Diabetes Father         mellitus    Heart attack Father         acute myocardial infarction    Hyperlipidemia Mother     Multiple sclerosis Maternal Grandfather     Prostate cancer Paternal Grandfather        Social History   reports that he has never smoked  He has never used smokeless tobacco  He reports current alcohol use of about 14 0 standard drinks of alcohol per week  He reports that he does not use drugs         Objective:     Vitals:    09/28/22 1616   BP: 170/100   Pulse: 77   Temp: 97 7 °F (36 5 °C)   SpO2: 98%     Wt Readings from Last 3 Encounters:   09/28/22 87 8 kg (193 lb 9 6 oz)   08/15/22 89 1 kg (196 lb 8 oz)   04/29/22 92 5 kg (203 lb 14 8 oz)     Pulse Readings from Last 3 Encounters:   09/28/22 77   08/15/22 82   04/29/22 80     BP Readings from Last 3 Encounters:   09/28/22 170/100   08/15/22 142/94   04/29/22 142/72         Physical Exam  Vitals reviewed  Constitutional:       General: He is not in acute distress  HENT:      Head: Normocephalic  Right Ear: External ear normal       Left Ear: External ear normal    Eyes:      General: No scleral icterus  Neck:      Vascular: No carotid bruit  Cardiovascular:      Rate and Rhythm: Normal rate and regular rhythm  Heart sounds: S1 normal and S2 normal  No murmur heard  Pulmonary:      Breath sounds: Normal breath sounds  No wheezing or rhonchi  Abdominal:      General: There is no distension  Musculoskeletal:         General: Swelling and deformity present  Right lower leg: No edema  Left lower leg: Edema present  Skin:     General: Skin is warm  Findings: No lesion  Comments: Knee incision well healed   Neurological:      General: No focal deficit present  Mental Status: He is alert     Psychiatric:         Mood and Affect: Mood normal          Pertinent Laboratory/Diagnostic Studies:    Laboratory studies reviewed personally by Gisell Bower    BMP:   Lab Results   Component Value Date    SODIUM 138 03/18/2022    K 4 6 03/18/2022     03/18/2022    CO2 30 03/18/2022    BUN 21 03/18/2022    CREATININE 0 94 03/18/2022    GLUC 104 (H) 03/18/2022    CALCIUM 9 5 03/18/2022     CBC:  Lab Results   Component Value Date    WBC 8 0 03/18/2022    WBC 8 50 04/27/2020    WBC 6 58 11/25/2014    RBC 4 70 03/18/2022    RBC 4 78 04/27/2020    RBC 4 64 11/25/2014    HGB 14 8 03/18/2022    HGB 14 5 04/27/2020    HGB 14 3 11/25/2014    HCT 42 8 03/18/2022    HCT 43 9 04/27/2020    HCT 42 5 11/25/2014    MCV 91 1 03/18/2022    MCV 92 04/27/2020    MCV 92 11/25/2014    MCH 31 5 03/18/2022    MCH 30 3 04/27/2020    MCH 30 8 11/25/2014    RDW 11 8 03/18/2022    RDW 12 2 04/27/2020    RDW 12 8 11/25/2014     03/18/2022     (H) 04/27/2020     11/25/2014     Coags:    Lipid Profile:   Lab Results   Component Value Date    CHOL 265 (H) 02/10/2017     Lab Results   Component Value Date    HDL 86 03/18/2022     Lab Results   Component Value Date    LDLCALC 142 (H) 03/18/2022     Lab Results   Component Value Date    TRIG 60 03/18/2022      No results found for: [id number], TSH  Lab Results   Component Value Date    ALT 31 03/18/2022    AST 16 03/18/2022         Imaging Studies:   No results found  Orders Placed This Encounter   Procedures    Basic metabolic panel    Echo complete w/ contrast if indicated           Vane Feng MD, [facility] of the record may have been created with voice recognition software  Occasional wrong word or "sound a like" substitutions may have occurred due to the inherent limitations of voice recognition software  Read the chart carefully and recognize, using context, where substitutions have occurred  If you have any questions or concerns about the context, text or information contained within the body of this dictation, please contact myself, the provider, for further clarification

## 2022-09-29 LAB
BUN SERPL-MCNC: 19 MG/DL (ref 7–25)
BUN/CREAT SERPL: NORMAL (CALC) (ref 6–22)
CALCIUM SERPL-MCNC: 9.9 MG/DL (ref 8.6–10.3)
CHLORIDE SERPL-SCNC: 102 MMOL/L (ref 98–110)
CO2 SERPL-SCNC: 31 MMOL/L (ref 20–32)
CREAT SERPL-MCNC: 1 MG/DL (ref 0.7–1.35)
GFR/BSA.PRED SERPLBLD CYS-BASED-ARV: 85 ML/MIN/1.73M2
GLUCOSE SERPL-MCNC: 92 MG/DL (ref 65–99)
POTASSIUM SERPL-SCNC: 4.6 MMOL/L (ref 3.5–5.3)
SODIUM SERPL-SCNC: 139 MMOL/L (ref 135–146)

## 2022-09-30 ENCOUNTER — OFFICE VISIT (OUTPATIENT)
Dept: PHYSICAL THERAPY | Facility: CLINIC | Age: 62
End: 2022-09-30
Payer: COMMERCIAL

## 2022-09-30 ENCOUNTER — TELEPHONE (OUTPATIENT)
Dept: CARDIOLOGY CLINIC | Facility: CLINIC | Age: 62
End: 2022-09-30

## 2022-09-30 DIAGNOSIS — Z96.652 TOTAL KNEE REPLACEMENT STATUS, LEFT: Primary | ICD-10-CM

## 2022-09-30 PROCEDURE — 97112 NEUROMUSCULAR REEDUCATION: CPT | Performed by: PHYSICAL THERAPIST

## 2022-09-30 PROCEDURE — 97110 THERAPEUTIC EXERCISES: CPT | Performed by: PHYSICAL THERAPIST

## 2022-09-30 PROCEDURE — 97530 THERAPEUTIC ACTIVITIES: CPT | Performed by: PHYSICAL THERAPIST

## 2022-09-30 PROCEDURE — 97140 MANUAL THERAPY 1/> REGIONS: CPT | Performed by: PHYSICAL THERAPIST

## 2022-09-30 PROCEDURE — 97116 GAIT TRAINING THERAPY: CPT | Performed by: PHYSICAL THERAPIST

## 2022-09-30 NOTE — RESULT ENCOUNTER NOTE
Lab work reviewed  Glucose is normal   Kidney function and potassium are normal     Please call patient with test results

## 2022-09-30 NOTE — PROGRESS NOTES
Re-Evaluation/Daily Note     Today's date: 2022  Patient name: Jae Vasquez  : 1960  MRN: [id number]  Referring provider: Nissa Whitehead  Dx:   Encounter Diagnosis     ICD-10-CM    1  Total knee replacement status, left  Z96 652                 Subjective: Patient reports he needed the two day break after last time because he left hip was really sore  Patient notes that it feels better today but he still feels it a little bit  Patient reports that prolong positions still make him stiff  Patient has the most problem with going up steps at home and then getting of the car because of the tightness  Objective: See treatment diary below      Assessment: Tolerated treatment well  Continued with established POC  Patient had mild complaints of pain in the L hip throughout exercises  Patient responded well with the introduction of leg press this date  Will continue to progress as able  Patient would benefit from continued skilled physical therapy to address the impairments, improve their level of function, and to improve their overall quality of life  Plan: Progress treatment as tolerated             Precautions: Parkinson's Disease, post-op TKA 22       Manuals    Manual knee stretching flex and ext  ACL     Flexion focus in supine ACL     Flexion focus in supine ACL MC ACL ACL [facility] ACL [facility]   Patellar ROM                                       Neuro Re-Ed             Quad set with strap    :05x20 :05x20 :05x20        SLR  /c quad set 3x10 /c quad set 2x10  /c quad set 2x10 /c quad set 2x10 /c quad set 2x10 /c quad set 2x10 /c quad set 2x10 /c quad set 2x10 /c quad set 3x10   TKE in standing              SLS NV                                                   Ther Ex             Heel slides  pball curls :10x2' pball curls :10x2' :10x3' :10x4' :10x4' :10x4' :10x4' pball roll ins for flexion 10" hold 3 min pball curls :10x2' pball curls Michelle Russo 10x2'   Seated heel slides    :10x3' :10x3' 10" 2x2 min 10" 2x2 min :10x4'      Standing hamstring curls  3# 30x 3# 30x  30x 30x 30x 30x 2 5# 30x 2 5# 30x 3# 30x 3# 30x   Heel raises    30x 30x 30x 30x       3-way  3# 2x10 ea B/L 3# 2x10 B/L 3x10 ea bilateral 2x10 ea B/L 2# 2x10 ea B/L 2#    3# 2x10 ea B/L 3# 2x10 ea B/L   Sidestepping    Full // bar 3x Full bar 3x 2#   2 5# 4 laps 2 5# 4 laps 3# 3 laps 3# 3 laps   LAQ with #       LLE   3# 2x10 slight hold at end range        Leg press  50# B/L   2x10           Knee flex/ext machine  Ext 22# S/L 3x10, flex 33# B/L 3x10 Ext 22# S/L 3x10, flex 33# B/L 3x10    NV?   B/L at least  Ext only 22# B/L 2x10 Ext only 22# B/L 2x10 Ext only 22# S/L 2x10 Ext 22# S/L 3x10, flex 33# B/L 3x10   Nustep---recumbent bike  6' full revolution Nu step 6' L2 Nu step 6' L2 Nu step 6' L2 Rocking to half range     5 min Full revolution     5 min rocking to full revolution 5' rocking to full revolution 5' 6' full revolution 6' full revolution   Ther Activity             Mini squats  3x10 3x10 2x10 2x10 2x10 2x10 2x10 2x10 3x10 3x10   Step ups 8" 20x    reciprocal stepping in clinic steps   4x no railing 8" 20x    reciprocal stepping in clinic steps   4x no railing 4" 2x10   6" 10x  6" 20x 6" 20x    8" 20x  8" 20x  8" 20x 8" 20x 8" 20x 8" 20x   Gait Training             SPC             Hurdles  6 laps  6 laps     NV 6 laps  6 laps  nv 6 laps   Modalities

## 2022-09-30 NOTE — TELEPHONE ENCOUNTER
Called patient and reviewed results Patient didn't have any questions I also related to the patient that his test result will be on my chart and if he has any questions or concerns to please call our office

## 2022-09-30 NOTE — TELEPHONE ENCOUNTER
----- Message from Domenic Novoa MD sent at 9/30/2022  8:08 AM EDT -----  Lab work reviewed  Glucose is normal   Kidney function and potassium are normal     Please call patient with test results

## 2022-10-03 ENCOUNTER — OFFICE VISIT (OUTPATIENT)
Dept: PHYSICAL THERAPY | Facility: CLINIC | Age: 62
End: 2022-10-03
Payer: COMMERCIAL

## 2022-10-03 DIAGNOSIS — Z96.652 TOTAL KNEE REPLACEMENT STATUS, LEFT: Primary | ICD-10-CM

## 2022-10-03 PROCEDURE — 97530 THERAPEUTIC ACTIVITIES: CPT

## 2022-10-03 PROCEDURE — 97140 MANUAL THERAPY 1/> REGIONS: CPT

## 2022-10-03 PROCEDURE — 97116 GAIT TRAINING THERAPY: CPT

## 2022-10-03 PROCEDURE — 97112 NEUROMUSCULAR REEDUCATION: CPT

## 2022-10-03 PROCEDURE — 97110 THERAPEUTIC EXERCISES: CPT

## 2022-10-03 NOTE — PROGRESS NOTES
Daily Note     Today's date: 10/3/2022  Patient name: Angelica Street  : 1960  MRN: [id number]  Referring provider: Khushbu Rivera  Dx:   Encounter Diagnosis     ICD-10-CM    1  Total knee replacement status, left  Z96 [id number]                   Subjective: Patient reports he attempted to mow his lawn over the weekend and felt the left knee was stable though the endurance was still lacking  His L hip did not bother him over the weekend  He continues to feel uncomfortable reciprocally negotiating stairs even when using UE assistance at home  Objective: See treatment diary below       Assessment: Tolerated treatment well  Patient demonstrated fatigue post treatment and would benefit from continued PT  Patient notes L posterolateral hip pain with warm up on bike which resolved after active warm up  Mild tightness at end range flexion>extension with good tolerance to manual stretching  Patient continues to use UE assist for balance when negotiating 6" stair reciprocally  Plan: Progress treatment as tolerated         Precautions: Parkinson's Disease, post-op TKA 22       Manuals 9/27 9/29 10/3  9/13 9/16 9/19 9/20 9/22 9/26   Manual knee stretching flex and ext  ACL     Flexion focus in supine ACL     Flexion focus in supine MC flexion focus  ACL ACL MC ACL [facility] MC   Patellar ROM                                       Neuro Re-Ed             Quad set with strap      :05x20        SLR  /c quad set 3x10 /c quad set 2x10 /c quad set 2x10  /c quad set 2x10 /c quad set 2x10 /c quad set 2x10 /c quad set 2x10 /c quad set 2x10 /c quad set 3x10   TKE in standing              SLS NV  :10x5                                                 Ther Ex             Heel slides  pball curls :10x2' pball curls :10x2' pball curls :10x2'  :10x4' :10x4' :10x4' pball roll ins for flexion 10" hold 3 min pball curls :10x2' pball curls :10x2'   Seated heel slides      10" 2x2 min 10" 2x2 min :10x4'      Standing hamstring curls  3# 30x 3# 30x  3# 30x  30x 30x 2 5# 30x 2 5# 30x 3# 30x 3# 30x   Heel raises      30x 30x       3-way  3# 2x10 ea B/L 3# 2x10 B/L 3# 3x10 ea B/L  2x10 ea B/L 2#    3# 2x10 ea B/L 3# 2x10 ea B/L   Sidestepping        2 5# 4 laps 2 5# 4 laps 3# 3 laps 3# 3 laps   LAQ with #       LLE   3# 2x10 slight hold at end range        Leg press  50# B/L   2x10 60# B/L 2x10          Knee flex/ext machine  Ext 22# S/L 3x10, flex 33# B/L 3x10 Ext 22# S/L 3x10, flex 33# B/L 3x10 Ext 22# S/L 3x10, flex 33# B/L 3x10   NV?   B/L at least  Ext only 22# B/L 2x10 Ext only 22# B/L 2x10 Ext only 22# S/L 2x10 Ext 22# S/L 3x10, flex 33# B/L 3x10   Nustep---recumbent bike  6' full revolution Nu step 6' L2 Bike 6'   Rocking to half range     5 min Full revolution     5 min rocking to full revolution 5' rocking to full revolution 5' 6' full revolution 6' full revolution   Ther Activity             Mini squats  3x10 3x10 3x10  2x10 2x10 2x10 2x10 3x10 3x10   Step ups 8" 20x    reciprocal stepping in clinic steps   4x no railing 8" 20x    reciprocal stepping in clinic steps   4x no railing 8" 20x,  6" steps reciprocal 4x   6" 20x    8" 20x  8" 20x  8" 20x 8" 20x 8" 20x 8" 20x   Gait Training             SPC             Hurdles  6 laps  6 laps     NV 6 laps  6 laps  nv 6 laps   Modalities

## 2022-10-04 ENCOUNTER — OFFICE VISIT (OUTPATIENT)
Dept: PHYSICAL THERAPY | Facility: CLINIC | Age: 62
End: 2022-10-04
Payer: COMMERCIAL

## 2022-10-04 DIAGNOSIS — Z96.652 TOTAL KNEE REPLACEMENT STATUS, LEFT: Primary | ICD-10-CM

## 2022-10-04 PROCEDURE — 97110 THERAPEUTIC EXERCISES: CPT

## 2022-10-04 PROCEDURE — 97112 NEUROMUSCULAR REEDUCATION: CPT

## 2022-10-04 PROCEDURE — 97530 THERAPEUTIC ACTIVITIES: CPT

## 2022-10-04 PROCEDURE — 97140 MANUAL THERAPY 1/> REGIONS: CPT

## 2022-10-04 NOTE — PROGRESS NOTES
Daily Note     Today's date: 10/4/2022  Patient name: Hector Muñoz  : 1960   MRN: [id number]  Referring provider: Selma Ward  Dx:   Encounter Diagnosis     ICD-10-CM    1  Total knee replacement status, left  Z96 [id number]                   Subjective: Patient reports L knee is doing well overall and he follows up with ortho next Thursday  Objective: See treatment diary below      Assessment: Tolerated treatment well  Patient demonstrated fatigue post treatment, exhibited good technique with therapeutic exercises and would benefit from continued PT  Progressed resistance on leg press with good tolerance  Patient has mild tightness into flexion>extension initially with manual stretching as well as tenderness to posterior knee  Plan: Progress treatment as tolerated         Precautions: Parkinson's Disease, post-op TKA 22       Manuals 9/27 9/29 10/3 10/4   9/19 9/20 9/22 9/26   Manual knee stretching flex and ext  ACL     Flexion focus in supine ACL     Flexion focus in supine MC flexion focus MC flexion focus   MC ACL [employer] MC   Patellar ROM                                       Neuro Re-Ed             Quad set with strap              SLR  /c quad set 3x10 /c quad set 2x10 /c quad set 2x10 /c quad set 2x10   /c quad set 2x10 /c quad set 2x10 /c quad set 2x10 /c quad set 3x10   TKE in standing              SLS NV  :10x5                                                 Ther Ex             Heel slides  pball curls :10x2' pball curls :10x2' pball curls :10x2' pball curls :10x2'   :10x4' pball roll ins for flexion 10" hold 3 min pball curls :10x2' pball curls :10x2'   Seated heel slides        :10x4'      Standing hamstring curls  3# 30x 3# 30x  3# 30x 3# 30x   2 5# 30x 2 5# 30x 3# 30x 3# 30x   Heel raises              3-way  3# 2x10 ea B/L 3# 2x10 B/L 3# 3x10 ea B/L 3# 3x10 ea B/L     3# 2x10 ea B/L 3# 2x10 ea B/L   Sidestepping        2 5# 4 laps 2 5# 4 laps 3# 3 laps 3# 3 laps   LAQ with # Leg press  50# B/L   2x10 60# B/L 2x10 80# B/L 3x10         Knee flex/ext machine  Ext 22# S/L 3x10, flex 33# B/L 3x10 Ext 22# S/L 3x10, flex 33# B/L 3x10 Ext 22# S/L 3x10, flex 33# B/L 3x10 Ext 22# S/L 3x10   Ext only 22# B/L 2x10 Ext only 22# B/L 2x10 Ext only 22# S/L 2x10 Ext 22# S/L 3x10, flex 33# B/L 3x10   Nustep---recumbent bike  6' full revolution Nu step 6' L2 Bike 6'  Bike 6' L2   rocking to full revolution 5' rocking to full revolution 5' 6' full revolution 6' full revolution   Ther Activity             Mini squats  3x10 3x10 3x10 3x10   2x10 2x10 3x10 3x10   Step ups 8" 20x    reciprocal stepping in clinic steps   4x no railing 8" 20x    reciprocal stepping in clinic steps   4x no railing 8" 20x,  6" steps reciprocal 4x  8" 20x   8" 20x 8" 20x 8" 20x 8" 20x   Gait Training             SPC             Hurdles  6 laps  6 laps      6 laps  6 laps  nv 6 laps   Modalities

## 2022-10-05 ENCOUNTER — TELEPHONE (OUTPATIENT)
Dept: CARDIOLOGY CLINIC | Facility: CLINIC | Age: 62
End: 2022-10-05

## 2022-10-05 ENCOUNTER — HOSPITAL ENCOUNTER (OUTPATIENT)
Dept: NON INVASIVE DIAGNOSTICS | Facility: HOSPITAL | Age: 62
Discharge: HOME | End: 2022-10-05
Attending: INTERNAL MEDICINE
Payer: COMMERCIAL

## 2022-10-05 VITALS
DIASTOLIC BLOOD PRESSURE: 84 MMHG | SYSTOLIC BLOOD PRESSURE: 156 MMHG | BODY MASS INDEX: 27.63 KG/M2 | HEIGHT: 70 IN | WEIGHT: 193 LBS | HEART RATE: 78 BPM

## 2022-10-05 DIAGNOSIS — I10 ESSENTIAL HYPERTENSION: ICD-10-CM

## 2022-10-05 LAB
AORTIC ROOT: 3.9 CM
APICAL FOUR CHAMBER EJECTION FRACTION: 53 %
E WAVE DECELERATION TIME: 265 MS
FRACTIONAL SHORTENING: 35 % (ref 28–44)
INTERVENTRICULAR SEPTUM IN DIASTOLE (PARASTERNAL SHORT AXIS VIEW): 1.2 CM
INTERVENTRICULAR SEPTUM: 1.2 CM (ref 0.6–1.1)
LAAS-AP2: 22.4 CM2
LAAS-AP4: 23.7 CM2
LEFT ATRIUM SIZE: 4.1 CM
LEFT INTERNAL DIMENSION IN SYSTOLE: 3.2 CM (ref 2.1–4)
LEFT VENTRICULAR INTERNAL DIMENSION IN DIASTOLE: 4.9 CM (ref 3.5–6)
LEFT VENTRICULAR POSTERIOR WALL IN END DIASTOLE: 1.1 CM
LEFT VENTRICULAR STROKE VOLUME: 75 ML
LVSV (TEICH): 75 ML
MV E'TISSUE VEL-LAT: 9 CM/S
MV PEAK A VEL: 0.62 M/S
MV PEAK E VEL: 49 CM/S
MV STENOSIS PRESSURE HALF TIME: 77 MS
MV VALVE AREA P 1/2 METHOD: 2.86 CM2
RA PRESSURE ESTIMATED: 5 MMHG
RIGHT ATRIUM AREA SYSTOLE A4C: 24.6 CM2
RIGHT VENTRICLE ID DIMENSION: 4.4 CM
RV PSP: 28 MMHG
SL CV LEFT ATRIUM LENGTH A2C: 5.6 CM
SL CV PED ECHO LEFT VENTRICLE DIASTOLIC VOLUME (MOD BIPLANE) 2D: 114 ML
SL CV PED ECHO LEFT VENTRICLE SYSTOLIC VOLUME (MOD BIPLANE) 2D: 40 ML
TR MAX PG: 23 MMHG
TR PEAK VELOCITY: 2.4 M/S
TRICUSPID VALVE PEAK REGURGITATION VELOCITY: 2.39 M/S

## 2022-10-05 PROCEDURE — 93306 TTE W/DOPPLER COMPLETE: CPT | Performed by: INTERNAL MEDICINE

## 2022-10-05 PROCEDURE — 93306 TTE W/DOPPLER COMPLETE: CPT

## 2022-10-05 NOTE — TELEPHONE ENCOUNTER
----- Message from Jennifer Mast MD sent at 10/5/2022  1:00 PM EDT -----  ECHO reviewed  Pumping strength (ejection fraction) is normal   Valves:  No significant abnormalities  Heart muscle thickness is slightly increased-this is a result of longstanding high blood pressure  Right heart chamber is slightly increased in size, this can sometimes be related to underlying sleep apnea  Please continue current blood pressure medications  Please discuss with Dr Tirso Howell if a sleep study is appropriate  Please call with echo results

## 2022-10-05 NOTE — RESULT ENCOUNTER NOTE
ECHO reviewed  Pumping strength (ejection fraction) is normal   Valves:  No significant abnormalities  Heart muscle thickness is slightly increased-this is a result of longstanding high blood pressure  Right heart chamber is slightly increased in size, this can sometimes be related to underlying sleep apnea  Please continue current blood pressure medications  Please discuss with Dr Mohini Alegria if a sleep study is appropriate  Please call with echo results

## 2022-10-05 NOTE — TELEPHONE ENCOUNTER
Called patient to review Echo results Patient understood results and had no questions or concerns at this time

## 2022-10-07 ENCOUNTER — OFFICE VISIT (OUTPATIENT)
Dept: PHYSICAL THERAPY | Facility: CLINIC | Age: 62
End: 2022-10-07
Payer: COMMERCIAL

## 2022-10-07 DIAGNOSIS — Z96.652 TOTAL KNEE REPLACEMENT STATUS, LEFT: Primary | ICD-10-CM

## 2022-10-07 PROCEDURE — 97530 THERAPEUTIC ACTIVITIES: CPT

## 2022-10-07 PROCEDURE — 97110 THERAPEUTIC EXERCISES: CPT

## 2022-10-07 PROCEDURE — 97140 MANUAL THERAPY 1/> REGIONS: CPT

## 2022-10-07 NOTE — PROGRESS NOTES
Daily Note     Today's date: 10/7/2022  Patient name: Go Field  : 1960  MRN: [id number]  Referring provider: Layton Whittaker  Dx:   Encounter Diagnosis     ICD-10-CM    1  Total knee replacement status, left  [id number] [id number]                   Subjective: Pt reports he over did it yesterday hanging a ceiling fan and closing a pool  Was pretty sore and stiff later last night, with swelling at the L knee  Objective: See treatment diary below      Assessment: Tolerated treatment well  Continued with program as outlined below  No progressions made d/t subjective comments at start of treatment  Occasional circumduction with fwd step up, but was able to self correct once cues provided  Visible limitations in strength and ROM during standing HS curl  Patient would benefit from continued PT  Plan: Continue per plan of care        Precautions: Parkinson's Disease, post-op TKA 22       Manuals 9/27 9/29 10/3 10/4 10/7  9/19 9/20 9/22 9/26   Manual knee stretching flex and ext  ACL     Flexion focus in supine ACL     Flexion focus in supine MC flexion focus MC flexion focus AFB flexion focus  MC ACL [facility] MC   Patellar ROM                                       Neuro Re-Ed             Quad set with strap              SLR  /c quad set 3x10 /c quad set 2x10 /c quad set 2x10 /c quad set 2x10 /c quad set 2x10  /c quad set 2x10 /c quad set 2x10 /c quad set 2x10 /c quad set 3x10   TKE in standing              SLS NV  :10x5                                                 Ther Ex             Heel slides  pball curls :10x2' pball curls :10x2' pball curls :10x2' pball curls :10x2' pball curls :10x2'  :10x4' pball roll ins for flexion 10" hold 3 min pball curls :10x2' pball curls :10x2'   Seated heel slides        :10x4'      Standing hamstring curls  3# 30x 3# 30x  3# 30x 3# 30x 3# 30x  2 5# 30x 2 5# 30x 3# 30x 3# 30x   Heel raises              3-way  3# 2x10 ea B/L 3# 2x10 B/L 3# 3x10 ea B/L 3# 3x10 ea B/L 3# 3x10 ea B/L    3# 2x10 ea B/L 3# 2x10 ea B/L   Sidestepping        2 5# 4 laps 2 5# 4 laps 3# 3 laps 3# 3 laps   LAQ with #              Leg press  50# B/L   2x10 60# B/L 2x10 80# B/L 3x10 80# B/L 3x10        Knee flex/ext machine  Ext 22# S/L 3x10, flex 33# B/L 3x10 Ext 22# S/L 3x10, flex 33# B/L 3x10 Ext 22# S/L 3x10, flex 33# B/L 3x10 Ext 22# S/L 3x10 Ext 22# S/L 3x10  Ext only 22# B/L 2x10 Ext only 22# B/L 2x10 Ext only 22# S/L 2x10 Ext 22# S/L 3x10, flex 33# B/L 3x10   Nustep---recumbent bike  6' full revolution Nu step 6' L2 Bike 6'  Bike 6' L2 Bike 6'   rocking to full revolution 5' rocking to full revolution 5' 6' full revolution 6' full revolution   Ther Activity             Mini squats  3x10 3x10 3x10 3x10 3x10  2x10 2x10 3x10 3x10   Step ups 8" 20x    reciprocal stepping in clinic steps   4x no railing 8" 20x    reciprocal stepping in clinic steps   4x no railing 8" 20x,  6" steps reciprocal 4x  8" 20x 8" 20x  8" 20x 8" 20x 8" 20x 8" 20x   Gait Training             SPC             Hurdles  6 laps  6 laps      6 laps  6 laps  nv 6 laps   Modalities

## 2022-10-10 ENCOUNTER — OFFICE VISIT (OUTPATIENT)
Dept: PHYSICAL THERAPY | Facility: CLINIC | Age: 62
End: 2022-10-10
Payer: COMMERCIAL

## 2022-10-10 DIAGNOSIS — Z96.652 TOTAL KNEE REPLACEMENT STATUS, LEFT: Primary | ICD-10-CM

## 2022-10-10 PROCEDURE — 97110 THERAPEUTIC EXERCISES: CPT | Performed by: PHYSICAL THERAPIST

## 2022-10-10 PROCEDURE — 97116 GAIT TRAINING THERAPY: CPT | Performed by: PHYSICAL THERAPIST

## 2022-10-10 PROCEDURE — 97140 MANUAL THERAPY 1/> REGIONS: CPT | Performed by: PHYSICAL THERAPIST

## 2022-10-10 NOTE — PROGRESS NOTES
Daily Note     Today's date: 10/10/2022  Patient name: Juliocesar Mistry  : 1960  MRN: [id number]  Referring provider: Caroline Neff  Dx:   Encounter Diagnosis     ICD-10-CM    1  Total knee replacement status, left  Z96 652        Start Time: 0815  Stop Time: 0900  Total time in clinic (min): 45 minutes    Subjective: Pt reports he was very busy closing his pool and installing ceiling fans and going up and down a ladder  PAtient notes that he was very sore and the knee was swollen afterwards and continues to ice  Patient notes that the ankle and knee have been itchy and has a rash that is getting better on the knee and ankle  Objective: See treatment diary below    Medial knee rash: small red bumps with reports of itching  Medial ankle: redness with reports of itching  Pitting edema in the ankle: consistent since surgery no worse   Mild increase in temperature in the ankle and lower leg compared to contralateral side  Posterior medial knee tightness with some muscle tension (palpable knot like structure) upon palpation  No full leg swelling or full leg redness  No calf pain with squeeze  Dry skin noted around incision and anterior shin   Temperature: (forehead) 98 6 degrees (toward the end of the session)       Assessment: Patient tolerated treatment fair  Patient continues to have swelling in the shin and ankle that has been consistent since surgery as well as on and off swelling of the knee  No significant changes in this swelling today or increased redness other than with associated rash  bserved and assessment knee and ankle rash this date  Patient doesn't demosntrate signs and symptoms consistent with DVT however symptoms may be related to contact dermatitis or a superficial cellulitis which can common after surgery  Forehead temperature reading was normal upon testing  Patient has another PT appointment tomorrow and will re-asses and refer to PCP if needed   Patient is also following up with ortho on Thursday  Will continue monitor symptoms  Patient would benefit from continued PT  Plan: Continue per plan of care        Precautions: Parkinson's Disease, post-op TKA 8/26/22       Manuals 9/27 9/29 10/3 10/4 10/7 10/10       Manual knee stretching flex and ext  ACL     Flexion focus in supine ACL     Flexion focus in supine MC flexion focus MC flexion focus AFB flexion focus ACL   Flexion focus        Patellar ROM                                       Neuro Re-Ed             Quad set with strap              SLR  /c quad set 3x10 /c quad set 2x10 /c quad set 2x10 /c quad set 2x10 /c quad set 2x10        TKE in standing              SLS NV  :10x5                                                 Ther Ex             Heel slides  pball curls :10x2' pball curls :10x2' pball curls :10x2' pball curls :10x2' pball curls :10x2' pball curls :10x2'       Seated heel slides              Standing hamstring curls  3# 30x 3# 30x  3# 30x 3# 30x 3# 30x        Heel raises              3-way  3# 2x10 ea B/L 3# 2x10 B/L 3# 3x10 ea B/L 3# 3x10 ea B/L 3# 3x10 ea B/L        Sidestepping              LAQ with #              Leg press  50# B/L   2x10 60# B/L 2x10 80# B/L 3x10 80# B/L 3x10 80# B/L 3x10       Knee flex/ext machine  Ext 22# S/L 3x10, flex 33# B/L 3x10 Ext 22# S/L 3x10, flex 33# B/L 3x10 Ext 22# S/L 3x10, flex 33# B/L 3x10 Ext 22# S/L 3x10 Ext 22# S/L 3x10 Ext 22# S/L 3x10, flex 33# B/L 3x10       Nustep---recumbent bike  6' full revolution Nu step 6' L2 Bike 6'  Bike 6' L2 Bike 6'  Bike 6'        Ther Activity             Mini squats  3x10 3x10 3x10 3x10 3x10        Step ups 8" 20x    reciprocal stepping in clinic steps   4x no railing 8" 20x    reciprocal stepping in clinic steps   4x no railing 8" 20x,  6" steps reciprocal 4x  8" 20x 8" 20x        Gait Training             SPC             Hurdles  6 laps  6 laps     6 laps        Modalities

## 2022-10-11 ENCOUNTER — OFFICE VISIT (OUTPATIENT)
Dept: NEUROLOGY | Facility: CLINIC | Age: 62
End: 2022-10-11
Payer: COMMERCIAL

## 2022-10-11 ENCOUNTER — OFFICE VISIT (OUTPATIENT)
Dept: PHYSICAL THERAPY | Facility: CLINIC | Age: 62
End: 2022-10-11
Payer: COMMERCIAL

## 2022-10-11 VITALS
WEIGHT: 193.5 LBS | SYSTOLIC BLOOD PRESSURE: 110 MMHG | BODY MASS INDEX: 27.76 KG/M2 | DIASTOLIC BLOOD PRESSURE: 60 MMHG | HEART RATE: 80 BPM | RESPIRATION RATE: 14 BRPM | TEMPERATURE: 97.5 F

## 2022-10-11 DIAGNOSIS — Z96.652 TOTAL KNEE REPLACEMENT STATUS, LEFT: Primary | ICD-10-CM

## 2022-10-11 DIAGNOSIS — G20 PARKINSON DISEASE (HCC): Primary | ICD-10-CM

## 2022-10-11 PROCEDURE — 97140 MANUAL THERAPY 1/> REGIONS: CPT

## 2022-10-11 PROCEDURE — 97110 THERAPEUTIC EXERCISES: CPT

## 2022-10-11 PROCEDURE — 99214 OFFICE O/P EST MOD 30 MIN: CPT | Performed by: PHYSICIAN ASSISTANT

## 2022-10-11 PROCEDURE — 97112 NEUROMUSCULAR REEDUCATION: CPT

## 2022-10-11 RX ORDER — CARBIDOPA AND LEVODOPA 25; 100 MG/1; MG/1
1 TABLET, EXTENDED RELEASE ORAL
Qty: 90 TABLET | Refills: 3 | Status: SHIPPED | OUTPATIENT
Start: 2022-10-11

## 2022-10-11 RX ORDER — CITALOPRAM 10 MG/1
10 TABLET ORAL DAILY
Qty: 30 TABLET | Refills: 3 | Status: SHIPPED | OUTPATIENT
Start: 2022-10-11

## 2022-10-11 NOTE — PATIENT INSTRUCTIONS
Patient with Samantha positive Parkinson's  He continues to have good control of his Parkinson's symptoms with Sinemet  Currently he is reduced to Sinemet dose to 1 tab 3 times a day along with Sinemet CR  He does have the ability to go to 1 tab 4 times a day if needed  Overall he has no clear on or off with the medication at this time  He is currently recovering from recent left knee replacement  Overall his left knee pain is much better and he is hopeful that this will allow him to get back to more exercise and activity  On exam today he is overall stable  I see no clear progression noted since his last visit  Because of this we will have him continue on his current Sinemet dosing  He also remains on selegiline 5 mg twice a day  In the future if Parkinson's symptoms worsen we would have the ability to both increase his Sinemet dose further as well as add other medications on board as needed  He does continue to have some issues with emotional lability  He will often finds that he cries and or laughs at  at random times  This can be very bothersome to him  He is also finding that he is more reserved than he was in the past   Overall however he does note some improvement of his mood since being out of work for his knee replacement  Once again discussed the option of medication treatments for his mood any emotional issues  We discussed the use of an SSRI such as citalopram, Paxil or sertraline  Given he is also on selegiline we discussed starting a low dose of citalopram given this medication was used in trials with Azilect up to a dose of 20 mg daily  We will start him on citalopram 10 mg daily to see how he does  He will call with any negative side effects  He does have some changes in his voice with hoarseness at times  Will make a referral for speech therapy for the LOUD therapy

## 2022-10-11 NOTE — PROGRESS NOTES
Patient ID: Matthew Bowser is a 58 y o  male  Assessment/Plan:    Parkinson disease [facility])  Patient with Samantha positive Parkinson's  He continues to have good control of his Parkinson's symptoms with Sinemet  Currently he is reduced to Sinemet dose to 1 tab 3 times a day along with Sinemet CR  He does have the ability to go to 1 tab 4 times a day if needed  Overall he has no clear on or off with the medication at this time  He is currently recovering from recent left knee replacement  Overall his left knee pain is much better and he is hopeful that this will allow him to get back to more exercise and activity  On exam today he is overall stable  I see no clear progression noted since his last visit  Because of this we will have him continue on his current Sinemet dosing  He also remains on selegiline 5 mg twice a day  In the future if Parkinson's symptoms worsen we would have the ability to both increase his Sinemet dose further as well as add other medications on board as needed  He does continue to have some issues with emotional lability  He will often finds that he cries and or laughs at  at random times  This can be very bothersome to him  He is also finding that he is more reserved than he was in the past   Overall however he does note some improvement of his mood since being out of work for his knee replacement  Once again discussed the option of medication treatments for his mood any emotional issues  We discussed the use of an SSRI such as citalopram, Paxil or sertraline  Given he is also on selegiline we discussed starting a low dose of citalopram given this medication was used in trials with Azilect up to a dose of 20 mg daily  We will start him on citalopram 10 mg daily to see how he does  He will call with any negative side effects  He does have some changes in his voice with hoarseness at times  Will make a referral for speech therapy for the LOUD therapy  Subjective:    Suzan Coburn is a 58year-old right-handed  who presents in follow up for MARÍA-positive parkinsonism  To review, symptom onset around 2018 with right>left tremor  On initial presentation the patient had a rest and postural tremor on the right, minimal if any kinetic tremor  Also had mild rigidity on the right and slight bradykinesia bilaterally with decrement on the right  His gait was fairly normal with good postural reflexes  No typical non motor symptoms associated with Parkinson's disease  At his last visit with Dr Rui Coelho he was overall stable   No changes were made to his medications  He also had a follow with up with Dr Juan Gilliland at [facility] (8/1/22), no changes were made  INTERVAL HISTORY:  s/p knee replacement 8/26/22, he is recovering well   His left knee pain is much better   He has reduced his Sinemet dose given he was feeling better   He feels more "clear headed"  He feels loopy when he is out in the sun  He is currently off work for recovery from his knee surgery   For the most part he sleeps well   He is using his cane in the middle of the night to use the bathroom  He can perform all of his ADLs on his own   He feels that his mood is better since being off work   He overall still feels more reserved than he was in the past   He is still trying to figure out what he wants to do with returning to work full time     Current medications:  Selegiline 5mg bid   Sinemet 25/100mg 1tab tid - he reduced his dose given he felt better and did not feel he needed it qid   Sinemet CR before bed       I personally reviewed and updated the ROS  Total time spent today was 45 minutes  Greater than 50% of total time was spent with the patient and / or family counseling and / or coordinating plan of care  Objective:    Blood pressure 110/60, pulse 80, temperature 97 5 °F (36 4 °C), temperature source Temporal, resp   rate 14, weight 87 8 kg (193 lb 8 oz)     Physical Exam  Constitutional:       Appearance: Normal appearance  HENT:      Right Ear: Hearing normal       Left Ear: Hearing normal    Eyes:      General: Lids are normal       Extraocular Movements: Extraocular movements intact  Pupils: Pupils are equal, round, and reactive to light  Pulmonary:      Effort: Pulmonary effort is normal    Neurological:      Mental Status: He is alert  Deep Tendon Reflexes: Strength normal    Psychiatric:         Speech: Speech normal          Neurological Exam  Mental Status  Alert  Oriented to person, place and time  Unable to copy figure  Clock drawing is normal  Speech is normal  Able to name objects  Unable to perform serial calculations  MoCA 10/11/22 - 25/30     MoCA 1/26/22 - 24/30   MoCA 24/30 - 8/17/21  Cranial Nerves  CN III, IV, VI: Extraocular movements intact bilaterally  Normal lids and orbits bilaterally  Pupils equal round and reactive to light bilaterally  CN V:  Right: Facial sensation is normal   Left: Facial sensation is normal on the left  CN VIII:  Right: Hearing is normal   Left: Hearing is normal   CN XI: Shoulder shrug strength is normal     Motor   Strength is 5/5 throughout all four extremities  Sensory  Light touch is normal in upper and lower extremities  Coordination  Right: Finger-to-nose normal Left: Finger-to-nose normal     Gait    Arose from the chair without difficulty  Overall good stride  Narendra Carl       UPDRS motor:     4/21/22 10/11/22                              Time since last dose:       Speech  0 0   Facial Expression  1 1   Rigidity - Neck  0 0   Rigidity - Upper Extremity (Right)  1 1   Rigidity - Upper Extremity (Left)   1 1   Rigidity - Lower Extremity (Right)  0 0   Rigidity - Lower Extremity (Left)   0 0   Finger Taps (Right)   2 2   Finger Taps (Left)   1 1   Hand Movement (Right)  1 1   Hand Movement (Left)   1 1   Pronation/Supination (Right)  2 1   Pronation/Supination (Left)   2 1   Toe Tapping (Right) 0 0   Toe Tapping (Left) 1 1   Leg Agility (Right)  1 1   Leg Agility (Left)   1 1   Arising from Chair   0 0   Gait   1 1   Freezing of Gait 0 0   Postural Stability        Posture 0 0   Global spontaneity of movement 1 1   Postural Tremor (Right) 0 0   Postural Tremor (Left) 0 0   Kinetic Tremor (Right)  0 0   Kinetic Tremor (Left)  0 0   Rest tremor amplitude RUE 0 0   Rest tremor amplitude LUE 1 holding phone 1   Rest tremor amplitude RLE 0 0   Reset tremor amplitude LLE 0 0   Lip/Jaw Tremor  0 0   Consistency of tremor 0 0   Motor Exam Total:         ROS:    Review of Systems   Constitutional: Negative  Negative for appetite change and fever  HENT: Negative  Negative for hearing loss, tinnitus, trouble swallowing and voice change  Eyes: Negative for photophobia, pain and visual disturbance  Respiratory: Negative  Negative for shortness of breath  Cardiovascular: Negative  Negative for palpitations  Gastrointestinal: Negative  Negative for nausea and vomiting  Endocrine: Negative  Negative for cold intolerance  Genitourinary: Negative  Negative for dysuria, frequency and urgency  Musculoskeletal: Negative for gait problem, myalgias and neck pain  Skin: Negative  Negative for rash  Neurological: Positive for tremors and light-headedness  Negative for dizziness, seizures, syncope, facial asymmetry, speech difficulty, weakness, numbness and headaches  Hematological: Negative  Does not bruise/bleed easily  Psychiatric/Behavioral: Negative  Negative for confusion, hallucinations and sleep disturbance

## 2022-10-11 NOTE — PROGRESS NOTES
Daily Note     Today's date: 10/11/2022  Patient name: Robinson Villa  : 1960  MRN: [id number]  Referring provider: Medhat Connelly  Dx:   Encounter Diagnosis     ICD-10-CM    1  Total knee replacement status, left  [id number] [id number]                   Subjective: Patient reports continued itching in medial aspect of L knee and lower leg which seems worse with activity  Objective: See treatment diary below      Assessment: Tolerated treatment well  Patient demonstrated fatigue post treatment and would benefit from continued PT  Initiated single leg press with complaints of mild lateral hip discomfort though patient was able to complete repetitions  Patient continues to be challenged by single leg knee extensions at 22#; increase resistance for resisted knee flexion as tolerated NV  Primary PT advised patient to contact PCP office regarding rash along medial knee and lower leg and increased swelling  Patient has follow up with ortho on Thursday  Plan: Progress treatment as tolerated         Precautions: Parkinson's Disease, post-op TKA 22       Manuals  10/3 10/4 10/7 10/10 10/11      Manual knee stretching flex and ext  ACL     Flexion focus in supine ACL     Flexion focus in supine MC flexion focus MC flexion focus AFB flexion focus ACL   Flexion focus  MC flexion focus      Patellar ROM                                       Neuro Re-Ed             Quad set with strap              SLR  /c quad set 3x10 /c quad set 2x10 /c quad set 2x10 /c quad set 2x10 /c quad set 2x10  /c quad set 3x10      TKE in standing              SLS NV  :10x5                                                 Ther Ex             Heel slides  pball curls :10x2' pball curls :10x2' pball curls :10x2' pball curls :10x2' pball curls :10x2' pball curls :10x2' pball curls :10x2'      Seated heel slides              Standing hamstring curls  3# 30x 3# 30x  3# 30x 3# 30x 3# 30x        Heel raises              3-way  3# 2x10 ea B/L 3# 2x10 B/L 3# 3x10 ea B/L 3# 3x10 ea B/L 3# 3x10 ea B/L        Sidestepping              LAQ with #              Leg press  50# B/L   2x10 60# B/L 2x10 80# B/L 3x10 80# B/L 3x10 80# B/L 3x10 85# B/L 2x10  45# 2x10 increase flexion NV     Knee flex/ext machine  Ext 22# S/L 3x10, flex 33# B/L 3x10 Ext 22# S/L 3x10, flex 33# B/L 3x10 Ext 22# S/L 3x10, flex 33# B/L 3x10 Ext 22# S/L 3x10 Ext 22# S/L 3x10 Ext 22# S/L 3x10, flex 33# B/L 3x10 Ext 22# S/L 3x10, flex 33# B/L 3x10      Nustep---recumbent bike  6' full revolution Nu step 6' L2 Bike 6'  Bike 6' L2 Bike 6'  Bike 6'  Bike 6'      Ther Activity             Mini squats  3x10 3x10 3x10 3x10 3x10        Step ups 8" 20x    reciprocal stepping in clinic steps   4x no railing 8" 20x    reciprocal stepping in clinic steps   4x no railing 8" 20x,  6" steps reciprocal 4x  8" 20x 8" 20x        Gait Training             SPC             Hurdles  6 laps  6 laps     6 laps        Modalities

## 2022-10-14 ENCOUNTER — OFFICE VISIT (OUTPATIENT)
Dept: PHYSICAL THERAPY | Facility: CLINIC | Age: 62
End: 2022-10-14
Payer: COMMERCIAL

## 2022-10-14 DIAGNOSIS — Z96.652 TOTAL KNEE REPLACEMENT STATUS, LEFT: Primary | ICD-10-CM

## 2022-10-14 PROCEDURE — 97530 THERAPEUTIC ACTIVITIES: CPT | Performed by: PHYSICAL THERAPIST

## 2022-10-14 PROCEDURE — 97116 GAIT TRAINING THERAPY: CPT | Performed by: PHYSICAL THERAPIST

## 2022-10-14 PROCEDURE — 97110 THERAPEUTIC EXERCISES: CPT | Performed by: PHYSICAL THERAPIST

## 2022-10-14 NOTE — PROGRESS NOTES
Daily Note     Today's date: 10/14/2022  Patient name: Angelica Street  : 1960  MRN: [id number]  Referring provider: Khushbu Rivera  Dx:   Encounter Diagnosis     ICD-10-CM    1  Total knee replacement status, left  [id number] [id number]        Start Time: 0800  Stop Time: 0845  Total time in clinic (min): 45 minutes    Subjective: Patient reports he followed with ortho surgeon and everything looks fine  Patient notes that he said the swelling and rash is normal and there is no infection  Patient notes that he is happy with his progression so far and to continued with more higher level strengthening  Objective: See treatment diary below      Assessment: Patient tolerated treatment well  Patient responded well to progressions and new exercises this date  Patient challenged with slider lung technique requiring verbal and tactile cueing  Patient continues to have slight circumduction with hurdles but able to correct with cueing  Discussed with patient to start doing more walks at home to progress cardiovascular endurance  Will continue to progress as able  Patient demonstrated fatigue post treatment and would benefit from continued PT  Plan: Progress treatment as tolerated         Precautions: Parkinson's Disease, post-op TKA 22       Manuals 9/27 9/29 10/3 10/4 10/7 10/10 10/11 10/14     Manual knee stretching flex and ext  ACL     Flexion focus in supine ACL     Flexion focus in supine MC flexion focus MC flexion focus AFB flexion focus ACL   Flexion focus  MC flexion focus      Patellar ROM                                       Neuro Re-Ed             Quad set with strap              SLR  /c quad set 3x10 /c quad set 2x10 /c quad set 2x10 /c quad set 2x10 /c quad set 2x10  /c quad set 3x10      TKE in standing              SLS NV  :10x5                                                 Ther Ex             Heel slides  pball curls :10x2' pball curls :10x2' pball curls :10x2' pball curls :10x2' pball curls :10x2' pball curls :10x2' pball curls :10x2'      Seated heel slides              Standing hamstring curls  3# 30x 3# 30x  3# 30x 3# 30x 3# 30x        Heel raises              3-way  3# 2x10 ea B/L 3# 2x10 B/L 3# 3x10 ea B/L 3# 3x10 ea B/L 3# 3x10 ea B/L        Sidestepping              Slider lunges         2x10   BWD and LAT     10x ea way      Leg press  50# B/L   2x10 60# B/L 2x10 80# B/L 3x10 80# B/L 3x10 80# B/L 3x10 85# B/L 2x10  45# 2x10 90# B/L 3x10      60# 3x10     Knee flex/ext machine  Ext 22# S/L 3x10, flex 33# B/L 3x10 Ext 22# S/L 3x10, flex 33# B/L 3x10 Ext 22# S/L 3x10, flex 33# B/L 3x10 Ext 22# S/L 3x10 Ext 22# S/L 3x10 Ext 22# S/L 3x10, flex 33# B/L 3x10 Ext 22# S/L 3x10, flex 33# B/L 3x10 Ext 33# S/L 3x10,      33# B/L 3x10     Nustep---recumbent bike  6' full revolution Nu step 6' L2 Bike 6'  Bike 6' L2 Bike 6'  Bike 6'  Bike 6' Bike 6'     Ther Activity             Mini squats  3x10 3x10 3x10 3x10 3x10        Step ups 8" 20x    reciprocal stepping in clinic steps   4x no railing 8" 20x    reciprocal stepping in clinic steps   4x no railing 8" 20x,  6" steps reciprocal 4x  8" 20x 8" 20x   20x 8"     4# DB in UEs     Sit to stands with resistance         10# KB   2x10  10# KB   2x10     Gait Training             SPC             Hurdles  6 laps  6 laps     6 laps   6 laps      Modalities

## 2022-10-17 ENCOUNTER — OFFICE VISIT (OUTPATIENT)
Dept: PHYSICAL THERAPY | Facility: CLINIC | Age: 62
End: 2022-10-17
Payer: COMMERCIAL

## 2022-10-17 DIAGNOSIS — Z96.652 TOTAL KNEE REPLACEMENT STATUS, LEFT: Primary | ICD-10-CM

## 2022-10-17 PROCEDURE — 97110 THERAPEUTIC EXERCISES: CPT | Performed by: PHYSICAL THERAPIST

## 2022-10-17 PROCEDURE — 97530 THERAPEUTIC ACTIVITIES: CPT | Performed by: PHYSICAL THERAPIST

## 2022-10-17 PROCEDURE — 97112 NEUROMUSCULAR REEDUCATION: CPT | Performed by: PHYSICAL THERAPIST

## 2022-10-17 NOTE — PROGRESS NOTES
Daily Note     Today's date: 10/17/2022  Patient name: Tolu Martin  : 1960  MRN: [id number]  Referring provider: Acacia Sheppard  Dx:   Encounter Diagnosis     ICD-10-CM    1  Total knee replacement status, left  [id number] [id number]        Start Time: 0815  Stop Time: 0900  Total time in clinic (min): 45 minutes    Subjective: Patient reports he felt felt after last session  Patient notes that he went for a couple mile hike over the weekend and felt good  Patient notes that he continues to ice for swelling and pain  Objective: See treatment diary below      Assessment: Patient tolerated treatment well  Continued with more progressive strengthening program this date  Patient had no flare ups in pain  Will continue to progress as able  Patient demonstrated fatigue post treatment and would benefit from continued PT  Plan: Progress treatment as tolerated         Precautions: Parkinson's Disease, post-op TKA 22       Manuals 9/27 9/29 10/3 10/4 10/7 10/10 10/11 10/14 10/17    Manual knee stretching flex and ext  ACL     Flexion focus in supine ACL     Flexion focus in supine MC flexion focus MC flexion focus AFB flexion focus ACL   Flexion focus  MC flexion focus      Patellar ROM                                       Neuro Re-Ed             Quad set with strap              SLR  /c quad set 3x10 /c quad set 2x10 /c quad set 2x10 /c quad set 2x10 /c quad set 2x10  /c quad set 3x10      TKE in standing              SLS NV  :10x5      :10x5                                           Ther Ex             Heel slides  pball curls :10x2' pball curls :10x2' pball curls :10x2' pball curls :10x2' pball curls :10x2' pball curls :10x2' pball curls :10x2'      Seated heel slides              Standing hamstring curls  3# 30x 3# 30x  3# 30x 3# 30x 3# 30x        Heel raises              3-way  3# 2x10 ea B/L 3# 2x10 B/L 3# 3x10 ea B/L 3# 3x10 ea B/L 3# 3x10 ea B/L    3# 3x10 ea B/L    Sidestepping              Slider lunges         2x10   BWD and LAT     10x ea way  2x10   BWD and LAT     10x ea way     Leg press  50# B/L   2x10 60# B/L 2x10 80# B/L 3x10 80# B/L 3x10 80# B/L 3x10 85# B/L 2x10  45# 2x10 90# B/L 3x10      60# 3x10 60# 3x10    Knee flex/ext machine  Ext 22# S/L 3x10, flex 33# B/L 3x10 Ext 22# S/L 3x10, flex 33# B/L 3x10 Ext 22# S/L 3x10, flex 33# B/L 3x10 Ext 22# S/L 3x10 Ext 22# S/L 3x10 Ext 22# S/L 3x10, flex 33# B/L 3x10 Ext 22# S/L 3x10, flex 33# B/L 3x10 Ext 33# S/L 3x10,      33# B/L 3x10 Ext 33# S/L 3x10,      33# B/L 3x10    Nustep---recumbent bike  6' full revolution Nu step 6' L2 Bike 6'  Bike 6' L2 Bike 6'  Bike 6'  Bike 6' Bike 6' Bike 6'    Ther Activity             Mini squats  3x10 3x10 3x10 3x10 3x10        Step ups 8" 20x    reciprocal stepping in clinic steps   4x no railing 8" 20x    reciprocal stepping in clinic steps   4x no railing 8" 20x,  6" steps reciprocal 4x  8" 20x 8" 20x   20x 8"     4# DB in UEs 20x 8"     4# DB in UEs    Sit to stands with resistance         10# KB   2x10  10# KB   2x10     Gait Training             SPC             Hurdles  6 laps  6 laps     6 laps   6 laps      Modalities

## 2022-10-18 ENCOUNTER — OFFICE VISIT (OUTPATIENT)
Dept: PHYSICAL THERAPY | Facility: CLINIC | Age: 62
End: 2022-10-18
Payer: COMMERCIAL

## 2022-10-18 DIAGNOSIS — Z96.652 TOTAL KNEE REPLACEMENT STATUS, LEFT: Primary | ICD-10-CM

## 2022-10-18 PROCEDURE — 97110 THERAPEUTIC EXERCISES: CPT | Performed by: PHYSICAL THERAPIST

## 2022-10-18 PROCEDURE — 97530 THERAPEUTIC ACTIVITIES: CPT | Performed by: PHYSICAL THERAPIST

## 2022-10-18 PROCEDURE — 97112 NEUROMUSCULAR REEDUCATION: CPT | Performed by: PHYSICAL THERAPIST

## 2022-10-18 NOTE — PROGRESS NOTES
Daily Note     Today's date: 10/18/2022  Patient name: Tolu Martin  : 1960  MRN: [id number]  Referring provider: Acacia Sheppard  Dx:   Encounter Diagnosis     ICD-10-CM    1  Total knee replacement status, left  [id number] [id number]        Start Time: 815  Stop Time: 0900  Total time in clinic (min): 45 minutes    Subjective: Patient reports he was a little sore after last time because he went home and did some weeding  Patient reports it was a good soreness though  Patient notes the rash is getting better but still will get some itchiness at times  Objective: See treatment diary below      Assessment: Patient tolerated treatment well  Continued with more progressive strengthening program this date  Patient responded well to new exercise and progressions this date  Patient had no flare ups in pain  Will continue to progress as able  Patient demonstrated fatigue post treatment and would benefit from continued PT  Plan: Progress treatment as tolerated         Precautions: Parkinson's Disease, post-op TKA 22       Manuals 9/27 9/29 10/3 10/4 10/7 10/10 10/11 10/14 10/17 10/18   Manual knee stretching flex and ext  ACL     Flexion focus in supine ACL     Flexion focus in supine MC flexion focus MC flexion focus AFB flexion focus ACL   Flexion focus  MC flexion focus      Patellar ROM                                       Neuro Re-Ed             Quad set with strap              SLR  /c quad set 3x10 /c quad set 2x10 /c quad set 2x10 /c quad set 2x10 /c quad set 2x10  /c quad set 3x10      TKE in standing              SLS NV  :10x5      :10x5 :15x3 ea                                          Ther Ex             Heel slides  pball curls :10x2' pball curls :10x2' pball curls :10x2' pball curls :10x2' pball curls :10x2' pball curls :10x2' pball curls :10x2'      Modified split squats with a TRX band           2x10   3-way  3# 2x10 ea B/L 3# 2x10 B/L 3# 3x10 ea B/L 3# 3x10 ea B/L 3# 3x10 ea B/L    3# 3x10 ea B/L 4# hip ext only 3x10   Sidestepping           4# 3 laps    Slider lunges         2x10   BWD and LAT     10x ea way  2x10   BWD and LAT     10x ea way  2x10   BWD and LAT     10x ea way   Leg press  50# B/L   2x10 60# B/L 2x10 80# B/L 3x10 80# B/L 3x10 80# B/L 3x10 85# B/L 2x10  45# 2x10 90# B/L 3x10      60# 3x10 60# 3x10 60# 3x10    S/L   Knee flex/ext machine  Ext 22# S/L 3x10, flex 33# B/L 3x10 Ext 22# S/L 3x10, flex 33# B/L 3x10 Ext 22# S/L 3x10, flex 33# B/L 3x10 Ext 22# S/L 3x10 Ext 22# S/L 3x10 Ext 22# S/L 3x10, flex 33# B/L 3x10 Ext 22# S/L 3x10, flex 33# B/L 3x10 Ext 33# S/L 3x10,      33# B/L 3x10 Ext 33# S/L 3x10,      33# B/L 3x10 Ext 33# S/L 3x10,      33# B/L 3x10   Nustep---recumbent bike  6' full revolution Nu step 6' L2 Bike 6'  Bike 6' L2 Bike 6'  Bike 6'  Bike 6' Bike 6' Bike 6' Bike 6'   Ther Activity             Mini squats  3x10 3x10 3x10 3x10 3x10        Step ups 8" 20x    reciprocal stepping in clinic steps   4x no railing 8" 20x    reciprocal stepping in clinic steps   4x no railing 8" 20x,  6" steps reciprocal 4x  8" 20x 8" 20x   20x 8"     4# DB in UEs 20x 8"     4# DB in UEs 20x 8"     4# DB in UEs   Sit to stands with resistance         10# KB   2x10  10# KB   2x10  10# KB   2x10    Gait Training             SPC             Hurdles  6 laps  6 laps     6 laps   6 laps      Modalities

## 2022-10-20 DIAGNOSIS — J31.0 CHRONIC RHINITIS: ICD-10-CM

## 2022-10-20 RX ORDER — FLUTICASONE PROPIONATE 50 MCG
2 SPRAY, SUSPENSION (ML) NASAL DAILY
Qty: 16 G | Refills: 2 | Status: SHIPPED | OUTPATIENT
Start: 2022-10-20

## 2022-10-21 ENCOUNTER — OFFICE VISIT (OUTPATIENT)
Dept: PHYSICAL THERAPY | Facility: CLINIC | Age: 62
End: 2022-10-21
Payer: COMMERCIAL

## 2022-10-21 DIAGNOSIS — Z96.652 TOTAL KNEE REPLACEMENT STATUS, LEFT: Primary | ICD-10-CM

## 2022-10-21 DIAGNOSIS — I10 ESSENTIAL HYPERTENSION: ICD-10-CM

## 2022-10-21 PROCEDURE — 97140 MANUAL THERAPY 1/> REGIONS: CPT

## 2022-10-21 PROCEDURE — 97110 THERAPEUTIC EXERCISES: CPT

## 2022-10-21 PROCEDURE — 97112 NEUROMUSCULAR REEDUCATION: CPT

## 2022-10-21 RX ORDER — LOSARTAN POTASSIUM AND HYDROCHLOROTHIAZIDE 12.5; 1 MG/1; MG/1
TABLET ORAL
Qty: 90 TABLET | Refills: 1 | Status: SHIPPED | OUTPATIENT
Start: 2022-10-21

## 2022-10-21 NOTE — PROGRESS NOTES
Daily Note     Today's date: 10/21/2022  Patient name: Deloris Gonzalez  : 1960  MRN: [id number]  Referring provider: Maura Gaspar  Dx:   Encounter Diagnosis     ICD-10-CM    1  Total knee replacement status, left  [id number] [id number]                   Subjective: Patient reports increased stiffness in L knee this morning  Objective: See treatment diary below      Assessment: Tolerated treatment well  Patient demonstrated fatigue post treatment and would benefit from continued PT  Performed brief manual stretching due to subjective report with decreased stiffness reported post   Reviewed floor transfers per patient request as he would like to start working on push ups at home  Also trialed full plank with emphasis on TKE with good tolerance and no pain or instability  Plan: Progress treatment as tolerated         Precautions: Parkinson's Disease, post-op TKA 22        Manuals 10/21  10/3 10/4 10/7 10/10 10/11 10/14 10/17 10/18   Manual knee stretching flex and ext  MC  MC flexion focus MC flexion focus AFB flexion focus ACL   Flexion focus  MC flexion focus      Patellar ROM                                       Neuro Re-Ed             Quad set with strap              SLR    /c quad set 2x10 /c quad set 2x10 /c quad set 2x10  /c quad set 3x10      TKE in standing              SLS   :10x5      :10x5 :15x3 ea                                          Ther Ex             Heel slides    pball curls :10x2' pball curls :10x2' pball curls :10x2' pball curls :10x2' pball curls :10x2'      Modified split squats with a TRX band  2x10         2x10   3-way    3# 3x10 ea B/L 3# 3x10 ea B/L 3# 3x10 ea B/L    3# 3x10 ea B/L 4# hip ext only 3x10   Sidestepping           4# 3 laps    Slider lunges  2x10   BWD and LAT     10x ea way       2x10   BWD and LAT     10x ea way  2x10   BWD and LAT     10x ea way  2x10   BWD and LAT     10x ea way   Leg press 65# 3x10 S/L  60# B/L 2x10 80# B/L 3x10 80# B/L 3x10 80# B/L 3x10 85# B/L 2x10  45# 2x10 90# B/L 3x10      60# 3x10 60# 3x10 60# 3x10    S/L   Knee flex/ext machine  Ext 33# S/L 3x10,      33# B/L 3x10  Ext 22# S/L 3x10, flex 33# B/L 3x10 Ext 22# S/L 3x10 Ext 22# S/L 3x10 Ext 22# S/L 3x10, flex 33# B/L 3x10 Ext 22# S/L 3x10, flex 33# B/L 3x10 Ext 33# S/L 3x10,      33# B/L 3x10 Ext 33# S/L 3x10,      33# B/L 3x10 Ext 33# S/L 3x10,      33# B/L 3x10   Nustep---recumbent bike  Bike 6'  Bike 6'  Bike 6' L2 Bike 6'  Bike 6'  Bike 6' Bike 6' Bike 6' Bike 6'   Ther Activity             Mini squats    3x10 3x10 3x10        Step ups 20x 8"     6# DB in UEs  8" 20x,  6" steps reciprocal 4x  8" 20x 8" 20x   20x 8"     4# DB in UEs 20x 8"     4# DB in UEs 20x 8"     4# DB in UEs   Sit to stands with resistance  15# KB 2x10       10# KB   2x10  10# KB   2x10  10# KB   2x10    Gait Training             SPC             Hurdles       6 laps   6 laps      Modalities

## 2022-10-24 ENCOUNTER — OFFICE VISIT (OUTPATIENT)
Dept: PHYSICAL THERAPY | Facility: CLINIC | Age: 62
End: 2022-10-24
Payer: COMMERCIAL

## 2022-10-24 DIAGNOSIS — Z96.652 TOTAL KNEE REPLACEMENT STATUS, LEFT: Primary | ICD-10-CM

## 2022-10-24 PROCEDURE — 97110 THERAPEUTIC EXERCISES: CPT | Performed by: PHYSICAL THERAPIST

## 2022-10-24 PROCEDURE — 97530 THERAPEUTIC ACTIVITIES: CPT | Performed by: PHYSICAL THERAPIST

## 2022-10-24 PROCEDURE — 97112 NEUROMUSCULAR REEDUCATION: CPT | Performed by: PHYSICAL THERAPIST

## 2022-10-24 NOTE — PROGRESS NOTES
Daily Note     Today's date: 10/24/2022  Patient name: Nixon Berry  : 1960  MRN: [id number]  Referring provider: Muna Jenkins  Dx:   Encounter Diagnosis     ICD-10-CM    1  Total knee replacement status, left  Z96 [id number]        Start Time: 0815  Stop Time: 0900  Total time in clinic (min): 45 minutes    Subjective: Patient reports feeling good this morning with little to no swelling in the knee  Objective: See treatment diary below      Assessment: Patient tolerated treatment well  Continued with established POC as noted  Patient had no flare ups in pain  Patient demonstrating proper technique throughout  Will continue to progress as able  Patient demonstrated fatigue post treatment and would benefit from continued PT  Plan: Progress treatment as tolerated         Precautions: Parkinson's Disease, post-op TKA 22        Manuals 10/21 10/24 10/3 10/4 10/7 10/10 10/11 10/14 10/17 10/18   Manual knee stretching flex and ext  MC  MC flexion focus MC flexion focus AFB flexion focus ACL   Flexion focus  MC flexion focus      Patellar ROM                                       Neuro Re-Ed             Quad set with strap              SLR    /c quad set 2x10 /c quad set 2x10 /c quad set 2x10  /c quad set 3x10      TKE in standing              SLS  :15x3 ea :10x5      :10x5 :15x3 ea                                          Ther Ex             Heel slides   pball curls 5" hold 20x  pball curls :10x2' pball curls :10x2' pball curls :10x2' pball curls :10x2' pball curls :10x2'      Modified split squats with a TRX band  2x10         2x10   3-way    3# 3x10 ea B/L 3# 3x10 ea B/L 3# 3x10 ea B/L    3# 3x10 ea B/L 4# hip ext only 3x10   Sidestepping           4# 3 laps    Slider lunges  2x10   BWD and LAT     10x ea way 2x10   BWD and LAT     10x ea way      2x10   BWD and LAT     10x ea way  2x10   BWD and LAT     10x ea way  2x10   BWD and LAT     10x ea way   Leg press 65# 3x10 S/L 65# 3x10 S/L 60# B/L 2x10 80# B/L 3x10 80# B/L 3x10 80# B/L 3x10 85# B/L 2x10  45# 2x10 90# B/L 3x10      60# 3x10 60# 3x10 60# 3x10    S/L   Knee flex/ext machine  Ext 33# S/L 3x10,      33# B/L 3x10 Ext 33# S/L 3x10,      33# B/L 3x10 Ext 22# S/L 3x10, flex 33# B/L 3x10 Ext 22# S/L 3x10 Ext 22# S/L 3x10 Ext 22# S/L 3x10, flex 33# B/L 3x10 Ext 22# S/L 3x10, flex 33# B/L 3x10 Ext 33# S/L 3x10,      33# B/L 3x10 Ext 33# S/L 3x10,      33# B/L 3x10 Ext 33# S/L 3x10,      33# B/L 3x10   Nustep---recumbent bike  Bike 6' Bike 6' Bike 6'  Bike 6' L2 Bike 6'  Bike 6'  Bike 6' Bike 6' Bike 6' Bike 6'   Ther Activity             Mini squats    3x10 3x10 3x10        Step ups 20x 8"     6# DB in UEs 20x 8"     6# DB in UEs 8" 20x,  6" steps reciprocal 4x  8" 20x 8" 20x   20x 8"     4# DB in UEs 20x 8"     4# DB in UEs 20x 8"     4# DB in UEs   Sit to stands with resistance  15# KB 2x10 15# KB 2x10      10# KB   2x10  10# KB   2x10  10# KB   2x10    Gait Training             SPC             Hurdles       6 laps   6 laps      Modalities

## 2022-10-25 ENCOUNTER — OFFICE VISIT (OUTPATIENT)
Dept: PHYSICAL THERAPY | Facility: CLINIC | Age: 62
End: 2022-10-25
Payer: COMMERCIAL

## 2022-10-25 DIAGNOSIS — Z96.652 TOTAL KNEE REPLACEMENT STATUS, LEFT: Primary | ICD-10-CM

## 2022-10-25 PROCEDURE — 97112 NEUROMUSCULAR REEDUCATION: CPT

## 2022-10-25 PROCEDURE — 97530 THERAPEUTIC ACTIVITIES: CPT

## 2022-10-25 PROCEDURE — 97110 THERAPEUTIC EXERCISES: CPT

## 2022-10-25 NOTE — PROGRESS NOTES
Daily Note     Today's date: 10/25/2022  Patient name: Gladys Dominguez  : 1960  MRN: [id number]  Referring provider: Rodri Valdes  Dx:   Encounter Diagnosis     ICD-10-CM    1  Total knee replacement status, left  [id number] [id number]                   Subjective: Patient reports L knee stiffness this morning though he is doing well overall  Objective: See treatment diary below      Assessment: Tolerated treatment well  Patient demonstrated fatigue post treatment, exhibited good technique with therapeutic exercises and would benefit from continued PT  Progressed LE strengthening to include dead lifts and goblet squats as patient would like more exercises that he can transition into a HEP when he is finished with formal PT  Increased resistance for bilateral hamstring curls with good tolerance  Performed brief manual stretching per patient request with increased stiffness felt at end range in both directions  Plan: Progress treatment as tolerated         Precautions: Parkinson's Disease, post-op TKA 22         Manuals 10/21 10/24 10/25   10/10 10/11 10/14 10/17 10/18   Manual knee stretching flex and ext  MC  MC brief   ACL   Flexion focus  MC flexion focus      Patellar ROM                                       Neuro Re-Ed             Quad set with strap              SLR        /c quad set 3x10      TKE in standing              SLS  :15x3 ea :15x3      :10x5 :15x3 ea                                          Ther Ex             Heel slides   pball curls 5" hold 20x     pball curls :10x2' pball curls :10x2'      Modified split squats with a TRX band  2x10         2x10   3-way          3# 3x10 ea B/L 4# hip ext only 3x10   Sidestepping           4# 3 laps    Slider lunges  2x10   BWD and LAT     10x ea way 2x10   BWD and LAT     10x ea way 2x10   BWD and LAT     10x ea way     2x10   BWD and LAT     10x ea way  2x10   BWD and LAT     10x ea way  2x10   BWD and LAT     10x ea way   Leg press 65# 3x10 S/L 65# 3x10 S/L 65# 3x10 S/L increase   80# B/L 3x10 85# B/L 2x10  45# 2x10 90# B/L 3x10      60# 3x10 60# 3x10 60# 3x10    S/L   Knee flex/ext machine  Ext 33# S/L 3x10,      33# B/L 3x10 Ext 33# S/L 3x10,      33# B/L 3x10 Ext 33# S/L 3x10,      44# B/L 3x10   Ext 22# S/L 3x10, flex 33# B/L 3x10 Ext 22# S/L 3x10, flex 33# B/L 3x10 Ext 33# S/L 3x10,      33# B/L 3x10 Ext 33# S/L 3x10,      33# B/L 3x10 Ext 33# S/L 3x10,      33# B/L 3x10   Nustep---recumbent bike  Bike 6' Bike 6' Bike 6'   Bike 6'  Bike 6' Bike 6' Bike 6' Bike 6'   Ther Activity             Mini squats              Step ups 20x 8"     6# DB in UEs 20x 8"     6# DB in UEs 20x 8"     6# DB in UEs     20x 8"     4# DB in UEs 20x 8"     4# DB in UEs 20x 8"     4# DB in UEs   Sit to stands with resistance  15# KB 2x10 15# KB 2x10      10# KB   2x10  10# KB   2x10  10# KB   2x10    Goblet squats   15# KB 2x10          Dead lifts   6# DBs 2x10                       Gait Training             SPC             Hurdles       6 laps   6 laps      Modalities

## 2022-10-28 ENCOUNTER — OFFICE VISIT (OUTPATIENT)
Dept: PHYSICAL THERAPY | Facility: CLINIC | Age: 62
End: 2022-10-28
Payer: COMMERCIAL

## 2022-10-28 DIAGNOSIS — Z96.652 TOTAL KNEE REPLACEMENT STATUS, LEFT: Primary | ICD-10-CM

## 2022-10-28 PROCEDURE — 97530 THERAPEUTIC ACTIVITIES: CPT

## 2022-10-28 PROCEDURE — 97110 THERAPEUTIC EXERCISES: CPT

## 2022-10-28 PROCEDURE — 97112 NEUROMUSCULAR REEDUCATION: CPT

## 2022-10-28 NOTE — PROGRESS NOTES
Daily Note     Today's date: 10/28/2022  Patient name: Pina Mejia  : 1960  MRN: [id number]  Referring provider: Sarah Bradley  Dx:   Encounter Diagnosis     ICD-10-CM    1  Total knee replacement status, left  [id number] [id number]                   Subjective: Patient reports L knee feels great and he walked his dog the last two days with no issues  He notes difficulty donning pants due to imbalance  Objective: See treatment diary below      Assessment: Tolerated treatment well  Patient exhibited good technique with therapeutic exercises and would benefit from continued PT  Progressed SLS to include dynamic balance activities which were more challenging when standing on RLE versus L  Increased resistance on leg press with good tolerance and mild LE fatigue at conclusion of third set  Plan: Progress treatment as tolerated         Precautions: Parkinson's Disease, post-op TKA 22         Manuals 10/21 10/24 10/25 10/28  10/10 10/11 10/14 10/17 10/18   Manual knee stretching flex and ext  MC  MC brief   ACL   Flexion focus  MC flexion focus      Patellar ROM                                       Neuro Re-Ed             Quad set with strap              SLR        /c quad set 3x10      TKE in standing              SLS  :15x3 ea :15x3      :10x5 :15x3 ea   SLS + cone taps   4 cones 5 rounds 4 cones 5 rounds         SLS + forward reach to chair    2x10                      Ther Ex             Heel slides   pball curls 5" hold 20x     pball curls :10x2' pball curls :10x2'      Modified split squats with a TRX band  2x10   2x10      2x10   3-way          3# 3x10 ea B/L 4# hip ext only 3x10   Sidestepping           4# 3 laps    Slider lunges  2x10   BWD and LAT     10x ea way 2x10   BWD and LAT     10x ea way 2x10   BWD and LAT     10x ea way 2x10   BWD and LAT     10x ea way    2x10   BWD and LAT     10x ea way  2x10   BWD and LAT     10x ea way  2x10   BWD and LAT     10x ea way   Leg press 65# 3x10 S/L 65# 3x10 S/L 65# 3x10 S/L 75# 3x10 S/L  80# B/L 3x10 85# B/L 2x10   45# 2x10 90# B/L 3x10      60# 3x10 60# 3x10 60# 3x10    S/L   Knee flex/ext machine  Ext 33# S/L 3x10,      33# B/L 3x10 Ext 33# S/L 3x10,      33# B/L 3x10 Ext 33# S/L 3x10,      44# B/L 3x10 Ext 33# S/L 3x10,      44# B/L 3x10  Ext 22# S/L 3x10, flex 33# B/L 3x10 Ext 22# S/L 3x10, flex 33# B/L 3x10 Ext 33# S/L 3x10,      33# B/L 3x10 Ext 33# S/L 3x10,      33# B/L 3x10 Ext 33# S/L 3x10,      33# B/L 3x10   Nustep---recumbent bike  Bike 6' Bike 6' Bike 6' Bike 6'  Bike 6'  Bike 6' Bike 6' Bike 6' Bike 6'   Ther Activity             Mini squats              Step ups 20x 8"     6# DB in UEs 20x 8"     6# DB in UEs 20x 8"     6# DB in UEs 20x 8" 8# DBs    20x 8"     4# DB in UEs 20x 8"     4# DB in UEs 20x 8"     4# DB in UEs   Sit to stands with resistance  15# KB 2x10 15# KB 2x10      10# KB   2x10  10# KB   2x10  10# KB   2x10    Goblet squats   15# KB 2x10 15# KB 2x10         Dead lifts   6# DBs 2x10                       Gait Training             SPC             Hurdles       6 laps   6 laps      Modalities

## 2022-10-31 ENCOUNTER — OFFICE VISIT (OUTPATIENT)
Dept: PHYSICAL THERAPY | Facility: CLINIC | Age: 62
End: 2022-10-31

## 2022-10-31 DIAGNOSIS — Z96.652 TOTAL KNEE REPLACEMENT STATUS, LEFT: Primary | ICD-10-CM

## 2022-10-31 NOTE — PROGRESS NOTES
Daily Note     Today's date: 10/31/2022  Patient name: Rocky Menjivar  : 1960  MRN: [id number]  Referring provider: Kit Antonio  Dx:   Encounter Diagnosis     ICD-10-CM    1  Total knee replacement status, left  Z96 [id number]                   Subjective: Patient reports continued stiffness in the morning which works out with activity  Objective: See treatment diary below      Assessment: Tolerated treatment well  Patient demonstrated fatigue post treatment, exhibited good technique with therapeutic exercises and would benefit from continued PT  Challenged by dynamic SLS activities though able to recover from mild LOB with stepping strategy  Progressed resistance for goblet squats with no difficulty though minimal cueing needed to avoid anterior translation of knees over toes  Plan: Progress treatment as tolerated         Precautions: Parkinson's Disease, post-op TKA 22         Manuals 10/21 10/24 10/25 10/28 10/31    10/17 10/18   Manual knee stretching flex and ext  MC  MC brief          Patellar ROM                                       Neuro Re-Ed             Quad set with strap              SLR              TKE in standing              SLS  :15x3 ea :15x3      :10x5 :15x3 ea   SLS + cone taps    4 cones 5 rounds 4 cones 5 rounds        SLS + forward reach to chair    2x10 2x10                     Ther Ex             Heel slides   pball curls 5" hold 20x            Modified split squats with a TRX band  2x10   2x10 2x10     2x10   3-way          3# 3x10 ea B/L 4# hip ext only 3x10   Sidestepping           4# 3 laps    Slider lunges  2x10   BWD and LAT     10x ea way 2x10   BWD and LAT     10x ea way 2x10   BWD and LAT     10x ea way 2x10   BWD and LAT     10x ea way 2x10   BWD and LAT     10x ea way    2x10   BWD and LAT     10x ea way  2x10   BWD and LAT     10x ea way   Leg press 65# 3x10 S/L 65# 3x10 S/L 65# 3x10 S/L 75# 3x10 S/L 75# 3x10 S/L    60# 3x10 60# 3x10    S/L   Knee flex/ext machine  Ext 33# S/L 3x10,      33# B/L 3x10 Ext 33# S/L 3x10,      33# B/L 3x10 Ext 33# S/L 3x10,      44# B/L 3x10 Ext 33# S/L 3x10,      44# B/L 3x10 Ext 33# S/L 3x10,      44# B/L 3x10    Ext 33# S/L 3x10,      33# B/L 3x10 Ext 33# S/L 3x10,      33# B/L 3x10   Nustep---recumbent bike  Bike 6' Bike 6' Bike 6' Bike 6' Bike 6'    Bike 6' Bike 6'   Ther Activity             Mini squats              Step ups 20x 8"     6# DB in UEs 20x 8"     6# DB in UEs 20x 8"     6# DB in UEs 20x 8" 8# DBs 20x 8" 8# DBs    20x 8"     4# DB in UEs 20x 8"     4# DB in UEs   Sit to stands with resistance  15# KB 2x10 15# KB 2x10       10# KB   2x10  10# KB   2x10    Goblet squats   15# KB 2x10 15# KB 2x10 20# KB 2x10        Dead lifts   6# DBs 2x10                       Gait Training             [facility]             [facility]              Modalities

## 2022-11-01 ENCOUNTER — EVALUATION (OUTPATIENT)
Dept: PHYSICAL THERAPY | Facility: CLINIC | Age: 62
End: 2022-11-01

## 2022-11-01 DIAGNOSIS — Z96.652 TOTAL KNEE REPLACEMENT STATUS, LEFT: Primary | ICD-10-CM

## 2022-11-01 NOTE — PROGRESS NOTES
Daily Note/Re-Evaluation      Today's date: 2022  Patient name: Khoi Francis  : 1960  MRN: [id number]  Referring provider: Edwinna Pallas  Dx:   Encounter Diagnosis     ICD-10-CM    1  Total knee replacement status, left  Z96 [id number]        Start Time: 730  Stop Time: 0815  Total time in clinic (min): 45 minutes  ASSESSMENT:   Khoi Francis is a 58 y o  male who presents s/p TKA on 22 with Dr Nidia Cano through [address] [place] [address]  Upon evaluation, patient demonstrates improvements with PROM/AROM in both knee flexion and extension, LE strength, and overall pain and is normalized at this point in recovery  Patient is having minimal limitations with functional activities and able to ambulate full time without AD  Patient has been responding well to progressive strengthening and stability exercises in therapy  Will use last visit to finalize comprehensive HEP in order to continue at home  Patient has met all of his short term goals and long term goals  Impairments:    restricted ROM    decreased strength   pain with function   activity intolerance   weight bearing intolerance   abnormal gait   imbalance     Prognosis:  Good  Positive and negative prognostic indicator(s):  none    Goals:    Short Term Goals: to be achieved by 4 weeks  1) Patient to be independent with basic HEP  MET  2) Decrease pain to 3 and 4/10 at its worst   MET  3) Increase knee ROM by 5-10 degrees  MET  4) Increase LE strength by 1/2 MMT grade in all deficient planes  MET    Long Term Goals: to be achieved by discharge  1) FOTO equal to or greater than target score indicating improvements with overall function  MET  2) Ambulation to improve to maximal level of function MET  3) Stair negotiation will improve to reciprocal  MET  4) Sit to stand transfers will improve to maximal level of function MET    Planned interventions: finalize home exercise program for completion after discharge       Duration in visits:  1  Duration in weeks: 0     History of Current Injury: Patient reports he is doing well overall and is back to doing heavier activities at home  Pain location: global knee pain   Pain descriptors:  Pulling; tightness  Pain at Currently: 0/10   Pain at Best: 0/10   Pain at Worst: 0/10 (on average FROM 1-2/10); occasional sharp: 3-4/10       Aggravating factors: general post op movements   Easing factors: rest     Imaging: see imaging tab   Special Questions: Denies numbness and tingling  Hobbies/Interests: staying active, walking dog on different surfaces  Occupation: teacher   Patient goals: Patient reports goals for physical therapy would be decreased pain, increased mobility, increased strength and improved balance        Objective     Observations     Right Knee   Positive for edema and incision  Additional Observation Details  Incision is intact and healed  Mild to moderate intermittent lower leg/ankle swelling       Tenderness     Additional Tenderness Details  Global knee  Very minimal tenderness with palpation  Neurological Testing     Sensation     Knee   Left Knee   Intact: light touch    Right Knee   Intact: light touch     Passive Range of Motion   Left Knee   Flexion: 120 degree (FROM 116 degrees)  Extension: 0 degrees (FROM 0 degrees)     Right Knee   Normal passive range of motion    Mobility   Patellar Mobility:   Left Knee   WFL: medial, lateral, superior and inferior  Strength/Myotome Testing     Left Hip   Planes of Motion   Flexion: 5  Extension: 5  Abduction: 5  Adduction: 5    Right Hip   Planes of Motion   Flexion: 5  Extension: 5  Abduction: 5  Adduction: 5    Left Knee  Flexion: 5  Extension: 5  Quad contraction: good with proper superior patellar translation  No extension lag  Steps:   Reciprocal stepping on 6" steps with no railings  No compensations        Ambulation:   No longer ambulating with assited device with minimal compensations and normal gait patterns  (FROM Ambulating with SPC  Minimal compensations or antalgic gait   Patient has some decreased knee flexion during swing phase secondary to available range at this point)             Precautions: Parkinson's Disease, post-op TKA 8/26/22         Manuals 10/21 10/24 10/25 10/28 10/31 11/1   10/17 10/18   Manual knee stretching flex and ext  MC  MC brief          Patellar ROM                                       Neuro Re-Ed             Quad set with strap              SLR              TKE in standing              SLS  :15x3 ea :15x3      :10x5 :15x3 ea   SLS + cone taps    4 cones 5 rounds 4 cones 5 rounds 4 cones 5 rounds       SLS + forward reach to chair    2x10 2x10 2x10                    Ther Ex             Heel slides   pball curls 5" hold 20x            Modified split squats with a TRX band  2x10   2x10 2x10 2x10    2x10   3-way          3# 3x10 ea B/L 4# hip ext only 3x10   Sidestepping           4# 3 laps    Slider lunges  2x10   BWD and LAT     10x ea way 2x10   BWD and LAT     10x ea way 2x10   BWD and LAT     10x ea way 2x10   BWD and LAT     10x ea way 2x10   BWD and LAT     10x ea way 2x10   BWD and LAT     10x ea way   2x10   BWD and LAT     10x ea way  2x10   BWD and LAT     10x ea way   Leg press 65# 3x10 S/L 65# 3x10 S/L 65# 3x10 S/L 75# 3x10 S/L 75# 3x10 S/L 75# 3x10 S/L   60# 3x10 60# 3x10    S/L   Knee flex/ext machine  Ext 33# S/L 3x10,      33# B/L 3x10 Ext 33# S/L 3x10,      33# B/L 3x10 Ext 33# S/L 3x10,      44# B/L 3x10 Ext 33# S/L 3x10,      44# B/L 3x10 Ext 33# S/L 3x10,      44# B/L 3x10 Ext 33# S/L 3x10,      44# B/L 3x10   Ext 33# S/L 3x10,      33# B/L 3x10 Ext 33# S/L 3x10,      33# B/L 3x10   Nustep---recumbent bike  Bike 6' Bike 6' Bike 6' Bike 6' Bike 6' Bike 6'   Bike 6' Bike 6'   Ther Activity             Mini squats              Step ups 20x 8"     6# DB in UEs 20x 8"     6# DB in UEs 20x 8"     6# DB in UEs 20x 8" 8# DBs 20x 8" 8# DBs 20x 8" 8# DBs   20x 8"     4# DB in UEs 20x 8"     4# DB in UEs   Sit to stands with resistance  15# KB 2x10 15# KB 2x10       10# KB   2x10  10# KB   2x10    Goblet squats   15# KB 2x10 15# KB 2x10 20# KB 2x10 20# KB 2x10       Dead lifts   6# DBs 2x10                       Gait Training             [facility]             Hurdles              Modalities

## 2022-11-02 DIAGNOSIS — G20 PARKINSON DISEASE (HCC): ICD-10-CM

## 2022-11-03 RX ORDER — CITALOPRAM 10 MG/1
TABLET ORAL
Qty: 90 TABLET | Refills: 2 | Status: SHIPPED | OUTPATIENT
Start: 2022-11-03

## 2022-11-04 ENCOUNTER — OFFICE VISIT (OUTPATIENT)
Dept: PHYSICAL THERAPY | Facility: CLINIC | Age: 62
End: 2022-11-04

## 2022-11-04 DIAGNOSIS — Z96.652 TOTAL KNEE REPLACEMENT STATUS, LEFT: Primary | ICD-10-CM

## 2022-11-04 NOTE — PROGRESS NOTES
Daily Note     Today's date: 2022  Patient name: Roz Cabrera  : 1960  MRN: [id number]  Referring provider: Hemanth De Santiago  Dx:   Encounter Diagnosis     ICD-10-CM    1  Total knee replacement status, left  Z96 [id number]                   Subjective: Patient reports L knee feels good and he feels ready to continue with home exercises  Objective: See treatment diary below      Assessment: Patient seen today for last formal PT session and tolerated treatment well  Patient demonstrated fatigue post treatment and exhibited good technique with therapeutic exercises  Patient has met all functional goals and is ready for discharge at this time  Updated HEP and reviewed with patient  Plan: DC to HEP         Precautions: Parkinson's Disease, post-op TKA 22         Manuals 10/21 10/24 10/25 10/28 10/31 11/1 11/4  10/17 10/18   Manual knee stretching flex and ext  MC  MC brief          Patellar ROM                                       Neuro Re-Ed             Quad set with strap              SLR              TKE in standing              SLS  :15x3 ea :15x3      :10x5 :15x3 ea   SLS + cone taps    4 cones 5 rounds 4 cones 5 rounds 4 cones 5 rounds       SLS + forward reach to chair    2x10 2x10 2x10 2x10                   Ther Ex             Heel slides   pball curls 5" hold 20x            Modified split squats with a TRX band  2x10   2x10 2x10 2x10 2x10   2x10   3-way          3# 3x10 ea B/L 4# hip ext only 3x10   Sidestepping           4# 3 laps    Slider lunges  2x10   BWD and LAT     10x ea way 2x10   BWD and LAT     10x ea way 2x10   BWD and LAT     10x ea way 2x10   BWD and LAT     10x ea way 2x10   BWD and LAT     10x ea way 2x10   BWD and LAT     10x ea way 2x10   BWD and LAT     2x10   BWD and LAT     10x ea way  2x10   BWD and LAT     10x ea way   Leg press 65# 3x10 S/L 65# 3x10 S/L 65# 3x10 S/L 75# 3x10 S/L 75# 3x10 S/L 75# 3x10 S/L 80# 3x10 S/L  60# 3x10 60# 3x10    S/L   Knee flex/ext machine  Ext 33# S/L 3x10,      33# B/L 3x10 Ext 33# S/L 3x10,      33# B/L 3x10 Ext 33# S/L 3x10,      44# B/L 3x10 Ext 33# S/L 3x10,      44# B/L 3x10 Ext 33# S/L 3x10,      44# B/L 3x10 Ext 33# S/L 3x10,      44# B/L 3x10 Ext 33# S/L 3x10,      44# B/L 3x10  Ext 33# S/L 3x10,      33# B/L 3x10 Ext 33# S/L 3x10,      33# B/L 3x10   Nustep---recumbent bike  Bike 6' Bike 6' Bike 6' Bike 6' Bike 6' Bike 6' Bike 5'  Bike 6' Bike 6'   Ther Activity             Mini squats              Step ups 20x 8"     6# DB in UEs 20x 8"     6# DB in UEs 20x 8"     6# DB in UEs 20x 8" 8# DBs 20x 8" 8# DBs 20x 8" 8# DBs 20x 8" 8# DBs  20x 8"     4# DB in UEs 20x 8"     4# DB in UEs   Sit to stands with resistance  15# KB 2x10 15# KB 2x10       10# KB   2x10  10# KB   2x10    Goblet squats   15# KB 2x10 15# KB 2x10 20# KB 2x10 20# KB 2x10 20# KB 2x10      Dead lifts   6# DBs 2x10                       Gait Training             [facility]             Hurdles              Modalities

## 2022-11-08 ENCOUNTER — EVALUATION (OUTPATIENT)
Dept: SPEECH THERAPY | Facility: CLINIC | Age: 62
End: 2022-11-08

## 2022-11-08 DIAGNOSIS — G20 PARKINSON DISEASE (HCC): ICD-10-CM

## 2022-11-08 DIAGNOSIS — R49.9 UNSPECIFIED VOICE AND RESONANCE DISORDER: Primary | ICD-10-CM

## 2022-11-08 NOTE — PROGRESS NOTES
Speech-Language Pathology Initial Evaluation    Today's date: 2022   Patient’s name: Sweta De Guzman  : 1960  MRN: [id number]  Safety measures: Parkinson's disease  Referring provider: Jade Brewster PA-C    Encounter Diagnosis     ICD-10-CM    1  Unspecified voice and resonance disorder  R49 9    2  Parkinson disease ([address] )  [address]      Visit tracking:  -Referring provider: Epic  -Billing guidelines: AMA  -Visit #1 ([facility])  -Insurance: Aetna  -Occurrence expires: 2023  -RE due 2023    Subjective comments: "At the end of the day my voice gets hoarse and weaker"    How did the patient hear about us? Physician    Patient's goal(s): To learn strategies for vocal loudness (to help at work) and prevent his voice from worsening with time (due to PD)    Reason for referral: Change in vocal quality  Prior functional status: Communication effective and appropriate in all situations  Clinically complex situations: N/A    History: Patient is a 58 y o  male who was referred to outpatient skilled Speech Therapy services for a LSVT LOUD and voice evaluation  Per chart review; patient follows with both Dr Eugenia Long [facility]) and Dr Marcia Yanez [facility]) for Parkinson's disease  Reports symptoms started in 2018 with R>L tremor  Most recent visit to  Neuro (10/11/22) he reported vocal hoarseness and was referred for LSVT LOUD  Today; patient reports he is not "too concerned" about his current voice problems, but has been trying to keep ahead of any problems that may worsen with PD  He has not completed any formal PD programs (I e , LSVT) but remains active  Patient will be returning to work next week; and due to his mild vocal concerns we will not be doing the LSVT LOUD protocol at this time  Patient would benefit from voice therapy to promote vocal hygiene as well as educate on how to increase vocal intensity when needed in a safe manner      Hearing: [facility] for testing  Vision: [facility] for testing    Home environment/lifestyle: home with spouse (3 kids - college/older)    Highest level of education: did not ask  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 [place]    Mental status: Alert  Behavior status: Cooperative  Communication modalities: Verbal  Rehabilitation prognosis: Good rehab potential to reach the established goals    Assessments    [facility] Voice Treatment (LSVT) LOUD assessment:    Sound Level Meter-to-Mouth Distance: 50 cm throughout testing    Maximum Duration of Sustained Vowel Phonation (/ah/):   Average MDP 20 sec    Average Intensity 54 dB SPL       Maximum Fundamental Frequency Range:   Highest Pitch 303 Hz   Lowest Pitch 61 Hz   Average Intensity 55 dB SPL     Reading of Words:   Average Intensity 61 dB SPL     Reading of Phrases:   Average Intensity 61 dB SPL     Conversational Monologue:   Average Intensity 60 dB SPL     *Data gathered from reading and conversation tasks revealed vocal SPL levels that may significantly reduce patient’s speech intelligibility and communicative effectiveness in his functional living environment  **STIMULABILITY TESTING TO “THINK LOUD!”**    Maximum Duration of Sustained Vowel Phonation (/ah/):   Average MDP 12 sec    Average Intensity 70 dB SPL       Maximum Fundamental Frequency Range:   Highest Pitch 480 Hz   Lowest Pitch 78 Hz   Average Intensity 64 dB SPL     Reading of Phrases:   Average Intensity 67 dB SPL     *Patient demonstrated vocal improvement with loudness and quality in all tasks when stimulated  Voice Handicap Index (VHI): The VHI is a list of 30 statements that many people have used to describe their voices and the effects of their voices on their lives  Patient indicated how frequently he has the same experience using a rating point scale (“never” = 0, “almost never” = 1, “sometimes” = 2, “almost always” = 3, and “always” = 4)    Results were as follows:    Subscale: Score: Self-Perceived Impairment Level:   Physical 19/40 Moderate   Functional 13/40 Moderate   Emotional 14/40 Moderate        TOTAL 46/120 Moderate           Goals    Short Term Goals:   1  Patient will be educated on vocal hygiene and demonstrate understanding of recommendations to facilitate improved vocal quality  2  Patient will develop the ability to self-monitor adequate loudness levels in conversation to facilitate increased communication success  3  Patient will be able to increase his/her vocal loudness to reach a target sound pressure level of 75 dB SPL with sustained phonation in order to increase vocal respiratory support required for functional communication  4  Patient will be able to increase his/her vocal loudness to reach target sound pressure level of 75 dB SPL with paragraph length reading material in order to increase vocal respiratory support required for functional communication  5  Patient will be able to increase his/her vocal loudness to reach a target sound pressure level of 70 dB SPL during brief conversational speech in order to increase vocal respiratory support required for functional communication  Long-Term Goals:  1  Patient will independently increase his/her vocal loudness to an appropriate level to be understood by others  2  Patient will increase self-intelligibility rating by 25%  3  Patient will utilize strategies and exercises to increase vocal loudness and improve respiratory laryngeal coordination using the LSVT protocol  Impressions/Recommendations    Impressions: Patient presents with a mild - moderate voice disorder, characterized by reduced loudness, monotone pitch, and a hoarse vocal quality, which can contribute to an overall decrease in speech intelligibility  Patient reports his speech gets worse at the end of the day; and he was noted to be able to improve vocal quality with increased intensity/loudness with cueing   He is motivated to improve his VQ for his work and home life as well as learn tools to prevent further decline with his PD dx         Recommendations:  -Patient would benefit from outpatient skilled Speech Therapy services: Voice therapy    -Frequency: 1x weekly  -Duration: 6-8 weeks    -Intervention certification from: 36/4/3890  -Intervention certification to: 48/7/7289

## 2022-11-17 ENCOUNTER — OFFICE VISIT (OUTPATIENT)
Dept: SPEECH THERAPY | Facility: CLINIC | Age: 62
End: 2022-11-17

## 2022-11-17 DIAGNOSIS — R49.9 UNSPECIFIED VOICE AND RESONANCE DISORDER: Primary | ICD-10-CM

## 2022-11-17 DIAGNOSIS — G20 PARKINSON DISEASE (HCC): ICD-10-CM

## 2022-11-17 NOTE — PROGRESS NOTES
Daily Speech Treatment Note    Today's date: 2022  Patient’s name: Timmy Maldonado  : 1960  MRN: [id number]  Safety measures: PD  Referring provider: Darris Schirmer, PA-C    Encounter Diagnosis     ICD-10-CM    1  Unspecified voice and resonance disorder  R49 9       2  Parkinson disease ([address] )  [address]         Visit tracking:  -Referring provider: Epic  -Billing guidelines: AMA  -Visit #2 ([facility])  -Insurance: Aetna  -Occurrence expires: 2023  -RE due 2023    Subjective/Behavioral:  -Patient arrived in good spirits today  Objective/Assessment:  -Reviewed testing results and goals in plan care with patient  Patient is in agreement at this time  Short Term Goals:   1  Patient will be educated on vocal hygiene and demonstrate understanding of recommendations to facilitate improved vocal quality  2  Patient will develop the ability to self-monitor adequate loudness levels in conversation to facilitate increased communication success  3  Patient will be able to increase his/her vocal loudness to reach a target sound pressure level of 75 dB SPL with sustained phonation in order to increase vocal respiratory support required for functional communication  4  Patient will be able to increase his/her vocal loudness to reach target sound pressure level of 75 dB SPL with paragraph length reading material in order to increase vocal respiratory support required for functional communication  5  Patient will be able to increase his/her vocal loudness to reach a target sound pressure level of 70 dB SPL during brief conversational speech in order to increase vocal respiratory support required for functional communication       To target increased vocal loudness during structured and unstructured tasks, patient completed the following vocal intensive activities: (Sound level meter-to-mouth distance: 50 cm )    Maximum Duration & Intensity of Sustained /ah/ Phonation:  Average duration: 18 6 sec  Average intensity: 69 5 dB SPL    Maximum Speech Loudness Drill of Phrases:  Average intensity: 69 dB SPL    Speech Loudness Drill (Word Level): Average intensity: 70 5 dB SPL    Average intensity during conversation: 64 dB SPL    Cues for increasing vocal intensity during treatment: Moderate      Plan:  -Patient was provided with home exercises/activities to target goals in plan of care at the end of today's session   -Continue with current plan of care

## 2022-11-30 ENCOUNTER — OFFICE VISIT (OUTPATIENT)
Dept: CARDIOLOGY CLINIC | Facility: CLINIC | Age: 62
End: 2022-11-30

## 2022-11-30 VITALS
OXYGEN SATURATION: 98 % | TEMPERATURE: 97 F | BODY MASS INDEX: 28.49 KG/M2 | HEIGHT: 70 IN | SYSTOLIC BLOOD PRESSURE: 115 MMHG | WEIGHT: 199 LBS | DIASTOLIC BLOOD PRESSURE: 78 MMHG | HEART RATE: 86 BPM

## 2022-11-30 DIAGNOSIS — E78.2 MIXED HYPERLIPIDEMIA: ICD-10-CM

## 2022-11-30 DIAGNOSIS — I10 ESSENTIAL HYPERTENSION: Primary | ICD-10-CM

## 2022-11-30 NOTE — PROGRESS NOTES
[facility] [address] [facility] [address]  Phone#  [phone]  Fax#  [phone]  [facility] Card[facility] Office Follow-up Visit             NAME: Holden Briggs  AGE: 58 y o  SEX: male   : 1960   MRN: [id number]    DATE: 2022  TIME: 2:00 PM    Cardiology Problem list:  Hypertension:  Stage II  Parkinson disease:  Mild tremor and cognitive changes  Dyslipidemia:  ASCVD score over 15%-need calcium score  Echo:  EF normal, diastolic function normal, RV mildly dilated, mild LVH    Visit diagnoses:  1  Essential hypertension        2  Mixed hyperlipidemia          Assessment and plan:    Essential hypertension  BP Readings from Last 3 Encounters:   22 115/78   10/11/22 110/60   10/05/22 156/84   Continue home blood pressure monitoring  Continue current medications  Recent echo showed mild LVH  Ongoing medical therapy is indicated  RV was dilated to 4 4cm  No LAI or PHTN  Lifestyle modification measures to help blood pressure control include:increased physical activity, low-salt diet rich in fruits and vegetables, avoidance of alcohol intake and maintaining healthy weight        Hyperlipidemia  Lab Results   Component Value Date    LDLCALC 142 (H) 2022   The 10-year ASCVD risk score (Dheeraj LAWTON, et al , 2019) is: 8 1%    Values used to calculate the score:      Age: 58 years      Sex: Male      Is Non- : No      Diabetic: No      Tobacco smoker: No      Systolic Blood Pressure: 376 mmHg      Is BP treated: Yes      HDL Cholesterol: 86 mg/dL      Total Cholesterol: 243 mg/dL    Discussed  calcium score for further risk stratification to see if statins are indicated  HDL is excellent  Defer further testing  Annual lipid evaluation with Dr She Stover    Mediterranean style low-sodium diet will be beneficial   Continue ongoing lifestyle modification         Chief Complaint   Patient presents with   • Follow-up     Some  dizziness HPI:    Dilshad Marcano is a 58y o -year-old male who presents to the cardiology clinic for follow up for the above-listed problems  Patient is doing well from a cardiovascular standpoint  He had no cardiac complications during his knee surgery  May need the other knee done  Current medications reviewed  Reports compliance to medicines  No side effects reported  Blood pressure control is improved  Denies chest pain on exertion  Denies worsening shortness of breath  Denies sustained palpitations  Reports mild dizziness related to neuro meds  We have previously discussed calcium scoring  Recent echo showed mild LVH, preserved LV function  Normal valves  Mild RV dilatation noted  No known diagnosis of sleep apnea  Preoperative cardiac risk assessment for knee surgery:  RCRI risk factors: none  RCI RISK CLASS I (0 risk factors, risk of major cardiac compl  appr  0 5%)  No Cardiac Contraindication for Planned Surgical Procedures  Continue current cardiac medication in the santino-operative period  Past history, family history, social history, current medications, vital signs, recent lab and imaging studies and  prior cardiology studies reviewed independently on this visit      No Known Allergies    Current Outpatient Medications:   •  acetaminophen (TYLENOL) 500 mg tablet, Take 1,000 mg by mouth Three times a day, Disp: , Rfl:   •  carbidopa-levodopa (SINEMET CR)  mg TBCR per ER tablet, Take 1 tablet by mouth daily at bedtime, Disp: 90 tablet, Rfl: 3  •  carbidopa-levodopa (SINEMET)  mg per tablet, Take 1 tablet by mouth 4 (four) times a day THEN 1 TAB 4 TIMES A DAY, Disp: 360 tablet, Rfl: 3  •  cholecalciferol (VITAMIN D3) 1,000 units tablet, Take 5,000 Units by mouth daily, Disp: , Rfl:   •  Collagen Hydrolysate POWD, 1 Dose by Does not apply route daily, Disp: , Rfl:   •  Flowflex COVID-19 Ag Home Test KIT, , Disp: , Rfl:   •  fluticasone (FLONASE) 50 mcg/act nasal spray, 2 sprays into each nostril daily, Disp: 16 g, Rfl: 2  •  ibuprofen (ADVIL,MOTRIN) 100 MG tablet, Take 100 mg by mouth every 6 (six) hours as needed for mild pain, Disp: , Rfl:   •  lansoprazole (PREVACID) 30 mg capsule, Take 1 capsule by mouth daily before breakfast, Disp: , Rfl:   •  losartan-hydrochlorothiazide (HYZAAR) 100-12 5 MG per tablet, TAKE 1 TABLET BY MOUTH EVERY DAY, Disp: 90 tablet, Rfl: 1  •  MAGNESIUM PO, Take by mouth daily at bedtime, Disp: , Rfl:   •  MELATONIN PO, Take by mouth daily at bedtime, Disp: , Rfl:   •  selegiline (ELDEPRYL) 5 mg tablet, Take 1 tablet (5 mg total) by mouth 2 (two) times a day with meals, Disp: 60 tablet, Rfl: 5  •  sildenafil (VIAGRA) 100 mg tablet, Take 1 tablet (100 mg total) by mouth daily as needed for erectile dysfunction, Disp: 6 tablet, Rfl: 5  •  valACYclovir (VALTREX) 1,000 mg tablet, Take 2 tablets (2,000 mg total) by mouth 2 (two) times a day for 1 day, Disp: 4 tablet, Rfl: 0  •  celecoxib (CeleBREX) 200 mg capsule, Take 200 mg by mouth 2 (two) times a day (Patient not taking: Reported on 11/30/2022), Disp: , Rfl:   •  citalopram (CeleXA) 10 mg tablet, TAKE 1 TABLET BY MOUTH EVERY DAY (Patient not taking: Reported on 11/30/2022), Disp: 90 tablet, Rfl: 2  •  oxyCODONE (ROXICODONE) 5 immediate release tablet, Take 5-10 mg by mouth (Patient not taking: Reported on 11/30/2022), Disp: , Rfl:   •  pregabalin (LYRICA) 50 mg capsule, Take 50 mg by mouth 2 (two) times a day (Patient not taking: Reported on 11/30/2022), Disp: , Rfl:   •  senna (SENOKOT) 8 6 MG tablet, Take 2 tablets by mouth daily at bedtime (Patient not taking: Reported on 11/30/2022), Disp: , Rfl:     Patient Active Problem List   Diagnosis   • Hyperlipidemia   • Impaired fasting glucose   • Parkinson disease (HCC)   • Primary osteoarthritis of both knees   • Thrombocytosis   • Cognitive changes   • Pre-op examination   • Essential hypertension      Past Medical History:   Diagnosis Date   • Hemorrhoids    • Parkinson disease [facility])      Past Surgical History:   Procedure Laterality Date   • KNEE SURGERY       Family History   Problem Relation Age of Onset   • Diabetes Father         mellitus   • Heart attack Father         acute myocardial infarction   • Hyperlipidemia Mother    • Multiple sclerosis Maternal Grandfather    • Prostate cancer Paternal Grandfather      Social History   reports that he has never smoked  He has never used smokeless tobacco  He reports current alcohol use  He reports that he does not use drugs  Review of Systems   Constitutional: Negative for fever  Respiratory: Negative for chest tightness, shortness of breath and wheezing  Cardiovascular: Negative for chest pain, palpitations and leg swelling  Musculoskeletal: Positive for arthralgias  Skin: Negative for rash  Neurological: Positive for dizziness and tremors  Negative for syncope  Hematological: Does not bruise/bleed easily  Objective:     Vitals:    11/30/22 1345   BP: 115/78   Pulse: 86   Temp: (!) 97 °F (36 1 °C)   SpO2: 98%     Wt Readings from Last 3 Encounters:   11/30/22 90 3 kg (199 lb)   10/11/22 87 8 kg (193 lb 8 oz)   10/05/22 87 5 kg (193 lb)     Pulse Readings from Last 3 Encounters:   11/30/22 86   10/11/22 80   10/05/22 78     BP Readings from Last 3 Encounters:   11/30/22 115/78   10/11/22 110/60   10/05/22 156/84     Physical Exam  Constitutional:       General: He is not in acute distress  Cardiovascular:      Rate and Rhythm: Normal rate and regular rhythm  Heart sounds: S1 normal and S2 normal  No murmur heard  Pulmonary:      Breath sounds: No wheezing or rhonchi  Musculoskeletal:      Right lower leg: No edema  Left lower leg: No edema  Skin:     General: Skin is warm     Psychiatric:         Mood and Affect: Mood normal          Pertinent Laboratory/Diagnostic Studies:    Laboratory studies reviewed personally by Corinne America, MD    BMP:   Lab Results   Component Value Date    SODIUM 139 09/29/2022    K 4 6 09/29/2022     09/29/2022    CO2 31 09/29/2022    BUN 19 09/29/2022    CREATININE 1 00 09/29/2022    GLUC 92 09/29/2022    CALCIUM 9 9 09/29/2022     CBC:  Lab Results   Component Value Date    WBC 8 0 03/18/2022    WBC 8 50 04/27/2020    WBC 6 58 11/25/2014    RBC 4 70 03/18/2022    RBC 4 78 04/27/2020    RBC 4 64 11/25/2014    HGB 14 8 03/18/2022    HGB 14 5 04/27/2020    HGB 14 3 11/25/2014    HCT 42 8 03/18/2022    HCT 43 9 04/27/2020    HCT 42 5 11/25/2014    MCV 91 1 03/18/2022    MCV 92 04/27/2020    MCV 92 11/25/2014    MCH 31 5 03/18/2022    MCH 30 3 04/27/2020    MCH 30 8 11/25/2014    RDW 11 8 03/18/2022    RDW 12 2 04/27/2020    RDW 12 8 11/25/2014     03/18/2022     (H) 04/27/2020     11/25/2014     Coags:    Lipid Profile:   Lab Results   Component Value Date    CHOL 265 (H) 02/10/2017     Lab Results   Component Value Date    HDL 86 03/18/2022     Lab Results   Component Value Date    LDLCALC 142 (H) 03/18/2022     Lab Results   Component Value Date    TRIG 60 03/18/2022      No results found for: [id number], TSH  Lab Results   Component Value Date    ALT 31 03/18/2022    AST 16 03/18/2022     No results found for: BNP   No results for input(s): NTBNP in the last 72 hours  Lab Results   Component Value Date    TROPONINI <0 02 04/27/2020       Imaging Studies:     No results found  Louisa Fox MD, [facility]    Portions of the record may have been created with voice recognition software  Occasional wrong word or "sound alike" substitutions may have occurred due to the inherent limitations of voice recognition software  Read the chart carefully and recognize, using context, where substitutions have occurred  Please reach out to me directly for any clarifications

## 2022-11-30 NOTE — PATIENT INSTRUCTIONS
Recent echo results discussed  Please continue current blood pressure medicines  Annual lipid checkup with Dr Sebastián Waterman  Preoperative cardiac risk assessment for knee surgery:  RCRI risk factors: none  RCI RISK CLASS I (0 risk factors, risk of major cardiac compl  appr   0 5%)  No Cardiac Contraindication for Planned Surgical Procedures  Continue current cardiac medication in the santino-operative period

## 2022-12-08 ENCOUNTER — OFFICE VISIT (OUTPATIENT)
Dept: SPEECH THERAPY | Facility: CLINIC | Age: 62
End: 2022-12-08

## 2022-12-08 DIAGNOSIS — R49.9 UNSPECIFIED VOICE AND RESONANCE DISORDER: Primary | ICD-10-CM

## 2022-12-08 DIAGNOSIS — G20 PARKINSON DISEASE (HCC): ICD-10-CM

## 2022-12-08 NOTE — PROGRESS NOTES
Daily Speech Treatment Note    Today's date: 2022  Patient’s name: Nichole Barton  : 1960  MRN: [id number]  Safety measures: PD  Referring provider: Rashid Noguera PA-C    Encounter Diagnosis     ICD-10-CM    1  Unspecified voice and resonance disorder  R49 9       2  Parkinson disease ([address] )  [address]         Visit tracking:  -Referring provider: Epic  -Billing guidelines: AMA  -Visit #3 ([facility])  -Insurance: Aetna  -Occurrence expires: 2023  -RE due 2023    Subjective/Behavioral:  -Patient arrived in good spirits today  Objective/Assessment:  Patient reports he is working on talking while he walks to improve his breath support  He also expressed using his LOUD voice at times at work, and people told him he was "yelling" at them  Short Term Goals:   1  Patient will be educated on vocal hygiene and demonstrate understanding of recommendations to facilitate improved vocal quality  Throat clearing education- pt c/o phlegm and the need to clear  Reviewed alteratives  SOVTE: straw phonation and cup bubbling with good results with resonant voicing    MFR: patient supine on high mat table  L anterior cervical tightness, lotion used to attempt to relieve tension  Patient expressed as helpful  Suggested ROM exercises (30 sec hold)    2  Patient will develop the ability to self-monitor adequate loudness levels in conversation to facilitate increased communication success  3  Patient will be able to increase his/her vocal loudness to reach a target sound pressure level of 75 dB SPL with sustained phonation in order to increase vocal respiratory support required for functional communication  4  Patient will be able to increase his/her vocal loudness to reach target sound pressure level of 75 dB SPL with paragraph length reading material in order to increase vocal respiratory support required for functional communication       5  Patient will be able to increase his/her vocal loudness to reach a target sound pressure level of 70 dB SPL during brief conversational speech in order to increase vocal respiratory support required for functional communication  To target increased vocal loudness during structured and unstructured tasks, patient completed the following vocal intensive activities: (Sound level meter-to-mouth distance: 50 cm )    Maximum Duration & Intensity of Sustained /ah/ Phonation:  Average duration: 12 9 sec   Average intensity:70 5 dB SPL      Plan:  -Patient was provided with home exercises/activities to target goals in plan of care at the end of today's session   -Continue with current plan of care

## 2022-12-14 DIAGNOSIS — N52.9 MALE ERECTILE DISORDER: ICD-10-CM

## 2022-12-15 ENCOUNTER — OFFICE VISIT (OUTPATIENT)
Dept: SPEECH THERAPY | Facility: CLINIC | Age: 62
End: 2022-12-15

## 2022-12-15 DIAGNOSIS — R49.9 UNSPECIFIED VOICE AND RESONANCE DISORDER: Primary | ICD-10-CM

## 2022-12-15 DIAGNOSIS — G20 PARKINSON DISEASE (HCC): ICD-10-CM

## 2022-12-15 RX ORDER — SILDENAFIL 100 MG/1
100 TABLET, FILM COATED ORAL DAILY PRN
Qty: 6 TABLET | Refills: 0 | Status: SHIPPED | OUTPATIENT
Start: 2022-12-15

## 2022-12-15 NOTE — PROGRESS NOTES
Daily Speech Treatment Note    Today's date: 12/15/2022  Patient’s name: Mandie Arnold  : 1960  MRN: [id number]  Safety measures: PD  Referring provider: Yolanda Cortes PA-C    Encounter Diagnosis     ICD-10-CM    1  Unspecified voice and resonance disorder  R49 9       2  Parkinson disease ([facility] [address] )  [address]         Visit tracking:  -Referring provider: Epic  -Billing guidelines: AMA  -Visit #4 ([facility])  -Insurance: [employer]  -Occurrence expires: 2023  -RE due 2023    Subjective/Behavioral:  -Patient arrived in good spirits today  His voice was clear and loud today  No vocal hoarseness noted today  He reports he is doing his neck stretches/ROM  He reports last week's manual neck treatment was helpful and lasted a few days  He is back to feeling tight today  Objective/Assessment:     Short Term Goals:   1  Patient will be educated on vocal hygiene and demonstrate understanding of recommendations to facilitate improved vocal quality  SOVTE: reviewed cup bubbling (handout provided)  Semi-Occluded Vocal Tract Exercises  To target relaxed laryngeal posture and coordination between phonation and respiration, the following activities were completed using principles of SOVTE  Cup Bubblin  Steady blowing - no voice  Completed 1 reps, avg 15 secs  Patient was able to demonstrate steady bubbling/airflow  2  Steady blowing - voice ON  Completed 1 reps, avg 15 secs  Patient was able to demonstrate steady bubbling/airflow  Patient cued to use front focus, feeling buzzing on lips  3  Steady blowing - voice ON and OFF  Completed 2 reps  Patient was able to keep continuous airflow  4  Steady blowing - voice ON, short/small pitch glides, approx  3-5 notes  Completed over 1 reps  5  Steady blowing - voice ON, long/full pitch glides, lowest to highest pitches  Completed over 1 reps  MFR: patient supine on high mat table   L anterior cervical tightness, lotion used to attempt to relieve tension  Patient expressed as helpful  2  Patient will develop the ability to self-monitor adequate loudness levels in conversation to facilitate increased communication success  3  Patient will be able to increase his/her vocal loudness to reach a target sound pressure level of 75 dB SPL with sustained phonation in order to increase vocal respiratory support required for functional communication  4  Patient will be able to increase his/her vocal loudness to reach target sound pressure level of 75 dB SPL with paragraph length reading material in order to increase vocal respiratory support required for functional communication  5  Patient will be able to increase his/her vocal loudness to reach a target sound pressure level of 70 dB SPL during brief conversational speech in order to increase vocal respiratory support required for functional communication  To target increased vocal loudness during structured and unstructured tasks, patient completed the following vocal intensive activities: (Sound level meter-to-mouth distance: 50 cm )    Maximum Duration & Intensity of Sustained /ah/ Phonation: x5  Average duration: 12 02 sec   Average intensity: 72 dB SPL      Plan:  -Patient was provided with home exercises/activities to target goals in plan of care at the end of today's session   -Continue with current plan of care

## 2022-12-28 ENCOUNTER — APPOINTMENT (OUTPATIENT)
Dept: SPEECH THERAPY | Facility: CLINIC | Age: 62
End: 2022-12-28

## 2023-01-05 ENCOUNTER — OFFICE VISIT (OUTPATIENT)
Dept: SPEECH THERAPY | Facility: CLINIC | Age: 63
End: 2023-01-05

## 2023-01-05 DIAGNOSIS — R49.9 UNSPECIFIED VOICE AND RESONANCE DISORDER: Primary | ICD-10-CM

## 2023-01-05 DIAGNOSIS — G20 PARKINSON DISEASE (HCC): ICD-10-CM

## 2023-01-05 NOTE — PROGRESS NOTES
Daily Speech Treatment Note    Today's date: 2023  Patient’s name: Jae Vasquez  : 1960  MRN: [id number]  Safety measures: PD  Referring provider: Radha Irizarry PA-C    Encounter Diagnosis     ICD-10-CM    1  Unspecified voice and resonance disorder  R49 9       2  Parkinson disease ([address] )  [address]         Visit tracking:  -Referring provider: Epic  -Billing guidelines: AMA  -Visit #5 ([facility])  -Insurance: Aetna  -Occurrence expires: 2023  -RE due 2023    Subjective/Behavioral:  -Patient arrived in good spirits today  His daughter, Milan Smith came to the session  Objective/Assessment:     Short Term Goals:   1  Patient will be educated on vocal hygiene and demonstrate understanding of recommendations to facilitate improved vocal quality  Straw Only Vibration  VFE  Reading aloud  Education (voice, hygiene)    Recommended [id number] (info provided)    2  Patient will develop the ability to self-monitor adequate loudness levels in conversation to facilitate increased communication success  3  Patient will be able to increase his/her vocal loudness to reach a target sound pressure level of 75 dB SPL with sustained phonation in order to increase vocal respiratory support required for functional communication  4  Patient will be able to increase his/her vocal loudness to reach target sound pressure level of 75 dB SPL with paragraph length reading material in order to increase vocal respiratory support required for functional communication  5  Patient will be able to increase his/her vocal loudness to reach a target sound pressure level of 70 dB SPL during brief conversational speech in order to increase vocal respiratory support required for functional communication  Plan:  -Patient was provided with home exercises/activities to target goals in plan of care at the end of today's session   -Continue with current plan of care

## 2023-01-12 ENCOUNTER — EVALUATION (OUTPATIENT)
Dept: SPEECH THERAPY | Facility: CLINIC | Age: 63
End: 2023-01-12

## 2023-01-12 DIAGNOSIS — R49.9 UNSPECIFIED VOICE AND RESONANCE DISORDER: Primary | ICD-10-CM

## 2023-01-12 DIAGNOSIS — G20 PARKINSON DISEASE (HCC): ICD-10-CM

## 2023-01-12 NOTE — PROGRESS NOTES
Speech-Language Pathology Re-Evaluation    Today's date: 2023   Patient’s name: Timmy Maldonado  : 1960  MRN: [id number]  Safety measures: Parkinson's disease  Referring provider: Darris Schirmer, PA-C    Encounter Diagnosis     ICD-10-CM    1  Unspecified voice and resonance disorder  R49 9       2  Parkinson disease ([address] )  [address]         Visit tracking:  -Referring provider: Epic  -Billing guidelines: AMA  -Visit #6 ([facility])  -Insurance: Rosaline Mckinley  -RE due 23    Subjective comments: " I am more aware of when I have to clear my throat and try not to do it"    Patient's goal(s): To learn strategies for vocal loudness (to help at work) and prevent his voice from worsening with time (due to PD)        Assessments    VOICE UPDATE:    Patient Self-Ratings:    Voice Handicap Index (VHI): The VHI is a list of 30 statements that many people have used to describe their voices and the effects of their voices on their lives  Patient indicated how frequently he has the same experience using a rating point scale (“never” = 0, “almost never” = 1, “sometimes” = 2, “almost always” = 3, and “always” = 4)  Results were as follows:    Subscale: 23: Self-Perceived Impairment Level: Status   Physical /40 19/40 Moderate DECLINE   Functional 20/40 13/40 Severe DECLINE   Emotional /40 14/40 Moderate DECLINE          TOTAL 60/120 46/120 Moderate DECLINE       Objective Measurements/Voice Parameters:  RESPIRATORY EFFICIENCY:   -S:Z Ratio Task: Patient was instructed to sustain the sounds /s/ and /z/ to examine the coordination and efficiency of respiration and voice production  Normative data suggests that adults can prolong these sounds for 20-25 seconds  Ratios of 1 4 and above are consistent with laryngeal inefficiency, and ratios of 2 0 and above are suggestive of vocal fold pathology  Patient's S:Z ratio was 0 96 (19 57 sec : 20 40 sec)       -Maximum Phonation Time: Patient was instructed to sustain /a/ to measure respiratory and laryngeal coordination and efficiency  Adults are typically able to prolong vowels sound for 15-20 seconds  Reduced MPT may suggest poor respiratory support, or poor medial glottal closure  Patient's MPT was 24 4 seconds  MEASURES OF PITCH:  The MeterHero IV, a computer-driven voice analysis program, was used to collect objective voice data using the Visi-Pitch Multidimensional Voice Program (MDVP) & Real Time Pitch Program (MTPP)  -Multi-Dimensional Voice Profile (MDVP): This is obtained on the phonation of the sound /a/, in which dimensions of the voice, including frequncy perturbations, amplitude perturbations, variations in F0, noise to harmonic ratio, voice turbulence Index, soft phonation Index, tremours in frequency and amplitude, degree of subharmonics, and degree of voicing apart from the frequency and amplitude, are reflected  Normative Data:   Mean Fundamental Frequency (F0) 131 1 Hz 145 22 Hz   Jitter (RAP) 0 67% 0 345%   Shimmer 5 3% 2 523%   Xsxsp-wd-Giustpobp Ratio (NHR) 0 151 0 122       -Habitual Pitch: Patient was instructed to engage in two different speaking tasks (counting and reading) to calculate the pitch he uses most frequently  Task: Mean F0 (Hz) Min-Max Range (Hz) Normative Data:   Speaking (counting 1-10) 130  128 Hz (100 Hz -150 Hz)   Reading ("[place]”) 127  128 Hz (100 Hz -150 Hz)         Goals    Short Term Goals:   1  Patient will be educated on vocal hygiene and demonstrate understanding of recommendations to facilitate improved vocal quality  MET    2  Patient will develop the ability to self-monitor adequate loudness levels in conversation to facilitate increased communication success  MET    3  Patient will be able to increase his/her vocal loudness to reach a target sound pressure level of 75 dB SPL with sustained phonation in order to increase vocal respiratory support required for functional communication  MET    4  Patient will be able to increase his/her vocal loudness to reach target sound pressure level of 75 dB SPL with paragraph length reading material in order to increase vocal respiratory support required for functional communication  NOT MET/DC GOAL     5  Patient will be able to increase his/her vocal loudness to reach a target sound pressure level of 70 dB SPL during brief conversational speech in order to increase vocal respiratory support required for functional communication  NOT MET/DC GOAL    Long-Term Goals:  1  Patient will independently increase his/her vocal loudness to an appropriate level to be understood by others  PARTIALLY MET    2  Patient will increase self-intelligibility rating by 25%  NOT MET/DC GOAL    3  Patient will utilize strategies and exercises to increase vocal loudness and improve respiratory laryngeal coordination using the LSVT protocol  NOT MET/DC GOAL      Impressions/Recommendations    Impressions: Patient presents with a mild - moderate voice disorder, characterized by reduced loudness, monotone pitch, and a hoarse vocal quality, which can contribute to an overall decrease in speech intelligibility  Patient has participated in sessions and education on vocal hygiene and vocal loudness exercises/resonant exercises  I also recommended and [id number] (he is looking into)  At this time I am recommending an ENT evaluation to further assess vocal pathology and plan moving forward  Patient encouraged to continue exercises for HEP         Recommendations: recommended being seen by ENT (laryngology) placing on HOLD from therapy at this time      -Intervention certification from: 0/92/7319  -Intervention certification SB:6/40/32    Voice eval x 25 min; education and POC discussion x 20 min

## 2023-03-07 ENCOUNTER — OFFICE VISIT (OUTPATIENT)
Dept: NEUROLOGY | Facility: CLINIC | Age: 63
End: 2023-03-07

## 2023-03-07 VITALS
SYSTOLIC BLOOD PRESSURE: 138 MMHG | BODY MASS INDEX: 29.72 KG/M2 | DIASTOLIC BLOOD PRESSURE: 80 MMHG | WEIGHT: 207.1 LBS | HEART RATE: 72 BPM | TEMPERATURE: 98.2 F

## 2023-03-07 DIAGNOSIS — R41.89 COGNITIVE CHANGES: ICD-10-CM

## 2023-03-07 DIAGNOSIS — G20 PARKINSON DISEASE (HCC): Primary | ICD-10-CM

## 2023-03-07 RX ORDER — PRAMIPEXOLE 0.38 MG/1
TABLET, EXTENDED RELEASE ORAL
Qty: 90 TABLET | Refills: 3 | Status: SHIPPED | OUTPATIENT
Start: 2023-03-07

## 2023-03-07 NOTE — PATIENT INSTRUCTIONS
Patient with MARÍA positive Parkinson's  He continues to have a good on with his medication  He does notice wearing off around the time he is due for his next dose of medication  At times however he will start to feel loopy about an hour or so before his dose is due  Because of this we discussed the option of making some medication changes to help with some wearing off  He already takes Sinemet 1 tab 4 times a day, Sinemet CR before bed and selegiline twice a day  Given his age we discussed the option of adding another medication on board in order to help his Sinemet work better  At this time we discussed starting a low-dose dopamine agonist   To try and reduce pill burden I will start with pramipexole ER to be taken at night before bed  If however his insurance does not cover this we will switch to pramipexole IR and it will be taken 3 times a day  He will watch for any side effects with the addition of this medication including hallucinations, obsessive-compulsive behaviors, dizziness when standing, lower extremity edema/swelling  At this time he will take Sinemet 1 tab 4 times a day, Sinemet CR 1 tab before bed, selegiline 1 tab twice a day  He will also start pramipexole ER 0 375 mg 1 tab before bed  After 1 week he can increase the pramipexole to 2 tabs before bed  If no side effects but he continues to have wearing off between doses, he can further increase to 3 tabs before bed  He was encouraged to remain active  He is now back to going for walks daily  In the past he had physical therapy and hopefully he will be able to get back into this once he is not so busy with his work  He does continue to work full-time as a    It is becoming harder for him to perform his job duties on a regular basis  We did discuss that he may want to look into cutting back his hours if possible in the future      Patient continues to have issues with emotional lability and possible pseudobulbar affect  Once again discussed the option of medication treatments  He did get the Citalopram ordered at the last visit however he has not started it given concern for interactions  He is also on selegiline and we discussed that citalopram was found to be safe in trials with Azilect up to a dose of 20 mg daily  Would however need to watch for side effects with the combination including symptoms of serotonin syndrome (sweating, hypertension)

## 2023-03-07 NOTE — PROGRESS NOTES
Patient ID: Angelica Bautista is a 61 y o  male  Assessment/Plan:    Parkinson disease [facility])  Patient with MARÍA positive Parkinson's  He continues to have a good on with his medication  He does notice wearing off around the time he is due for his next dose of medication  At times however he will start to feel loopy about an hour or so before his dose is due  Because of this we discussed the option of making some medication changes to help with some wearing off  He already takes Sinemet 1 tab 4 times a day, Sinemet CR before bed and selegiline twice a day  Given his age we discussed the option of adding another medication on board in order to help his Sinemet work better  At this time we discussed starting a low-dose dopamine agonist   To try and reduce pill burden I will start with pramipexole ER to be taken at night before bed  If however his insurance does not cover this we will switch to pramipexole IR and it will be taken 3 times a day  He will watch for any side effects with the addition of this medication including hallucinations, obsessive-compulsive behaviors, dizziness when standing, lower extremity edema/swelling  At this time he will take Sinemet 1 tab 4 times a day, Sinemet CR 1 tab before bed, selegiline 1 tab twice a day  He will also start pramipexole ER 0 375 mg 1 tab before bed  After 1 week he can increase the pramipexole to 2 tabs before bed  If no side effects but he continues to have wearing off between doses, he can further increase to 3 tabs before bed  He was encouraged to remain active  He is now back to going for walks daily  In the past he had physical therapy and hopefully he will be able to get back into this once he is not so busy with his work  He does continue to work full-time as a    It is becoming harder for him to perform his job duties on a regular basis    We did discuss that he may want to look into cutting back his hours if possible in the future  Patient continues to have issues with emotional lability and possible pseudobulbar affect  Once again discussed the option of medication treatments  He did get the Citalopram ordered at the last visit however he has not started it given concern for interactions  He is also on selegiline and we discussed that citalopram was found to be safe in trials with Azilect up to a dose of 20 mg daily  Would however need to watch for side effects with the combination including symptoms of serotonin syndrome (sweating, hypertension)  Subjective:    Surjit Morgan is a 61year-old right-handed  who presents in follow up for MARÍA-positive parkinsonism  To review, symptom onset around 2018 with right>left tremor  On initial presentation the patient had a rest and postural tremor on the right, minimal if any kinetic tremor  Also had mild rigidity on the right and slight bradykinesia bilaterally with decrement on the right  His gait was fairly normal with good postural reflexes  No typical non motor symptoms associated with Parkinson's disease  At his last visit his PD symptoms were overall stable  No changes were made to his Sinemet dose    He was also having more issues with mood and was started on a low dose of Citalopram       INTERVAL HISTORY:  He followed with [facility] 1/6/23 - No changes made   He did NOT start the citalopram given concerns for interactions   Completed speech therapy for his voice, per the patient he lost the coverage for this therapy   Wife has mentioned that he shuffles his feet at times   He has reduced his drinking however he does still have a drink prior to bed   He continues to struggle with feeling some mood issues   He has some quivering in the voice, this can be worse when talking with parents   Mobility is better, he is back to walking daily   He will need to have the right knee replaced as well   He struggles with balance at times   He can feel an on with the medication   He can feel the medication wear off at the 4 hour interval, he may feel "loopy" at times prior to the medication and this will resolve once he takes a dose   He sleeps well with a drink and the Sinemet CR  He feels that his memory is not as sharp as it was, he can still manage his medications   Wife does finances due to stress   He is still working, he feels that he is struggling with the volume of work   He is under stress with work, he is hopeful to finish next year however he is not sure he will make it     Current medications:  Selegiline 5mg bid   Sinemet 25/100mg 1tab qid (5am, 9am, 1pm, 5pm)  Sinemet CR before bed        I personally reviewed and updated the ROS  I have spent a total time of 50 minutes on 03/07/23 in caring for this patient including Prognosis, Risks and benefits of tx options, Instructions for management, Patient and family education, Counseling / Coordination of care and Obtaining or reviewing history    Objective:    Blood pressure 138/80, pulse 72, temperature 98 2 °F (36 8 °C), weight 93 9 kg (207 lb 1 6 oz)  Physical Exam  Constitutional:       Appearance: Normal appearance  HENT:      Right Ear: Hearing normal       Left Ear: Hearing normal    Eyes:      General: Lids are normal       Extraocular Movements: Extraocular movements intact  Pupils: Pupils are equal, round, and reactive to light  Pulmonary:      Effort: Pulmonary effort is normal    Neurological:      Mental Status: He is alert  Motor: Motor strength is normal    Psychiatric:         Speech: Speech normal          Neurological Exam  Mental Status  Alert  Oriented to person, place and time  Unable to copy figure  Clock drawing is normal  Speech is normal   MoCA 10/11/22 - 25/30   MoCA 1/26/22 - 24/30   MoCA 24/30 - 8/17/21  Cranial Nerves  CN III, IV, VI: Extraocular movements intact bilaterally  Normal lids and orbits bilaterally  Pupils equal round and reactive to light bilaterally  CN V:  Right: Facial sensation is normal   Left: Facial sensation is normal on the left  CN VIII:  Right: Hearing is normal   Left: Hearing is normal   CN XI: Shoulder shrug strength is normal     Motor   Strength is 5/5 throughout all four extremities  Sensory  Light touch is normal in upper and lower extremities  Coordination  Right: Finger-to-nose normal Left: Finger-to-nose normal     Gait    Arose from the chair without difficulty  Overall good stride  Slight shuffling at times  Volodymyr Valenzuela UPDRS motor:     3/7/23 10/11/22                              Time since last dose:        Speech  0 0   Facial Expression  1 1   Rigidity - Neck  0 0   Rigidity - Upper Extremity (Right)  1 1   Rigidity - Upper Extremity (Left)   1 1   Rigidity - Lower Extremity (Right)  0 0   Rigidity - Lower Extremity (Left)   0 0   Finger Taps (Right)   2 2   Finger Taps (Left)   1 1   Hand Movement (Right)  1 1   Hand Movement (Left)   1 1   Pronation/Supination (Right)  2 1   Pronation/Supination (Left)   2 1   Toe Tapping (Right) 0 0   Toe Tapping (Left) 1 1   Leg Agility (Right)  1 1   Leg Agility (Left)   1 1   Arising from Chair   0 0   Gait   1 1   Freezing of Gait 0 0   Postural Stability         Posture 0 0   Global spontaneity of movement 1 1   Postural Tremor (Right) 0 0   Postural Tremor (Left) 0 0   Kinetic Tremor (Right)  0 0   Kinetic Tremor (Left)  0 0   Rest tremor amplitude RUE 0 0   Rest tremor amplitude LUE 1 holding phone 1   Rest tremor amplitude RLE 0 0   Reset tremor amplitude LLE 0 0   Lip/Jaw Tremor  0 0   Consistency of tremor 0 0   Motor Exam Total:              ROS:    Review of Systems   Constitutional: Negative  Negative for appetite change and fever  HENT: Positive for voice change (Occasional)  Negative for hearing loss, tinnitus and trouble swallowing  Eyes: Negative  Negative for photophobia, pain and visual disturbance     Respiratory: Negative  Negative for shortness of breath  Cardiovascular: Negative  Negative for palpitations  Gastrointestinal: Negative  Negative for nausea and vomiting  Endocrine: Negative  Negative for cold intolerance  Genitourinary: Negative  Negative for dysuria, frequency and urgency  Musculoskeletal: Positive for gait problem (States his wife tells him he shuffles feet a little bit), myalgias (Tightness occasionally in hands,, in the morning whole body stiffness) and neck stiffness (Occasional)  Negative for neck pain  Occasional balance issue     Skin: Negative  Negative for rash  Allergic/Immunologic: Negative  Neurological: Positive for dizziness (Increased lately), tremors (Hands  , has gotten a little worse), speech difficulty ([place] in his voice), weakness (hands), light-headedness (has increased) and numbness (Fingers)  Negative for seizures, syncope, facial asymmetry and headaches  Hematological: Negative  Does not bruise/bleed easily  Psychiatric/Behavioral: Negative  Negative for confusion, hallucinations and sleep disturbance  All other systems reviewed and are negative

## 2023-03-11 ENCOUNTER — TELEPHONE (OUTPATIENT)
Dept: OTHER | Facility: OTHER | Age: 63
End: 2023-03-11

## 2023-03-11 NOTE — TELEPHONE ENCOUNTER
Patient would like to have [facility] for him to get labs done prior to his appt on 3/21/23  He is requesting a call back once labs are ordered

## 2023-03-13 DIAGNOSIS — E78.00 PURE HYPERCHOLESTEROLEMIA: Primary | ICD-10-CM

## 2023-03-13 DIAGNOSIS — Z12.5 SCREENING FOR PROSTATE CANCER: ICD-10-CM

## 2023-03-13 DIAGNOSIS — R73.01 IMPAIRED FASTING GLUCOSE: ICD-10-CM

## 2023-03-13 DIAGNOSIS — G20 PARKINSON DISEASE: ICD-10-CM

## 2023-03-15 LAB
ALBUMIN SERPL-MCNC: 4.3 G/DL (ref 3.6–5.1)
ALBUMIN/GLOB SERPL: 1.9 (CALC) (ref 1–2.5)
ALP SERPL-CCNC: 53 U/L (ref 35–144)
ALT SERPL-CCNC: 17 U/L (ref 9–46)
AST SERPL-CCNC: 17 U/L (ref 10–35)
BASOPHILS # BLD AUTO: 52 CELLS/UL (ref 0–200)
BASOPHILS NFR BLD AUTO: 0.9 %
BILIRUB SERPL-MCNC: 1.1 MG/DL (ref 0.2–1.2)
BUN SERPL-MCNC: 23 MG/DL (ref 7–25)
BUN/CREAT SERPL: NORMAL (CALC) (ref 6–22)
CALCIUM SERPL-MCNC: 9.8 MG/DL (ref 8.6–10.3)
CHLORIDE SERPL-SCNC: 101 MMOL/L (ref 98–110)
CHOLEST SERPL-MCNC: 241 MG/DL
CHOLEST/HDLC SERPL: 3.1 (CALC)
CO2 SERPL-SCNC: 29 MMOL/L (ref 20–32)
CREAT SERPL-MCNC: 0.98 MG/DL (ref 0.7–1.35)
EOSINOPHIL # BLD AUTO: 99 CELLS/UL (ref 15–500)
EOSINOPHIL NFR BLD AUTO: 1.7 %
ERYTHROCYTE [DISTWIDTH] IN BLOOD BY AUTOMATED COUNT: 12.3 % (ref 11–15)
GFR/BSA.PRED SERPLBLD CYS-BASED-ARV: 87 ML/MIN/1.73M2
GLOBULIN SER CALC-MCNC: 2.3 G/DL (CALC) (ref 1.9–3.7)
GLUCOSE SERPL-MCNC: 93 MG/DL (ref 65–99)
HBA1C MFR BLD: 5.4 % OF TOTAL HGB
HCT VFR BLD AUTO: 43.7 % (ref 38.5–50)
HDLC SERPL-MCNC: 78 MG/DL
HGB BLD-MCNC: 14.5 G/DL (ref 13.2–17.1)
LDLC SERPL CALC-MCNC: 145 MG/DL (CALC)
LYMPHOCYTES # BLD AUTO: 1949 CELLS/UL (ref 850–3900)
LYMPHOCYTES NFR BLD AUTO: 33.6 %
MCH RBC QN AUTO: 31 PG (ref 27–33)
MCHC RBC AUTO-ENTMCNC: 33.2 G/DL (ref 32–36)
MCV RBC AUTO: 93.6 FL (ref 80–100)
MONOCYTES # BLD AUTO: 655 CELLS/UL (ref 200–950)
MONOCYTES NFR BLD AUTO: 11.3 %
NEUTROPHILS # BLD AUTO: 3045 CELLS/UL (ref 1500–7800)
NEUTROPHILS NFR BLD AUTO: 52.5 %
NONHDLC SERPL-MCNC: 163 MG/DL (CALC)
PLATELET # BLD AUTO: 316 THOUSAND/UL (ref 140–400)
PMV BLD REES-ECKER: 8.6 FL (ref 7.5–12.5)
POTASSIUM SERPL-SCNC: 4.2 MMOL/L (ref 3.5–5.3)
PROT SERPL-MCNC: 6.6 G/DL (ref 6.1–8.1)
PSA SERPL-MCNC: 0.54 NG/ML
RBC # BLD AUTO: 4.67 MILLION/UL (ref 4.2–5.8)
SODIUM SERPL-SCNC: 136 MMOL/L (ref 135–146)
TRIGL SERPL-MCNC: 77 MG/DL
WBC # BLD AUTO: 5.8 THOUSAND/UL (ref 3.8–10.8)

## 2023-03-21 ENCOUNTER — OFFICE VISIT (OUTPATIENT)
Dept: FAMILY MEDICINE CLINIC | Facility: CLINIC | Age: 63
End: 2023-03-21

## 2023-03-21 VITALS
HEART RATE: 82 BPM | DIASTOLIC BLOOD PRESSURE: 84 MMHG | SYSTOLIC BLOOD PRESSURE: 136 MMHG | RESPIRATION RATE: 16 BRPM | OXYGEN SATURATION: 98 % | HEIGHT: 70 IN | BODY MASS INDEX: 29.99 KG/M2 | WEIGHT: 209.5 LBS | TEMPERATURE: 97.4 F

## 2023-03-21 DIAGNOSIS — J31.0 CHRONIC RHINITIS: ICD-10-CM

## 2023-03-21 DIAGNOSIS — G20 PARKINSON DISEASE (HCC): ICD-10-CM

## 2023-03-21 DIAGNOSIS — E78.00 PURE HYPERCHOLESTEROLEMIA: ICD-10-CM

## 2023-03-21 DIAGNOSIS — Z00.00 WELL ADULT EXAM: Primary | ICD-10-CM

## 2023-03-21 DIAGNOSIS — I10 ESSENTIAL HYPERTENSION: ICD-10-CM

## 2023-03-21 DIAGNOSIS — N52.9 MALE ERECTILE DISORDER: ICD-10-CM

## 2023-03-21 DIAGNOSIS — Z82.49 FH: CAD (CORONARY ARTERY DISEASE): ICD-10-CM

## 2023-03-21 DIAGNOSIS — Z96.652 HISTORY OF TOTAL LEFT KNEE REPLACEMENT (TKR): ICD-10-CM

## 2023-03-21 PROBLEM — Z01.818 PRE-OP EXAMINATION: Status: RESOLVED | Noted: 2022-08-15 | Resolved: 2023-03-21

## 2023-03-21 PROBLEM — M17.11 PRIMARY OSTEOARTHRITIS OF RIGHT KNEE: Status: ACTIVE | Noted: 2021-03-15

## 2023-03-21 PROBLEM — D75.839 THROMBOCYTOSIS: Status: RESOLVED | Noted: 2021-03-15 | Resolved: 2023-03-21

## 2023-03-21 RX ORDER — SILDENAFIL 100 MG/1
100 TABLET, FILM COATED ORAL DAILY PRN
Qty: 10 TABLET | Refills: 5 | Status: SHIPPED | OUTPATIENT
Start: 2023-03-21

## 2023-03-21 RX ORDER — FLUTICASONE PROPIONATE 50 MCG
2 SPRAY, SUSPENSION (ML) NASAL DAILY
Qty: 16 G | Refills: 2 | Status: SHIPPED | OUTPATIENT
Start: 2023-03-21

## 2023-03-21 RX ORDER — AMOXICILLIN 500 MG/1
4 TABLET, FILM COATED ORAL AS NEEDED
COMMUNITY
Start: 2023-02-24

## 2023-03-21 RX ORDER — IBUPROFEN 800 MG/1
1 TABLET ORAL AS NEEDED
COMMUNITY
Start: 2023-02-10

## 2023-03-21 NOTE — PROGRESS NOTES
Name: Jae Vasquez      : 1960      MRN: [id number]  Encounter Provider: Kadie Escobar MD  Encounter Date: 3/21/2023   Encounter department: [address] [place] [address]     1  Well adult exam    2  Essential hypertension    3  Pure hypercholesterolemia  -     CT coronary calcium score; Future; Expected date: 2023    4  Male erectile disorder  -     sildenafil (VIAGRA) 100 mg tablet; Take 1 tablet (100 mg total) by mouth daily as needed for erectile dysfunction    5  Parkinson disease ([address] )    6  Chronic rhinitis  -     fluticasone (FLONASE) 50 mcg/act nasal spray; 2 sprays into each nostril daily    7  History of total left knee replacement (TKR)    8  FH: CAD (coronary artery disease)  -     CT coronary calcium score; Future; Expected date: 2023    Continue with current medications  Monitor blood pressure at home  Schedule CT coronary calcium score  Follow-up with Cardiology, Neurology  OV 1 year      BMI Counseling: Body mass index is 30 06 kg/m²  The BMI is above normal  Nutrition recommendations include reducing portion sizes, decreasing overall calorie intake, consuming healthier snacks, moderation in carbohydrate intake, reducing intake of saturated fat and trans fat and reducing intake of cholesterol  Exercise recommendations include exercising 3-5 times per week  The 10-year ASCVD risk score (Dheeraj DK, et al , 2019) is: 12 3%    Values used to calculate the score:      Age: 61 years      Sex: Male      Is Non- : No      Diabetic: No      Tobacco smoker: No      Systolic Blood Pressure: 122 mmHg      Is BP treated: Yes      HDL Cholesterol: 78 mg/dL      Total Cholesterol: 241 mg/dL      Subjective     59-year-old male here for wellness exam  Medications reviewed  Hospitalizations/surgeries/FH/SH reviewed see note  [facility]    HS in [place]   3 children  Non smoker    Hyperlipidemia with high HDL   2023 Lipid profile cholesterol 241 increased from 229  Triglycerides 77  HDL 78    LFTs normal    Hypertension blood pressures have been stable on Losartan HCTZ 100/25 daily 03/2023 creatinine 0 98  GFR 87  Electrolytes normal  Hgb 14 5  Rosalia Cao Impaired fasting glucose  03/2021 FBS 93    A1c 5 4    Recent Results (from the past 672 hour(s))   Comprehensive metabolic panel    Collection Time: 03/14/23  8:34 AM   Result Value Ref Range    Glucose, Random 93 65 - 99 mg/dL    BUN 23 7 - 25 mg/dL    Creatinine 0 98 0 70 - 1 35 mg/dL    eGFR 87 > OR = 60 mL/min/1 73m2    SL AMB BUN/CREATININE RATIO NOT APPLICABLE 6 - 22 (calc)    Sodium 136 135 - 146 mmol/L    Potassium 4 2 3 5 - 5 3 mmol/L    Chloride 101 98 - 110 mmol/L    CO2 29 20 - 32 mmol/L    Calcium 9 8 8 6 - 10 3 mg/dL    Protein, Total 6 6 6 1 - 8 1 g/dL    Albumin 4 3 3 6 - 5 1 g/dL    Globulin 2 3 1 9 - 3 7 g/dL (calc)    Albumin/Globulin Ratio 1 9 1 0 - 2 5 (calc)    TOTAL BILIRUBIN 1 1 0 2 - 1 2 mg/dL    Alkaline Phosphatase 53 35 - 144 U/L    AST 17 10 - 35 U/L    ALT 17 9 - 46 U/L   CBC and differential    Collection Time: 03/14/23  8:34 AM   Result Value Ref Range    White Blood Cell Count 5 8 3 8 - 10 8 Thousand/uL    Red Blood Cell Count 4 67 4 20 - 5 80 Million/uL    Hemoglobin 14 5 13 2 - 17 1 g/dL    HCT 43 7 38 5 - 50 0 %    MCV 93 6 80 0 - 100 0 fL    MCH 31 0 27 0 - 33 0 pg    MCHC 33 2 32 0 - 36 0 g/dL    RDW 12 3 11 0 - 15 0 %    Platelet Count 287 008 - 400 Thousand/uL    SL AMB MPV 8 6 7 5 - 12 5 fL    Neutrophils (Absolute) 3,045 1,500 - 7,800 cells/uL    Lymphocytes (Absolute) 1,949 850 - 3,900 cells/uL    Monocytes (Absolute) 655 200 - 950 cells/uL    Eosinophils (Absolute) 99 15 - 500 cells/uL    Basophils ABS 52 0 - 200 cells/uL    Neutrophils 52 5 %    Lymphocytes 33 6 %    Monocytes 11 3 %    Eosinophils 1 7 %    Basophils PCT 0 9 %   Lipid panel    Collection Time: 03/14/23  8:34 AM   Result Value Ref Range    Total Cholesterol 241 (H) <200 mg/dL HDL 78 > OR = 40 mg/dL    Triglycerides 77 <150 mg/dL    LDL Calculated 145 (H) mg/dL (calc)    Chol HDLC Ratio 3 1 <5 0 (calc)    Non-HDL Cholesterol 163 (H) <130 mg/dL (calc)   PSA, Total Screen    Collection Time: 03/14/23  8:34 AM   Result Value Ref Range    Prostate Specific Antigen Total 0 54 < OR = 4 00 ng/mL   Hemoglobin A1c (w/out EAG)    Collection Time: 03/14/23  8:34 AM   Result Value Ref Range    Hemoglobin A1C 5 4 <5 7 % of total Hgb           Review of Systems   Constitutional: Positive for unexpected weight change (10 lb weight gain from 102/2022 )  Negative for appetite change, chills and fever  HENT: Negative for congestion, ear pain, rhinorrhea, sore throat and trouble swallowing  Eyes: Negative for visual disturbance  Respiratory: Negative for cough, shortness of breath and wheezing  Cardiovascular: Negative for chest pain, palpitations and leg swelling  102/2022 Echo Left Ventricle: Left ventricular cavity size is normal  Wall thickness is mildly increased  There is mild concentric hypertrophy  Systolic function is normal  Wall motion is normal  Diastolic function is normal  Right Ventricle: Right ventricular cavity size is mildly dilated  Systolic function is normal   Right Atrium: The atrium is mildly dilated  Gastrointestinal: Negative for abdominal pain, blood in stool, constipation, diarrhea, nausea and vomiting         12/2020 colonoscopy  Endocrine: Negative for polydipsia and polyuria  Genitourinary: Negative for difficulty urinating         + ED   He uses PRN Viagra   03/2021 testosterone level 564     Lab Results       Component                Value               Date                       PSA                      0 54                03/14/2023                 PSA                      0 51                03/18/2022                 PSA                      0 6                 03/17/2021                    Musculoskeletal: Positive for arthralgias and gait problem  Negative for myalgias  08/2022 s/p L TKR Balance problems secondary to Parkinson's  He completed PT  Skin: Negative for rash  Allergic/Immunologic: Negative for environmental allergies  Neurological: Positive for tremors  Negative for dizziness and headaches  Parkinson's disease diagnosed by Neurology 07/2020 +  tremor hands R > L  09/2020 Nuclear medicine aden brain scan showed abnormal decreased radiotracer activity in the posterior basal ganglia bilaterally  Asymmetrically decreased activity is also noted in the left anterior basal ganglia  Radiotracer distribution is otherwise unremarkable  Abnormal examination most concerning for underlying parkinsonian syndrome  He is currently on Sinemet and Selegeline  Mild cognitive issues  08/2021 RPR negative  MMA level normal  TSH 1 85  He is now on B12 supplement     Lab Results       Component                Value               Date                       [id number]               221                 08/17/2021              Lab Results       Component                Value               Date                       FOLATE                   13 4                08/17/2021                Hematological: Negative for adenopathy  Does not bruise/bleed easily          Past history of mild thrombocytosis        Lab Results       Component                Value               Date                       WBC                      5 8                 03/14/2023                 HGB                      14 5                03/14/2023                 HCT                      43 7                03/14/2023                 MCV                      93 6                03/14/2023                 PLT                      316                 03/14/2023                                Platelet Count       Date                     Value               Ref Range           Status                02/07/2018               340                 140 - 400 Thou*     Final Platelets       Date                     Value               Ref Range           Status                04/27/2020               420 (H)             149 - 390 Thou*     Final                 11/25/2014               304                 149 - 390 Thou*     Final                 Psychiatric/Behavioral: Negative for behavioral problems, dysphoric mood and sleep disturbance         Past Medical History:   Diagnosis Date   • Hemorrhoids    • Parkinson disease ([facility] [address] )    • Thrombocytosis 3/15/2021     Past Surgical History:   Procedure Laterality Date   • KNEE SURGERY       Family History   Problem Relation Age of Onset   • Diabetes Father         mellitus   • Heart attack Father         acute myocardial infarction   • Hyperlipidemia Mother    • Multiple sclerosis Maternal Grandfather    • Prostate cancer Paternal Grandfather      Social History     Socioeconomic History   • Marital status: /Civil Union     Spouse name: None   • Number of children: None   • Years of education: None   • Highest education level: None   Occupational History   • None   Tobacco Use   • Smoking status: Never   • Smokeless tobacco: Never   Vaping Use   • Vaping Use: Former   Substance and Sexual Activity   • Alcohol use: Yes     Comment: 1 small glass of scotch most nights   • Drug use: No   • Sexual activity: Not Currently   Other Topics Concern   • None   Social History Narrative   • None     Social Determinants of Health     Financial Resource Strain: Not on file   Food Insecurity: Not on file   Transportation Needs: Not on file   Physical Activity: Not on file   Stress: Not on file   Social Connections: Not on file   Intimate Partner Violence: Not on file   Housing Stability: Not on file     Current Outpatient Medications on File Prior to Visit   Medication Sig   • acetaminophen (TYLENOL) 500 mg tablet Take 1,000 mg by mouth 2 (two) times a day   • amoxicillin (AMOXIL) 500 MG tablet Take 4 tablets by mouth as needed   • carbidopa-levodopa (SINEMET CR)  mg TBCR per ER tablet Take 1 tablet by mouth daily at bedtime   • carbidopa-levodopa (SINEMET)  mg per tablet Take 1 tablet by mouth 4 (four) times a day THEN 1 TAB 4 TIMES A DAY   • cholecalciferol (VITAMIN D3) 1,000 units tablet Take 5,000 Units by mouth daily   • Collagen Hydrolysate POWD 1 Dose by Does not apply route daily   • Flowflex COVID-19 Ag Home Test KIT    • ibuprofen (ADVIL,MOTRIN) 100 MG tablet Take 100 mg by mouth every 6 (six) hours as needed for mild pain   • ibuprofen (MOTRIN) 800 mg tablet Take 1 tablet by mouth as needed   • losartan-hydrochlorothiazide (HYZAAR) 100-12 5 MG per tablet TAKE 1 TABLET BY MOUTH EVERY DAY   • MAGNESIUM PO Take by mouth daily at bedtime   • MELATONIN PO Take by mouth daily at bedtime   • Pramipexole Dihydrochloride ER 0 375 MG TB24 Take 1 tab before bed x1 week, 2 tabs before bed x1 week, 3 tabs before bed   • selegiline (ELDEPRYL) 5 mg tablet Take 1 tablet (5 mg total) by mouth 2 (two) times a day with meals   • [DISCONTINUED] fluticasone (FLONASE) 50 mcg/act nasal spray 2 sprays into each nostril daily (Patient taking differently: 2 sprays into each nostril if needed)   • [DISCONTINUED] sildenafil (VIAGRA) 100 mg tablet Take 1 tablet (100 mg total) by mouth daily as needed for erectile dysfunction   • valACYclovir (VALTREX) 1,000 mg tablet Take 2 tablets (2,000 mg total) by mouth 2 (two) times a day for 1 day (Patient not taking: Reported on 3/21/2023)   • [DISCONTINUED] celecoxib (CeleBREX) 200 mg capsule Take 200 mg by mouth 2 (two) times a day (Patient not taking: Reported on 3/21/2023)   • [DISCONTINUED] citalopram (CeleXA) 10 mg tablet TAKE 1 TABLET BY MOUTH EVERY DAY (Patient not taking: Reported on 11/30/2022)   • [DISCONTINUED] lansoprazole (PREVACID) 30 mg capsule Take 1 capsule by mouth daily before breakfast (Patient not taking: Reported on 3/7/2023)   • [DISCONTINUED] lisinopril (ZESTRIL) 10 mg tablet Take 10 mg by mouth daily   • [DISCONTINUED] oxyCODONE (ROXICODONE) 5 immediate release tablet Take 5-10 mg by mouth (Patient not taking: Reported on 11/30/2022)   • [DISCONTINUED] pregabalin (LYRICA) 50 mg capsule Take 50 mg by mouth 2 (two) times a day (Patient not taking: Reported on 11/30/2022)   • [DISCONTINUED] senna (SENOKOT) 8 6 MG tablet Take 2 tablets by mouth daily at bedtime (Patient not taking: Reported on 11/30/2022)     No Known Allergies  Immunization History   Administered Date(s) Administered   • COVID-19 MODERNA VACC 0 5 ML IM 01/19/2021, 02/16/2021   • INFLUENZA 11/11/2016, 11/09/2017   • Influenza Quadrivalent, 6-35 Months IM 10/21/2014, 11/20/2015, 11/11/2016, 11/09/2017   • Influenza, injectable, quadrivalent, preservative free 0 5 mL 10/11/2018   • Influenza, recombinant, quadrivalent,injectable, preservative free 10/14/2019, 10/09/2020   • Influenza, seasonal, injectable 09/26/2011, 10/08/2012, 10/03/2013   • Tdap 08/11/2020       Objective     /84 (BP Location: Left arm, Patient Position: Sitting, Cuff Size: Large)   Pulse 82   Temp (!) 97 4 °F (36 3 °C) (Tympanic)   Resp 16   Ht 5' 10" (1 778 m)   Wt 95 kg (209 lb 8 oz)   SpO2 98%   BMI 30 06 kg/m²      BP Readings from Last 3 Encounters:   03/21/23 136/84   03/07/23 138/80   11/30/22 115/78     Wt Readings from Last 3 Encounters:   03/21/23 95 kg (209 lb 8 oz)   03/07/23 93 9 kg (207 lb 1 6 oz)   11/30/22 90 3 kg (199 lb)       Physical Exam  Vitals and nursing note reviewed  Constitutional:       General: He is not in acute distress  Appearance: He is well-developed  HENT:      Right Ear: Tympanic membrane and ear canal normal       Left Ear: Tympanic membrane and ear canal normal    Eyes:      General: No scleral icterus  Extraocular Movements: Extraocular movements intact  Conjunctiva/sclera: Conjunctivae normal       Pupils: Pupils are equal, round, and reactive to light     Neck:      Thyroid: No thyroid mass or thyromegaly  Vascular: No carotid bruit or JVD  Trachea: No tracheal deviation  Cardiovascular:      Rate and Rhythm: Normal rate and regular rhythm  Pulses:           Carotid pulses are 2+ on the right side and 2+ on the left side  Heart sounds: Normal heart sounds  No murmur heard  No gallop  Pulmonary:      Effort: Pulmonary effort is normal  No respiratory distress  Breath sounds: Normal breath sounds  No wheezing or rales  Abdominal:      General: Bowel sounds are normal  There is no distension or abdominal bruit  Palpations: Abdomen is soft  There is no hepatomegaly, splenomegaly or mass  Tenderness: There is no abdominal tenderness  There is no guarding or rebound  Musculoskeletal:      Right lower leg: No edema  Left lower leg: No edema  Lymphadenopathy:      Cervical: No cervical adenopathy  Upper Body:      Right upper body: No supraclavicular adenopathy  Left upper body: No supraclavicular adenopathy  Skin:     Findings: No rash  Nails: There is no clubbing  Neurological:      General: No focal deficit present  Mental Status: He is alert and oriented to person, place, and time     Psychiatric:         Mood and Affect: Mood normal        Kadie Escobar MD

## 2023-04-25 DIAGNOSIS — E78.00 PURE HYPERCHOLESTEROLEMIA: Primary | ICD-10-CM

## 2023-04-29 RX ORDER — ATORVASTATIN CALCIUM 40 MG/1
40 TABLET, FILM COATED ORAL DAILY
Qty: 30 TABLET | Refills: 5 | Status: SHIPPED | OUTPATIENT
Start: 2023-04-29 | End: 2023-06-26

## 2023-05-14 DIAGNOSIS — I10 ESSENTIAL HYPERTENSION: ICD-10-CM

## 2023-05-15 ENCOUNTER — OFFICE VISIT (OUTPATIENT)
Dept: UROLOGY | Facility: CLINIC | Age: 63
End: 2023-05-15

## 2023-05-15 VITALS
HEIGHT: 71 IN | DIASTOLIC BLOOD PRESSURE: 82 MMHG | OXYGEN SATURATION: 98 % | BODY MASS INDEX: 29.4 KG/M2 | WEIGHT: 210 LBS | SYSTOLIC BLOOD PRESSURE: 134 MMHG | HEART RATE: 82 BPM

## 2023-05-15 DIAGNOSIS — N39.41 URGENCY INCONTINENCE: ICD-10-CM

## 2023-05-15 DIAGNOSIS — N52.9 ERECTILE DYSFUNCTION, UNSPECIFIED ERECTILE DYSFUNCTION TYPE: Primary | ICD-10-CM

## 2023-05-15 RX ORDER — TADALAFIL 5 MG/1
5 TABLET ORAL DAILY
Qty: 30 TABLET | Refills: 2 | Status: SHIPPED | OUTPATIENT
Start: 2023-05-15

## 2023-05-15 RX ORDER — MIRABEGRON 25 MG/1
25 TABLET, FILM COATED, EXTENDED RELEASE ORAL DAILY
Qty: 90 TABLET | Refills: 3 | Status: SHIPPED | OUTPATIENT
Start: 2023-05-15

## 2023-05-15 RX ORDER — LOSARTAN POTASSIUM AND HYDROCHLOROTHIAZIDE 12.5; 1 MG/1; MG/1
TABLET ORAL
Qty: 90 TABLET | Refills: 2 | Status: SHIPPED | OUTPATIENT
Start: 2023-05-15

## 2023-05-15 NOTE — PATIENT INSTRUCTIONS
Please take one 5 mg tablet by mouth daily  Daily Cialis is approved for treatment of urinary symptoms caused by an enlarged prostate (BPH or Benign Prostatic Hyperplasia) as well as erectile dysfunction    May be administered with or without food  Should be taken at the same time each day without regard to timing of sexual activity  Nitrates (e g  Nitroglycerin) used for chest pain/angina can not be taken within 48 hours of taking Cialis (Tadalafil) due to potentially dangerous lowering of blood pressure  Stop the medication go to the emergency room if you experience chest pain, an allergic reaction, sudden hearing loss, severe drop in blood pressure (dizziness/lightheadedness), erection lasting for more than 4 hours, or sudden loss of vision after administration of Cialis       Side effects include but is not limited to:  Facial flushing (facial redness/sensation of the heat)  Upset stomach  Nausea   Headache  Back pain   Nasal congestion     If you have any questions or concerns please do not hesitate to reach out through [employer] or by calling the office

## 2023-05-15 NOTE — PROGRESS NOTES
Office Visit- Urology  Paolo Schmitt 1960 MRN: [id number]      Assessment/Discussion/Plan    61 y o  male managed by     1  Peyronie's disease   -Patient complaint of penile curvature, penile shortening, pain with erection, and lump within the penis  -Exam today is consistent with a plaque on the dorsal aspect of the penis about half an inch  -Because patient is still having pain with erections he would still be in the active phase of Peyronie's disease  -Advised patient to consider taking a picture of an erection to identify degree of severity of the curvature  Once Xiaflex therapy is initiated per the protocol an erection will also be induced within the office through pharmacotherapy for measurement of curvature but this would be performed by the physician  -Patient may be starting stable phase at this point due to being been treated with erections for 2 weeks  Discussed with Dr Raina Nichole I would want patient to be pain-free for about 2 to 3 months before consideration of Xioleflex therapy  -Within 2 to 3 months     2  Prostate Cancer Screening  -PSA 0 5 at last measurement in March 2023  -Patient defers JASON today  -Plan for yearly PSA and offer JASON at subsequent visits     3  Urgency with urge incontinence  -Resolved with Myrbetriq 25 mg  -Refill given today     4  Erectile dysfunction  -Probably somewhat related to Peyronie's disease but patient notes erectile dysfunction preceded development of symptoms indicative of Peyronie's  -Has spontaneous nocturnal erections but has difficulty when trying to engage in sexual activity  -Utilizing Viagra 50 mg to some effect  -Would like more spontaneity    Discontinued Viagra and will initiate on Cialis 5 mg daily      Chief Complaint:   Dallin Bautista is a 61 y o  male presenting to the office for a follow up visit regarding  Peyronie's disease, urgency with urge incontinence, erectile dysfunction        Subjective    HPI at time of initial encounter   Patient is a 42-year-old male with a history of Parkinson's disease and no significant past urologic history besides a vasectomy who presents to the office today for evaluation of multiple complaints  He states over the past couple weeks he has been noticing a left-sided curvature of his penis, shortening of the penis, and pain with erections when he is able to obtain an erection  He also noticed a area of hardness on the dorsal aspect of his penis  this has been present for couple weeks/months -patient does not specifically clarify with a more exact measurement of time he has been having the symptoms  He states that he had an episode of a nocturnal erection during the night that was painful and associated with curvature  Due to being asleep does not know how long he had the erection but it did quickly reside  He reports no episodes of erections lasting greater than 4 hours  Patient also reports that he has baseline erectile dysfunction has been more chronic in nature  He has previously tried Viagra 100 mg tablets to good effect  His main concern is that he is unable to achieve an erection at this point to have satisfactory sexual engagement with his spouse  He also reports that he has bothersome urinary incontinence  He has a frequent sense of urgency with occasional leakage of urine that requires patient to utilize 2 pairs of underwear when he is wearing light-colored pants  He denies fever, chills, dysuria, gross hematuria  Largely denies obstructive symptoms but states that he does not feel he totally empties his bladder  Nocturia x1-2  No tobacco use  Hx Today    -Patient has been utilizing Myrbetriq 25 mg with no side effects  -Nocturia, urgency with urge incontinence has significantly improved  -No longer having pain with erections  Has been painfree for about 2 weeks  -Patient utilizes Viagra 50 mg for erectile dysfunction    Does not like the lack of spontaneity with it  -Denies flank pain, dysuria, gross hematuria    ROS:   Review of Systems   Constitutional: Negative  Negative for chills, fatigue and fever  HENT: Negative  Eyes: Negative  Respiratory: Negative  Negative for shortness of breath  Cardiovascular: Negative  Negative for chest pain  Gastrointestinal: Negative  Endocrine: Negative  Genitourinary: Negative  Allergic/Immunologic: Negative  Neurological: Negative  Psychiatric/Behavioral: Negative            Past Medical History  Past Medical History:   Diagnosis Date   • Hemorrhoids    • Parkinson disease ([facility] [address] )    • Thrombocytosis 3/15/2021       Past Surgical History  Past Surgical History:   Procedure Laterality Date   • KNEE SURGERY         Past Family History  Family History   Problem Relation Age of Onset   • Diabetes Father         mellitus   • Heart attack Father         acute myocardial infarction   • Hyperlipidemia Mother    • Multiple sclerosis Maternal Grandfather    • Prostate cancer Paternal Grandfather        Past Social history  Social History     Socioeconomic History   • Marital status: /Civil Union     Spouse name: Not on file   • Number of children: Not on file   • Years of education: Not on file   • Highest education level: Not on file   Occupational History   • Not on file   Tobacco Use   • Smoking status: Never   • Smokeless tobacco: Never   Vaping Use   • Vaping Use: Former   Substance and Sexual Activity   • Alcohol use: Yes     Comment: 1 small glass of scotch most nights   • Drug use: No   • Sexual activity: Not Currently   Other Topics Concern   • Not on file   Social History Narrative   • Not on file     Social Determinants of Health     Financial Resource Strain: Not on file   Food Insecurity: Not on file   Transportation Needs: Not on file   Physical Activity: Not on file   Stress: Not on file   Social Connections: Not on file   Intimate Partner Violence: Not on file   Housing Stability: Not on file       Current Medications  Current Outpatient Medications   Medication Sig Dispense Refill   • acetaminophen (TYLENOL) 500 mg tablet Take 1,000 mg by mouth 2 (two) times a day     • amoxicillin (AMOXIL) 500 MG tablet Take 4 tablets by mouth as needed     • atorvastatin (LIPITOR) 40 mg tablet Take 1 tablet (40 mg total) by mouth daily 30 tablet 5   • carbidopa-levodopa (SINEMET CR)  mg TBCR per ER tablet Take 1 tablet by mouth daily at bedtime 90 tablet 3   • carbidopa-levodopa (SINEMET)  mg per tablet Take 1 tablet by mouth 4 (four) times a day THEN 1 TAB 4 TIMES A  tablet 3   • cholecalciferol (VITAMIN D3) 1,000 units tablet Take 5,000 Units by mouth daily     • Collagen Hydrolysate POWD 1 Dose by Does not apply route daily     • fluticasone (FLONASE) 50 mcg/act nasal spray 2 sprays into each nostril daily 16 g 2   • ibuprofen (ADVIL,MOTRIN) 100 MG tablet Take 100 mg by mouth every 6 (six) hours as needed for mild pain     • ibuprofen (MOTRIN) 800 mg tablet Take 1 tablet by mouth as needed     • losartan-hydrochlorothiazide (HYZAAR) 100-12 5 MG per tablet TAKE 1 TABLET BY MOUTH EVERY DAY 90 tablet 2   • MAGNESIUM PO Take by mouth daily at bedtime     • MELATONIN PO Take by mouth daily at bedtime     • Mirabegron ER (Myrbetriq) 25 MG TB24 Take 25 mg by mouth 1 (one) time for 1 dose 30 tablet 1   • Pramipexole Dihydrochloride ER 0 375 MG TB24 take 3 tabs before bed 270 tablet 0   • selegiline (ELDEPRYL) 5 mg tablet Take 1 tablet (5 mg total) by mouth 2 (two) times a day with meals 60 tablet 5   • sildenafil (VIAGRA) 100 mg tablet Take 1 tablet (100 mg total) by mouth daily as needed for erectile dysfunction 10 tablet 5   • Flowflex COVID-19 Ag Home Test KIT  (Patient not taking: Reported on 4/10/2023)     • valACYclovir (VALTREX) 1,000 mg tablet Take 2 tablets (2,000 mg total) by mouth 2 (two) times a day for 1 day (Patient not taking: Reported on 3/21/2023) 4 tablet 0     No current facility-administered medications for this visit  "      Allergies  No Known Allergies    OBJECTIVE    Vitals   Vitals:    05/15/23 0951   BP: 134/82   BP Location: Left arm   Patient Position: Sitting   Pulse: 82   SpO2: 98%   Weight: 95 3 kg (210 lb)   Height: 5' 11\" (1 803 m)       Physical Exam  Vitals reviewed  Constitutional:       Appearance: Normal appearance  He is normal weight  HENT:      Head: Normocephalic and atraumatic  Right Ear: External ear normal       Left Ear: External ear normal    Eyes:      Conjunctiva/sclera: Conjunctivae normal    Cardiovascular:      Rate and Rhythm: Normal rate  Pulmonary:      Effort: Pulmonary effort is normal  No respiratory distress  Genitourinary:     Comments: JASON deferred by patient today   Musculoskeletal:         General: Normal range of motion  Skin:     General: Skin is warm and dry  Neurological:      General: No focal deficit present  Mental Status: He is alert  Cranial Nerves: No cranial nerve deficit  Psychiatric:         Mood and Affect: Mood normal          Behavior: Behavior normal          Thought Content: Thought content normal           Labs:     Lab Results   Component Value Date    PSA 0 54 03/14/2023    PSA 0 51 03/18/2022    PSA 0 6 03/17/2021    PSA 0 6 01/20/2020     Lab Results   Component Value Date    CREATININE 0 98 03/14/2023      Lab Results   Component Value Date    HGBA1C 5 4 03/14/2023     Lab Results   Component Value Date    GLUCOSE 103 11/25/2014    CALCIUM 9 8 03/14/2023     11/25/2014    K 4 2 03/14/2023    CO2 29 03/14/2023     03/14/2023    BUN 23 03/14/2023    CREATININE 0 98 03/14/2023       I have personally reviewed all pertinent lab results and reviewed with patient    Imaging       Hien Nash PA-C  Date: 5/15/2023 Time: 9:58 AM  [facility]    This note was written using fluency dictation software  Please excuse any resulting minor grammatical errors      "

## 2023-05-25 DIAGNOSIS — N52.9 ERECTILE DYSFUNCTION, UNSPECIFIED ERECTILE DYSFUNCTION TYPE: ICD-10-CM

## 2023-05-25 NOTE — TELEPHONE ENCOUNTER
I believe patient is referring to Cialis 5 mg  This was sent to [facility]/pharmacy #[id number]- Marisela Reza, [address]

## 2023-05-25 NOTE — TELEPHONE ENCOUNTER
The patient called to inform that the insurance is not covering the medication and wants to know if there is any genetic or related medication to the one he had been prescribed       Please contact the patient

## 2023-05-25 NOTE — TELEPHONE ENCOUNTER
I attempted to reach the patient by phone but needed to leave a message on his home phone voice mail  I left my direct dial number for ease in communication  I await a return phone call before taking further action  (Deshawn recommended for best cash pay pricing   Cost for #30 tablets of Tadalafil 5mg [facility] or [facility] is under $13 )

## 2023-05-25 NOTE — TELEPHONE ENCOUNTER
Pt called in stating the dr changed his prescription but never added the new one  He's requesting a call back at [phone]

## 2023-05-25 NOTE — TELEPHONE ENCOUNTER
I spoke with the patient and let him know this was sent on 5/15/23  He will follow up with the pharmacy again

## 2023-05-26 ENCOUNTER — HOSPITAL ENCOUNTER (OUTPATIENT)
Dept: NON INVASIVE DIAGNOSTICS | Facility: HOSPITAL | Age: 63
Discharge: HOME | End: 2023-05-26

## 2023-05-26 VITALS — HEIGHT: 71 IN | WEIGHT: 210 LBS | BODY MASS INDEX: 29.4 KG/M2

## 2023-05-26 DIAGNOSIS — E78.00 PURE HYPERCHOLESTEROLEMIA: ICD-10-CM

## 2023-05-26 LAB
CHEST PAIN STATEMENT: NORMAL
MAX DIASTOLIC BP: 80 MMHG
MAX HEART RATE: 137 BPM
MAX HR PERCENT: 87 %
MAX HR: 137 BPM
MAX PREDICTED HEART RATE: 157 BPM
MAX. SYSTOLIC BP: 188 MMHG
PROTOCOL NAME: NORMAL
RATE PRESSURE PRODUCT: NORMAL
REASON FOR TERMINATION: NORMAL
SL CV STRESS RECOVERY BP: NORMAL MMHG
SL CV STRESS RECOVERY HR: 86 BPM
SL CV STRESS RECOVERY O2 SAT: 99 %
SL CV STRESS STAGE REACHED: 3
STRESS ANGINA INDEX: 0
STRESS BASELINE BP: NORMAL MMHG
STRESS BASELINE HR: 72 BPM
STRESS DUKE TREADMILL SCORE: 3
STRESS O2 SAT REST: 96 %
STRESS PEAK HR: 137 BPM
STRESS POST ESTIMATED WORKLOAD: 10 METS
STRESS POST EXERCISE DUR MIN: 7 MIN
STRESS POST EXERCISE DUR SEC: 59 SEC
STRESS POST O2 SAT PEAK: 98 %
STRESS POST PEAK BP: 188 MMHG
STRESS ST DEPRESSION: 1 MM
TARGET HR FORMULA: NORMAL
TEST INDICATION: NORMAL
TIME IN EXERCISE PHASE: NORMAL

## 2023-05-26 NOTE — TELEPHONE ENCOUNTER
Patient returned my call  Situation explained  Patient delighted with the cost and agreeable to using [facility] at [facility] in [place], [place]  GoodRx coupon was attached to the prescription for easier processing      Script for the requested medication was requeued and forwarded to the Advanced Practitioner covering the [facility] for approval

## 2023-05-29 DIAGNOSIS — G20 PARKINSON DISEASE (HCC): Primary | ICD-10-CM

## 2023-05-31 DIAGNOSIS — R94.39 ABNORMAL STRESS TEST: Primary | ICD-10-CM

## 2023-06-01 DIAGNOSIS — R94.39 ABNORMAL STRESS TEST: Primary | ICD-10-CM

## 2023-06-01 RX ORDER — TADALAFIL 5 MG/1
5 TABLET ORAL DAILY
Qty: 90 TABLET | Refills: 0 | Status: SHIPPED | OUTPATIENT
Start: 2023-06-01

## 2023-06-02 NOTE — TELEPHONE ENCOUNTER
Phone call to pt  Pt stated he will stay on his current dose until he Demi Beebe the end on this month         Last  OV 3-7-23  Next OV 6-27-23

## 2023-06-02 NOTE — TELEPHONE ENCOUNTER
Abeba Farias PA-C  to Neurology [place] Clinical Team 4        5/30/23  1:15 PM  Could we please call and see how the patient is doing on this dose   I could write for a higher mg tab to decrease his pill burden if doing well on this dose or we could consider increasing the dose further if PD symptoms are still bothersome   Thanks!

## 2023-06-06 DIAGNOSIS — J31.0 CHRONIC RHINITIS: ICD-10-CM

## 2023-06-06 RX ORDER — PRAMIPEXOLE 0.38 MG/1
TABLET, EXTENDED RELEASE ORAL
Qty: 270 TABLET | Refills: 1 | Status: SHIPPED | OUTPATIENT
Start: 2023-06-06

## 2023-06-06 RX ORDER — FLUTICASONE PROPIONATE 50 MCG
SPRAY, SUSPENSION (ML) NASAL
Qty: 48 ML | Refills: 1 | Status: SHIPPED | OUTPATIENT
Start: 2023-06-06

## 2023-06-21 ENCOUNTER — TELEPHONE (OUTPATIENT)
Dept: FAMILY MEDICINE CLINIC | Facility: CLINIC | Age: 63
End: 2023-06-21

## 2023-06-21 NOTE — TELEPHONE ENCOUNTER
Denise called from [facility]  Patient is scheduled for a Cardiac CTA 07/11/2023  He will need BMP prior to the test as well as a beta blocker to lower his heart rate  Target heart rate is 60  She stated most cardiologist will give patient a dose of metoprolol 1 hour prior to the test    I explained that you were out of town until 06/26/2023, she was ok with waiting until then  Reinaldojaime Camden will be back in the office 06/27/2023 if you have any questions    [phone]

## 2023-06-26 DIAGNOSIS — E78.00 PURE HYPERCHOLESTEROLEMIA: ICD-10-CM

## 2023-06-26 RX ORDER — ATORVASTATIN CALCIUM 40 MG/1
TABLET, FILM COATED ORAL
Qty: 90 TABLET | Refills: 2 | Status: SHIPPED | OUTPATIENT
Start: 2023-06-26

## 2023-06-27 ENCOUNTER — PATIENT MESSAGE (OUTPATIENT)
Dept: UROLOGY | Facility: CLINIC | Age: 63
End: 2023-06-27

## 2023-06-27 ENCOUNTER — OFFICE VISIT (OUTPATIENT)
Dept: NEUROLOGY | Facility: CLINIC | Age: 63
End: 2023-06-27
Payer: COMMERCIAL

## 2023-06-27 VITALS
SYSTOLIC BLOOD PRESSURE: 130 MMHG | TEMPERATURE: 97.9 F | DIASTOLIC BLOOD PRESSURE: 84 MMHG | HEART RATE: 76 BPM | WEIGHT: 209.8 LBS | BODY MASS INDEX: 29.26 KG/M2

## 2023-06-27 DIAGNOSIS — N52.9 ERECTILE DYSFUNCTION, UNSPECIFIED ERECTILE DYSFUNCTION TYPE: Primary | ICD-10-CM

## 2023-06-27 DIAGNOSIS — G20 PARKINSON DISEASE (HCC): ICD-10-CM

## 2023-06-27 DIAGNOSIS — R41.89 COGNITIVE CHANGES: Primary | ICD-10-CM

## 2023-06-27 DIAGNOSIS — I10 ESSENTIAL HYPERTENSION: Primary | ICD-10-CM

## 2023-06-27 RX ORDER — SELEGILINE HYDROCHLORIDE 5 MG/1
5 TABLET ORAL 2 TIMES DAILY WITH MEALS
Qty: 180 TABLET | Refills: 3 | Status: SHIPPED | OUTPATIENT
Start: 2023-06-27

## 2023-06-27 RX ORDER — METOPROLOL TARTRATE 50 MG/1
TABLET, FILM COATED ORAL
Qty: 1 TABLET | Refills: 0 | Status: SHIPPED | OUTPATIENT
Start: 2023-06-27 | End: 2023-08-10

## 2023-06-27 NOTE — PATIENT INSTRUCTIONS
Patient with MARÍA positive Parkinson's disease  Overall he has been doing well however he continues to have some wearing off between doses  He does have a good on with the medication  He has been taking low-dose pramipexole with perhaps some improvement of the wearing off however his son has recently noted some worsening of his OCD  We did discuss that unfortunately obsessive-compulsive behaviors can be a side effect to dopamine agonist   Given this is now becoming more of an issue for him and is also making it harder for him to perform tasks at work, we discussed weaning off of the pramipexole  He will start by taking pramipexole 0 375 mg 2 tabs before bed for the next week  He will then reduce to 1 tab before bed for a week and then stop  Once off this medication we discussed that he will watch for any worsening of his Parkinson symptoms including wearing off  If this becomes more of an issue again then we could consider one of the below options: We could increase his Sinemet dose to 1 5 tabs 4 times a day  He does have some concerns with splitting the medication in half  He could the interval between doses and start taking the Sinemet 1 tab 5 times a day  The goal would be to see if this would help reduce the wearing off between doses  For wearing off we could also consider the addition of Comtan or opicapone  These medications help to stop the breakdown of levodopa in the gut so that more of the medication can get up to the brain  Once again the goal would be to reduce wearing off between doses  We could also consider a trial of Rose Decent which is a newer medication specific for wearing off  Given this medication is new there is some difficulty with insurance coverage at times  For now he will remain on his current dose of Sinemet 1 tab 4 times a day along with Sinemet CR before bed  He also remains on selegiline      We had a long discussion in regards to the importance of regular exercise and activity  Currently he does go for walks daily and also does some strengthening exercises on his own at home  We once again did discuss that he may benefit from something like the rock steady boxing program however it is difficult for him to find time for this right now  He continues to work full-time which is becoming harder for him overall  We had a long discussion in regards to disability  He would like to consider this and will discuss further with the HR department at his work  Will hold off starting Citalopram at this time  May reconsider depending on how he feels once he is done working

## 2023-06-27 NOTE — ASSESSMENT & PLAN NOTE
Overall stable  Will continue to monitor  Patient was encouraged to increase mind stimulating activities such as reading, crosswords, word searches, puzzles, Soduku, solitaire, coloring and other brain games  We also discussed the importance of staying physically active and eating a health diet such as the Mediterranean or MIND diet

## 2023-06-27 NOTE — ASSESSMENT & PLAN NOTE
Patient with MARÍA positive Parkinson's disease  Overall he has been doing well however he continues to have some wearing off between doses  He does have a good on with the medication  He has been taking low-dose pramipexole with perhaps some improvement of the wearing off however his son has recently noted some worsening of his OCD  We did discuss that unfortunately obsessive-compulsive behaviors can be a side effect to dopamine agonist   Given this is now becoming more of an issue for him and is also making it harder for him to perform tasks at work, we discussed weaning off of the pramipexole  He will start by taking pramipexole 0 375 mg 2 tabs before bed for the next week  He will then reduce to 1 tab before bed for a week and then stop  Once off this medication we discussed that he will watch for any worsening of his Parkinson symptoms including wearing off  If this becomes more of an issue again then we could consider one of the below options:    1  We could increase his Sinemet dose to 1 5 tabs 4 times a day  He does have some concerns with splitting the medication in half  2  He could the interval between doses and start taking the Sinemet 1 tab 5 times a day  The goal would be to see if this would help reduce the wearing off between doses  3  For wearing off we could also consider the addition of Comtan or opicapone  These medications help to stop the breakdown of levodopa in the gut so that more of the medication can get up to the brain  Once again the goal would be to reduce wearing off between doses  4  We could also consider a trial of Kenard Araceli which is a newer medication specific for wearing off  Given this medication is new there is some difficulty with insurance coverage at times  For now he will remain on his current dose of Sinemet 1 tab 4 times a day along with Sinemet CR before bed  He also remains on selegiline      We had a long discussion in regards to the importance of regular exercise and activity  Currently he does go for walks daily and also does some strengthening exercises on his own at home  We once again did discuss that he may benefit from something like the rock steady boxing program however it is difficult for him to find time for this right now  He continues to work full-time which is becoming harder for him overall  We had a long discussion in regards to disability  He would like to consider this and will discuss further with the HR department at his work  Will hold off starting Citalopram at this time  May reconsider depending on how he feels once he is done working

## 2023-06-27 NOTE — PROGRESS NOTES
Patient ID: Kash Jiang is a 61 y o  male  Assessment/Plan:    Parkinson disease [facility])  Patient with MARÍA positive Parkinson's disease  Overall he has been doing well however he continues to have some wearing off between doses  He does have a good on with the medication  He has been taking low-dose pramipexole with perhaps some improvement of the wearing off however his son has recently noted some worsening of his OCD  We did discuss that unfortunately obsessive-compulsive behaviors can be a side effect to dopamine agonist   Given this is now becoming more of an issue for him and is also making it harder for him to perform tasks at work, we discussed weaning off of the pramipexole  He will start by taking pramipexole 0 375 mg 2 tabs before bed for the next week  He will then reduce to 1 tab before bed for a week and then stop  Once off this medication we discussed that he will watch for any worsening of his Parkinson symptoms including wearing off  If this becomes more of an issue again then we could consider one of the below options:    1  We could increase his Sinemet dose to 1 5 tabs 4 times a day  He does have some concerns with splitting the medication in half  2  He could the interval between doses and start taking the Sinemet 1 tab 5 times a day  The goal would be to see if this would help reduce the wearing off between doses  3  For wearing off we could also consider the addition of Comtan or opicapone  These medications help to stop the breakdown of levodopa in the gut so that more of the medication can get up to the brain  Once again the goal would be to reduce wearing off between doses  4  We could also consider a trial of Maikel Deer which is a newer medication specific for wearing off  Given this medication is new there is some difficulty with insurance coverage at times        For now he will remain on his current dose of Sinemet 1 tab 4 times a day along with Sinemet CR before bed   He also remains on selegiline  We had a long discussion in regards to the importance of regular exercise and activity  Currently he does go for walks daily and also does some strengthening exercises on his own at home  We once again did discuss that he may benefit from something like the rock steady boxing program however it is difficult for him to find time for this right now  He continues to work full-time which is becoming harder for him overall  We had a long discussion in regards to disability  He would like to consider this and will discuss further with the HR department at his work  Will hold off starting Citalopram at this time  May reconsider depending on how he feels once he is done working  Cognitive changes  Overall stable  Will continue to monitor  Patient was encouraged to increase mind stimulating activities such as reading, crosswords, word searches, puzzles, Soduku, solitaire, coloring and other brain games  We also discussed the importance of staying physically active and eating a health diet such as the Mediterranean or MIND diet  Subjective:    Michael Noble is a 61year-old right-handed  who presents in follow up for MARÍA-positive parkinsonism  To review, symptom onset around 2018 with right>left tremor  On initial presentation the patient had a rest and postural tremor on the right, minimal if any kinetic tremor  Also had mild rigidity on the right and slight bradykinesia bilaterally with decrement on the right  His gait was fairly normal with good postural reflexes  No typical non motor symptoms associated with Parkinson's disease  At his last visit he was having some wearing off and Pramipexole was started  Also discussed started the Citalopram which had been ordered at the last visit       INTERVAL HISTORY:  Some days the wearing off is still present, often this will be around the time that he is due for his next dose   He will feel the medication kick in at times  If he is sleeping well he feels that his PD symptoms are overall better controlled   He can still perform his ADLS however he is slower with buttoning or tying his shoes   Hands just feel stiff in general, no clear correlation between this and the medication   Normally he feels that he sleeps well  This past week his wife was gone and he has not been sleeping well  He walks the dog a few times a day   He does some weights and strengthening exercises at home   Son has noticed that his OCD is worse then it was in the past   He will get involved in things that are not important and he can not stop, this makes it hard for him to get his work completed     He is still working, it is becoming harder for perform all of his job duties   He struggles with prioritizing things at work   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, he will often feels tired and winded   Handwriting is an issue and this makes it hard for him to keep notes and paperwork  It is becoming harder for him to respond 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 due to some of the underlying emotions changes   Speech is becoming more of an issues for him due to being hoarse, this makes it hard for him to interact with people for his job   He was in contact with a SW for discussion in regards to disability     Current medications:  Selegiline 5mg bid   Sinemet 25/100mg 1tab qid (5am, 9am, 1pm, 5pm)  Sinemet CR before bed  Pramipexole ER 0 375mg 3tabs qhs       I personally reviewed and updated the ROS  I have spent a total time of 45 minutes on 06/27/23 in caring for this patient including Risks and benefits of tx options, Instructions for management, Patient and family education, Importance of tx compliance and Counseling / Coordination of care  Objective:    Blood pressure 130/84, pulse 76, temperature 97 9 °F (36 6 °C), weight 95 2 kg (209 lb 12 8 oz)      Physical Exam  Constitutional:       Appearance: Normal appearance  HENT:      Right Ear: Hearing normal       Left Ear: Hearing normal    Eyes:      General: Lids are normal       Extraocular Movements: Extraocular movements intact  Pupils: Pupils are equal, round, and reactive to light  Pulmonary:      Effort: Pulmonary effort is normal    Neurological:      Mental Status: He is alert  Motor: Motor strength is normal   Psychiatric:         Speech: Speech normal          Neurological Exam  Mental Status  Alert  Oriented to person, place and time  Unable to copy figure  Clock drawing is normal  Speech is normal   MoCA 10/11/22 - 25/30   MoCA 1/26/22 - 24/30   MoCA 24/30 - 8/17/21  Cranial Nerves  CN III, IV, VI: Extraocular movements intact bilaterally  Normal lids and orbits bilaterally  Pupils equal round and reactive to light bilaterally  CN V:  Right: Facial sensation is normal   Left: Facial sensation is normal on the left  CN VIII:  Right: Hearing is normal   Left: Hearing is normal   CN XI: Shoulder shrug strength is normal     Motor   Strength is 5/5 throughout all four extremities  Sensory  Light touch is normal in upper and lower extremities  Coordination  Right: Finger-to-nose normal Left: Finger-to-nose normal     Gait    Arose from the chair without difficulty  Overall good stride  Slight shuffling at times  Ernesto Brambila       UPDRS motor:     3/7/23 6/27/23                              Time since last dose:        Speech  0 0   Facial Expression  1 1   Rigidity - Neck  0 0   Rigidity - Upper Extremity (Right)  1 1   Rigidity - Upper Extremity (Left)   1 1   Rigidity - Lower Extremity (Right)  0 0   Rigidity - Lower Extremity (Left)   0 0   Finger Taps (Right)   2 1   Finger Taps (Left)   1 1   Hand Movement (Right)  1 1   Hand Movement (Left)   1 1   Pronation/Supination (Right)  2 2   Pronation/Supination (Left)   2 1   Toe Tapping (Right) 0 0   Toe Tapping (Left) 1 1   Leg Agility (Right)  1 1 Leg Agility (Left)   1 1   Arising from Chair   0 0   Gait   1 1   Freezing of Gait 0 0   Postural Stability         Posture 0 0   Global spontaneity of movement 1 1   Postural Tremor (Right) 0 0   Postural Tremor (Left) 0 0   Kinetic Tremor (Right)  0 0   Kinetic Tremor (Left)  0 0   Rest tremor amplitude RUE 0 0   Rest tremor amplitude LUE 1 holding phone 1   Rest tremor amplitude RLE 0 0   Reset tremor amplitude LLE 0 0   Lip/Jaw Tremor  0 0   Consistency of tremor 0 0   Motor Exam Total:          ROS:    Review of Systems   Constitutional: Positive for fatigue (Some)  Negative for appetite change and fever  HENT: Positive for trouble swallowing (To an extent) and voice change  Negative for hearing loss and tinnitus  Eyes: Negative  Negative for photophobia, pain and visual disturbance  Respiratory: Negative  Negative for shortness of breath  Cardiovascular: Negative  Negative for palpitations  Gastrointestinal: Negative  Negative for nausea and vomiting  Endocrine: Negative  Negative for cold intolerance  Genitourinary: Negative  Negative for dysuria, frequency and urgency  Musculoskeletal: Positive for gait problem (Shuffling feet) and myalgias (In the AM until he gets going)  Negative for back pain and neck pain  Balance Issues at times     Skin: Negative  Negative for rash  Allergic/Immunologic: Negative  Neurological: Positive for dizziness (Occasional), tremors (Hands, has gotten worse), speech difficulty, weakness and numbness (Hands)  Negative for seizures, syncope, facial asymmetry, light-headedness and headaches  Hematological: Negative  Does not bruise/bleed easily  Psychiatric/Behavioral: Negative for confusion, hallucinations and sleep disturbance  OCD   All other systems reviewed and are negative

## 2023-06-29 LAB
BUN SERPL-MCNC: 21 MG/DL (ref 7–25)
BUN/CREAT SERPL: NORMAL (CALC) (ref 6–22)
CALCIUM SERPL-MCNC: 9.7 MG/DL (ref 8.6–10.3)
CHLORIDE SERPL-SCNC: 103 MMOL/L (ref 98–110)
CO2 SERPL-SCNC: 30 MMOL/L (ref 20–32)
CREAT SERPL-MCNC: 0.98 MG/DL (ref 0.7–1.35)
GFR/BSA.PRED SERPLBLD CYS-BASED-ARV: 87 ML/MIN/1.73M2
GLUCOSE SERPL-MCNC: 86 MG/DL (ref 65–99)
POTASSIUM SERPL-SCNC: 4.4 MMOL/L (ref 3.5–5.3)
SODIUM SERPL-SCNC: 139 MMOL/L (ref 135–146)

## 2023-07-06 ENCOUNTER — PATIENT MESSAGE (OUTPATIENT)
Dept: NEUROLOGY | Facility: CLINIC | Age: 63
End: 2023-07-06

## 2023-07-11 ENCOUNTER — HOSPITAL ENCOUNTER (OUTPATIENT)
Dept: CT IMAGING | Facility: HOSPITAL | Age: 63
Discharge: HOME | End: 2023-07-11
Payer: COMMERCIAL

## 2023-07-11 VITALS — DIASTOLIC BLOOD PRESSURE: 77 MMHG | HEART RATE: 64 BPM | SYSTOLIC BLOOD PRESSURE: 129 MMHG | RESPIRATION RATE: 20 BRPM

## 2023-07-11 DIAGNOSIS — R94.39 ABNORMAL STRESS TEST: ICD-10-CM

## 2023-07-11 PROCEDURE — G1004 CDSM NDSC: HCPCS

## 2023-07-11 PROCEDURE — 75574 CT ANGIO HRT W/3D IMAGE: CPT

## 2023-07-11 RX ORDER — NITROGLYCERIN 0.4 MG/1
0.4 TABLET SUBLINGUAL ONCE
Status: COMPLETED | OUTPATIENT
Start: 2023-07-11 | End: 2023-07-11

## 2023-07-11 RX ORDER — METOPROLOL TARTRATE 5 MG/5ML
5 INJECTION INTRAVENOUS
Status: DISCONTINUED | OUTPATIENT
Start: 2023-07-11 | End: 2023-07-12 | Stop reason: HOSPADM

## 2023-07-11 RX ADMIN — IOHEXOL 80 ML: 350 INJECTION, SOLUTION INTRAVENOUS at 12:56

## 2023-07-11 RX ADMIN — NITROGLYCERIN 0.4 MG: 0.4 TABLET SUBLINGUAL at 12:59

## 2023-07-11 NOTE — NURSING NOTE
Patient arrived for cardiac cta. Test/medications reviewed. Pt denies pde5 inhibitor use. Pt took 50 mg metoprolol tartrate per cardiology. Pt tolerated test well. See vitals flowsheet. Asymptomatic upon departure.

## 2023-07-12 RX ORDER — SILDENAFIL 50 MG/1
50 TABLET, FILM COATED ORAL DAILY PRN
Qty: 30 TABLET | Refills: 1 | Status: SHIPPED | OUTPATIENT
Start: 2023-07-12

## 2023-07-12 NOTE — PATIENT COMMUNICATION
July 11, 2023  Analisa Holcomb  to [facility] (supporting Sadiq Wyatt PA-C)          7/11/23  9:06 PM  Thank you for your reply. I have decided to go with the 1.5 tablets 4x/day. Seems to be working once I get going in the AM.  Early on is still problematic as I try cope until dosage begins to work.    The OCD issues have certainly improved as I was unaware of the degree of OCD that I was exhibiting

## 2023-07-17 ENCOUNTER — OFFICE VISIT (OUTPATIENT)
Dept: UROLOGY | Facility: CLINIC | Age: 63
End: 2023-07-17
Payer: COMMERCIAL

## 2023-07-17 VITALS
OXYGEN SATURATION: 96 % | HEIGHT: 71 IN | DIASTOLIC BLOOD PRESSURE: 70 MMHG | WEIGHT: 207.6 LBS | SYSTOLIC BLOOD PRESSURE: 130 MMHG | BODY MASS INDEX: 29.06 KG/M2 | RESPIRATION RATE: 16 BRPM | HEART RATE: 87 BPM

## 2023-07-17 DIAGNOSIS — N39.41 URGENCY INCONTINENCE: Primary | ICD-10-CM

## 2023-07-17 LAB — POST-VOID RESIDUAL VOLUME, ML POC: 141 ML

## 2023-07-17 PROCEDURE — 99213 OFFICE O/P EST LOW 20 MIN: CPT

## 2023-07-17 PROCEDURE — 51798 US URINE CAPACITY MEASURE: CPT

## 2023-07-17 NOTE — PROGRESS NOTES
Office Visit- Urology  [facility] 1960 MRN: [id number]      Assessment/Discussion/Plan    61 y.o. male managed by     1. Peyronie's disease   -Patient complaint of penile curvature, penile shortening, pain with erection, and lump within the penis  -Exam today is consistent with a plaque on the dorsal aspect of the penis about half an inch. -Because patient is still having pain with erections he would still be in the active phase of Peyronie's disease  -Advised patient to consider taking a picture of an erection to identify degree of severity of the curvature.  Once Xiaflex therapy is initiated per the protocol an erection will also be induced within the office through pharmacotherapy for measurement of curvature but this would be performed by the physician  -Patient may be starting stable phase at this point due to being been treated with erections for 2 weeks. -Due to time constraints with office appointments for Dr. Harley Estrada will refer patient out for consultation for consideration of Peyronie's management    2. Prostate Cancer Screening  -PSA 0.5 at last measurement in March 2023  -JASON today was not concerning for prostate cancer  -Plan for yearly PSA and offer JASON at subsequent visits     3.  Urgency with urge incontinence  -Resolved with Myrbetriq 25 mg  -Continue with course of Myrbetriq 25 mg patient is aware that he can increase as needed     4. Erectile dysfunction  -Probably somewhat related to Peyronie's disease but patient notes erectile dysfunction preceded development of symptoms indicative of Peyronie's  -Has spontaneous nocturnal erections but has difficulty when trying to engage in sexual activity  -Utilizing Viagra 50 mg to some effect  -Because of side effects patient had to stop Cialis 5 mg.   Transitioned back to Viagra 50 mg    Chief Complaint:   Lani Mascorro is a 61 y.o. male presenting to the office for a follow up visit regarding  Peyronie's, urgency with urge incontinence , erectile dysfunction         Subjective    HPI at time of initial encounter   Patient is a 70-year-old male with a history of Parkinson's disease and no significant past urologic history besides a vasectomy who presents to the office today for evaluation of multiple complaints.  He states over the past couple weeks he has been noticing a left-sided curvature of his penis, shortening of the penis, and pain with erections when he is able to obtain an erection.  He also noticed a area of hardness on the dorsal aspect of his penis. this has been present for couple weeks/months -patient does not specifically clarify with a more exact measurement of time he has been having the symptoms. He states that he had an episode of a nocturnal erection during the night that was painful and associated with curvature.  Due to being asleep does not know how long he had the erection but it did quickly reside.  He reports no episodes of erections lasting greater than 4 hours.  Patient also reports that he has baseline erectile dysfunction has been more chronic in nature.  He has previously tried Viagra 100 mg tablets to good effect.  His main concern is that he is unable to achieve an erection at this point to have satisfactory sexual engagement with his spouse.  He also reports that he has bothersome urinary incontinence. Simon Davis has a frequent sense of urgency with occasional leakage of urine that requires patient to utilize 2 pairs of underwear when he is wearing light-colored pants.  He denies fever, chills, dysuria, gross hematuria.  Largely denies obstructive symptoms but states that he does not feel he totally empties his bladder.  Nocturia x1-2.  No tobacco use.         Hx 5/15     -Patient has been utilizing Myrbetriq 25 mg with no side effects  -Nocturia, urgency with urge incontinence has significantly improved  -No longer having pain with erections.   Has been painfree for about 2 weeks  -Patient utilizes Viagra 50 mg for erectile dysfunction. Does not like the lack of spontaneity with it  -Denies flank pain, dysuria, gross hematuria    Hx 7/17    -Patient reported intolerance to Cialis 5 mg so switched back to Viagra 50 mg  -He has remained pain-free with erections now for 2 to 3 months  -He denies any flank pain, dysuria, gross hematuria  -Myrbetriq 25 mg is still working with resolution of urgency with urge incontinence    AUA SYMPTOM SCORE    Flowsheet Row Most Recent Value   AUA SYMPTOM SCORE    How often have you had a sensation of not emptying your bladder completely after you finished urinating? 3 (P)     How often have you had to urinate again less than two hours after you finished urinating? 4 (P)     How often have you found you stopped and started again several times when you urinate? 3 (P)     How often have you found it difficult to postpone urination? 4 (P)     How often have you had a weak urinary stream? 4 (P)     How often have you had to push or strain to begin urination? 3 (P)     How many times did you most typically get up to urinate from the time you went to bed at night until the time you got up in the morning? 3 (P)     Quality of Life: If you were to spend the rest of your life with your urinary condition just the way it is now, how would you feel about that? 5 (P)     AUA SYMPTOM SCORE 24 (P)           ROS:   Review of Systems   Constitutional: Negative. Negative for chills, fatigue and fever. HENT: Negative. Respiratory: Negative for shortness of breath. Cardiovascular: Negative for chest pain. Gastrointestinal: Negative. Negative for abdominal pain. Endocrine: Negative. Musculoskeletal: Negative. Skin: Negative. Neurological: Negative. Negative for dizziness and light-headedness. Hematological: Negative. Psychiatric/Behavioral: Negative.           Past Medical History  Past Medical History:   Diagnosis Date   • Hemorrhoids    • Parkinson disease ([address])    • Thrombocytosis 3/15/2021       Past Surgical History  Past Surgical History:   Procedure Laterality Date   • KNEE SURGERY         Past Family History  Family History   Problem Relation Age of Onset   • Diabetes Father         mellitus   • Heart attack Father         acute myocardial infarction   • Hyperlipidemia Mother    • Multiple sclerosis Maternal Grandfather    • Prostate cancer Paternal Grandfather        Past Social history  Social History     Socioeconomic History   • Marital status: /Civil Union     Spouse name: Not on file   • Number of children: Not on file   • Years of education: Not on file   • Highest education level: Not on file   Occupational History   • Not on file   Tobacco Use   • Smoking status: Never   • Smokeless tobacco: Never   Vaping Use   • Vaping Use: Former   Substance and Sexual Activity   • Alcohol use: Yes     Comment: 1 small glass of scotch most nights   • Drug use: No   • Sexual activity: Not Currently   Other Topics Concern   • Not on file   Social History Narrative   • Not on file     Social Determinants of Health     Financial Resource Strain: Not on file   Food Insecurity: Not on file   Transportation Needs: Not on file   Physical Activity: Not on file   Stress: Not on file   Social Connections: Not on file   Intimate Partner Violence: Not on file   Housing Stability: Not on file       Current Medications  Current Outpatient Medications   Medication Sig Dispense Refill   • amoxicillin (AMOXIL) 500 MG tablet Take 4 tablets by mouth as needed     • atorvastatin (LIPITOR) 40 mg tablet TAKE 1 TABLET BY MOUTH EVERY DAY 90 tablet 2   • carbidopa-levodopa (SINEMET CR)  mg TBCR per ER tablet Take 1 tablet by mouth daily at bedtime 90 tablet 3   • carbidopa-levodopa (SINEMET)  mg per tablet Take 1 tablet by mouth 4 (four) times a day THEN 1 TAB 4 TIMES A  tablet 3   • cholecalciferol (VITAMIN D3) 1,000 units tablet Take 5,000 Units by mouth daily     • Collagen Hydrolysate POWD 1 Dose by Does not apply route daily     • fluticasone (FLONASE) 50 mcg/act nasal spray SPRAY 2 SPRAYS INTO EACH NOSTRIL EVERY DAY (Patient taking differently: if needed) 48 mL 1   • ibuprofen (ADVIL,MOTRIN) 100 MG tablet Take 100 mg by mouth every 6 (six) hours as needed for mild pain     • ibuprofen (MOTRIN) 800 mg tablet Take 1 tablet by mouth as needed     • losartan-hydrochlorothiazide (HYZAAR) 100-12.5 MG per tablet TAKE 1 TABLET BY MOUTH EVERY DAY 90 tablet 2   • MELATONIN PO Take by mouth daily at bedtime     • metoprolol tartrate (LOPRESSOR) 50 mg tablet Take 1 dose one hour prior to procedure 1 tablet 0   • Mirabegron ER (Myrbetriq) 25 MG TB24 Take 25 mg by mouth daily 90 tablet 3   • selegiline (ELDEPRYL) 5 mg tablet Take 1 tablet (5 mg total) by mouth 2 (two) times a day with meals 180 tablet 3   • sildenafil (VIAGRA) 50 MG tablet Take 1 tablet (50 mg total) by mouth daily as needed for erectile dysfunction 30 tablet 1   • acetaminophen (TYLENOL) 500 mg tablet Take 1,000 mg by mouth 2 (two) times a day (Patient not taking: Reported on 6/27/2023)     • Flowflex COVID-19 Ag Home Test KIT  (Patient not taking: Reported on 4/10/2023)     • MAGNESIUM PO Take by mouth daily at bedtime (Patient not taking: Reported on 6/27/2023)     • valACYclovir (VALTREX) 1,000 mg tablet Take 2 tablets (2,000 mg total) by mouth 2 (two) times a day for 1 day (Patient not taking: Reported on 3/21/2023) 4 tablet 0     No current facility-administered medications for this visit. Allergies  No Known Allergies    OBJECTIVE    Vitals   Vitals:    07/17/23 0947   BP: 130/70   BP Location: Left arm   Patient Position: Sitting   Cuff Size: Large   Pulse: 87   Resp: 16   SpO2: 96%   Weight: 94.2 kg (207 lb 9.6 oz)   Height: 5' 11" (1.803 m)       PVR:    Physical Exam  Constitutional:       General: He is not in acute distress. Appearance: Normal appearance. He is normal weight. He is not ill-appearing or toxic-appearing. HENT:      Head: Normocephalic and atraumatic. Eyes:      Conjunctiva/sclera: Conjunctivae normal.   Cardiovascular:      Rate and Rhythm: Normal rate. Pulses: Normal pulses. Pulmonary:      Effort: Pulmonary effort is normal. No respiratory distress. Abdominal:      Tenderness: There is no right CVA tenderness or left CVA tenderness. Genitourinary:     Comments: Prostate exam without concern for nodules, induration, or asymmetry  Musculoskeletal:         General: Normal range of motion. Cervical back: Normal range of motion and neck supple. Skin:     General: Skin is warm and dry. Neurological:      General: No focal deficit present. Mental Status: He is alert and oriented to person, place, and time. Cranial Nerves: No cranial nerve deficit. Psychiatric:         Mood and Affect: Mood normal.         Behavior: Behavior normal.         Thought Content: Thought content normal.          Labs:     Lab Results   Component Value Date    PSA 0.54 03/14/2023    PSA 0.51 03/18/2022    PSA 0.6 03/17/2021    PSA 0.6 01/20/2020     Lab Results   Component Value Date    CREATININE 0.98 06/29/2023      Lab Results   Component Value Date    HGBA1C 5.4 03/14/2023     Lab Results   Component Value Date    GLUCOSE 103 11/25/2014    CALCIUM 9.7 06/29/2023     11/25/2014    K 4.4 06/29/2023    CO2 30 06/29/2023     06/29/2023    BUN 21 06/29/2023    CREATININE 0.98 06/29/2023       I have personally reviewed all pertinent lab results and reviewed with patient    Imaging       Martin Dong PA-C  Date: 7/17/2023 Time: 10:02 AM  [facility]    This note was written using fluency dictation software. Please excuse any resulting minor grammatical errors.

## 2023-07-17 NOTE — PATIENT INSTRUCTIONS
Dr. Ellie Cornelius at [facility] in [place]    Dr. Pedro Morales at [facility] for sexual medicine in \[facility]\""

## 2023-07-28 ENCOUNTER — TELEPHONE (OUTPATIENT)
Dept: UROLOGY | Facility: CLINIC | Age: 63
End: 2023-07-28

## 2023-07-28 NOTE — TELEPHONE ENCOUNTER
Is currently not offering Xiaflex therapy unfortunately. He would like to be evaluated by an outside provider please reply to this message to [facility] provider pools.   Dr. William Culver in [facility] does offer therapy and we can refer patient to Dr. Mitchel Delcid at [facility] can also refer patient to [facility] if he would prefer

## 2023-08-10 ENCOUNTER — OFFICE VISIT (OUTPATIENT)
Dept: CARDIOLOGY CLINIC | Facility: CLINIC | Age: 63
End: 2023-08-10
Payer: COMMERCIAL

## 2023-08-10 VITALS
BODY MASS INDEX: 28.7 KG/M2 | WEIGHT: 205 LBS | HEIGHT: 71 IN | HEART RATE: 73 BPM | DIASTOLIC BLOOD PRESSURE: 101 MMHG | OXYGEN SATURATION: 99 % | SYSTOLIC BLOOD PRESSURE: 142 MMHG

## 2023-08-10 DIAGNOSIS — E78.2 MIXED HYPERLIPIDEMIA: ICD-10-CM

## 2023-08-10 DIAGNOSIS — I25.10 CORONARY ARTERY DISEASE INVOLVING NATIVE HEART WITHOUT ANGINA PECTORIS, UNSPECIFIED VESSEL OR LESION TYPE: Primary | ICD-10-CM

## 2023-08-10 DIAGNOSIS — I71.21 ANEURYSM OF ASCENDING AORTA WITHOUT RUPTURE (HCC): ICD-10-CM

## 2023-08-10 DIAGNOSIS — M19.90 OSTEOARTHRITIS, UNSPECIFIED OSTEOARTHRITIS TYPE, UNSPECIFIED SITE: ICD-10-CM

## 2023-08-10 DIAGNOSIS — I10 ESSENTIAL HYPERTENSION: ICD-10-CM

## 2023-08-10 DIAGNOSIS — R06.09 DOE (DYSPNEA ON EXERTION): ICD-10-CM

## 2023-08-10 PROCEDURE — 99214 OFFICE O/P EST MOD 30 MIN: CPT | Performed by: NURSE PRACTITIONER

## 2023-08-10 RX ORDER — ACETAMINOPHEN 500 MG
1000 TABLET ORAL 2 TIMES DAILY
Start: 2023-08-10

## 2023-08-10 RX ORDER — ASPIRIN 81 MG/1
81 TABLET ORAL DAILY
Start: 2023-08-10

## 2023-08-10 NOTE — LETTER
August 10, 2023     Kendell Moreira MD  [id number] [address]    Patient: Raquel Alvarado   YOB: 1960   Date of Visit: 8/10/2023       Dear Dr. Cynthia Oropeza:    Thank you for referring Maria Antonia Solis to me for evaluation. Below are my notes for this consultation. If you have questions, please do not hesitate to call me. I look forward to following your patient along with you. Sincerely,        YADIRA Mccartney        CC: MD Faraz Longo, [facility]  8/10/2023  2:02 PM  Sign when Signing Visit  Cardiology  Follow Up   Office Visit Note  Raquel Alvarado   61 y.o.   male   MRN: [id number]  [place]  [address] [facility] [address]  [phone]  [phone]    PCP: Kendell Moreira MD  Cardiologist: Dr. Jus Whitten            Summary of recommendations  Low-sodium diet. Education provided  Add aspirin 81 mg daily  Repeat fasting lipid profile  Add metoprolol  tartrate 25 mg every 12h to optimize BP  Periodic home blood pressure monitoring, goal less than 130/80 log, bring the monitor in for validation  Follow up will be scheduled with Dr Jus Whitten 6 weeks  Colon Ca screenin/15/2020 , up to date      Assessment/plan  Mild coronary artery atherosclerotic disease with no flow-limiting stenosis. Ct cardiac CTA 23. CAC score 162 ( was 202 2023, probably mildly exaggerated due to cardiac motion artifact. )  Today, 8/10, add ASA 81 mg/d, continue a high intensity statin, and metoprolol tartrate 25 mg every 12h to optimize blood pressure  LEFT MAIN:  Mild atherosclerotic plaque without significant stenosis (less than 25 % narrowing). LAD AND DIAGONAL BRANCHES:  Mild atherosclerotic plaque without significant stenosis (less than 25 % narrowing). LCX:  Mild atherosclerotic plaque without significant stenosis (less than 25 % narrowing).   RCA:  Right coronary artery gives rise to patent posterior descending and posterolateral left ventricular branches. Mild atherosclerotic plaque without significant stenosis (less than 25 % narrowing). ectasia of ascending thoracic aorta   measuring up to 40 mm. Cardiac CTA 7 /11/23. Repeat 1 year  42 mm CAC 4/18/23  MIRANDA. I suspect this may be related to hypertension. Monitor as we optimize his BP. Hydrate. He is been working out in the heat. Avoid regular NSAIDs. No decongestants. Hypertension, essential.  There is evidence of mild LVH on his echo. /101-losartan/HCTZ 100/12.5 daily  I recommend he avoid regular NSAIDs, which she has been doing for his DJD. Alternatively he could take acetaminophen which is also been prescribed by his orthopedist.  low Na diet  Begin metoprolol tartrate 25 mg every 12 routinely  Hyperlipidemia, on atorvastatin 40 mg daily. 3/14/23 calc , non . ASCVD risk score: 11.4 %. Repeat lipid profile as she has been on atorvastatin since June 26   Latest Reference Range & Units 02/05/19 06:41 01/20/20 11:35 03/17/21 07:45 03/18/22 09:02 03/14/23 08:34   Cholesterol <200 mg/dL 251 (H) 229 (H) 243 (H) 243 (H) 241 (H)   Triglycerides <150 mg/dL 68 49 76 60 77   HDL > OR = 40 mg/dL 81 69 72 86 78   Non-HDL Cholesterol <130 mg/dL (calc) 170 (H) 160 (H) 171 (H) 157 (H) 163 (H)   LDL Calculated mg/dL (calc) 154 (H) 145 (H) 153 (H) 142 (H) 145 (H)   Chol HDLC Ratio <5.0 (calc) 3.1 3.3 3.4 2.8 3.1   Parkinson's, on carbidopa/levodopa  ED sildenafil as needed  Cardiac testing  TTE  10/5/22. EF normal.   No RWMA. Mild LVH. LV function is normal.  Normal size and function. Mild DEANDRA. EKG stress test  The patient reached stage 3.0 of the protocol after exercising for 7 min and 59 sec and had a maximal HR of 137 bpm (87 % of MPHR) and 10.0 METS. Blood pressure demonstrated a normal response to stress. Stress ECG: Horizontal ST depression of 1.0 mm (V3 and V4) is noted. ST depression began during stress.  ST deviation returned to baseline after less than 1 minute of recovery. Arrhythmias during stress: occasional PVCs. The stress ECG is equivocal for ischemia after maximal exercise, with reproduction of symptoms of dyspnea but no angina. •  Barr Treadmill Score =3 (Moderate Risk)  Intermediate risk maximal exercise treadmill stress test with equivocal ST depressions in the anterior leads. Recommend further risk stratification with either stress echocardiogram or coronary artery CTA. Cardiac CTA: CAC score 162. Please note the prior score of 201 from April 18, 2023 is probably mildly exaggerated due to cardiac motion artifact. In any event, the difference between 162 and 201 does not differ in calcium score percentile. Calcium score PERCENTILE of age, race, and gender matched database participants in the Multi-Ethnic Study of Atherosclerosis (VILLAGOMEZ) trial:   68th percentile  CORONARY ARTERY ANATOMY:  Coronary arteries arise in normal position. There is right coronary artery dominance. There is typical bifurcation of the left main coronary artery into left anterior descending and left circumflex coronary arteries. IMPRESSION  Mild coronary artery atherosclerotic disease with no flow-limiting stenosis. MERCEDES Heredia is a 70-year-old male with hypertension, mixed dyslipidemia and Parkinson's disease. He follows with Dr. Margaret Dumas. He was last seen in the office December 2022. .  Given his heightened ASCVD risk score ( 15%), a calcium score was recommended.     4/18/23: CT coronary artery calcium score 201  Left main coronary artery:  86  Left anterior descending coronary artery and diagonal branches:  97  Left circumflex coronary artery and left marginal branches:  11  Right coronary artery and right marginal branches:  7  Posterior descending coronary artery: 0      5/26/2023 he underwent an exercise stress test ordered by his PCP  He reached stage III of the Steven protocol exercising for 7 minutes 59 seconds, at 87% max predicted heart rate. It was an intermediate study with equivocal ST depressions in the anterior leads. Recommend further restratification with either stress echocardiogram or coronary artery CTA      8/9/23  Follow-up cardiac CTA  ROS: He notes significant DJD he has been taking ibuprofen regularly. He denies chest pain but admits to MIRANDA and fatigue  We reviewed his cardiac CTA  There was also a fusiform ectasia of the ascending aorta measuring up to 40 mm  Mild nonobstructive disease in the left main LAD, left circumflex and RCA  Blood pressure elevated today 142/101  Together we reviewed coronary artery anatomy and discussed his testing both the coronary calcium and the cardiac CTA. I recommend beginning aspirin 81 mg daily, repeating his lipid profile for a goal LDL less than 70 and optimizing his blood pressure. He has been on NSAIDs regularly for his DJD. I recommend he switch to acetaminophen, avoid decongestants, and adhere to a salt restricted diet  I recommend he check his home blood pressures and will have his home BP machine validated at a future OV. Goal: <130/80  Follow-up in a short interval    I have spent 40 minutes with Patient  today in which greater than 50% of this time was spent in counseling/coordination of care regarding Instructions for management, Patient and family education, Importance of tx compliance, Risk factor reductions, Documenting in the medical record, Reviewing / ordering tests, medicine, procedures   and Obtaining or reviewing history  . Assessment  Diagnoses and all orders for this visit:    Coronary artery disease involving native heart without angina pectoris, unspecified vessel or lesion type  -     metoprolol tartrate (LOPRESSOR) 25 mg tablet; Take 1 tablet (25 mg total) by mouth every 12 (twelve) hours  -     aspirin (Aspirin 81) 81 mg EC tablet;  Take 1 tablet (81 mg total) by mouth daily    Essential hypertension  -     metoprolol tartrate (LOPRESSOR) 25 mg tablet; Take 1 tablet (25 mg total) by mouth every 12 (twelve) hours    Mixed hyperlipidemia  -     Lipid panel; Future  -     Lipid panel    Osteoarthritis, unspecified osteoarthritis type, unspecified site  -     acetaminophen (TYLENOL) 500 mg tablet; Take 2 tablets (1,000 mg total) by mouth 2 (two) times a day    MIRANDA (dyspnea on exertion)    Aneurysm of ascending aorta without rupture [facility])          Past Medical History:   Diagnosis Date   • Hemorrhoids    • Parkinson disease ([address])    • Thrombocytosis 3/15/2021       Review of Systems   Constitutional: Positive for malaise/fatigue. Negative for chills. Cardiovascular: Negative for chest pain, claudication, cyanosis, dyspnea on exertion, irregular heartbeat, leg swelling, near-syncope, orthopnea, palpitations, paroxysmal nocturnal dyspnea and syncope. Respiratory: Positive for shortness of breath. Negative for cough. Gastrointestinal: Negative for heartburn and nausea. Neurological: Negative for dizziness, focal weakness, headaches, light-headedness and weakness. All other systems reviewed and are negative. No Known Allergies  .     Current Outpatient Medications:   •  acetaminophen (TYLENOL) 500 mg tablet, Take 2 tablets (1,000 mg total) by mouth 2 (two) times a day, Disp: , Rfl:   •  amoxicillin (AMOXIL) 500 MG tablet, Take 4 tablets by mouth as needed, Disp: , Rfl:   •  aspirin (Aspirin 81) 81 mg EC tablet, Take 1 tablet (81 mg total) by mouth daily, Disp: , Rfl:   •  atorvastatin (LIPITOR) 40 mg tablet, TAKE 1 TABLET BY MOUTH EVERY DAY, Disp: 90 tablet, Rfl: 2  •  carbidopa-levodopa (SINEMET CR)  mg TBCR per ER tablet, Take 1 tablet by mouth daily at bedtime, Disp: 90 tablet, Rfl: 3  •  carbidopa-levodopa (SINEMET)  mg per tablet, Take 1 tablet by mouth 4 (four) times a day THEN 1 TAB 4 TIMES A DAY, Disp: 360 tablet, Rfl: 3  •  cholecalciferol (VITAMIN D3) 1,000 units tablet, Take 5,000 Units by mouth daily, Disp: , Rfl:   • Collagen Hydrolysate POWD, 1 Dose by Does not apply route daily, Disp: , Rfl:   •  fluticasone (FLONASE) 50 mcg/act nasal spray, SPRAY 2 SPRAYS INTO EACH NOSTRIL EVERY DAY (Patient taking differently: if needed), Disp: 48 mL, Rfl: 1  •  losartan-hydrochlorothiazide (HYZAAR) 100-12.5 MG per tablet, TAKE 1 TABLET BY MOUTH EVERY DAY, Disp: 90 tablet, Rfl: 2  •  MELATONIN PO, Take by mouth daily at bedtime, Disp: , Rfl:   •  metoprolol tartrate (LOPRESSOR) 25 mg tablet, Take 1 tablet (25 mg total) by mouth every 12 (twelve) hours, Disp: 60 tablet, Rfl: 5  •  Mirabegron ER (Myrbetriq) 25 MG TB24, Take 25 mg by mouth daily, Disp: 90 tablet, Rfl: 3  •  selegiline (ELDEPRYL) 5 mg tablet, Take 1 tablet (5 mg total) by mouth 2 (two) times a day with meals, Disp: 180 tablet, Rfl: 3  •  sildenafil (VIAGRA) 50 MG tablet, Take 1 tablet (50 mg total) by mouth daily as needed for erectile dysfunction, Disp: 30 tablet, Rfl: 1  •  Flowflex COVID-19 Ag Home Test KIT, , Disp: , Rfl:   •  valACYclovir (VALTREX) 1,000 mg tablet, Take 2 tablets (2,000 mg total) by mouth 2 (two) times a day for 1 day (Patient not taking: Reported on 3/21/2023), Disp: 4 tablet, Rfl: 0        Social History     Socioeconomic History   • Marital status: /Civil Union     Spouse name: Not on file   • Number of children: Not on file   • Years of education: Not on file   • Highest education level: Not on file   Occupational History   • Not on file   Tobacco Use   • Smoking status: Never   • Smokeless tobacco: Never   Vaping Use   • Vaping Use: Former   Substance and Sexual Activity   • Alcohol use: Yes     Comment: 1 small glass of scotch most nights   • Drug use: No   • Sexual activity: Not Currently   Other Topics Concern   • Not on file   Social History Narrative   • Not on file     Social Determinants of Health     Financial Resource Strain: Not on file   Food Insecurity: Not on file   Transportation Needs: Not on file   Physical Activity: Not on file Stress: Not on file   Social Connections: Not on file   Intimate Partner Violence: Not on file   Housing Stability: Not on file       Family History   Problem Relation Age of Onset   • Diabetes Father         mellitus   • Heart attack Father         acute myocardial infarction   • Hyperlipidemia Mother    • Multiple sclerosis Maternal Grandfather    • Prostate cancer Paternal Grandfather        Physical Exam  Vitals and nursing note reviewed. Constitutional:       General: He is not in acute distress. Appearance: He is obese. HENT:      Head: Normocephalic and atraumatic. Eyes:      Conjunctiva/sclera: Conjunctivae normal.   Cardiovascular:      Rate and Rhythm: Normal rate and regular rhythm. Pulses: Intact distal pulses. Heart sounds: Normal heart sounds. Pulmonary:      Effort: Pulmonary effort is normal.      Breath sounds: Normal breath sounds. Abdominal:      General: Bowel sounds are normal.      Palpations: Abdomen is soft. Musculoskeletal:         General: Normal range of motion. Cervical back: Normal range of motion and neck supple. Skin:     General: Skin is warm and dry. Neurological:      Mental Status: He is alert and oriented to person, place, and time. Vitals: Blood pressure (!) 142/101, pulse 73, height 5' 11" (1.803 m), weight 93 kg (205 lb), SpO2 99 %.    Wt Readings from Last 3 Encounters:   08/10/23 93 kg (205 lb)   07/17/23 94.2 kg (207 lb 9.6 oz)   06/27/23 95.2 kg (209 lb 12.8 oz)         Labs & Results:  Lab Results   Component Value Date    WBC 5.8 03/14/2023    HGB 14.5 03/14/2023    HCT 43.7 03/14/2023    MCV 93.6 03/14/2023     03/14/2023     No results found for: "BNP"  No components found for: "CHEM"  Troponin I   Date Value Ref Range Status   04/27/2020 <0.02 <=0.04 ng/mL Final     Comment:     Autovalidation override  [employer] Chemistry analyzer 99% cutoff is > 0.04 ng/mL in network labs     o cTnI 99% cutoff is useful only when applied to patients in the clinical setting of myocardial ischemia   o cTnI 99% cutoff should be interpreted in the context of clinical history, ECG findings and possibly cardiac imaging to establish correct diagnosis. o cTnI 99% cutoff may be suggestive but clearly not indicative of a coronary event without the clinical setting of myocardial ischemia. No results found for this or any previous visit. No results found for this or any previous visit. This note was completed in part utilizing [employer] direct voice recognition software. Grammatical errors, random word insertion, spelling mistakes, and incomplete sentences may be an occasional consequence of the system secondary to software limitations, ambient noise and hardware issues. At the time of dictation, efforts were made to edit, clarify and /or correct errors. Please read the chart carefully and recognize, using context, where substitutions have occurred.   If you have any questions or concerns about the context, text or information contained within the body of this dictation, please contact myself, the provider, for further clarification

## 2023-08-10 NOTE — PATIENT INSTRUCTIONS
DASH Eating Plan   WHAT YOU NEED TO KNOW:   The DASH (Dietary Approaches to Stop Hypertension) Eating Plan is designed to help prevent or lower high blood pressure. It can also help to lower LDL (bad) cholesterol and decrease your risk for heart disease. The plan is low in sodium, sugar, unhealthy fats, and total fat. It is high in potassium, calcium, magnesium, and fiber. These nutrients are added when you eat more fruits, vegetables, and whole grains. With the DASH eating plan, you need to eat a certain number of servings from each food group. This will help you get enough of certain nutrients and limit others. The amount of servings you should eat depends on how many calories you need. Your dietitian can help you create meal plans with the right number of servings for each food group. DISCHARGE INSTRUCTIONS:   What you need to know about sodium:  Your dietitian will tell you how much sodium is safe for you to have each day. People with high blood pressure should have no more than 1,500 to 2,300 mg of sodium in a day. A teaspoon (tsp) of salt has 2,300 mg of sodium. This may seem like a difficult goal, but small changes to the foods you eat can make a big difference. Your healthcare provider or dietitian can help you create a meal plan that follows your sodium limit. Read food labels. Food labels can help you choose foods that are low in sodium. The amount of sodium is listed in milligrams (mg). The % Daily Value (DV) column tells you how much of your daily needs are met by 1 serving of the food for each nutrient listed. Choose foods that have less than 5% of the DV of sodium. These foods are considered low in sodium. Foods that have 20% or more of the DV of sodium are considered high in sodium. Avoid foods that have more than 300 mg of sodium in each serving. Choose foods that say low-sodium, reduced-sodium, or no salt added on the food label. Limit added salt.   Do not salt food at the table if you add salt when you cook. Use herbs and spices, such as onions, garlic, and salt-free seasonings to add flavor. Try lemon or lime juice or vinegar to add a tart flavor. Use hot peppers or a small amount of hot pepper sauce to add a spicy flavor. Limit foods high in added salt, such as the following:    Seasonings made with salt, such as garlic salt, celery salt, onion salt, seasoned salt, meat tenderizers, and monosodium glutamate (MSG)    Miso soup and canned or dried soup mixes    Regular soy sauce, barbecue sauce, teriyaki sauce, steak sauce, Worcestershire sauce, and most flavored vinegars    Snack foods, such as salted chips, popcorn, pretzels, pork rinds, salted crackers, and salted nuts    Frozen foods, such as dinners, entrees, vegetables with sauces, and breaded meats    Ask about salt substitutes. Ask your healthcare provider if you may use salt substitutes. Some salt substitutes have ingredients that can be harmful if you have certain health conditions. Choose foods carefully at restaurants. Meals from restaurants, especially fast food restaurants, are often high in sodium. Some restaurants have nutrition information that tells you the amount of sodium in their foods. Ask to have your food prepared with less, or no salt. What you need to know about fats:  Healthy fats include unsaturated fats and omega-3 fatty acids. Unhealthy fats include saturated fats and trans fats.   Include healthy fats, such as the following:      Cooking oils, such as soybean, canola, olive, or sunflower    Fatty fish, such as salmon, tuna, mackerel, or sardines    Flaxseed oil or ground flaxseed    ½ cup of cooked beans, such as black beans, kidney beans, or casillas beans    1½ ounces of low-sodium nuts, such as almonds or walnuts    Low-sugar, low-sodium peanut butter    Seeds such as aleksey seeds or sunflower seeds       Limit or do not have unhealthy fats, such as the following:      Foods that contain fat from animals, such as fatty meats, whole milk, butter, and cream    Shortening, stick margarine, palm oil, and coconut oil    Full-fat or creamy salad dressing    Creamy soup    Crackers, chips, and baked goods made with margarine or shortening    Foods that are fried in unhealthy fats    Gravy and sauces, such as Junior or cheese sauces    What you need to know about carbohydrates (carbs): All carbs break down into sugar. Complex carbs contain more fiber than simple carbs. This means complex carbs go into the bloodstream more slowly and cause less of a blood sugar spike. Try to include more complex carbs and fewer simple carbs. Include complex carbs, such as the followin slice of whole-grain bread    1 ounce of dry cereal that does not contain added sugar    ½ cup of cooked oatmeal    2 ounces of cooked whole-grain pasta    ½ cup of cooked brown rice    Limit or do not have simple carbs, such as the following:      [employer], such as doughnuts, pastries, and cookies    Mixes for cornbread and biscuits    White rice and pasta mixes, such as boxed macaroni and cheese    Instant and cold cereals that contain sugar    Jelly, jam, and ice cream that contain sugar    Condiments such as ketchup    Drinks high in sugar, such as soft drinks, lemonade, and fruit juice    What you need to know about vegetables and fruits:  Vegetables and fruits can be fresh, frozen, or canned. If possible, try to choose low-sodium canned options.   Include a variety of vegetables and fruits, such as the followin medium apple, pear, or peach (about ½ cup chopped)    ½ small banana    ½ cup berries, such as blueberries, strawberries, or blackberries    1 cup of raw leafy greens, such as lettuce, spinach, kale, or skip greens    ½ cup of frozen or canned (no added salt) vegetables, such as green beans    ½ cup of fresh, frozen, or canned fruit (canned in light syrup or fruit juice)    ½ cup of vegetable or fruit juice    Limit or do not have vegetables and fruits made in the following ways:      Frozen fruit such as cherries that have added sugar    Fruit in cream or butter sauce    Canned vegetables that are high in sodium    Sauerkraut, pickled vegetables, and other foods prepared in brine    Fried vegetables or vegetables in butter or high-fat sauces    What you need to know about protein foods: Include lean or low-fat protein foods, such as the following:      Poultry (chicken, turkey) with no skin    Fish (especially fatty fish, such as salmon, fresh tuna, or mackerel)    Lean beef and pork (loin, round, extra lean hamburger)    Egg whites and egg substitutes    1 cup of nonfat (skim) or 1% milk    1½ ounces of fat-free or low-fat cheese    6 ounces of nonfat or low-fat yogurt    Limit or do not have high-fat protein foods, such as the following:      Smoked or cured meat, such as corned beef, kennedy, ham, hot dogs, and sausage    Canned beans and canned meats or spreads, such as potted meats, sardines, anchovies, and imitation seafood    Deli or lunch meats, such as bologna, ham, turkey, and roast beef    High-fat meat (T-bone steak, regular hamburger, and ribs)    Whole eggs and egg yolks    Whole milk, 2% milk, and cream    Regular cheese and processed cheese    Other guidelines to follow:   Maintain a healthy weight. Your risk for heart disease is higher if you are overweight. Your healthcare provider may suggest that you lose weight if you are overweight. You can lose weight by eating fewer calories and foods that have added sugars and fat. The DASH meal plan can help you do this. Decrease calories by eating smaller portions at each meal and fewer snacks. Ask your healthcare provider for more information about how to lose weight. Exercise regularly. Regular exercise can help you reach or maintain a healthy weight. Regular exercise can also help decrease your blood pressure and improve your cholesterol levels.  Get 30 minutes or more of moderate exercise each day of the week. To lose weight, get at least 60 minutes of exercise. Talk to your healthcare provider about the best exercise program for you. Limit alcohol. Women should limit alcohol to 1 drink a day. Men should limit alcohol to 2 drinks a day. A drink of alcohol is 12 ounces of beer, 5 ounces of wine, or 1½ ounces of liquor. For more information:   National [employer], [employer] and [address]  [id number]  [place] [address]  Phone: [phone]  Web Address: [facility]no    © Copyright [employer]ative 2022 Information is for End User's use only and may not be sold, redistributed or otherwise used for commercial purposes. The above information is an  only. It is not intended as medical advice for individual conditions or treatments. Talk to your doctor, nurse or pharmacist before following any medical regimen to see if it is safe and effective for you.

## 2023-08-10 NOTE — PROGRESS NOTES
Cardiology  Follow Up   Office Visit Note  Fritz Sotomayor   61 y.o.   male   MRN: [id number]  [place]  [facility]  [place] [address]  [address] [address] [phone]    PCP: Linda Mcneal MD  Cardiologist: Dr. Landon Mariee            Summary of recommendations  Low-sodium diet. Education provided  Add aspirin 81 mg daily  Repeat fasting lipid profile  Add metoprolol  tartrate 25 mg every 12h to optimize BP  Periodic home blood pressure monitoring, goal less than 130/80 log, bring the monitor in for validation  Follow up will be scheduled with Dr Landon Mariee 6 weeks  Colon Ca screenin/15/2020 , up to date      Assessment/plan  Mild coronary artery atherosclerotic disease with no flow-limiting stenosis. Ct cardiac CTA 23. CAC score 162 ( was 202 2023, probably mildly exaggerated due to cardiac motion artifact. )  · Today, 8/10, add ASA 81 mg/d, continue a high intensity statin, and metoprolol tartrate 25 mg every 12h to optimize blood pressure  · LEFT MAIN:  Mild atherosclerotic plaque without significant stenosis (less than 25 % narrowing). · LAD AND DIAGONAL BRANCHES:  Mild atherosclerotic plaque without significant stenosis (less than 25 % narrowing). · LCX:  Mild atherosclerotic plaque without significant stenosis (less than 25 % narrowing). · RCA:  Right coronary artery gives rise to patent posterior descending and posterolateral left ventricular branches. Mild atherosclerotic plaque without significant stenosis (less than 25 % narrowing). ectasia of ascending thoracic aorta   · measuring up to 40 mm. Cardiac CTA . Repeat 1 year  · 42 mm CAC 23  MIRANDA. I suspect this may be related to hypertension. Monitor as we optimize his BP. Hydrate. He is been working out in the heat. Avoid regular NSAIDs. No decongestants. Hypertension, essential.  There is evidence of mild LVH on his echo.   /101-losartan/HCTZ 100/12.5 daily  · I recommend he avoid regular NSAIDs, which she has been doing for his DJD. Alternatively he could take acetaminophen which is also been prescribed by his orthopedist.  · low Na diet  · Begin metoprolol tartrate 25 mg every 12 routinely  Hyperlipidemia, on atorvastatin 40 mg daily. 3/14/23 calc , non . ASCVD risk score: 11.4 %. Repeat lipid profile as she has been on atorvastatin since June 26   Latest Reference Range & Units 02/05/19 06:41 01/20/20 11:35 03/17/21 07:45 03/18/22 09:02 03/14/23 08:34   Cholesterol <200 mg/dL 251 (H) 229 (H) 243 (H) 243 (H) 241 (H)   Triglycerides <150 mg/dL 68 49 76 60 77   HDL > OR = 40 mg/dL 81 69 72 86 78   Non-HDL Cholesterol <130 mg/dL (calc) 170 (H) 160 (H) 171 (H) 157 (H) 163 (H)   LDL Calculated mg/dL (calc) 154 (H) 145 (H) 153 (H) 142 (H) 145 (H)   Chol HDLC Ratio <5.0 (calc) 3.1 3.3 3.4 2.8 3.1   Parkinson's, on carbidopa/levodopa  ED sildenafil as needed  Cardiac testing  • TTE  10/5/22. EF normal.   No RWMA. Mild LVH. LV function is normal.  Normal size and function. Mild DEANDRA. • EKG stress test  The patient reached stage 3.0 of the protocol after exercising for 7 min and 59 sec and had a maximal HR of 137 bpm (87 % of MPHR) and 10.0 METS. Blood pressure demonstrated a normal response to stress. Stress ECG: Horizontal ST depression of 1.0 mm (V3 and V4) is noted. ST depression began during stress. ST deviation returned to baseline after less than 1 minute of recovery. Arrhythmias during stress: occasional PVCs. The stress ECG is equivocal for ischemia after maximal exercise, with reproduction of symptoms of dyspnea but no angina. •  Barr Treadmill Score =3 (Moderate Risk)  Intermediate risk maximal exercise treadmill stress test with equivocal ST depressions in the anterior leads. Recommend further risk stratification with either stress echocardiogram or coronary artery CTA. · Cardiac CTA: CAC score 162.  Please note the prior score of 201 from April 18, 2023 is probably mildly exaggerated due to cardiac motion artifact. In any event, the difference between 162 and 201 does not differ in calcium score percentile. Calcium score PERCENTILE of age, race, and gender matched database participants in the Multi-Ethnic Study of Atherosclerosis (VILLAGOMEZ) trial:   68th percentile  CORONARY ARTERY ANATOMY:  Coronary arteries arise in normal position. There is right coronary artery dominance. There is typical bifurcation of the left main coronary artery into left anterior descending and left circumflex coronary arteries. IMPRESSION  Mild coronary artery atherosclerotic disease with no flow-limiting stenosis.                  MERCEDES Coe is a 70-year-old male with hypertension, mixed dyslipidemia and Parkinson's disease. He follows with Dr. Prentis [employer]. He was last seen in the office December 2022. .  Given his heightened ASCVD risk score ( 15%), a calcium score was recommended. 4/18/23: CT coronary artery calcium score 201  Left main coronary artery:  86  Left anterior descending coronary artery and diagonal branches:  97  Left circumflex coronary artery and left marginal branches:  11  Right coronary artery and right marginal branches:  7  Posterior descending coronary artery: 0      5/26/2023 he underwent an exercise stress test ordered by his PCP  He reached stage III of the Steven protocol exercising for 7 minutes 59 seconds, at 87% max predicted heart rate. It was an intermediate study with equivocal ST depressions in the anterior leads. Recommend further restratification with either stress echocardiogram or coronary artery CTA      8/9/23  Follow-up cardiac CTA  ROS: He notes significant DJD he has been taking ibuprofen regularly.   He denies chest pain but admits to MIRANDA and fatigue  We reviewed his cardiac CTA  There was also a fusiform ectasia of the ascending aorta measuring up to 40 mm  Mild nonobstructive disease in the left main LAD, left circumflex and RCA  Blood pressure elevated today 142/101  Together we reviewed coronary artery anatomy and discussed his testing both the coronary calcium and the cardiac CTA. I recommend beginning aspirin 81 mg daily, repeating his lipid profile for a goal LDL less than 70 and optimizing his blood pressure. He has been on NSAIDs regularly for his DJD. I recommend he switch to acetaminophen, avoid decongestants, and adhere to a salt restricted diet  I recommend he check his home blood pressures and will have his home BP machine validated at a future OV. Goal: <130/80  Follow-up in a short interval    I have spent 40 minutes with Patient  today in which greater than 50% of this time was spent in counseling/coordination of care regarding Instructions for management, Patient and family education, Importance of tx compliance, Risk factor reductions, Documenting in the medical record, Reviewing / ordering tests, medicine, procedures   and Obtaining or reviewing history  . Assessment  Diagnoses and all orders for this visit:    Coronary artery disease involving native heart without angina pectoris, unspecified vessel or lesion type  -     metoprolol tartrate (LOPRESSOR) 25 mg tablet; Take 1 tablet (25 mg total) by mouth every 12 (twelve) hours  -     aspirin (Aspirin 81) 81 mg EC tablet; Take 1 tablet (81 mg total) by mouth daily    Essential hypertension  -     metoprolol tartrate (LOPRESSOR) 25 mg tablet; Take 1 tablet (25 mg total) by mouth every 12 (twelve) hours    Mixed hyperlipidemia  -     Lipid panel; Future  -     Lipid panel    Osteoarthritis, unspecified osteoarthritis type, unspecified site  -     acetaminophen (TYLENOL) 500 mg tablet;  Take 2 tablets (1,000 mg total) by mouth 2 (two) times a day    MIRANDA (dyspnea on exertion)    Aneurysm of ascending aorta without rupture [facility])          Past Medical History:   Diagnosis Date   • Hemorrhoids    • Parkinson disease ([address])    • Thrombocytosis 3/15/2021       Review of Systems   Constitutional: Positive for malaise/fatigue. Negative for chills. Cardiovascular: Negative for chest pain, claudication, cyanosis, dyspnea on exertion, irregular heartbeat, leg swelling, near-syncope, orthopnea, palpitations, paroxysmal nocturnal dyspnea and syncope. Respiratory: Positive for shortness of breath. Negative for cough. Gastrointestinal: Negative for heartburn and nausea. Neurological: Negative for dizziness, focal weakness, headaches, light-headedness and weakness. All other systems reviewed and are negative. No Known Allergies  .     Current Outpatient Medications:   •  acetaminophen (TYLENOL) 500 mg tablet, Take 2 tablets (1,000 mg total) by mouth 2 (two) times a day, Disp: , Rfl:   •  amoxicillin (AMOXIL) 500 MG tablet, Take 4 tablets by mouth as needed, Disp: , Rfl:   •  aspirin (Aspirin 81) 81 mg EC tablet, Take 1 tablet (81 mg total) by mouth daily, Disp: , Rfl:   •  atorvastatin (LIPITOR) 40 mg tablet, TAKE 1 TABLET BY MOUTH EVERY DAY, Disp: 90 tablet, Rfl: 2  •  carbidopa-levodopa (SINEMET CR)  mg TBCR per ER tablet, Take 1 tablet by mouth daily at bedtime, Disp: 90 tablet, Rfl: 3  •  carbidopa-levodopa (SINEMET)  mg per tablet, Take 1 tablet by mouth 4 (four) times a day THEN 1 TAB 4 TIMES A DAY, Disp: 360 tablet, Rfl: 3  •  cholecalciferol (VITAMIN D3) 1,000 units tablet, Take 5,000 Units by mouth daily, Disp: , Rfl:   •  Collagen Hydrolysate POWD, 1 Dose by Does not apply route daily, Disp: , Rfl:   •  fluticasone (FLONASE) 50 mcg/act nasal spray, SPRAY 2 SPRAYS INTO EACH NOSTRIL EVERY DAY (Patient taking differently: if needed), Disp: 48 mL, Rfl: 1  •  losartan-hydrochlorothiazide (HYZAAR) 100-12.5 MG per tablet, TAKE 1 TABLET BY MOUTH EVERY DAY, Disp: 90 tablet, Rfl: 2  •  MELATONIN PO, Take by mouth daily at bedtime, Disp: , Rfl:   •  metoprolol tartrate (LOPRESSOR) 25 mg tablet, Take 1 tablet (25 mg total) by mouth every 12 (twelve) hours, Disp: 60 tablet, Rfl: 5  •  Mirabegron ER (Myrbetriq) 25 MG TB24, Take 25 mg by mouth daily, Disp: 90 tablet, Rfl: 3  •  selegiline (ELDEPRYL) 5 mg tablet, Take 1 tablet (5 mg total) by mouth 2 (two) times a day with meals, Disp: 180 tablet, Rfl: 3  •  sildenafil (VIAGRA) 50 MG tablet, Take 1 tablet (50 mg total) by mouth daily as needed for erectile dysfunction, Disp: 30 tablet, Rfl: 1  •  Flowflex COVID-19 Ag Home Test KIT, , Disp: , Rfl:   •  valACYclovir (VALTREX) 1,000 mg tablet, Take 2 tablets (2,000 mg total) by mouth 2 (two) times a day for 1 day (Patient not taking: Reported on 3/21/2023), Disp: 4 tablet, Rfl: 0        Social History     Socioeconomic History   • Marital status: /Civil Union     Spouse name: Not on file   • Number of children: Not on file   • Years of education: Not on file   • Highest education level: Not on file   Occupational History   • Not on file   Tobacco Use   • Smoking status: Never   • Smokeless tobacco: Never   Vaping Use   • Vaping Use: Former   Substance and Sexual Activity   • Alcohol use: Yes     Comment: 1 small glass of scotch most nights   • Drug use: No   • Sexual activity: Not Currently   Other Topics Concern   • Not on file   Social History Narrative   • Not on file     Social Determinants of Health     Financial Resource Strain: Not on file   Food Insecurity: Not on file   Transportation Needs: Not on file   Physical Activity: Not on file   Stress: Not on file   Social Connections: Not on file   Intimate Partner Violence: Not on file   Housing Stability: Not on file       Family History   Problem Relation Age of Onset   • Diabetes Father         mellitus   • Heart attack Father         acute myocardial infarction   • Hyperlipidemia Mother    • Multiple sclerosis Maternal Grandfather    • Prostate cancer Paternal Grandfather        Physical Exam  Vitals and nursing note reviewed. Constitutional:       General: He is not in acute distress. Appearance: He is obese. HENT:      Head: Normocephalic and atraumatic. Eyes:      Conjunctiva/sclera: Conjunctivae normal.   Cardiovascular:      Rate and Rhythm: Normal rate and regular rhythm. Pulses: Intact distal pulses. Heart sounds: Normal heart sounds. Pulmonary:      Effort: Pulmonary effort is normal.      Breath sounds: Normal breath sounds. Abdominal:      General: Bowel sounds are normal.      Palpations: Abdomen is soft. Musculoskeletal:         General: Normal range of motion. Cervical back: Normal range of motion and neck supple. Skin:     General: Skin is warm and dry. Neurological:      Mental Status: He is alert and oriented to person, place, and time. Vitals: Blood pressure (!) 142/101, pulse 73, height 5' 11" (1.803 m), weight 93 kg (205 lb), SpO2 99 %. Wt Readings from Last 3 Encounters:   08/10/23 93 kg (205 lb)   07/17/23 94.2 kg (207 lb 9.6 oz)   06/27/23 95.2 kg (209 lb 12.8 oz)         Labs & Results:  Lab Results   Component Value Date    WBC 5.8 03/14/2023    HGB 14.5 03/14/2023    HCT 43.7 03/14/2023    MCV 93.6 03/14/2023     03/14/2023     No results found for: "BNP"  No components found for: "CHEM"  Troponin I   Date Value Ref Range Status   04/27/2020 <0.02 <=0.04 ng/mL Final     Comment:     Autovalidation override  [employer] Chemistry analyzer 99% cutoff is > 0.04 ng/mL in network labs     o cTnI 99% cutoff is useful only when applied to patients in the clinical setting of myocardial ischemia   o cTnI 99% cutoff should be interpreted in the context of clinical history, ECG findings and possibly cardiac imaging to establish correct diagnosis. o cTnI 99% cutoff may be suggestive but clearly not indicative of a coronary event without the clinical setting of myocardial ischemia. No results found for this or any previous visit. No results found for this or any previous visit.       This note was completed in part utilizing m-modal fluency direct voice recognition software. Grammatical errors, random word insertion, spelling mistakes, and incomplete sentences may be an occasional consequence of the system secondary to software limitations, ambient noise and hardware issues. At the time of dictation, efforts were made to edit, clarify and /or correct errors. Please read the chart carefully and recognize, using context, where substitutions have occurred.   If you have any questions or concerns about the context, text or information contained within the body of this dictation, please contact myself, the provider, for further clarification

## 2023-08-13 LAB
CHOLEST SERPL-MCNC: 171 MG/DL
CHOLEST/HDLC SERPL: 2.7 (CALC)
HDLC SERPL-MCNC: 64 MG/DL
LDLC SERPL CALC-MCNC: 93 MG/DL (CALC)
NONHDLC SERPL-MCNC: 107 MG/DL (CALC)
TRIGL SERPL-MCNC: 58 MG/DL

## 2023-08-14 DIAGNOSIS — E78.2 MIXED HYPERLIPIDEMIA: Primary | ICD-10-CM

## 2023-08-14 RX ORDER — ROSUVASTATIN CALCIUM 40 MG/1
40 TABLET, COATED ORAL DAILY
Qty: 30 TABLET | Refills: 5 | Status: SHIPPED | OUTPATIENT
Start: 2023-08-14 | End: 2023-09-11

## 2023-08-14 NOTE — RESULT ENCOUNTER NOTE
Notified patient of new medication prescription sent over to his pharmacy and lab work that needs to be done per saima , patient verbalized understanding .

## 2023-08-18 ENCOUNTER — TELEPHONE (OUTPATIENT)
Dept: CARDIOLOGY CLINIC | Facility: CLINIC | Age: 63
End: 2023-08-18

## 2023-08-18 NOTE — TELEPHONE ENCOUNTER
----- Message from YADIRA Luna sent at 8/17/2023  3:48 PM EDT -----  Please call the patient.  His LDL is 90, greatly proved from prior.  However not quite at goal.  He can either increase his atorvastatin to 80 mg daily or we can switch to rosuvastatin 40 mg daily.  I think he may benefit from rosuvastatin 40 mg daily but if he has a lot of his current med he may want to finish it up by doubling atorvastatin 40 mg before he sees Dr. Welsh. Thanks

## 2023-08-18 NOTE — TELEPHONE ENCOUNTER
Spoke with pt- he will double the atorvastatin 40mg until he runs out. Then he is agreeable to start rosuvastatin 40mg daily.         ----- Message from YADIRA Luna sent at 8/17/2023  3:48 PM EDT -----  Please call the patient.  His LDL is 90, greatly proved from prior.  However not quite at goal.  He can either increase his atorvastatin to 80 mg daily or we can switch to rosuvastatin 40 mg daily.  I think he may benefit from rosuvastatin 40 mg daily but if he has a lot of his current med he may want to finish it up by doubling atorvastatin 40 mg before he sees Dr. Welsh. Thanks

## 2023-09-05 DIAGNOSIS — I25.10 CORONARY ARTERY DISEASE INVOLVING NATIVE HEART WITHOUT ANGINA PECTORIS, UNSPECIFIED VESSEL OR LESION TYPE: ICD-10-CM

## 2023-09-05 DIAGNOSIS — I10 ESSENTIAL HYPERTENSION: ICD-10-CM

## 2023-09-11 DIAGNOSIS — E78.2 MIXED HYPERLIPIDEMIA: ICD-10-CM

## 2023-09-11 RX ORDER — ROSUVASTATIN CALCIUM 40 MG/1
40 TABLET, COATED ORAL DAILY
Qty: 90 TABLET | Refills: 2 | Status: SHIPPED | OUTPATIENT
Start: 2023-09-11

## 2023-09-12 ENCOUNTER — TELEPHONE (OUTPATIENT)
Dept: NEUROLOGY | Facility: CLINIC | Age: 63
End: 2023-09-12

## 2023-09-12 DIAGNOSIS — N52.9 ERECTILE DYSFUNCTION, UNSPECIFIED ERECTILE DYSFUNCTION TYPE: ICD-10-CM

## 2023-09-12 RX ORDER — SILDENAFIL 50 MG/1
50 TABLET, FILM COATED ORAL DAILY PRN
Qty: 30 TABLET | Refills: 1 | Status: SHIPPED | OUTPATIENT
Start: 2023-09-12

## 2023-09-12 NOTE — TELEPHONE ENCOUNTER
Patient dropped off two page form to be completed by Dacia Brooks. Fee was collected and papers scanned. Patient would like a call to  a copy once it's done. Routing to [employer].

## 2023-09-15 LAB
CHOLEST SERPL-MCNC: 177 MG/DL
CHOLEST/HDLC SERPL: 2.3 (CALC)
HDLC SERPL-MCNC: 77 MG/DL
LDLC SERPL CALC-MCNC: 85 MG/DL (CALC)
NONHDLC SERPL-MCNC: 100 MG/DL (CALC)
TRIGL SERPL-MCNC: 68 MG/DL

## 2023-09-19 NOTE — TELEPHONE ENCOUNTER
Patient came to office asking for the form and if it was completed. Advised not yet. We will call him.

## 2023-09-21 ENCOUNTER — TELEPHONE (OUTPATIENT)
Dept: NEUROLOGY | Facility: CLINIC | Age: 63
End: 2023-09-21

## 2023-09-21 NOTE — TELEPHONE ENCOUNTER
Called pt and LMOM stating that I am calling in regards to patients paperwork being completed by Dr. Castro and a copy is able to be picked up at the [place] location. I asked the patient to please give us a call back if he has any questions.

## 2023-10-10 ENCOUNTER — NURSE TRIAGE (OUTPATIENT)
Age: 63
End: 2023-10-10

## 2023-10-10 ENCOUNTER — TELEPHONE (OUTPATIENT)
Age: 63
End: 2023-10-10

## 2023-10-10 DIAGNOSIS — R31.0 GROSS HEMATURIA: Primary | ICD-10-CM
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
## 2023-10-10 NOTE — TELEPHONE ENCOUNTER
Patient said he has been waiting for a phone call from urology all morning to day. I advised him he may go ahead and give them a call, but he said he is going in person to the office.

## 2023-10-11 NOTE — TELEPHONE ENCOUNTER
sorry to hear of his acute symptoms i reviewed his urology chart. this is new for him    please suggest ct urogram for imaging both of the back pain and the hematuria problem.  can take it from there.  keep hydrated and rest as needed

## 2023-10-20 DIAGNOSIS — G20.A1 PARKINSON DISEASE: ICD-10-CM

## 2023-10-20 RX ORDER — CARBIDOPA AND LEVODOPA 25; 100 MG/1; MG/1
1 TABLET, EXTENDED RELEASE ORAL
Qty: 30 TABLET | Refills: 11 | Status: SHIPPED | OUTPATIENT
Start: 2023-10-20

## 2023-10-23 ENCOUNTER — OFFICE VISIT (OUTPATIENT)
Dept: NEUROLOGY | Facility: CLINIC | Age: 63
End: 2023-10-23
Payer: COMMERCIAL

## 2023-10-23 VITALS
TEMPERATURE: 98.3 F | WEIGHT: 212 LBS | SYSTOLIC BLOOD PRESSURE: 138 MMHG | HEART RATE: 64 BPM | BODY MASS INDEX: 29.57 KG/M2 | DIASTOLIC BLOOD PRESSURE: 76 MMHG

## 2023-10-23 DIAGNOSIS — G20.A1 PARKINSON'S DISEASE WITHOUT DYSKINESIA, UNSPECIFIED WHETHER MANIFESTATIONS FLUCTUATE: ICD-10-CM

## 2023-10-23 DIAGNOSIS — R20.2 PARESTHESIAS IN LEFT HAND: Primary | ICD-10-CM

## 2023-10-23 PROCEDURE — 99214 OFFICE O/P EST MOD 30 MIN: CPT | Performed by: PSYCHIATRY & NEUROLOGY

## 2023-10-23 NOTE — PATIENT INSTRUCTIONS
Will continue on current PD medications  Will refer for EMG of the left arm given symptoms of left hand numbness and weakness.

## 2023-10-23 NOTE — PROGRESS NOTES
Review of Systems   Constitutional:  Positive for appetite change (this past week has had an increased appetite) and fatigue (Some). Negative for fever. HENT:  Positive for trouble swallowing (Sometimes) and voice change (Loses voice often). Negative for hearing loss and tinnitus. Eyes: Negative. Negative for photophobia, pain and visual disturbance. Respiratory: Negative. Negative for shortness of breath. Cardiovascular: Negative. Negative for palpitations. Gastrointestinal: Negative. Negative for nausea and vomiting. Endocrine: Negative. Negative for cold intolerance. Genitourinary: Negative. Negative for dysuria, frequency and urgency. Musculoskeletal:  Positive for gait problem (Shuffles feet), myalgias (Hands) and neck stiffness (at times). Negative for back pain and neck pain. Slight balance issue   Skin: Negative. Negative for rash. Allergic/Immunologic: Negative. Neurological:  Positive for dizziness (Occasional), tremors (Hands, have gotten slightly worse), speech difficulty (slurred speech), weakness (Arms and Legs) and numbness (Left Hand has tingling). Negative for seizures, syncope, facial asymmetry, light-headedness and headaches. Hematological: Negative. Does not bruise/bleed easily. Psychiatric/Behavioral: Negative. Negative for confusion, hallucinations and sleep disturbance. All other systems reviewed and are negative.

## 2023-10-23 NOTE — PROGRESS NOTES
Patient ID: Jennifer Hawkins is a 61 y.o. male    Assessment/Plan:    Parkinson disease ([address])  Parkinsonian motor symptoms appear to be fairly stable. He has progressed over the past year developed mild cognitive changes which have also been impacting his adequately perform at work. Tremor becomes more apparent in front of others and can interfere. He will continue on current medication regimen with no change. Encouraged to continue to remain physically active. Paresthesias in left hand  He has developed left hand paresthesias over the past month. This is becoming more bothersome. He is dropping objects. Symptoms suggestive of carpal tunnel syndrome. We will refer for nerve conduction studies/EMG for further evaluation. Diagnoses and all orders for this visit:    Paresthesias in left hand  -     EMG 1 Limb; Future    Parkinson's disease without dyskinesia, unspecified whether manifestations fluctuate        Subjective:      Faith Bolaños is a right-handed  who presents in follow up for MARÍA-positive parkinsonism. To review, symptom onset around 2018 with right>left tremor. On initial presentation the patient had a rest and postural tremor on the right, minimal if any kinetic tremor. Also had mild rigidity on the right and slight bradykinesia bilaterally with decrement on the right. His gait was fairly normal with good postural reflexes. No typical non motor symptoms associated with Parkinson's disease. He called in between visit with episode of tremors (vocal and physical) during presentation while working. Tried taking an extra 1/2 tab the next times as instructed which helped. Difficulty buttoning. Needs occasional assistance. Shuffles at times. Tremor is present but varies. Tremor is worse while at work. He has noted difficulty with conversing, especially when confronting others. This has become an issue at work.      Left hand numbness presents for sometime now. This involves the entire hand. It feels stiff. He can awaken with arm/hand asleep. Hand feels weak. Current medications:  Selegiline 5mg bid   Sinemet 25/100mg 1.5 tab qid (5am, 9am, 1pm, 5pm), extra 1/2 tab prior to presentation  Sinemet CR before bed    Prior medications:  Pramipexole ER 0.375mg 3tabs qhs - discontinued due to ICD/ pundiung          Objective:    /76 (BP Location: Left arm, Patient Position: Standing, Cuff Size: Large)   Pulse 64   Temp 98.3 °F (36.8 °C)   Wt 96.2 kg (212 lb)   BMI 29.57 kg/m²       Physical Exam  Vitals reviewed. Eyes:      Extraocular Movements: Extraocular movements intact. Pupils: Pupils are equal, round, and reactive to light. Neurological:      Mental Status: He is alert. Neurological Exam  Mental Status  Alert. Oriented to person, place, time and situation. Speech: hypophonia. Language is fluent with no aphasia. Attention and concentration are normal.    Cranial Nerves  CN III, IV, VI: Extraocular movements intact bilaterally. Pupils equal round and reactive to light bilaterally. CN VII: Full and symmetric facial movement. CN VIII: Hearing is normal.  CN IX, X: Palate elevates symmetrically  CN XI: Shoulder shrug strength is normal.  CN XII: Tongue midline without atrophy or fasciculations. Motor   No fasciculations present. Normal muscle tone. Strength is 5/5 in all four extremities except as noted. Right                     Left  Thumb abduction                                            5-    Sensory  Light touch is normal in upper and lower extremities. Pinprick is normal in upper and lower extremities. Left: Loss of sensation in the median nerve distribution. Coordination  Right: Finger-to-nose normal. Rapid alternating movement abnormality:Left: Finger-to-nose normal. Rapid alternating movement abnormality:  See motor UPDRS.     Gait  Casual gait: Able to rise from chair without using arms.          UPDRS motor:     6/27/23 10/23/23                              Time since last dose:       Speech  0 1   Facial Expression  1 1   Rigidity - Neck  0 0   Rigidity - Upper Extremity (Right)  1 1   Rigidity - Upper Extremity (Left)   1 0   Rigidity - Lower Extremity (Right)  0 0   Rigidity - Lower Extremity (Left)   0 0   Finger Taps (Right)   1 2   Finger Taps (Left)   1 1   Hand Movement (Right)  1 1   Hand Movement (Left)   1 1   Pronation/Supination (Right)  2 2   Pronation/Supination (Left)   1 2   Toe Tapping (Right) 0 0   Toe Tapping (Left) 1 1   Leg Agility (Right)  1 1   Leg Agility (Left)   1 1   Arising from Chair   0    Gait   1 1   Freezing of Gait 0 0   Postural Stability        Posture 0 0   Global spontaneity of movement 1 1   Postural Tremor (Right) 0 0   Postural Tremor (Left) 0 0   Kinetic Tremor (Right)  0 0   Kinetic Tremor (Left)  0 1   Rest tremor amplitude RUE 0 0   Rest tremor amplitude LUE 1 0   Rest tremor amplitude RLE 0 0   Reset tremor amplitude LLE 0 0   Lip/Jaw Tremor  0 0   Consistency of tremor 0 0   Motor Exam Total:                   Ildefonso Nickerson MD  Movement disorder physician  [address] [facility]

## 2023-10-24 NOTE — ASSESSMENT & PLAN NOTE
Parkinsonian motor symptoms appear to be fairly stable. He has progressed over the past year developed mild cognitive changes which have also been impacting his adequately perform at work. Tremor becomes more apparent in front of others and can interfere. He will continue on current medication regimen with no change. Encouraged to continue to remain physically active.

## 2023-10-24 NOTE — ASSESSMENT & PLAN NOTE
He has developed left hand paresthesias over the past month. This is becoming more bothersome. He is dropping objects. Symptoms suggestive of carpal tunnel syndrome. We will refer for nerve conduction studies/EMG for further evaluation.

## 2023-10-25 DIAGNOSIS — G20.A1 PARKINSON DISEASE: ICD-10-CM

## 2023-10-27 ENCOUNTER — OFFICE VISIT (OUTPATIENT)
Dept: CARDIOLOGY CLINIC | Facility: CLINIC | Age: 63
End: 2023-10-27
Payer: COMMERCIAL

## 2023-10-27 VITALS
BODY MASS INDEX: 30.24 KG/M2 | WEIGHT: 216 LBS | HEART RATE: 60 BPM | SYSTOLIC BLOOD PRESSURE: 132 MMHG | OXYGEN SATURATION: 96 % | HEIGHT: 71 IN | DIASTOLIC BLOOD PRESSURE: 70 MMHG

## 2023-10-27 DIAGNOSIS — E78.2 MIXED HYPERLIPIDEMIA: ICD-10-CM

## 2023-10-27 DIAGNOSIS — I10 ESSENTIAL HYPERTENSION: Primary | ICD-10-CM

## 2023-10-27 DIAGNOSIS — I25.10 MILD CAD: ICD-10-CM

## 2023-10-27 PROCEDURE — 93000 ELECTROCARDIOGRAM COMPLETE: CPT | Performed by: INTERNAL MEDICINE

## 2023-10-27 PROCEDURE — 99214 OFFICE O/P EST MOD 30 MIN: CPT | Performed by: INTERNAL MEDICINE

## 2023-11-07 ENCOUNTER — TELEPHONE (OUTPATIENT)
Age: 63
End: 2023-11-07

## 2023-11-07 DIAGNOSIS — B00.1 COLD SORE: ICD-10-CM

## 2023-11-07 RX ORDER — VALACYCLOVIR HYDROCHLORIDE 1 G/1
2000 TABLET, FILM COATED ORAL 2 TIMES DAILY
Qty: 4 TABLET | Refills: 3 | Status: SHIPPED | OUTPATIENT
Start: 2023-11-07 | End: 2023-11-08

## 2023-11-07 NOTE — TELEPHONE ENCOUNTER
Called patient left message on verified voicemail letting him know his medication was sent to the pharmacy for him if any question to call the office

## 2023-11-07 NOTE — TELEPHONE ENCOUNTER
Patient called and would like to know if Dr Payton Auguste could prescribe him Valtrex again for another cold sore flare-up he's having. Patient's preferred pharmacy is [facility] [address] [address] [facility].

## 2023-11-09 DIAGNOSIS — N52.9 ERECTILE DYSFUNCTION, UNSPECIFIED ERECTILE DYSFUNCTION TYPE: ICD-10-CM

## 2023-11-20 DIAGNOSIS — G20.A1 PARKINSON DISEASE: ICD-10-CM

## 2023-11-21 RX ORDER — CARBIDOPA AND LEVODOPA 25; 100 MG/1; MG/1
1 TABLET, EXTENDED RELEASE ORAL
Qty: 90 TABLET | Refills: 4 | Status: SHIPPED | OUTPATIENT
Start: 2023-11-21

## 2023-11-22 ENCOUNTER — TELEPHONE (OUTPATIENT)
Age: 63
End: 2023-11-22

## 2023-11-22 ENCOUNTER — NURSE TRIAGE (OUTPATIENT)
Age: 63
End: 2023-11-22

## 2023-11-22 ENCOUNTER — OFFICE VISIT (OUTPATIENT)
Dept: URGENT CARE | Facility: MEDICAL CENTER | Age: 63
End: 2023-11-22
Payer: COMMERCIAL

## 2023-11-22 VITALS
BODY MASS INDEX: 30.24 KG/M2 | HEART RATE: 80 BPM | RESPIRATION RATE: 18 BRPM | HEIGHT: 71 IN | OXYGEN SATURATION: 99 % | SYSTOLIC BLOOD PRESSURE: 132 MMHG | TEMPERATURE: 98.9 F | DIASTOLIC BLOOD PRESSURE: 84 MMHG | WEIGHT: 216 LBS

## 2023-11-22 DIAGNOSIS — U07.1 COVID-19: Primary | ICD-10-CM

## 2023-11-22 LAB
SARS-COV-2 AG UPPER RESP QL IA: POSITIVE
VALID CONTROL: ABNORMAL

## 2023-11-22 PROCEDURE — 87811 SARS-COV-2 COVID19 W/OPTIC: CPT | Performed by: PHYSICIAN ASSISTANT

## 2023-11-22 PROCEDURE — G0382 LEV 3 HOSP TYPE B ED VISIT: HCPCS | Performed by: PHYSICIAN ASSISTANT

## 2023-11-22 RX ORDER — MOLNUPIRAVIR 200 MG/1
800 CAPSULE ORAL EVERY 12 HOURS
Qty: 40 CAPSULE | Refills: 0 | Status: SHIPPED | OUTPATIENT
Start: 2023-11-22 | End: 2023-11-27

## 2023-11-22 RX ORDER — ATORVASTATIN CALCIUM 40 MG/1
40 TABLET, FILM COATED ORAL DAILY
COMMUNITY
Start: 2023-07-24

## 2023-11-22 NOTE — TELEPHONE ENCOUNTER
Patient going to care now for symptoms. Congestion and body aches that started Monday. Denies fever. Reason for Disposition   [1] Sinus congestion (pressure, fullness) AND [2] present > 10 days    Answer Assessment - Initial Assessment Questions  1. ONSET: "When did the nasal discharge start?"       Monday    2. AMOUNT: "How much discharge is there?"       Congestion    3. COUGH: "Do you have a cough?" If yes, ask: "Describe the color of your sputum" (clear, white, yellow, green)        4. RESPIRATORY DISTRESS: "Describe your breathing."       Denies    5. FEVER: "Do you have a fever?" If Yes, ask: "What is your temperature, how was it measured, and when did it start?"      Denies    6. SEVERITY: "Overall, how bad are you feeling right now?" (e.g., doesn't interfere with normal activities, staying home from school/work, staying in bed)         7. OTHER SYMPTOMS: "Do you have any other symptoms?" (e.g., sore throat, earache, wheezing, vomiting)      Body aches    8.  PREGNANCY: "Is there any chance you are pregnant?" "When was your last menstrual period?"      N/A    Protocols used: Common Cold-ADULT-AH

## 2023-11-22 NOTE — LETTER
November 22, 2023     Patient: Akua Lopez   YOB: 1960   Date of Visit: 11/22/2023       To Whom it May Concern:    Noe Ramires was seen in my clinic on 11/22/2023. He may return to work 12/1/23. If you have any questions or concerns, please don't hesitate to call.          Sincerely,          Gagan Del Rio PA-C        CC: No Recipients

## 2023-11-22 NOTE — PROGRESS NOTES
[facility]        NAME: Tobi Thomas is a 61 y.o. male  : 1960    MRN: [id number]  DATE: 2023  TIME: 7:21 PM    Assessment and Plan   COVID-19 [U07.1]  1. COVID-19  Poct Covid 19 Rapid Antigen Test    molnupiravir (Lagevrio) 200 mg capsule        COVID here is positive  Patient given quarantine guidelines. Isolate 5 days from sx onset with an additional 5 days of masking. Pt opted for anti-viral therapy. All risks/benefits discussed. Pt last set of labs in , reviewed. Pt will f/u PCP within 3-5 days    Patient Instructions     There are no Patient Instructions on file for this visit. **Portions of the record may have been created with voice recognition software. Occasional wrong word or "sound a like" substitutions may have occurred due to the inherent limitations of voice recognition software. Read the chart carefully and recognize, using context, where substitutions have occurred. **     Chief Complaint     Chief Complaint   Patient presents with   • Flu Symptoms     Generalized body aches, fatigue, headache, congestion, cough; 2-3 days          History of Present Illness     Pt presents today for evaluation of flu symptoms. Patient states symptoms started 2 days ago. He has been feeling fatigued, achey, cough and congestion. He does work in a school. Unsure of sick contacts. Denies fever. Denies SOB        Review of Systems     Review of Systems   Constitutional:  Positive for chills and fever. Negative for fatigue. HENT:  Positive for congestion. Negative for ear pain, sinus pain, sore throat and trouble swallowing. Eyes:  Negative for pain, discharge and redness. Respiratory:  Positive for cough. Negative for chest tightness, shortness of breath and wheezing. Cardiovascular:  Negative for chest pain, palpitations and leg swelling. Gastrointestinal:  Negative for abdominal pain, diarrhea, nausea and vomiting.    Musculoskeletal:  Negative for arthralgias, joint swelling and myalgias. Skin:  Negative for rash. Neurological:  Negative for dizziness, weakness, numbness and headaches.          Current Medications     Current Outpatient Medications:   •  aspirin (Aspirin 81) 81 mg EC tablet, Take 1 tablet (81 mg total) by mouth daily, Disp: , Rfl:   •  atorvastatin (LIPITOR) 40 mg tablet, Take 40 mg by mouth daily, Disp: , Rfl:   •  carbidopa-levodopa (SINEMET CR)  mg TBCR per ER tablet, TAKE 1 TABLET BY MOUTH EVERYDAY AT BEDTIME, Disp: 90 tablet, Rfl: 4  •  cholecalciferol (VITAMIN D3) 1,000 units tablet, Take 5,000 Units by mouth daily, Disp: , Rfl:   •  losartan-hydrochlorothiazide (HYZAAR) 100-12.5 MG per tablet, TAKE 1 TABLET BY MOUTH EVERY DAY, Disp: 90 tablet, Rfl: 2  •  MELATONIN PO, Take by mouth daily at bedtime, Disp: , Rfl:   •  Mirabegron ER (Myrbetriq) 25 MG TB24, Take 25 mg by mouth daily, Disp: 90 tablet, Rfl: 3  •  molnupiravir (Lagevrio) 200 mg capsule, Take 4 capsules (800 mg total) by mouth every 12 (twelve) hours for 5 days, Disp: 40 capsule, Rfl: 0  •  rosuvastatin (CRESTOR) 40 MG tablet, TAKE 1 TABLET BY MOUTH EVERY DAY, Disp: 90 tablet, Rfl: 2  •  acetaminophen (TYLENOL) 500 mg tablet, Take 2 tablets (1,000 mg total) by mouth 2 (two) times a day, Disp: , Rfl:   •  amoxicillin (AMOXIL) 500 MG tablet, Take 4 tablets by mouth as needed, Disp: , Rfl:   •  carbidopa-levodopa (SINEMET)  mg per tablet, TAKE 1 TABLET BY MOUTH FOUR TIMES A DAY, Disp: 120 tablet, Rfl: 11  •  Collagen Hydrolysate POWD, 1 Dose by Does not apply route daily (Patient not taking: Reported on 10/23/2023), Disp: , Rfl:   •  Flowflex COVID-19 Ag Home Test KIT, , Disp: , Rfl:   •  fluticasone (FLONASE) 50 mcg/act nasal spray, SPRAY 2 SPRAYS INTO EACH NOSTRIL EVERY DAY (Patient taking differently: if needed), Disp: 48 mL, Rfl: 1  •  selegiline (ELDEPRYL) 5 mg tablet, Take 1 tablet (5 mg total) by mouth 2 (two) times a day with meals, Disp: 180 tablet, Rfl: 3  •  sildenafil (VIAGRA) 50 MG tablet, Take 1 tablet (50 mg total) by mouth daily as needed for erectile dysfunction, Disp: 30 tablet, Rfl: 1  •  valACYclovir (VALTREX) 1,000 mg tablet, Take 2 tablets (2,000 mg total) by mouth 2 (two) times a day for 1 day, Disp: 4 tablet, Rfl: 3    Current Allergies     Allergies as of 11/22/2023   • (No Known Allergies)            The following portions of the patient's history were reviewed and updated as appropriate: allergies, current medications, past family history, past medical history, past social history, past surgical history and problem list.     Past Medical History:   Diagnosis Date   • Hemorrhoids    • Parkinson disease    • Thrombocytosis 3/15/2021       Past Surgical History:   Procedure Laterality Date   • KNEE SURGERY         Family History   Problem Relation Age of Onset   • Diabetes Father         mellitus   • Heart attack Father         acute myocardial infarction   • Hyperlipidemia Mother    • Multiple sclerosis Maternal Grandfather    • Prostate cancer Paternal Grandfather          Medications have been verified. Objective     /84   Pulse 80   Temp 98.9 °F (37.2 °C)   Resp 18   Ht 5' 11" (1.803 m)   Wt 98 kg (216 lb)   SpO2 99%   BMI 30.13 kg/m²        Physical Exam     Physical Exam  Vitals and nursing note reviewed. Constitutional:       Appearance: He is well-developed. HENT:      Head: Normocephalic. Right Ear: Hearing and tympanic membrane normal.      Left Ear: Hearing and tympanic membrane normal.      Nose: No mucosal edema. Mouth/Throat:      Pharynx: Uvula midline. Posterior oropharyngeal erythema present. Cardiovascular:      Rate and Rhythm: Normal rate and regular rhythm. Pulmonary:      Effort: Pulmonary effort is normal.      Breath sounds: Normal breath sounds.

## 2023-11-22 NOTE — LETTER
November 22, 2023     Patient: Vanna Ortiz   YOB: 1960   Date of Visit: 11/22/2023       To Whom it May Concern:    Cindy Salinas was seen in my clinic on 11/22/2023. He may return to work on 12/4/23. If you have any questions or concerns, please don't hesitate to call.          Sincerely,          Bela Del Rio PA-C        CC: No Recipients

## 2023-11-22 NOTE — TELEPHONE ENCOUNTER
Pt called back again about this. I told him we are waiting to hear from the doctor and he will continue to wait.

## 2023-11-22 NOTE — TELEPHONE ENCOUNTER
Called patient gave him message, patient states he will go the urgent care. Spoke with  and gave him verbally the message.

## 2023-11-22 NOTE — TELEPHONE ENCOUNTER
Patient said he got the flu shot and came down with flu like sxs 10 days later. He has congestion, body aches and laryngitis but no fever. Offered same day however he declined and asked if his doctor could send medication to pharmacy. His wife scheduled him at [facility] but he wasn't aware she had done so and wasn't going to keep that apt.

## 2023-11-24 DIAGNOSIS — N52.9 ERECTILE DYSFUNCTION, UNSPECIFIED ERECTILE DYSFUNCTION TYPE: Primary | ICD-10-CM

## 2023-11-24 RX ORDER — SILDENAFIL 100 MG/1
TABLET, FILM COATED ORAL
Qty: 30 TABLET | Refills: 3 | Status: SHIPPED | OUTPATIENT
Start: 2023-11-24

## 2023-11-24 RX ORDER — SILDENAFIL 50 MG/1
50 TABLET, FILM COATED ORAL DAILY PRN
Qty: 30 TABLET | Refills: 0 | Status: SHIPPED | OUTPATIENT
Start: 2023-11-24 | End: 2023-11-24

## 2023-12-01 DIAGNOSIS — G20.A1 PARKINSON DISEASE: ICD-10-CM

## 2023-12-03 DIAGNOSIS — J31.0 CHRONIC RHINITIS: ICD-10-CM

## 2023-12-04 RX ORDER — FLUTICASONE PROPIONATE 50 MCG
SPRAY, SUSPENSION (ML) NASAL
Qty: 16 ML | Refills: 5 | Status: SHIPPED | OUTPATIENT
Start: 2023-12-04

## 2023-12-13 ENCOUNTER — PROCEDURE VISIT (OUTPATIENT)
Dept: NEUROLOGY | Facility: CLINIC | Age: 63
End: 2023-12-13
Payer: COMMERCIAL

## 2023-12-13 DIAGNOSIS — R20.2 PARESTHESIAS IN LEFT HAND: ICD-10-CM

## 2023-12-13 PROCEDURE — 95886 MUSC TEST DONE W/N TEST COMP: CPT | Performed by: PHYSICAL MEDICINE & REHABILITATION

## 2023-12-13 PROCEDURE — 95911 NRV CNDJ TEST 9-10 STUDIES: CPT | Performed by: PHYSICAL MEDICINE & REHABILITATION

## 2023-12-17 DIAGNOSIS — G20.A1 PARKINSON DISEASE: ICD-10-CM

## 2023-12-19 DIAGNOSIS — G56.02 CARPAL TUNNEL SYNDROME, LEFT: ICD-10-CM

## 2023-12-19 DIAGNOSIS — G56.22 ULNAR NEUROPATHY OF LEFT UPPER EXTREMITY: Primary | ICD-10-CM

## 2023-12-19 RX ORDER — SELEGILINE HYDROCHLORIDE 5 MG/1
TABLET ORAL
Qty: 180 TABLET | Refills: 4 | Status: SHIPPED | OUTPATIENT
Start: 2023-12-19

## 2023-12-31 DIAGNOSIS — J31.0 CHRONIC RHINITIS: ICD-10-CM

## 2024-01-02 RX ORDER — FLUTICASONE PROPIONATE 50 MCG
SPRAY, SUSPENSION (ML) NASAL
Qty: 48 ML | Refills: 2 | Status: SHIPPED | OUTPATIENT
Start: 2024-01-02

## 2024-01-19 DIAGNOSIS — I10 ESSENTIAL HYPERTENSION: ICD-10-CM

## 2024-01-19 RX ORDER — LOSARTAN POTASSIUM AND HYDROCHLOROTHIAZIDE 12.5; 1 MG/1; MG/1
TABLET ORAL
Qty: 30 TABLET | Refills: 8 | Status: SHIPPED | OUTPATIENT
Start: 2024-01-19

## 2024-01-30 ENCOUNTER — EVALUATION (OUTPATIENT)
Dept: PHYSICAL THERAPY | Facility: CLINIC | Age: 64
End: 2024-01-30
Payer: COMMERCIAL

## 2024-01-30 DIAGNOSIS — R26.89 BALANCE DISORDER: Primary | ICD-10-CM

## 2024-01-30 DIAGNOSIS — R26.2 AMBULATORY DYSFUNCTION: ICD-10-CM

## 2024-01-30 PROCEDURE — 97162 PT EVAL MOD COMPLEX 30 MIN: CPT | Performed by: PHYSICAL THERAPIST

## 2024-01-30 NOTE — PROGRESS NOTES
PT Evaluation     Today's date: 2024  Patient name: Bob Fernandes  : 1960  MRN: [id number]  Referring provider: David Hanna MD  Dx: No diagnosis found.               Assessment  Assessment details: Pt reports to physical therapy with a diagnosis of parkinson's disease with poor balance and overall slowed gait. At this time patient has decreased LE coordination, kinesthetic awareness, decreased balance, decreased gait efficiency and endurance, and overall decreased ability to participate without taking extensive time with ADLs and IADLs as well as community activities. Pt will benfit from skilled therapy intervention 2x/week for 8 weeks in order to progress balance, coordination, gait efficiency, endurance.   Understanding of Dx/Px/POC: good   Prognosis: good    Goals  STG:  Pt will improve ABC scale by 20% in 4 weeks  Pt will complete 6 mwt in 1600 ft in 4 weeks  Pt will score 80% or greater on ABC scale in 4 weeks  Pt will be independent with HEP within 4 weeks  Pt will complete FGA with a score of 27/30 in 4 weeks    LTG  Pt will complete ABC scale with a score of 90% in 8 weeks   Pt will transition to independent HEP consistently within 8 weeks  Pt will complete FGA with a score of 30/30 n 8 weeks    Plan  Planned therapy interventions: neuromuscular re-education, balance, gait training, home exercise program, therapeutic activities, therapeutic exercise and patient education  Frequency: 2x week  Duration in weeks: 8  Plan of Care beginning date: 2024  Plan of Care expiration date: 3/29/2024  Treatment plan discussed with: patient        Subjective Evaluation    History of Present Illness  Mechanism of injury: Pt was diagnosed with Parkinson's Disease about four years ago. He follows with a neurologist Dr Hanna at [facility]. Pt reports overall feeling more uneasy in crowds and getting around in general. He reprots falling a few days ago he thinks due to wearing slide on shoes.  That is the first time he has fallen. He currently does not use an assistive device. He feels good in open areas, goes for walks in the woods with his dogs. In closed spaces he has more difficulty. He denies freezing of gait or festination at this time. He does feel he is moving much more slowly in general. Had surgery on  for carpal tunnel and ulnar nerve release.   Patient Goals  Patient goal: feel like he has normal balance and walking  Pain  Location: cervical    Social Support  Stairs in house: yes   Lives in: multiple-level home  Lives with: adult children and spouse    Employment status: not working  Exercise history: walking [facility] dog          Objective     Strength/Myotome Testing     Left Hip   Planes of Motion   Flexion: 5  Extension: 5  Abduction: 4  External rotation: 5    Right Hip   Planes of Motion   Flexion: 5  Extension: 5  Abduction: 4  External rotation: 5    Left Knee   Flexion: 5  Extension: 5    Right Knee   Flexion: 5  Extension: 5    Left Ankle/Foot   Dorsiflexion: 5  Plantar flexion: 5    Right Ankle/Foot   Dorsiflexion: 5  Plantar flexion: 5  Neuro Exam:     Sensation   Light touch LE: left WNL and right WNL  Proprioception LE: left WNL and right WNL    Coordination   Rapid alternating movements: UE impaired and LE impaired    Modified Tiana   Left gastroc: 0  Right gastroc: 0  Left hamstrin  Right hamstrin  Left quadricepts: 0  Right quadricepts: 0    Functional outcomes   6 minute walk test: 1525  Gait speed: 1.6 m/s  5x sit to stand: 13.85 (seconds)  TU.12 (seconds)  TUG cognitive: 9.78 (seconds)  FGA :   ABC: 75%           Precautions: fall risk        POC expires Unit limit Auth Expiration date PT/OT/ST + Visit Limit?                                 Visit/Unit Tracking  AUTH Status:  Date               Used               Remaining                     Precautions        Manuals                                        Neuro Re-Ed         minibest                                                         Ther Ex                                                                        Ther Activity                        Gait Training                        Modalities

## 2024-02-06 ENCOUNTER — OFFICE VISIT (OUTPATIENT)
Dept: PHYSICAL THERAPY | Facility: CLINIC | Age: 64
End: 2024-02-06
Payer: COMMERCIAL

## 2024-02-06 DIAGNOSIS — R26.2 AMBULATORY DYSFUNCTION: ICD-10-CM

## 2024-02-06 DIAGNOSIS — R26.89 BALANCE DISORDER: Primary | ICD-10-CM

## 2024-02-06 PROCEDURE — 97110 THERAPEUTIC EXERCISES: CPT | Performed by: PHYSICAL THERAPIST

## 2024-02-06 PROCEDURE — 97112 NEUROMUSCULAR REEDUCATION: CPT | Performed by: PHYSICAL THERAPIST

## 2024-02-06 NOTE — PROGRESS NOTES
"Daily Note     Today's date: 2024  Patient name: Bob Fernandes  : 1960  MRN: [id number]  Referring provider: David Hanna MD  Dx:   Encounter Diagnosis     ICD-10-CM    1. Balance disorder  R26.89       2. Ambulatory dysfunction  R26.2                      Subjective: Patient reports no new chagnes since last session, having some back pain today      Objective: See treatment diary below      Assessment: Patient completed first few blaze pod exercises with minimal difficulty but encouraged him to complete as fast as possible. Unbale to increase to exertion level greater than RPE of 5/10.       Plan: Continue per plan of care.      Precautions: fall risk        POC expires Unit limit Auth Expiration date PT/OT/ST + Visit Limit?                                 Visit/Unit Tracking  AUTH Status:  Date               Used               Remaining                     Precautions        Manuals                                        Neuro Re-Ed         minibest        Obstacle navigation Blaze pod color catch - 3 rounds       boxing 4 square step over with airex pad and punching heavy bag - 3 rounds       Four square step over With airex pads 12\" hurdles - fast as possible for 2 min x 2                               Ther Ex                                                                        Ther Activity                        Gait Training                        Modalities                                      "

## 2024-02-09 ENCOUNTER — EVALUATION (OUTPATIENT)
Dept: SPEECH THERAPY | Facility: CLINIC | Age: 64
End: 2024-02-09
Payer: COMMERCIAL

## 2024-02-09 ENCOUNTER — OFFICE VISIT (OUTPATIENT)
Dept: PHYSICAL THERAPY | Facility: CLINIC | Age: 64
End: 2024-02-09
Payer: COMMERCIAL

## 2024-02-09 ENCOUNTER — APPOINTMENT (OUTPATIENT)
Dept: SPEECH THERAPY | Facility: CLINIC | Age: 64
End: 2024-02-09
Payer: COMMERCIAL

## 2024-02-09 DIAGNOSIS — R49.0 HYPOKINETIC PARKINSONIAN DYSPHONIA: Primary | ICD-10-CM

## 2024-02-09 DIAGNOSIS — R26.89 BALANCE DISORDER: Primary | ICD-10-CM

## 2024-02-09 DIAGNOSIS — G20.A1 HYPOKINETIC PARKINSONIAN DYSPHONIA: Primary | ICD-10-CM

## 2024-02-09 DIAGNOSIS — R26.2 AMBULATORY DYSFUNCTION: ICD-10-CM

## 2024-02-09 PROCEDURE — 92524 BEHAVRAL QUALIT ANALYS VOICE: CPT | Performed by: SPEECH-LANGUAGE PATHOLOGIST

## 2024-02-09 PROCEDURE — 97112 NEUROMUSCULAR REEDUCATION: CPT | Performed by: PHYSICAL THERAPIST

## 2024-02-09 NOTE — PROGRESS NOTES
"Daily Note     Today's date: 2024  Patient name: Bob Fernandes  : 1960  MRN: [id number]  Referring provider: David Hanna MD  Dx:   Encounter Diagnosis     ICD-10-CM    1. Balance disorder  R26.89       2. Ambulatory dysfunction  R26.2                      Subjective: Patient reports no new changes since last week, feeling well today      Objective: See treatment diary below      Assessment: Completed session this date with focus on high intensity and coordination activities. Patient was able to complete with a 9/10 exertion level throughout. Improved with footwork coordination as he continued iwht session.       Plan: Continue per plan of care.      Precautions: fall risk        POC expires Unit limit Auth Expiration date PT/OT/ST + Visit Limit?                                 Visit/Unit Tracking  AUTH Status:  Date               Used               Remaining                     Precautions        Manuals                                       Neuro Re-Ed         minibest        Obstacle navigation Blaze pod color catch - 3 rounds       boxing 4 square step over with airex pad and punching heavy bag - 3 rounds Focus mitts - 3 rounds for 1 min ea    Shadow boxing - 1 round 1 min    Heavy bag - 2x with hop over stick for 1 min ea    1 round heavy bag 1 min ea        Four square step over With airex pads 12\" hurdles - fast as possible for 2 min x 2       footwork        core  Bridge 5x4, push up 5x4,               Ther Ex                                                                        Ther Activity                        Gait Training                        Modalities                                        "

## 2024-02-09 NOTE — PROGRESS NOTES
"Speech-Language Pathology Initial Evaluation    Today's date: 2024   Patient’s name: Bob Fernandes  : 1960  MRN: [id number]  Safety measures: PD  Referring provider: Sasha Gamble PA-C    Encounter Diagnosis     ICD-10-CM    1. Hypokinetic Parkinsonian dysphonia  G20.A1     R49.0         Visit tracking:    Re-eval Due POC Expires Auth Expiration Date ST Visit Limit   3/15/24  3/15/24  12/31/24 BOMN                           Visit/Unit Tracking  AUTH Status:  Date 24   No Auth Needed Used 1    Remaining  BOMN     Subjective comments: Patient reports that lately he has had trouble swallowing.   Sometimes, he will just start choking, even on water. When he eats something, it doesn't matter how small, he will start coughing and choking. This happens a couple of times a week, not every day.     He also reports that his voice is a lot more hoarse and quiet. He is often asked to repeat himself.     He is retiring in February due to challenges with Parkinson's disease. He has been taking vacation time to be able to focus on therapy.     He feels that he has some difficulty in processing what he wants to say and this makes him more self concious when speaking. He gets a little confused, \"I used to have my wits about me and I feel I've lost my wit. I miss the spontaneity I use to have.\"    Why did the patient choose us? Prior patient    Patient's goal(s): To be able to swallow and speak clearly    Reason for referral: Change in vocal quality and Difficulty swallowing solids or liquids  Prior functional status: Communication effective and appropriate in all situations  Clinically complex situations: Previous therapy to address similar deficits    History: Patient is a 64 y.o. male who was referred to outpatient skilled Speech Therapy services for a voice evaluation (referral noted LSVT, as well as dysphagia and cognitive deficits).    Patient has a prior medical history of the following: PD, essential " "HTN and cognitive changes (see history for full list).    Patient received ST to address dysphonia related to PD from 11/8/22 to 1/12/23 at this clinic. At his last visit, it was recommended that he be evaluated by an ENT to determine any evidence of laryngeal dysfunction related to dysphonia and therapy was placed on hold. The patient reports during today's evaluation that he never pursued that referral but has the information written down at home. This is his first time returning back for ST evaluation.    Hearing: WFL  Vision: He has been diagnosed with early stage macular degeneration - he is on medication for it    Home environment/lifestyle: Lives with his wife and oldest son  Highest level of education: Master's degree  Vocational status: He is a  (about to retire) of  in [place]    Mental status: Alert  Behavior status: Cooperative  Patient reported symptoms of:  Not reported  Communication modalities: Verbal  Rehabilitation prognosis: Good rehab potential to reach and maintain prior level of function    Assessments    SPEAK OUT!® VOCAL INTENSITY ASSESSMENT    Sound level meter-to-mouth distance: 50 cm throughout testing    BASELINE ASSESSMENT OF SPEECH:  Pre-treatment interview: 70 dB SPL    Pre-treatment spontaneous speech sample (conversation): 70 dB SPL    Pre-treatment oral reading (\"Grandfather Passage\"): 68 dB SPL    Sustained phonation of \"ah\" (without cueing):   Trial 1: 27.4 seconds @ 71 dB SPL  Trial 2: 16.4 seconds @  70 dB SPL    *Patient was noted to have the following impairments: reduced vocal intensity and monopitch/monoloudness - mild symptoms      STIMULABILITY TESTING:  Sustained phonation of \"ah\" (with cueing of \"INTENT\"):   Trial 1: 18.5 seconds @  78 dB SPL -- IMPROVEMENT  Trial 2: 19 seconds @  79 dB SPL -- IMPROVEMENT    Repeating phrases (with cueing of INTENT): 75 dB SPL      *Patient demonstrated the following improvements with cueing: vocal intensity, " "articulation, and facial expression as well as expression in his speech.    *Patient's response to using \"INTENT\": Improved loudness and expression      Voice Handicap Index (VHI):  The VHI is a list of 30 statements that many people have used to describe their voices and the effects of their voices on their lives. Patient indicated how frequently he has the same experience using a rating point scale (“never” = 0, “almost never” = 1, “sometimes” = 2, “almost always” = 3, and “always” = 4).  Results were as follows:    Subscale: Score: Self-Perceived Impairment Level:   Physical 24/40 Severe   Functional 22/40 Severe   Emotional 20/40 Severe        TOTAL 66/120 Severe           Goals    Dysphagia  Patient will participate in a swallowing evaluation to determine extent of deficits, to be addressed in 1 to 2 weeks.     Speak OUT!  Short-term goals:  1. Patient and family will watch Parkinson's voice project informational session called \"Learn About Parkinson's\" in order to better understand the SpeakOUT! program and commitment -  https://www.[employer]iW[employer].com/watch?v=[email or web address]      2. Patient will obtain a SpeakOUT! Workbook and complete exercises twice a day for 25 days, before transitioning to the LOUD CROWD.      3. Patient will coordinate vocal and articulatory subsystems in hierarchal speech tasks by producing sounds with intention with minimal assistance     4. Patient will read phrases, sentences and paragraphs with intention, yielding improved vocal quality, loudness, articulatory precision, and endrance while maintaining a minimum of 75 db with min assistance.      5. Patient will generalize intentional speech to cognitive-linguistic exercises and conversational speech with improved vocal quality, loudness, articulatory precision, and endurance while maintaining a minimum of 75 dB with min assistance.     Long-term goals:  1. Patient will establish a home exercise program independently or with appropriate " supports.     2. Patient will participate in the LOUD CROWD (online or in person) as able after completion of one on one SLP treatments.     3. Patient will produce spontaneous conversation while using intent independently or with appropriate supports.       Impressions/Recommendations    Impressions:   Patient presents with a mild voice disorder, characterized by reduced loudness, monotone pitch, and a hoarse vocal quality, which contributed to an overall decrease in speech intelligibility, intermittently. During conversation, speech intelligibility is reduced 10% of the time. Based on stimulability testing, patient is an excellent candidate for SPEAK OUT!® program.    Recommend ST services for the patient to participate in the SPEAK OUT!® program. Additionally, recommend further evaluation for swallowing difficulties. Plan of care was discussed with the patient and he is in agreement at this time.     Recommendations:  -Patient would benefit from outpatient skilled Speech Therapy services: Voice therapy    -Frequency: 2x weekly  -Duration: 6-8 weeks    -Intervention certification from: 2/9/2024  -Intervention certification to: 3/15/2024    -Intervention comments:   45 minutes of voice evaluation time.

## 2024-02-13 ENCOUNTER — HOSPITAL ENCOUNTER (EMERGENCY)
Facility: HOSPITAL | Age: 64
Discharge: HOME | End: 2024-02-13
Attending: EMERGENCY MEDICINE | Admitting: EMERGENCY MEDICINE
Payer: COMMERCIAL

## 2024-02-13 ENCOUNTER — APPOINTMENT (OUTPATIENT)
Dept: PHYSICAL THERAPY | Facility: CLINIC | Age: 64
End: 2024-02-13
Payer: COMMERCIAL

## 2024-02-13 ENCOUNTER — APPOINTMENT (OUTPATIENT)
Dept: SPEECH THERAPY | Facility: CLINIC | Age: 64
End: 2024-02-13
Payer: COMMERCIAL

## 2024-02-13 ENCOUNTER — APPOINTMENT (EMERGENCY)
Dept: RADIOLOGY | Facility: HOSPITAL | Age: 64
End: 2024-02-13
Payer: COMMERCIAL

## 2024-02-13 VITALS
DIASTOLIC BLOOD PRESSURE: 89 MMHG | RESPIRATION RATE: 16 BRPM | BODY MASS INDEX: 29.83 KG/M2 | TEMPERATURE: 97.2 F | SYSTOLIC BLOOD PRESSURE: 174 MMHG | HEART RATE: 86 BPM | WEIGHT: 213.85 LBS | OXYGEN SATURATION: 96 %

## 2024-02-13 DIAGNOSIS — S86.302A PERONEAL TENDON INJURY, LEFT, INITIAL ENCOUNTER: Primary | ICD-10-CM

## 2024-02-13 PROCEDURE — 99283 EMERGENCY DEPT VISIT LOW MDM: CPT

## 2024-02-13 PROCEDURE — 73630 X-RAY EXAM OF FOOT: CPT

## 2024-02-13 RX ORDER — IBUPROFEN 400 MG/1
800 TABLET ORAL ONCE
Status: DISCONTINUED | OUTPATIENT
Start: 2024-02-13 | End: 2024-02-13 | Stop reason: HOSPADM

## 2024-02-13 NOTE — ED PROVIDER NOTES
History  Chief Complaint   Patient presents with    Foot Pain     Left foot pain. Pt reports shoveling, rolled foot and heard a pop. Denies falling     64-year-old male with past medical history of Parkinson's disease presents for evaluation of left medial midfoot pain that started several days ago, patient is unsure when exactly pain began however worsened today while shoveling the snow.  Denies feeling any popping or hearing any cracking sensations today.  No known injuries or recent strenuous activity however patient reports he started boxing last week prior to onset of pain.  He states that he has had significant difficulty bearing full weight on his left foot due to pain and has been using a cane today.  Denies associated symptoms of focal weakness/numbness/tingling, fever/chills, rash, chest pain, shortness of breath or any other symptoms.  No other concerns.        Prior to Admission Medications   Prescriptions Last Dose Informant Patient Reported? Taking?   Collagen Hydrolysate POWD  Self Yes No   Si Dose by Does not apply route daily   Patient not taking: Reported on 10/23/2023   Flowflex COVID-19 Ag Home Test KIT  Self Yes No   Patient not taking: Reported on 4/10/2023   MELATONIN PO  Self Yes No   Sig: Take by mouth daily at bedtime   Mirabegron ER (Myrbetriq) 25 MG TB24   No No   Sig: Take 25 mg by mouth daily   acetaminophen (TYLENOL) 500 mg tablet   No No   Sig: Take 2 tablets (1,000 mg total) by mouth 2 (two) times a day   amoxicillin (AMOXIL) 500 MG tablet  Self Yes No   Sig: Take 4 tablets by mouth as needed   aspirin (Aspirin 81) 81 mg EC tablet   No No   Sig: Take 1 tablet (81 mg total) by mouth daily   atorvastatin (LIPITOR) 40 mg tablet   Yes No   Sig: Take 40 mg by mouth daily   carbidopa-levodopa (SINEMET CR)  mg TBCR per ER tablet   No No   Sig: TAKE 1 TABLET BY MOUTH EVERYDAY AT BEDTIME   carbidopa-levodopa (SINEMET)  mg per tablet   No No   Sig: TAKE 1 TABLET BY MOUTH FOUR  TIMES A DAY   cholecalciferol (VITAMIN D3) 1,000 units tablet  Self Yes No   Sig: Take 5,000 Units by mouth daily   fluticasone (FLONASE) 50 mcg/act nasal spray   No No   Sig: SPRAY 2 SPRAYS INTO EACH NOSTRIL EVERY DAY   losartan-hydrochlorothiazide (HYZAAR) 100-12.5 MG per tablet   No No   Sig: TAKE 1 TABLET BY MOUTH EVERY DAY   rosuvastatin (CRESTOR) 40 MG tablet   No No   Sig: TAKE 1 TABLET BY MOUTH EVERY DAY   selegiline (ELDEPRYL) 5 mg tablet   No No   Sig: TAKE 1 TABLET BY MOUTH 2 TIMES A DAY WITH MEALS.   sildenafil (Viagra) 100 mg tablet   No No   Sig: Take one tablet by mouth on empty stomach one hour prior to sexual activity   valACYclovir (VALTREX) 1,000 mg tablet   No No   Sig: Take 2 tablets (2,000 mg total) by mouth 2 (two) times a day for 1 day      Facility-Administered Medications: None       Past Medical History:   Diagnosis Date    Hemorrhoids     Parkinson disease     Thrombocytosis 3/15/2021       Past Surgical History:   Procedure Laterality Date    KNEE SURGERY         Family History   Problem Relation Age of Onset    Diabetes Father         mellitus    Heart attack Father         acute myocardial infarction    Hyperlipidemia Mother     Multiple sclerosis Maternal Grandfather     Prostate cancer Paternal Grandfather      I have reviewed and agree with the history as documented.    E-Cigarette/Vaping    E-Cigarette Use Former User      E-Cigarette/Vaping Substances    Nicotine No     THC No     CBD No     Flavoring No     Other No     Unknown No      Social History     Tobacco Use    Smoking status: Never    Smokeless tobacco: Never   Vaping Use    Vaping status: Former   Substance Use Topics    Alcohol use: Yes     Comment: 1 small glass of scotch most nights    Drug use: No        Review of Systems   Constitutional:  Negative for chills and fever.   HENT:  Negative for ear pain and sore throat.    Eyes:  Negative for pain and visual disturbance.   Respiratory:  Negative for cough and  shortness of breath.    Cardiovascular:  Negative for chest pain and palpitations.   Gastrointestinal:  Negative for abdominal pain and vomiting.   Genitourinary:  Negative for dysuria and hematuria.   Musculoskeletal:  Positive for arthralgias. Negative for back pain.   Skin:  Negative for color change and rash.   Neurological:  Negative for seizures and syncope.   All other systems reviewed and are negative.      Physical Exam  ED Triage Vitals [02/13/24 1323]   Temperature Pulse Respirations Blood Pressure SpO2   (!) 97.2 °F (36.2 °C) 86 16 (!) 174/89 96 %      Temp src Heart Rate Source Patient Position - Orthostatic VS BP Location FiO2 (%)   -- -- -- -- --      Pain Score       4             Orthostatic Vital Signs  Vitals:    02/13/24 1323   BP: (!) 174/89   Pulse: 86       Physical Exam  Vitals and nursing note reviewed.   Constitutional:       General: He is not in acute distress.     Appearance: Normal appearance. He is well-developed. He is not ill-appearing or diaphoretic.   HENT:      Head: Normocephalic and atraumatic.      Right Ear: External ear normal.      Left Ear: External ear normal.      Nose: Nose normal.      Mouth/Throat:      Mouth: Mucous membranes are moist.   Eyes:      Extraocular Movements: Extraocular movements intact.      Conjunctiva/sclera: Conjunctivae normal.   Cardiovascular:      Rate and Rhythm: Normal rate and regular rhythm.   Pulmonary:      Effort: Pulmonary effort is normal. No respiratory distress.   Musculoskeletal:         General: Swelling and tenderness present.      Cervical back: Normal range of motion.      Comments: Very minimal/scant generalized edema over the medial aspect of the L midfoot with point tenderness over this area.  Neurovascularly intact.  Full range of motion of all toes intact.  Inversion, eversion, plantarflexion, dorsiflexion intact without significant reproduction of tenderness.  No overlying skin changes.   Skin:     General: Skin is warm and  dry.      Capillary Refill: Capillary refill takes less than 2 seconds.   Neurological:      Mental Status: He is alert.   Psychiatric:         Mood and Affect: Mood normal.         ED Medications  Medications - No data to display    Diagnostic Studies  Results Reviewed       None                   XR foot 3+ views LEFT   ED Interpretation by Adilia Hayes MD (02/13 9028)   No acute osseous process      Final Result by Misha Driscoll MD (02/13 0378)      No acute osseous abnormality.      Workstation performed: [id number]               Procedures  Procedures      ED Course                                       Medical Decision Making  Patient remained stable throughout ED course.  No evidence of stress fracture or any other acute osseous abnormality on x-ray.  Patient had some difficulty bearing weight on the left foot secondary to pain however had appropriate strength and was able to ambulate appropriately with assistance of his cane that patient brought from home.  Given onset of pain after new activity (patient's report of boxing), Dx most likely peroneal tendinitis at the insertion point of the peroneal tendon at the left medial midfoot.  Patient's foot was Ace wrapped by me for further stabilizing support and ice pack offered.  Doubt infectious etiology.  Follow-up information and ambulatory referral to podiatry provided to be used on an as-needed basis.  Return to ER precautions discussed at length.  Stable for discharge home.     Amount and/or Complexity of Data Reviewed  Radiology: ordered and independent interpretation performed.          Disposition  Final diagnoses:   Peroneal tendon injury, left, initial encounter     Time reflects when diagnosis was documented in both MDM as applicable and the Disposition within this note       Time User Action Codes Description Comment    2/13/2024  2:36 PM Adilia Hayes Add [S86.302A] Peroneal tendon injury, left, initial encounter           ED Disposition       ED  Disposition   Discharge    Condition   Stable    Date/Time   Tue Feb 13, 2024  2:29 PM    Comment   Bob Fernandes discharge to home/self care.                   Follow-up Information       Follow up With Specialties Details Why Contact Info Additional Information    Nish Barajas MD Family Medicine Call  As needed [address] [facility]  [place] [address]  [phone]       [facility] Emergency Medicine Go to  As needed, If symptoms worsen [address] [facility] [address]  [phone] [facility], [address], [place], [address]    [facility] Podiatry Call  As needed [address]  [facility] [address] [phone] [facility], [address] [place], [address], [phone]    [facility] Podiat[facility] Call  As needed [address] [id number] [phone] [facility] [address] [id number] [phone]            Discharge Medication List as of 2/13/2024  2:38 PM        CONTINUE these medications which have NOT CHANGED    Details   acetaminophen (TYLENOL) 500 mg tablet Take 2 tablets (1,000 mg total) by mouth 2 (two) times a day, Starting Thu 8/10/2023, No Print      amoxicillin (AMOXIL) 500 MG tablet Take 4 tablets by mouth as needed, Starting Fri 2/24/2023, Historical Med      aspirin (Aspirin 81) 81 mg EC tablet Take 1 tablet (81 mg total) by mouth daily, Starting Thu 8/10/2023, No Print      atorvastatin (LIPITOR) 40 mg tablet Take 40 mg by mouth daily, Starting Mon 7/24/2023, Historical Med      carbidopa-levodopa (SINEMET CR)  mg TBCR per ER tablet TAKE 1 TABLET BY MOUTH EVERYDAY AT BEDTIME, Starting Tue 11/21/2023, Normal      carbidopa-levodopa (SINEMET)  mg per tablet TAKE 1 TABLET BY MOUTH FOUR TIMES A DAY,  Starting Fri 12/1/2023, Normal      cholecalciferol (VITAMIN D3) 1,000 units tablet Take 5,000 Units by mouth daily, Historical Med      Collagen Hydrolysate POWD 1 Dose by Does not apply route daily, Historical Med      Flowflex COVID-19 Ag Home Test KIT Starting Fri 9/9/2022, Historical Med      fluticasone (FLONASE) 50 mcg/act nasal spray SPRAY 2 SPRAYS INTO EACH NOSTRIL EVERY DAY, Normal      losartan-hydrochlorothiazide (HYZAAR) 100-12.5 MG per tablet TAKE 1 TABLET BY MOUTH EVERY DAY, Normal      MELATONIN PO Take by mouth daily at bedtime, Historical Med      Mirabegron ER (Myrbetriq) 25 MG TB24 Take 25 mg by mouth daily, Starting Mon 5/15/2023, Normal      rosuvastatin (CRESTOR) 40 MG tablet TAKE 1 TABLET BY MOUTH EVERY DAY, Starting Mon 9/11/2023, Normal      selegiline (ELDEPRYL) 5 mg tablet TAKE 1 TABLET BY MOUTH 2 TIMES A DAY WITH MEALS., Normal      sildenafil (Viagra) 100 mg tablet Take one tablet by mouth on empty stomach one hour prior to sexual activity, Normal      valACYclovir (VALTREX) 1,000 mg tablet Take 2 tablets (2,000 mg total) by mouth 2 (two) times a day for 1 day, Starting Tue 11/7/2023, Until Wed 11/8/2023, Normal               PDMP Review       None             ED Provider  Attending physically available and evaluated Bob Fernandes. I managed the patient along with the ED Attending.    Electronically Signed by           Adilia Hayes MD  02/13/24 8647

## 2024-02-13 NOTE — DISCHARGE INSTRUCTIONS
Please take Tylenol for pain every 4 hours as needed not to exceed 4000 mg or 4 g per day. For example you may take 500 mg every 4 hours or 1000 mg every 8 hours for pain.    Please take Ibuprofen as needed for pain, up to 3.2 g per day. For example you may take 600-800 mg every 6 hours alternating with Tylenol as needed. Please take with food and no longer than 3 days continuously.

## 2024-02-13 NOTE — ED ATTENDING ATTESTATION
2/13/2024  ILeslie MD, saw and evaluated the patient. I have discussed the patient with the resident/non-physician practitioner and agree with the resident's/non-physician practitioner's findings, Plan of Care, and MDM as documented in the resident's/non-physician practitioner's note, except where noted. All available labs and Radiology studies were reviewed.  I was present for key portions of any procedure(s) performed by the resident/non-physician practitioner and I was immediately available to provide assistance.       At this point I agree with the current assessment done in the Emergency Department.  I have conducted an independent evaluation of this patient a history and physical is as follows:    64-year-old male with a history of Parkinson's disease presenting for evaluation of left foot pain.  Patient states he developed pain in his left midfoot several days ago but acutely worsened over the last 2 days.  Patient states he was clearing so this morning the pain was worse.  Bought new orthopedic insoles, as well.  Denies specific trauma or injury.  Notes he recently started boxing.  Patient states he has been using a cane because he cannot fully bear weight.  Did not take anything else for pain at home.  Denies fever and other systemic symptoms.  Notes chronic paresthesias.  Denies pain first thing in morning.    Please see resident documentation for histories review of systems.    Exam: Vital signs and nursing notes reviewed  General: Awake, alert, no acute distress  HEENT: Normocephalic, atraumatic, mucous membranes moist  Neck: Supple  Heart: Regular rate and rhythm  Lungs: Nonlabored respirations  Abdomen: Nondistended  Extremities: No swelling or deformity to the left foot.  No tenderness to anterior tibia, fibular head, malleoli, base of first or fifth metatarsal.  Point tenderness in the plantar aspect of the midfoot.  No pain on calcaneal squeeze.  No pain on Achilles squeeze.   No overlying erythema or skin changes.  Good distal pulses and capillary refill.  Intact dorsiflexion and plantarflexion  Skin: Warm, dry, intact, no rash    ED Course  ED Course as of 24 1437   Tue 2024   1427 Foot xray per my interpretation no acute fracture     ED course/medical decision m[age]-year-old male presenting for evaluation of left foot pain.  Differential diagnosis includes contusion, ligamentous injury, sprain, fracture, dislocation, stress fracture, plantar fasciitis, peroneal tendinitis.  X-ray obtained of the left foot, which per my interpretation, is negative for acute osseous abnormality.  Based on patient's symptoms, suspect peroneal tendinitis versus possible plantar fasciitis.  Patient counseled on supportive measures at home including anti-inflammatories, ice, gentle stretching exercises, and follow-up with PCP and podiatry in addition to physical therapy.  Return precautions discussed.  Patient is in agreement and understanding of these instructions.    Diagnosis: Left foot pain, peroneal tendinitis  Disposition: Discharge

## 2024-02-14 ENCOUNTER — OFFICE VISIT (OUTPATIENT)
Dept: PODIATRY | Facility: CLINIC | Age: 64
End: 2024-02-14
Payer: COMMERCIAL

## 2024-02-14 ENCOUNTER — OFFICE VISIT (OUTPATIENT)
Dept: PHYSICAL THERAPY | Facility: CLINIC | Age: 64
End: 2024-02-14
Payer: COMMERCIAL

## 2024-02-14 ENCOUNTER — APPOINTMENT (OUTPATIENT)
Dept: PHYSICAL THERAPY | Facility: CLINIC | Age: 64
End: 2024-02-14
Payer: COMMERCIAL

## 2024-02-14 ENCOUNTER — OFFICE VISIT (OUTPATIENT)
Dept: SPEECH THERAPY | Facility: CLINIC | Age: 64
End: 2024-02-14
Payer: COMMERCIAL

## 2024-02-14 VITALS
HEART RATE: 84 BPM | BODY MASS INDEX: 29.83 KG/M2 | RESPIRATION RATE: 18 BRPM | HEIGHT: 71 IN | DIASTOLIC BLOOD PRESSURE: 84 MMHG | SYSTOLIC BLOOD PRESSURE: 149 MMHG

## 2024-02-14 DIAGNOSIS — M72.2 PLANTAR FASCIITIS OF LEFT FOOT: ICD-10-CM

## 2024-02-14 DIAGNOSIS — R49.0 HYPOKINETIC PARKINSONIAN DYSPHONIA: Primary | ICD-10-CM

## 2024-02-14 DIAGNOSIS — G20.A1 HYPOKINETIC PARKINSONIAN DYSPHONIA: Primary | ICD-10-CM

## 2024-02-14 DIAGNOSIS — R26.2 AMBULATORY DYSFUNCTION: ICD-10-CM

## 2024-02-14 DIAGNOSIS — S86.302A PERONEAL TENDON INJURY, LEFT, INITIAL ENCOUNTER: Primary | ICD-10-CM

## 2024-02-14 DIAGNOSIS — R26.89 BALANCE DISORDER: Primary | ICD-10-CM

## 2024-02-14 DIAGNOSIS — R13.12 OROPHARYNGEAL DYSPHAGIA: ICD-10-CM

## 2024-02-14 PROCEDURE — 92507 TX SP LANG VOICE COMM INDIV: CPT

## 2024-02-14 PROCEDURE — 97110 THERAPEUTIC EXERCISES: CPT | Performed by: PHYSICAL THERAPIST

## 2024-02-14 PROCEDURE — 99203 OFFICE O/P NEW LOW 30 MIN: CPT | Performed by: PODIATRIST

## 2024-02-14 PROCEDURE — 92526 ORAL FUNCTION THERAPY: CPT

## 2024-02-14 NOTE — PROGRESS NOTES
PT Evaluation     Today's date: 2/15/2024  Patient name: Bob Fernandes  : 1960  MRN: [id number]  Referring provider: Manny Winters D*  Dx:   Encounter Diagnosis     ICD-10-CM    1. Plantar fasciitis of left foot  M72.2 Ambulatory referral to Physical Therapy      2. Peroneal tendon injury, left, initial encounter  S86.302A Ambulatory referral to Physical Therapy          Start Time: 1700  Stop Time: 1745  Total time in clinic (min): 45 minutes    Assessment  Assessment details: Bob Fernandes is a 64 y.o. male who presents with signs and symptoms consistent of acute left foot and ankle pain. Pt has been experiencing pain for a few days and attributes the worsening of symptoms after shoveling the heavy wet snow. Pt went to ED yesterday and saw Dr. Cuellar today who diagnosed pain with Plantar heel pain and peronal tendinopathy. Patient presents with Left foot/ankle pain, decreased Left ankle ROM, Weight bearing difficulty, tenderness,  moderate swelling, joint mobility restrictions, and flexibility restrictions. Due to these impairments, Patient has difficulty performing ambulating, weight bearing over involved LE, and stair negotiating . Patient would benefit from skilled physical therapy to address the impairments, improve their level of function, and to improve their overall quality of life.    Primary movement impairments:   1) Moderate swelling in ankle/foot  2) Decreased ankle ROM  3) Weakness of medial and lateral ankle stabilizers      Impairments: abnormal gait, abnormal muscle firing, abnormal or restricted ROM, abnormal movement, activity intolerance, impaired balance, impaired physical strength, lacks appropriate home exercise program, pain with function and weight-bearing intolerance  Understanding of Dx/Px/POC: good   Prognosis: good    Goals  Short Term Goals: to be achieved by 4 weeks  1) Patient to be independent with basic HEP.  2) Decrease pain to 3/10 at its  worst.  3) Increase bilateral ankle ROM by 5-10 degrees   4) Increase LE strength by 1/2 MMT grade in all deficient planes.    Long Term Goals: to be achieved by discharge  1) FOTO equal to or greater than expected.  2) Ambulation to improve to maximal level of function  3) Stair negotiation will improve to reciprocal.  4) Sit to stand transfers will improve to maximal level of function       Plan  Patient would benefit from: PT eval and skilled physical therapy  Planned modality interventions: low level laser therapy  Planned therapy interventions: joint mobilization, manual therapy, neuromuscular re-education, patient education, therapeutic activities, therapeutic exercise and home exercise program  Frequency: 2x per week for 4-6 weeks.  Treatment plan discussed with: patient      Subjective Evaluation    History of Present Illness  Mechanism of injury: History of Current Injury: Pt referred to PT for acute ankle pain after shoveling. He is currently being treated by Svetlana Whiteside for Parkinson's disease and was recommended to come to an ortho PT. Pt went to ED earlier this week and saw podiatry yesterday. Pt received XR of the foot which was normal. Pt reports feeling a pop when he was shoveling. Pt notes that he has been exercising more recently in PT and at [employer] So he has been noticing a mild increase of pain. However, symptoms drastically worsened after shoveling. Pt is currently ambulating with a SPC but not typically using it. Symptoms are gradually improving.   PMH significant for L TKA   Pain location/Descriptors: Pain in two locations (lateral ankle) and (plantar aspect of foot). Pain is dull but can be sharp in nature.   Aggravating factors: Walking, standing/walking, and weight bearing activities.   Easing factors: Gentle stretching, Tylenol  24 HR pattern: Worse in the AM during WB.   Imaging: foot XR- Normal   Special Questions: Pt denies any N&T in left LE.   Patient goals:   Resume daily activities.   Hobbies/Interest: PT and rock steady boxing.   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     Active Range of Motion   Left Ankle/Foot   Dorsiflexion (ke): 8 degrees   Plantar flexion: 30 degrees   Inversion: 10 degrees   Eversion: 0 degrees     Right Ankle/Foot   Dorsiflexion (ke): 24 degrees   Plantar flexion: 50 degrees   Inversion: 25 degrees   Eversion: 12 degrees     Joint Play   Left Ankle/Foot  Hypomobile in the talocrural joint, subtalar joint and midfoot.     Strength/Myotome Testing     Left Ankle/Foot   Dorsiflexion: 4  Plantar flexion: 4  Inversion: 4  Eversion: 4    Right Ankle/Foot   Normal strength    Tests     Additional Tests Details  *Poor GT mobility on L  *Restricted gastroc/soleus complex on L   *Hypomobile mid foot  *Tenderness along peroneal tendons on L     Swelling   Left Ankle/Foot   Metatarsal heads: 25 cm  Figure 8: 58 cm  [place]: 28.5 cm    Right Ankle/Foot   Metatarsal heads: 24.5 cm  Figure 8: 54 cm  [place]: 27.5 cm    Ambulation     Observational Gait   Gait: antalgic and asymmetric   Decreased walking speed and stride length.     Additional Observational Gait Details  Single point cane    General Comments:      Ankle/Foot Comments   Navicular: 25cm L/23.5cmR               Precautions: Parkinsons, Mild CAD, HTN, H/O L TKA.      Manuals             PROM of the Left ankle             Mid foot mobilizations             Great toe mobs                          Neuro Re-Ed             Toe yoga             Toe spreading             BAPs board                                                                 Ther Ex              Seated ankle PF/DF             Gastroc stretching             PF roller             Ankle inversion             Ankle eversion                                                    Ther Activity             HR/TR                          Gait Training                                       Modalities                                          [email or web address]  Access Code: [email or web address]

## 2024-02-14 NOTE — PROGRESS NOTES
"Daily Note     Today's date: 2024  Patient name: Bob Fernandes  : 1960  MRN: [id number]  Referring provider: David Hanna MD  Dx:   Encounter Diagnosis     ICD-10-CM    1. Balance disorder  R26.89       2. Ambulatory dysfunction  R26.2                      Subjective: Patient reports having some ankle pain on Saturday morning but suddenly when shoveling has signifncant sharp pain in ankle and had to d/c walking. He was so painful he went to ED. Had imaging  which showed no fx. He was referred to podiatry which he has an appt this afternoon.       Objective: See treatment diary below      Assessment: Assessed patient ankle as he arrived with cane and limping when walking. Pt was point tender over peroneal tendon insertion and around bottom of foot. Minimal swelling noted, no bruising noted. Pain with resisted ankle eversion only, pain with over pressure into inversion. Painfree ankle PF, DF resisted and end range. Encouraged pt to ice, elevate when sitting and to maintain ROM with ankle circles. Referred to orthopedic PT to f/u.         Plan: Continue per plan of care.      Precautions: fall risk        POC expires Unit limit Auth Expiration date PT/OT/ST + Visit Limit?                                 Visit/Unit Tracking  AUTH Status:  Date               Used               Remaining                     Precautions        Manuals                                      Neuro Re-Ed         minibest        Obstacle navigation Blaze pod color catch - 3 rounds       boxing 4 square step over with airex pad and punching heavy bag - 3 rounds Focus mitts - 3 rounds for 1 min ea    Shadow boxing - 1 round 1 min    Heavy bag - 2x with hop over stick for 1 min ea    1 round heavy bag 1 min ea        Four square step over With airex pads 12\" hurdles - fast as possible for 2 min x 2       footwork        core  Bridge 5x4, push up 5x4,               Ther Ex                                            "                             Ther Activity                        Gait Training                        Modalities

## 2024-02-14 NOTE — PATIENT INSTRUCTIONS
Plantar Fasciitis Exercises   WHAT YOU NEED TO KNOW:   Plantar fasciitis exercises help stretch your plantar fascia, calf muscles, and Achilles tendon. They also help strengthen the muscles that support your heel and foot. Exercises and stretching can help prevent plantar fasciitis from getting worse or coming back.       DISCHARGE INSTRUCTIONS:   Call your doctor or physical therapist if:   Your pain and swelling increase.    You develop new knee, hip, or back pain.    You have questions or concerns about your condition or care.    How to do plantar fasciitis exercises:  Ask your healthcare provider when to start these exercises and how often to do them.  Slant board stretch:  Stand on a slanted board with your toes higher than your heel. Press your heel into the board. Keep your knee slightly bent. Hold this position for 1 minute. Repeat 5 times.         Heel stretch:  Stand up straight with your hands on a wall. Place your injured leg slightly behind your other leg. Keep your heels flat on the floor, lean forward, and bend both knees. Hold for 30 seconds.         Calf stretch:  Stand up straight with your hands on a wall. Step forward so that your uninjured foot is in front of your injured foot. Keep your front leg bent and your back leg straight. Gently lean forward until you feel your calf stretch. Hold for 30 seconds and then relax.         Seated plantar fascia stretch:  Sit on a firm surface, such as the floor or a mat. Extend your legs out in front of you. Raise your injured foot a few inches off the ground. Keep your leg straight. Grab the toes of your injured foot and pull them toward you. With your other hand, feel your plantar fascia. You should feel it press outward. Hold for 30 seconds. If you cannot reach your toes, loop a towel or tie around your foot. Gently pull on the towel or tie and flex your toes toward you.         Heel raises:  Stand on the injured leg. Raise your other leg off the ground.  Hold onto a railing or wall for balance. Slowly rise up on the toes of your injured leg. Hold for 5 seconds. Slowly lower your heel to the ground.         Toe curls:  Place a towel on the floor. Put your foot flat on the towel. Grab the towel with your toes by curling them around the towel. Lift the towel up with your toes.         Toe taps:  Sit down and place your foot flat on the floor. Keep your heel on the floor. Point all your toes up toward the ceiling. While the 4 smaller toes are pointed up, bend your big toe down and tap it on the ground. Do 10 to 50 taps. Point all 5 toes up toward the ceiling again. This time keep your big toe pointed up and tap the 4 smaller toes on the ground. Do 10 to 50 taps each time.       Follow up with your doctor or physical therapist as directed:  Write down your questions so you remember to ask them during your visits.  © Copyright [employer] 2023 Information is for End User's use only and may not be sold, redistributed or otherwise used for commercial purposes.  The above information is an  only. It is not intended as medical advice for individual conditions or treatments. Talk to your doctor, nurse or pharmacist before following any medical regimen to see if it is safe and effective for you.    Ankle Exercises   AMBULATORY CARE:   What you need to know about ankle exercises:  Ankle exercises help strengthen your ankle and improve its function after injury. These are beginning exercises. Ask your healthcare provider if you need to see a physical therapist for more advanced exercises.   General guidelines for ankle exercises:   Do these exercises 3 to 5 days a week, or as directed by your healthcare provider.  Ask if you should do the exercises on each ankle.    Do the exercises in the order that your healthcare provider recommends.  This will help prevent swelling, chronic pain, and reinjury. Start with range of motion exercises. Then move to strengthening  exercises, and finally to balancing exercises.    Warm up before you do ankle exercises.  Walk or ride a stationary bike for 5 to 10 minutes to prepare your ankle for movement.    Stop if you feel pain.  It is normal to feel some discomfort at first but you should not feel pain. Tell your doctor or physical therapist if you have pain while you exercise. Regular exercise will help decrease your discomfort over time.    How to perform range of motion exercises safely:  Begin with range of motion exercises to improve flexibility. Ask your healthcare provider when you can progress to strengthening exercises.  Ankle alphabet:  Sit on a chair so that your feet do not touch the floor. Use your big toe to write each letter of the alphabet. Use only your foot and ankle, and keep your movements small. Do 2 sets.         Calf stretches:      Sitting calf stretches with a towel:  Sit on the floor with both legs out straight in front of you. Loop a towel around the ball of your injured foot. Grasp the ends of the towel and pull it toward you. Keep your leg and back straight. Do not lean forward as you pull the towel. Hold for 30 seconds. Then relax for 30 seconds. Do 2 sets of 10.         Standing calf stretches:  Stand facing a wall with the foot that is not injured forward and your knee slightly bent. Keep the leg with the injured foot straight and behind you with your toes pointed in slightly. With both heels flat on the floor, press your hips forward. Do not arch your back. Hold for 30 seconds, and then relax for 30 seconds. Do 2 sets of 10. Repeat with your leg bent. Do 2 sets of 10.       How to perform strengthening exercises safely:  After you can perform range of motion exercises without pain, you may begin strengthening exercises. Ask your healthcare provider when you can progress to balancing exercises.  Ankle movement in 4 directions:  Sit on the floor with your legs straight in front of you. Keep your heels on the  floor for support.    Dorsiflexion:  Begin with your toes pointing straight up. Pull your toes toward your body. Slowly return to the starting position. Do 3 sets of 5.     Plantar flexion:  Begin with your toes pointing straight up. Push your toes away from your body. Slowly return to the starting position. Do 3 sets of 5.         Inversion:  Begin with your toes pointing straight up. Push your toes inward, toward each other. Slowly return to the starting position. Do 3 sets of 5.     Eversion:  Begin with your toes pointing straight up. Push your toes outward, away from each other. Slowly return to the starting position. Do 3 sets of 5.       Toe curls with a towel:  Sit on a chair so that both of your feet are flat on the floor. Place a small towel on the floor in front of your injured foot. Grab the center of the towel with your toes and curl the towel toward you. Relax and repeat. Do 1 set of 5.         [place] pick-ups:  Sit on a chair so that both of your feet are flat on the floor. Place 20 marbles on the floor in front of your injured foot. Use your toes to  one marble at a time and place it into a bowl. Repeat until you have picked up all the marbles. Do 1 set.     Heel raises:      Single leg heel raises:  Stand with your weight evenly on both feet. Hold on to a chair or a wall for balance. Lift the foot that is not injured off the floor so all your weight is placed on your injured foot. Raise the heel of your injured foot as high as you can. Slowly lower your heel to the floor. Do 1 set of 10.         Double leg heel raises:  Stand with your weight evenly on both feet. Hold on to a chair or a wall for balance. Raise both of your heels as high as you can. Slowly lower your heels to the floor. Do 1 set of 10.       Heel and toe walks:      Heel walks:  Begin in a standing position. Lift your toes off the floor and walk on your heels. Keep your toes lifted as high as possible. Do 2 sets of 10.          Toe walks:  Begin in a standing position. Lift your heels off the floor and walk on the balls and toes of your feet. Keep your heels lifted as high as possible. Do 2 sets of 10.       How to perform a balance exercise safely:  After you can perform strengthening exercises without pain, you may do this beginning balancing exercise. Ask your healthcare provider for more advanced balance exercises.  Single leg stance:  Stand with your weight evenly on both feet, or hold on to a chair or a wall. Do not lean to the side. Lift the foot that is not injured off the floor so all your weight is placed on your injured foot. Balance on your injured foot. Ask your healthcare provider how long to hold this position.           Call your doctor or physical therapist if:   You have new pain, or your pain becomes worse.    You have questions or concerns about your condition, care, or exercise program.    © Copyright [employer] 2023 Information is for End User's use only and may not be sold, redistributed or otherwise used for commercial purposes.  The above information is an  only. It is not intended as medical advice for individual conditions or treatments. Talk to your doctor, nurse or pharmacist before following any medical regimen to see if it is safe and effective for you.

## 2024-02-14 NOTE — PROGRESS NOTES
"Assessment/Plan:       Diagnoses and all orders for this visit:    Peroneal tendon injury, left, initial encounter  -     Ambulatory Referral to Podiatry  -     Ambulatory referral to Physical Therapy; Future    Plantar fasciitis of left foot  -     Ambulatory referral to Physical Therapy; Future      Diagnosis and options discussed with patient  Patient agreeable to the plan as stated below  XR reviewed, no acute findings    RICE protocol  PT recommended  RICE protocol  RTC 4 weeks    XRay 3 views of the left foot personally read by Dr. Winters in office today and discussed with patient:    There is no acute fracture or dislocation.  No significant degenerative changes.  No lytic or blastic osseous lesion.  Soft tissues are unremarkable.      Subjective:      Patient ID: Bob Fernandes is a 64 y.o. male.    Patient was shoveling yesterday and felt a pop in his left foot. HE has pain in the arch of his left foot. He has not seen bruising but it feels swollen. HE reports pain lateral left ankle and arch on the left.         The following portions of the patient's history were reviewed and updated as appropriate: allergies, current medications, past family history, past medical history, past social history, past surgical history, and problem list.    Review of Systems    As stated in HPI, otherwise normal    Objective:      /84   Pulse 84   Resp 18   Ht 5' 11\" (1.803 m)   BMI 29.83 kg/m²          Physical Exam  Vitals reviewed.   Constitutional:       Appearance: He is not ill-appearing or diaphoretic.   Cardiovascular:      Rate and Rhythm: Normal rate.      Pulses: Normal pulses.   Pulmonary:      Effort: Pulmonary effort is normal. No respiratory distress.   Musculoskeletal:         General: Tenderness (central plantar fascia) present.      Left ankle: Tenderness (pain along peroneal tendon with stressed eversion. No instability of ankle or ATFL pain) present. No lateral malleolus or ATF " ligament tenderness. Anterior drawer test negative. Normal pulse.      Left Achilles Tendon: No tenderness or defects. Mcadams's test negative.   Skin:     Findings: No erythema.   Neurological:      Mental Status: He is alert and oriented to person, place, and time.   Psychiatric:         Mood and Affect: Mood normal.

## 2024-02-14 NOTE — PROGRESS NOTES
"Daily Speech Treatment Note    Today's date: 2024  Patient’s name: Bob Fernandes  : 1960  MRN: [id number]  Safety measures: PD  Referring provider: Sasha Gamble PA-C    Encounter Diagnosis     ICD-10-CM    1. Hypokinetic Parkinsonian dysphonia  G20.A1     R49.0         Visit tracking:  Re-eval Due POC Expires Auth Expiration Date ST Visit Limit   3/15/24  3/15/24  12/31/24 BOMN                           Visit/Unit Tracking  AUTH Status:  Date 24   No Auth Needed Used 1 2    Remaining  BOMN        Subjective/Behavioral:  -Patient reports increased coughing with thin liquids. Discussed swallowing strategies and modifications (I.e, small sips, chin tuck). Patient also reporting this happening with food and pills. Patient was observed taking sips of water via water bottle today without difficulty. HLE assessed to be WNL upon palpation.     Objective/Assessment:  -Reviewed testing results and goals in plan care with patient. Patient is in agreement at this time.      -Eating Assessment Tool (EAT-10) Swallowing Screening Tool is a patient self-assessment tool that rates the percieved impairment a swallowing disorder has on the Functional, Physical, and Emotional aspects of one's life.  -EAT-10 score:  11/40    Short-term goals:  1. Patient and family will watch Parkinson's voice project informational session called \"Learn About Parkinson's\" in order to better understand the SpeakOUT! program and commitment -  https://www.youtube.com/watch?v=OhkUf2h3AkG  - email link sent to patient today     2. Patient will obtain a SpeakOUT! Workbook and complete exercises twice a day for 25 days, before transitioning to the [place]. -email sent to patient today to register; workbook ordered during session.      3. Patient will coordinate vocal and articulatory subsystems in hierarchal speech tasks by producing sounds with intention with minimal assistance     4. Patient will read phrases, sentences " "and paragraphs with intention, yielding improved vocal quality, loudness, articulatory precision, and endrance while maintaining a minimum of 75 db with min assistance.      5. Patient will generalize intentional speech to cognitive-linguistic exercises and conversational speech with improved vocal quality, loudness, articulatory precision, and endurance while maintaining a minimum of 75 dB with min assistance.    SPEAK OUT!® Treatment Note:   Session #: 1/25    The following exercises were targeted to facilitate connection of breath to speech, loudness, articulation, expression, and intent of speech:    Sound level meter-to-mouth distance: 50 cm throughout session    Task Data Level of Cueing Provided   Pre-treatment spontaneous conversation 64 dB SPL N/A         Warm-up (“may-me-my-maxine-moo”) 77 dB SPL moderate         Sustained “ah” 10.4 seconds moderate   Sustained “ah” 77.6 dB SPL moderate         Vocal glides 75 dB SPL moderate         Numerical sequences 82 dB SPL minimal-moderate        Oral reading (phrase level) 78 dB SPL moderate        Cognitive-linguistic tasks workbook (phrase level) 73 dB SPL moderate                  Post-treatment spontaneous conversation 66 dB SPL N/A      Patient required moderate cues to utilize \"INTENT\" during spontaneous conversation on this date of service.      Plan:  -Continue with current plan of care.     Discussed EMST75 - patient agreeable to order  Discussed VBSS needs - patient would like to wait at this time  "

## 2024-02-15 ENCOUNTER — OFFICE VISIT (OUTPATIENT)
Dept: PHYSICAL THERAPY | Facility: CLINIC | Age: 64
End: 2024-02-15
Payer: COMMERCIAL

## 2024-02-15 DIAGNOSIS — S86.302A PERONEAL TENDON INJURY, LEFT, INITIAL ENCOUNTER: ICD-10-CM

## 2024-02-15 DIAGNOSIS — M72.2 PLANTAR FASCIITIS OF LEFT FOOT: Primary | ICD-10-CM

## 2024-02-15 PROCEDURE — 97110 THERAPEUTIC EXERCISES: CPT | Performed by: PHYSICAL THERAPIST

## 2024-02-15 PROCEDURE — 97162 PT EVAL MOD COMPLEX 30 MIN: CPT | Performed by: PHYSICAL THERAPIST

## 2024-02-16 ENCOUNTER — APPOINTMENT (OUTPATIENT)
Dept: PHYSICAL THERAPY | Facility: CLINIC | Age: 64
End: 2024-02-16
Payer: COMMERCIAL

## 2024-02-16 ENCOUNTER — OFFICE VISIT (OUTPATIENT)
Dept: SPEECH THERAPY | Facility: CLINIC | Age: 64
End: 2024-02-16
Payer: COMMERCIAL

## 2024-02-16 DIAGNOSIS — G20.A1 HYPOKINETIC PARKINSONIAN DYSPHONIA: Primary | ICD-10-CM

## 2024-02-16 DIAGNOSIS — R49.0 HYPOKINETIC PARKINSONIAN DYSPHONIA: Primary | ICD-10-CM

## 2024-02-16 DIAGNOSIS — R13.12 OROPHARYNGEAL DYSPHAGIA: ICD-10-CM

## 2024-02-16 PROCEDURE — 92507 TX SP LANG VOICE COMM INDIV: CPT

## 2024-02-16 NOTE — PROGRESS NOTES
"Daily Speech Treatment Note    Today's date: 2024  Patient’s name: Bob Fernandes  : 1960  MRN: [id number]  Safety measures: PD  Referring provider: Sasha Gamble PA-C    Encounter Diagnosis     ICD-10-CM    1. Hypokinetic Parkinsonian dysphonia  G20.A1     R49.0       2. Oropharyngeal dysphagia  R13.12         Visit tracking:  Re-eval Due POC Expires Auth Expiration Date ST Visit Limit   3/15/24  3/15/24  12/31/24 BOMN                           Visit/Unit Tracking  AUTH Status:  Date 24   No Auth Needed Used 1 2 3    Remaining  BOMN         Subjective/Behavioral:  Patient was excited to tell me his daughter got accepted to [employer] masters program!    Objective/Assessment: EMST75 paperwork/application filled out with patient today during the session.       Short-term goals:  1. Patient and family will watch Parkinson's voice project informational session called \"Learn About Parkinson's\" in order to better understand the SpeakOUT! program and commitment -  [email or web address]/[email or web address]  - email link sent to patient - he has not watched it yet     2. Patient will obtain a SpeakOUT! Workbook and complete exercises twice a day for 25 days, before transitioning to the LOUD CROWD. -email sent to patient today to register; workbook ordered during session.- workbook not delivered yet      3. Patient will coordinate vocal and articulatory subsystems in hierarchal speech tasks by producing sounds with intention with minimal assistance     4. Patient will read phrases, sentences and paragraphs with intention, yielding improved vocal quality, loudness, articulatory precision, and endrance while maintaining a minimum of 75 db with min assistance.      5. Patient will generalize intentional speech to cognitive-linguistic exercises and conversational speech with improved vocal quality, loudness, articulatory precision, and endurance while maintaining a minimum of 75 " "dB with min assistance.    SPEAK OUT!® Treatment Note:   Session #: 2/25    The following exercises were targeted to facilitate connection of breath to speech, loudness, articulation, expression, and intent of speech:    Sound level meter-to-mouth distance: 50 cm throughout session    Task Data Level of Cueing Provided   Pre-treatment spontaneous conversation 74 dB SPL N/A         Warm-up (“may-me-my-maxine-moo”) 79 dB SPL moderate         Sustained “ah” 10 seconds moderate   Sustained “ah” 75 dB SPL moderate         Vocal glides 77 dB SPL moderate         Numerical sequences 82 dB SPL minimal-moderate        Oral reading (phrase level) 77 dB SPL moderate        Cognitive-linguistic tasks workbook (phrase level) 78 dB SPL moderate                  Post-treatment spontaneous conversation 76 dB SPL N/A      Patient required moderate cues to utilize \"INTENT\" during spontaneous conversation on this date of service.      Plan:  -Continue with current plan of care.     Discussed EMST75 - ordered today  Discussed VBSS needs - patient would like to wait at this time  "

## 2024-02-19 ENCOUNTER — OFFICE VISIT (OUTPATIENT)
Dept: PHYSICAL THERAPY | Facility: CLINIC | Age: 64
End: 2024-02-19
Payer: COMMERCIAL

## 2024-02-19 DIAGNOSIS — S86.302A PERONEAL TENDON INJURY, LEFT, INITIAL ENCOUNTER: Primary | ICD-10-CM

## 2024-02-19 DIAGNOSIS — M72.2 PLANTAR FASCIITIS OF LEFT FOOT: ICD-10-CM

## 2024-02-19 PROCEDURE — 97110 THERAPEUTIC EXERCISES: CPT

## 2024-02-19 PROCEDURE — 97140 MANUAL THERAPY 1/> REGIONS: CPT

## 2024-02-19 PROCEDURE — 97112 NEUROMUSCULAR REEDUCATION: CPT

## 2024-02-19 NOTE — PROGRESS NOTES
"Daily Note     Today's date: 2024  Patient name: Bob Fernandes  : 1960  MRN: [id number]  Referring provider: Aydin Orr  Dx:   Encounter Diagnosis     ICD-10-CM    1. Peroneal tendon injury, left, initial encounter  S86.302A       2. Plantar fasciitis of left foot  M72.2                      Subjective: Pt feels swelling has decreased, and ibuprofen has been helping decrease discomfort.  Pt reports increased discomfort when walking in morning and end of the day, although overall he notes he is more \"ambulatory\" lately.  Pt also noticed bruising of the 2nd toe.      Objective: See treatment diary below      Assessment: Tolerated treatment well. Patient would benefit from continued PT.  TTP met heads, 5th metatarsal; otherwise good tolerance to manual stretching. No increased sx noted w/ TE performed.      Plan: Progress treatment as tolerated.       Precautions: Parkinsons, Mild CAD, HTN, H/O L TKA.      Manuals             PROM of the Left ankle  LM           Mid foot mobilizations             Great toe mobs                          Neuro Re-Ed             Toe yoga             Toe spreading             BAPs board  Seated, AP/ML 20 ea; CW/CCW 10 ea                                                               Ther Ex             Seated ankle PF/DF             Gastroc stretching  Towel 15\"x5            PF roller             Ankle inversion  Active 15 ea           Ankle eversion  Active 15 ea                                                  Ther Activity             HR/TR  Seated 20 ea                        Gait Training                                       Modalities                                            "

## 2024-02-20 ENCOUNTER — APPOINTMENT (OUTPATIENT)
Dept: PHYSICAL THERAPY | Facility: CLINIC | Age: 64
End: 2024-02-20
Payer: COMMERCIAL

## 2024-02-20 ENCOUNTER — OFFICE VISIT (OUTPATIENT)
Dept: SPEECH THERAPY | Facility: CLINIC | Age: 64
End: 2024-02-20
Payer: COMMERCIAL

## 2024-02-20 DIAGNOSIS — R13.12 OROPHARYNGEAL DYSPHAGIA: ICD-10-CM

## 2024-02-20 DIAGNOSIS — R49.0 HYPOKINETIC PARKINSONIAN DYSPHONIA: Primary | ICD-10-CM

## 2024-02-20 DIAGNOSIS — G20.A1 HYPOKINETIC PARKINSONIAN DYSPHONIA: Primary | ICD-10-CM

## 2024-02-20 PROCEDURE — 92507 TX SP LANG VOICE COMM INDIV: CPT

## 2024-02-20 NOTE — PROGRESS NOTES
"Daily Speech Treatment Note    Today's date: 2024  Patient’s name: Bob Fernandes  : 1960  MRN: [id number]  Safety measures: PD  Referring provider: Sasha Gamble PA-C    Encounter Diagnosis     ICD-10-CM    1. Hypokinetic Parkinsonian dysphonia  G20.A1     R49.0       2. Oropharyngeal dysphagia  R13.12         Visit tracking:  Re-eval Due POC Expires Auth Expiration Date ST Visit Limit   3/15/24  3/15/24  12/31/24 BOMN                           Visit/Unit Tracking  AUTH Status:  Date 24   No Auth Needed Used 1 2 3 4    Remaining  BOMN          Subjective/Behavioral:  \"I choked on an orange this morning\"    Objective/Assessment: EMST75 paperwork/application filled out with patient today during the session.       Short-term goals:  1. Patient and family will watch Parkinson's voice project informational session called \"Learn About Parkinson's\" in order to better understand the SpeakOUT! program and commitment -  [email or web address]/[email or web address]  - email link sent to patient - he has not watched it yet     2. Patient will obtain a SpeakOUT! Workbook and complete exercises twice a day for 25 days, before transitioning to the LOUD CROWD. -email sent to patient today to register; workbook ordered during session.- workbook not delivered yet      3. Patient will coordinate vocal and articulatory subsystems in hierarchal speech tasks by producing sounds with intention with minimal assistance     4. Patient will read phrases, sentences and paragraphs with intention, yielding improved vocal quality, loudness, articulatory precision, and endrance while maintaining a minimum of 75 db with min assistance.      5. Patient will generalize intentional speech to cognitive-linguistic exercises and conversational speech with improved vocal quality, loudness, articulatory precision, and endurance while maintaining a minimum of 75 dB with min assistance.    SPEAK OUT!® Treatment Note: " "  Session #: 3/25    The following exercises were targeted to facilitate connection of breath to speech, loudness, articulation, expression, and intent of speech:    Sound level meter-to-mouth distance: 50 cm throughout session    Task Data Level of Cueing Provided   Pre-treatment spontaneous conversation 68 dB SPL N/A         Warm-up (“may-me-my-maxine-moo”) 73 dB SPL moderate         Sustained “ah” 10.6 seconds moderate   Sustained “ah” 77 dB SPL moderate         Vocal glides 75 dB SPL moderate         Numerical sequences 76 dB SPL minimal-moderate        Oral reading (phrase level) 75 dB SPL moderate        Cognitive-linguistic tasks workbook (phrase level) 73 dB SPL moderate                  Post-treatment spontaneous conversation 72 dB SPL N/A      Patient required moderate cues to utilize \"INTENT\" during spontaneous conversation on this date of service.      Plan:  -Continue with current plan of care.     Discussed EMST75 - ordered today (resubmit on 3/1 to [facility])  Discussed VBSS needs - patient would like to wait at this time  "

## 2024-02-22 ENCOUNTER — OFFICE VISIT (OUTPATIENT)
Dept: PHYSICAL THERAPY | Facility: CLINIC | Age: 64
End: 2024-02-22
Payer: COMMERCIAL

## 2024-02-22 DIAGNOSIS — M72.2 PLANTAR FASCIITIS OF LEFT FOOT: ICD-10-CM

## 2024-02-22 DIAGNOSIS — S86.302A PERONEAL TENDON INJURY, LEFT, INITIAL ENCOUNTER: Primary | ICD-10-CM

## 2024-02-22 PROCEDURE — 97110 THERAPEUTIC EXERCISES: CPT | Performed by: PHYSICAL THERAPIST

## 2024-02-22 PROCEDURE — 97140 MANUAL THERAPY 1/> REGIONS: CPT | Performed by: PHYSICAL THERAPIST

## 2024-02-22 NOTE — PROGRESS NOTES
"Daily Note     Today's date: 2024  Patient name: Bob Fernandes  : 1960  MRN: [id number]  Referring provider: Aydin Orr  Dx:   Encounter Diagnosis     ICD-10-CM    1. Peroneal tendon injury, left, initial encounter  S86.302A       2. Plantar fasciitis of left foot  M72.2           Start Time: 1120  Stop Time: 1216  Total time in clinic (min): 56 minutes    Subjective: Pt reports good improvements in pain and function since starting PT. However, he notes swelling continues.       Objective: See treatment diary below      Assessment: Tolerated treatment well. Significant improvement in discomfort since initiation of PT. Progressed plan without much difficulty. Moderate swelling present throughout midfoot and ankle. Patient exhibited good technique with therapeutic exercises and would benefit from continued PT.      Plan: Continue per plan of care.      Precautions: Parkinsons, Mild CAD, HTN, H/O L TKA.      Manuals            PROM of the Left ankle  LM JK          Mid foot mobilizations   JK Gr III          Great toe mobs   Gr III                       Neuro Re-Ed             Toe yoga             Toe spreading             BAPs board  Seated, AP/ML 20 ea; CW/CCW 10 ea                                                               Ther Ex             KB soleus HR   Towel roll- 2x10 15# KB          TR seated   Rtb 2x10           Gastroc stretching  Towel 15\"x5            PF roller             Ankle inversion  Active 15 ea           Ankle eversion  Active 15 ea           SB gastroc stretch   4x30\"          SB soleus stretch   4x30\"          Bike   9' Lvl 4          PF roll   3'          GT ext stretch   3x10\"           Ther Activity             HR/TR  Seated 20 ea                        Gait Training                                       Modalities                                       1:1 with PT from 1123-1216PM       "

## 2024-02-23 ENCOUNTER — OFFICE VISIT (OUTPATIENT)
Dept: SPEECH THERAPY | Facility: CLINIC | Age: 64
End: 2024-02-23
Payer: COMMERCIAL

## 2024-02-23 ENCOUNTER — APPOINTMENT (OUTPATIENT)
Dept: PHYSICAL THERAPY | Facility: CLINIC | Age: 64
End: 2024-02-23
Payer: COMMERCIAL

## 2024-02-23 DIAGNOSIS — R49.0 HYPOKINETIC PARKINSONIAN DYSPHONIA: ICD-10-CM

## 2024-02-23 DIAGNOSIS — R13.12 OROPHARYNGEAL DYSPHAGIA: Primary | ICD-10-CM

## 2024-02-23 DIAGNOSIS — G20.A1 HYPOKINETIC PARKINSONIAN DYSPHONIA: ICD-10-CM

## 2024-02-23 PROCEDURE — 92507 TX SP LANG VOICE COMM INDIV: CPT

## 2024-02-23 NOTE — PROGRESS NOTES
"Daily Speech Treatment Note    Today's date: 2024  Patient’s name: Bob Fernandes  : 1960  MRN: [id number]  Safety measures: PD  Referring provider: Sasha Gamble PA-C    Encounter Diagnosis     ICD-10-CM    1. Oropharyngeal dysphagia  R13.12       2. Hypokinetic Parkinsonian dysphonia  G20.A1     R49.0           Visit tracking:  Re-eval Due POC Expires Auth Expiration Date ST Visit Limit   3/15/24  3/15/24  12/31/24 BOMN                           Visit/Unit Tracking  AUTH Status:  Date 24   No Auth Needed Used 1 2 3 4 5    Remaining  BOMN           Subjective/Behavioral: Provided patient with ENT/referral informaiton    Objective/Assessment: EMST75 -wait until new insurance (March)    Short-term goals:  1. Patient and family will watch Parkinson's voice project informational session called \"Learn About Parkinson's\" in order to better understand the SpeakOUT! program and commitment -  [email or web address]/[email or web address]  -     2. Patient will obtain a SpeakOUT! Workbook and complete exercises twice a day for 25 days, before transitioning to the LOUD CROWD. -workbook delievered     3. Patient will coordinate vocal and articulatory subsystems in hierarchal speech tasks by producing sounds with intention with minimal assistance     4. Patient will read phrases, sentences and paragraphs with intention, yielding improved vocal quality, loudness, articulatory precision, and endrance while maintaining a minimum of 75 db with min assistance.      5. Patient will generalize intentional speech to cognitive-linguistic exercises and conversational speech with improved vocal quality, loudness, articulatory precision, and endurance while maintaining a minimum of 75 dB with min assistance.    SPEAK OUT!® Treatment Note:   Session #:     The following exercises were targeted to facilitate connection of breath to speech, loudness, articulation, expression, and intent of " "speech:    Sound level meter-to-mouth distance: 50 cm throughout session    Task Data Level of Cueing Provided   Pre-treatment spontaneous conversation 69 dB SPL N/A         Warm-up (“may-me-my-maxine-moo”) 74 dB SPL moderate         Sustained “ah” 10.4 seconds moderate   Sustained “ah” 77 dB SPL moderate         Vocal glides 74 dB SPL moderate         Numerical sequences 77 dB SPL minimal-moderate        Oral reading (phrase level) 73 dB SPL moderate        Cognitive-linguistic tasks workbook (phrase level) 74 dB SPL moderate                  Post-treatment spontaneous conversation 70 dB SPL N/A      Patient required moderate cues to utilize \"INTENT\" during spontaneous conversation on this date of service.      Plan:  -Continue with current plan of care.     Discussed EMST75 - ordered today (resubmit on 3/1 to [facility])  Discussed VBSS needs - patient would like to wait at this time  "

## 2024-02-26 ENCOUNTER — OFFICE VISIT (OUTPATIENT)
Dept: SPEECH THERAPY | Facility: CLINIC | Age: 64
End: 2024-02-26
Payer: COMMERCIAL

## 2024-02-26 ENCOUNTER — APPOINTMENT (OUTPATIENT)
Dept: PHYSICAL THERAPY | Facility: CLINIC | Age: 64
End: 2024-02-26
Payer: COMMERCIAL

## 2024-02-26 DIAGNOSIS — R49.0 HYPOKINETIC PARKINSONIAN DYSPHONIA: ICD-10-CM

## 2024-02-26 DIAGNOSIS — R13.12 OROPHARYNGEAL DYSPHAGIA: Primary | ICD-10-CM

## 2024-02-26 DIAGNOSIS — G20.A1 HYPOKINETIC PARKINSONIAN DYSPHONIA: ICD-10-CM

## 2024-02-26 PROCEDURE — 92507 TX SP LANG VOICE COMM INDIV: CPT

## 2024-02-26 NOTE — PROGRESS NOTES
"Daily Speech Treatment Note    Today's date: 2024  Patient’s name: Bob Fernandes  : 1960  MRN: [id number]  Safety measures: PD  Referring provider: Sasha Gamble PA-C    Encounter Diagnosis     ICD-10-CM    1. Oropharyngeal dysphagia  R13.12       2. Hypokinetic Parkinsonian dysphonia  G20.A1     R49.0           Visit tracking:  Re-eval Due POC Expires Auth Expiration Date ST Visit Limit   3/15/24  3/15/24  12/31/24 BOMN                           Visit/Unit Tracking  AUTH Status:  Date 24   No Auth Needed Used 1 2 3 4 5 6    Remaining  BOMN            Subjective/Behavioral: Patient reports he was out to eat over the weekend; and thought about speaking with INTENT!      Provided patient with ENT/referral informaiton    Patient getting a new insurance as of 3/1 (will work on a March schedule next week)    Objective/Assessment: EMST75 -wait until new insurance (March)    Short-term goals:  1. Patient and family will watch Parkinson's voice project informational session called \"Learn About Parkinson's\" in order to better understand the SpeakOUT! program and commitment -  https://www.youtube.com/watch?v=[email or web address]  - Patient reported he watched the video     2. Patient will obtain a SpeakOUT! Workbook and complete exercises twice a day for 25 days, before transitioning to the LOUD CROWD. -Patient has his workbook     3. Patient will coordinate vocal and articulatory subsystems in hierarchal speech tasks by producing sounds with intention with minimal assistance     4. Patient will read phrases, sentences and paragraphs with intention, yielding improved vocal quality, loudness, articulatory precision, and endrance while maintaining a minimum of 75 db with min assistance.      5. Patient will generalize intentional speech to cognitive-linguistic exercises and conversational speech with improved vocal quality, loudness, articulatory precision, and endurance while " "maintaining a minimum of 75 dB with min assistance.    SPEAK OUT!® Treatment Note:   Session #: 5/25    The following exercises were targeted to facilitate connection of breath to speech, loudness, articulation, expression, and intent of speech:    Sound level meter-to-mouth distance: 50 cm throughout session    Task Data Level of Cueing Provided   Pre-treatment spontaneous conversation 68 dB SPL N/A         Warm-up (“may-me-my-maxine-moo”) 77 dB SPL moderate         Sustained “ah” 10.2 seconds moderate   Sustained “ah” 78 dB SPL moderate         Vocal glides 78 dB SPL moderate         Numerical sequences 81 dB SPL minimal-moderate        Oral reading (phrase level) 78 dB SPL moderate        Cognitive-linguistic tasks workbook (phrase level) 75 dB SPL moderate                  Post-treatment spontaneous conversation 70 dB SPL N/A      Patient required moderate cues to utilize \"INTENT\" during spontaneous conversation on this date of service.      Plan:  -Continue with current plan of care.     Discussed EMST75 - ordered today (resubmit on 3/1 to [facility])  Discussed VBSS needs - patient would like to wait at this time  "

## 2024-02-27 ENCOUNTER — APPOINTMENT (OUTPATIENT)
Dept: PHYSICAL THERAPY | Facility: CLINIC | Age: 64
End: 2024-02-27
Payer: COMMERCIAL

## 2024-02-27 ENCOUNTER — APPOINTMENT (OUTPATIENT)
Dept: SPEECH THERAPY | Facility: CLINIC | Age: 64
End: 2024-02-27
Payer: COMMERCIAL

## 2024-02-27 ENCOUNTER — OFFICE VISIT (OUTPATIENT)
Dept: PHYSICAL THERAPY | Facility: CLINIC | Age: 64
End: 2024-02-27
Payer: COMMERCIAL

## 2024-02-27 DIAGNOSIS — R26.89 BALANCE DISORDER: ICD-10-CM

## 2024-02-27 DIAGNOSIS — S86.302A PERONEAL TENDON INJURY, LEFT, INITIAL ENCOUNTER: Primary | ICD-10-CM

## 2024-02-27 DIAGNOSIS — M72.2 PLANTAR FASCIITIS OF LEFT FOOT: ICD-10-CM

## 2024-02-27 PROCEDURE — 97110 THERAPEUTIC EXERCISES: CPT | Performed by: PHYSICAL THERAPIST

## 2024-02-27 PROCEDURE — 97140 MANUAL THERAPY 1/> REGIONS: CPT | Performed by: PHYSICAL THERAPIST

## 2024-02-27 PROCEDURE — 97112 NEUROMUSCULAR REEDUCATION: CPT | Performed by: PHYSICAL THERAPIST

## 2024-02-27 NOTE — PROGRESS NOTES
"Daily Note     Today's date: 2024  Patient name: Bob Fernandes  : 1960  MRN: [id number]  Referring provider: Aydin Orr  Dx:   Encounter Diagnosis     ICD-10-CM    1. Peroneal tendon injury, left, initial encounter  S86.302A       2. Balance disorder  R26.89       3. Plantar fasciitis of left foot  M72.2                      Subjective: Pt went to [facility] and bought odell. He reports progressive improvement in foot/ankle discomfort. Pt arrives to PT without an assisted device. Mild numbness in lateral toes of involved foot. .       Objective: See treatment diary below      Assessment: Pt still fairly tender to palpation at mid foot/arch of foot (plantar aspect). However, swelling is gradually improving. Implemented foot intrinsic strengthening and WB exercises. Tolerated treatment well. Patient would benefit from continued PT      Plan: Continue per plan of care.      Precautions: Parkinsons, Mild CAD, HTN, H/O L TKA.      Manuals           PROM of the Left ankle  LM JK JK         Mid foot mobilizations   JK Gr III JK GrIII         Great toe mobs   Gr III Gr III                      Neuro Re-Ed             Toe yoga    20x         Toe spreading    20x         BAPs board  Seated, AP/ML 20 ea; CW/CCW 10 ea           SLB     FT support 10x10\"                                                Ther Ex             KB soleus HR   Towel roll- 2x10 15# KB          TR seated   Rtb 2x10           Gastroc stretching  Towel 15\"x5            PF roller             Ankle inversion  Active 15 ea  Gtb 2x10         Ankle eversion  Active 15 ea  Gtb 2x10          SB gastroc stretch   4x30\" 4x30\"         SB soleus stretch   4x30\" 4x30\"          Bike   9' Lvl 4 8\" lvl 4'         PF roll   3' 4'          GT ext stretch   3x10\"           Ther Activity             HR/TR  Seated 20 ea           Eccentric heel lowering    2x10          Gait Training                                       Modalities           "                             1:1 with PT from 415-5pm

## 2024-02-29 ENCOUNTER — APPOINTMENT (OUTPATIENT)
Dept: PHYSICAL THERAPY | Facility: CLINIC | Age: 64
End: 2024-02-29
Payer: COMMERCIAL

## 2024-03-01 ENCOUNTER — APPOINTMENT (OUTPATIENT)
Dept: PHYSICAL THERAPY | Facility: CLINIC | Age: 64
End: 2024-03-01
Payer: COMMERCIAL

## 2024-03-01 ENCOUNTER — APPOINTMENT (OUTPATIENT)
Dept: SPEECH THERAPY | Facility: CLINIC | Age: 64
End: 2024-03-01
Payer: COMMERCIAL

## 2024-03-01 ENCOUNTER — OFFICE VISIT (OUTPATIENT)
Dept: PHYSICAL THERAPY | Facility: CLINIC | Age: 64
End: 2024-03-01
Payer: COMMERCIAL

## 2024-03-01 DIAGNOSIS — R26.89 BALANCE DISORDER: ICD-10-CM

## 2024-03-01 DIAGNOSIS — R26.2 AMBULATORY DYSFUNCTION: ICD-10-CM

## 2024-03-01 DIAGNOSIS — S86.302A PERONEAL TENDON INJURY, LEFT, INITIAL ENCOUNTER: Primary | ICD-10-CM

## 2024-03-01 DIAGNOSIS — M72.2 PLANTAR FASCIITIS OF LEFT FOOT: ICD-10-CM

## 2024-03-01 PROCEDURE — 97140 MANUAL THERAPY 1/> REGIONS: CPT

## 2024-03-01 PROCEDURE — 97112 NEUROMUSCULAR REEDUCATION: CPT

## 2024-03-01 PROCEDURE — 97110 THERAPEUTIC EXERCISES: CPT

## 2024-03-01 NOTE — PROGRESS NOTES
"Daily Note     Today's date: 3/1/2024  Patient name: Bob Fernandes  : 1960  MRN: [id number]  Referring provider: Aydin Orr  Dx:   Encounter Diagnosis     ICD-10-CM    1. Peroneal tendon injury, left, initial encounter  S86.302A       2. Balance disorder  R26.89       3. Plantar fasciitis of left foot  M72.2       4. Ambulatory dysfunction  R26.2                      Subjective: Pt states he feels \"so much better\" w/ new sneakers. However, pt states his toes were \"numb\" yesterday.      Objective: See treatment diary below      Assessment: Tolerated treatment well. Patient exhibited good technique with therapeutic exercises and would benefit from continued PT.  No UE assist need w/ SLB today. Difficulty noted w/ toe spreading.  Good tolerance to manual stretching; no discomfort noted.       Plan: Progress treatment as tolerated.       Precautions: Parkinsons, Mild CAD, HTN, H/O L TKA.    1:1 8:53-9:32am   Manuals  2/19 2/22 2/27 3/1        PROM of the Left ankle  LM JK JK LM        Mid foot mobilizations   JK Gr III JK GrIII         Great toe mobs   Gr III Gr III                      Neuro Re-Ed             Toe yoga    20x 20x        Toe spreading    20x 20x        BAPs board  Seated, AP/ML 20 ea; CW/CCW 10 ea           SLB     FT support 10x10\" 10x10\"                                               Ther Ex             KB soleus HR   Towel roll- 2x10 15# KB          TR seated   Rtb 2x10           Gastroc stretching  Towel 15\"x5            PF roller             Ankle inversion  Active 15 ea  Gtb 2x10 GTB 2x10        Ankle eversion  Active 15 ea  Gtb 2x10  GTB 2x10        SB gastroc stretch   4x30\" 4x30\" 4x30\"        SB soleus stretch   4x30\" 4x30\"  4x30\"        Bike   9' Lvl 4 8\" lvl 4' 8' lv 5-8        PF roll   3' 4'  3'        GT ext stretch   3x10\"           Ther Activity             HR/TR  Seated 20 ea           Eccentric heel lowering    2x10  2x10        Gait Training                             "           Modalities

## 2024-03-04 ENCOUNTER — OFFICE VISIT (OUTPATIENT)
Dept: PHYSICAL THERAPY | Facility: CLINIC | Age: 64
End: 2024-03-04
Payer: COMMERCIAL

## 2024-03-04 DIAGNOSIS — M72.2 PLANTAR FASCIITIS OF LEFT FOOT: ICD-10-CM

## 2024-03-04 DIAGNOSIS — S86.302A PERONEAL TENDON INJURY, LEFT, INITIAL ENCOUNTER: Primary | ICD-10-CM

## 2024-03-04 PROCEDURE — 97110 THERAPEUTIC EXERCISES: CPT | Performed by: PHYSICAL THERAPIST

## 2024-03-04 PROCEDURE — 97112 NEUROMUSCULAR REEDUCATION: CPT | Performed by: PHYSICAL THERAPIST

## 2024-03-04 PROCEDURE — 97140 MANUAL THERAPY 1/> REGIONS: CPT | Performed by: PHYSICAL THERAPIST

## 2024-03-04 NOTE — PROGRESS NOTES
"Daily Note     Today's date: 3/4/2024  Patient name: Bob Fernandes  : 1960  MRN: [id number]  Referring provider: Aydin Orr  Dx:   Encounter Diagnosis     ICD-10-CM    1. Peroneal tendon injury, left, initial encounter  S86.302A       2. Plantar fasciitis of left foot  M72.2           Start Time: 1533  Stop Time: 1615  Total time in clinic (min): 42 minutes    Subjective: Pt states that his left lateral foot woke him up at night time.       Objective: See treatment diary below      Assessment: Tolerated treatment well. Introduced more weight bearing exercises with good tolerance. Swelling reduced at mid foot but continues to ankle. Recommend patient return to [address] 1 x per week and here 1 x per week to monitor symptoms. Will transition to full gait/balance program once ankle/foot symptoms resolve. Pt in agreement with POC. Patient exhibited good technique with therapeutic exercises      Plan: Continue per plan of care.      Precautions: Parkinsons, Mild CAD, HTN, H/O L TKA.    Manuals  2/19 2/22 2/27 3/1 3/4       PROM of the Left ankle  LM JK JK LM JK       Mid foot mobilizations   JK Gr III JK GrIII  GrIII       Great toe mobs   Gr III Gr III  GrIII                    Neuro Re-Ed             Toe yoga    20x 20x        Toe spreading    20x 20x        BAPs board  Seated, AP/ML 20 ea; CW/CCW 10 ea    Standing WB 30x ea       SLB     FT support 10x10\" 10x10\" 10x10\"                                               Ther Ex             KB soleus HR   Towel roll- 2x10 15# KB          TR seated   Rtb 2x10           Gastroc stretching  Towel 15\"x5            PF roller             Ankle inversion  Active 15 ea  Gtb 2x10 GTB 2x10 WB on valslide 20x       Ankle eversion  Active 15 ea  Gtb 2x10  GTB 2x10 WB on valslide 20x        SB gastroc stretch   4x30\" 4x30\" 4x30\" 4x30\"        SB soleus stretch   4x30\" 4x30\"  4x30\"        Bike   9' Lvl 4 8\" lvl 4' 8' lv 5-8 8' L8       PF roll   3' 4'  3' 3'       GT ext " "stretch   3x10\"           Ther Activity             HR/TR  Seated 20 ea           Eccentric heel lowering    2x10  2x10 On blue foam 2x10        Gait Training                                       Modalities                                         1:1 with PT from 333-415pm           "

## 2024-03-06 ENCOUNTER — OFFICE VISIT (OUTPATIENT)
Dept: PHYSICAL THERAPY | Facility: CLINIC | Age: 64
End: 2024-03-06
Payer: COMMERCIAL

## 2024-03-06 ENCOUNTER — EVALUATION (OUTPATIENT)
Dept: SPEECH THERAPY | Facility: CLINIC | Age: 64
End: 2024-03-06
Payer: COMMERCIAL

## 2024-03-06 DIAGNOSIS — G20.A1 PARKINSON'S DISEASE WITHOUT DYSKINESIA, UNSPECIFIED WHETHER MANIFESTATIONS FLUCTUATE: ICD-10-CM

## 2024-03-06 DIAGNOSIS — R13.12 OROPHARYNGEAL DYSPHAGIA: Primary | ICD-10-CM

## 2024-03-06 DIAGNOSIS — G20.A1 HYPOKINETIC PARKINSONIAN DYSPHONIA: ICD-10-CM

## 2024-03-06 DIAGNOSIS — R26.2 AMBULATORY DYSFUNCTION: ICD-10-CM

## 2024-03-06 DIAGNOSIS — R26.89 BALANCE DISORDER: Primary | ICD-10-CM

## 2024-03-06 DIAGNOSIS — R49.0 HYPOKINETIC PARKINSONIAN DYSPHONIA: ICD-10-CM

## 2024-03-06 PROCEDURE — 97164 PT RE-EVAL EST PLAN CARE: CPT | Performed by: PHYSICAL THERAPIST

## 2024-03-06 PROCEDURE — 92507 TX SP LANG VOICE COMM INDIV: CPT

## 2024-03-06 NOTE — PROGRESS NOTES
Re-Evaluation Note     Today's date: 3/6/2024  Patient name: Bob Fernandes  : 1960  MRN: [id number]  Referring provider: David Hanna MD  Dx:   Encounter Diagnosis     ICD-10-CM    1. Balance disorder  R26.89                      Subjective: Patient reports to physical therapy [facility] related to parkinson's disease. At this time patient remains stable from one month ago. He had a one month break in care to focus on a sprain in his foot.       Objective: See treatment diary below    Functional outcomes   6 minute walk test: 1525  Gait speed: 1.6 m/s  5x sit to stand: 13.85 (seconds)  TU.12 (seconds)  TUG cognitive: 9.78 (seconds)  FGA :   ABC: 75%    Functional Outcomes 3/6/24  6 minute walk test: 1550 ft   Gait speed: 1.8 m/s  5x sts: 8. 72 sec   FGA:   ABC: 76%      Assessment: Patient reports to physical therapy after one month break to focus on foot sprain. At this time patient has remained steady with endurance, balance, independence and overall confidence levels in his balance howeve the has not made improvements as he has not been attending neuro therapy. Goals below were not met and will be restarted as of 3/5/24. Plan to complete physical therapy at 1x/week for 4 weeks and then 2x/week for 4 weeks as he slowly is able to consistsently put more weight on his foot with focus on balance, coordination, fast direction changes, endurance, and establishment of HEP.     Goals  STG:  Pt will improve ABC scale by 20% in 4 weeks  Pt will complete 6 mwt in 1600 ft in 4 weeks  Pt will score 80% or greater on ABC scale in 4 weeks  Pt will be independent with HEP within 4 weeks  Pt will complete FGA with a score of 27/30 in 4 weeks - met     LTG  Pt will complete ABC scale with a score of 90% in 8 weeks   Pt will transition to independent HEP consistently within 8 weeks  Pt will complete FGA with a score of 30/30 n 8 weeks    POC expires Unit limit Auth Expiration date PT/OT/ST + Visit Limit?  "  4/2/24 3 tbd bomn                           Visit/Unit Tracking  AUTH Status:  Date 3/6             tbd Used 1              Remaining                    Plan: Continue per plan of care.      Precautions: Parkinsons, Mild CAD, HTN, H/O L TKA.    Manuals  2/19 2/22 2/27 3/1 3/4       PROM of the Left ankle  LM JK JK LM JK       Mid foot mobilizations   JK Gr III JK GrIII  GrIII       Great toe mobs   Gr III Gr III  GrIII                    Neuro Re-Ed             Toe yoga    20x 20x        Toe spreading    20x 20x        BAPs board  Seated, AP/ML 20 ea; CW/CCW 10 ea    Standing WB 30x ea       SLB     FT support 10x10\" 10x10\" 10x10\"                                               Ther Ex             KB soleus HR   Towel roll- 2x10 15# KB          TR seated   Rtb 2x10           Gastroc stretching  Towel 15\"x5            PF roller             Ankle inversion  Active 15 ea  Gtb 2x10 GTB 2x10 WB on valslide 20x       Ankle eversion  Active 15 ea  Gtb 2x10  GTB 2x10 WB on valslide 20x        SB gastroc stretch   4x30\" 4x30\" 4x30\" 4x30\"        SB soleus stretch   4x30\" 4x30\"  4x30\"        Bike   9' Lvl 4 8\" lvl 4' 8' lv 5-8 8' L8       PF roll   3' 4'  3' 3'       GT ext stretch   3x10\"           Ther Activity             HR/TR  Seated 20 ea           Eccentric heel lowering    2x10  2x10 On blue foam 2x10        Gait Training                                       Modalities                                         1:1 with PT from 333-415pm             "

## 2024-03-06 NOTE — PROGRESS NOTES
"Speech-Language Pathology Re- Evaluation    Today's date: 3/6/2024   Patient’s name: Bob Fernandes  : 1960  MRN: [id number]  Safety measures: PD, fall risk  Referring provider: Sasha Gamble PA-C    Encounter Diagnosis     ICD-10-CM    1. Oropharyngeal dysphagia  R13.12       2. Hypokinetic Parkinsonian dysphonia  G20.A1     R49.0           Assessment: Patient presents with a mild voice disorder, characterized by reduced loudness, monotone pitch, and a hoarse vocal quality, which contributed to an overall decrease in speech intelligibility, intermittently. During conversation, speech intelligibility is reduced 10% of the time. Patient has been participating in the SPEAK OUT!® program and doing great. Today he is on day . Recommended continued skilled therapy to target goals listed below.       Short term goals:     1. Patient and family will watch Parkinson's voice project informational session called \"Learn About Parkinson's\" in order to better understand the SpeakOUT! program and commitment -  https://www.CR[employer].com/watch?v=SpxBc8b9WiQ  - MET     2. Patient will obtain a SpeakOUT! Workbook and complete exercises twice a day for 25 days, before transitioning to the LOUD CROWD. -Patient has his workbook - Exercises ongoing     3. Patient will coordinate vocal and articulatory subsystems in hierarchal speech tasks by producing sounds with intention with minimal assistance     4. Patient will read phrases, sentences and paragraphs with intention, yielding improved vocal quality, loudness, articulatory precision, and endrance while maintaining a minimum of 75 db with min assistance.      5. Patient will generalize intentional speech to cognitive-linguistic exercises and conversational speech with improved vocal quality, loudness, articulatory precision, and endurance while maintaining a minimum of 75 dB with min assistance.    Long-term goals:  1. Patient will establish a home exercise program " independently or with appropriate supports.     2. Patient will participate in the LOUD CROWD (online or in person) as able after completion of one on one SLP treatments.     3. Patient will produce spontaneous conversation while using intent independently or with appropriate supports.     Plan  Patient would benefit from outpatient skilled Speech Therapy services: Voice therapy    Frequency: 2x weekly  Duration: 6-8 weeks    Intervention certification from: 3/6/2024  Intervention certification to: 5/6/2024    Subjective  Patient's goal(s): To be able to swallow and speak clearly    Objective    2/9/24:   Voice Handicap Index (VHI):  The VHI is a list of 30 statements that many people have used to describe their voices and the effects of their voices on their lives. Patient indicated how frequently he has the same experience using a rating point scale (“never” = 0, “almost never” = 1, “sometimes” = 2, “almost always” = 3, and “always” = 4).  Results were as follows:    Subscale: Score: Self-Perceived Impairment Level:   Physical 24/40 Severe   Functional 22/40 Severe   Emotional 20/40 Severe        TOTAL 66/120 Severe     Treatment  SPEAK OUT!® Treatment Note:   Session #: 5/25    The following exercises were targeted to facilitate connection of breath to speech, loudness, articulation, expression, and intent of speech:    Sound level meter-to-mouth distance: 50 cm throughout session    Task Data Level of Cueing Provided   Pre-treatment spontaneous conversation 72 dB SPL N/A         Warm-up (“may-me-my-maxine-moo”) 74 dB SPL moderate         Sustained “ah” 10.2  seconds moderate   Sustained “ah” 78 dB SPL moderate         Vocal glides 75 dB SPL moderate         Numerical sequences 78 dB SPL minimal-moderate        Oral reading (phrase level) 75 dB SPL moderate        Cognitive-linguistic tasks workbook (phrase level) 75 dB SPL moderate                  Post-treatment spontaneous conversation 75 dB SPL N/A      Patient  "required moderate cues to utilize \"INTENT\" during spontaneous conversation on this date of service.      Visit tracking:    Re-eval Due POC Expires Auth Expiration Date ST Visit Limit   5/6/24 5/6/24 tbd Auth pending (BOMN)                           Visit/Unit Tracking  AUTH Status:  Date 3/6   BOMN - auth at eval Used 1    Remaining         "

## 2024-03-11 ENCOUNTER — OFFICE VISIT (OUTPATIENT)
Dept: PHYSICAL THERAPY | Facility: CLINIC | Age: 64
End: 2024-03-11
Payer: COMMERCIAL

## 2024-03-11 DIAGNOSIS — M72.2 PLANTAR FASCIITIS OF LEFT FOOT: Primary | ICD-10-CM

## 2024-03-11 DIAGNOSIS — S86.302A PERONEAL TENDON INJURY, LEFT, INITIAL ENCOUNTER: ICD-10-CM

## 2024-03-11 PROCEDURE — 97140 MANUAL THERAPY 1/> REGIONS: CPT | Performed by: PHYSICAL THERAPIST

## 2024-03-11 PROCEDURE — 97110 THERAPEUTIC EXERCISES: CPT | Performed by: PHYSICAL THERAPIST

## 2024-03-11 PROCEDURE — 97530 THERAPEUTIC ACTIVITIES: CPT | Performed by: PHYSICAL THERAPIST

## 2024-03-11 NOTE — PROGRESS NOTES
"Daily Note     Today's date: 3/11/2024  Patient name: Bob Fernandes  : 1960  MRN: [id number]  Referring provider: Aydin Orr  Dx:   Encounter Diagnosis     ICD-10-CM    1. Plantar fasciitis of left foot  M72.2       2. Peroneal tendon injury, left, initial encounter  S86.302A                      Subjective: Pt returned to [address] last week for re-evaluation. Pt denies any new complaints today.       Objective: See treatment diary below      Assessment: Minimal swelling and pain noted today. Tolerated treatment well. WB exercise performed without adverse effects of swelling or discomfort. Improved loading pattern on involved LE. Patient exhibited good technique with therapeutic exercises and would benefit from continued PT.       Plan: Continue per plan of care.      Precautions: Parkinsons, Mild CAD, HTN, H/O L TKA.    Manuals  2/19 2/22 2/27 3/1 3/4 3/11      PROM of the Left ankle  LM JK JK LM JK JK      Mid foot mobilizations   JK Gr III JK GrIII  GrIII Gr III      Great toe mobs   Gr III Gr III  GrIII Gr III                   Neuro Re-Ed             Toe yoga    20x 20x        Toe spreading    20x 20x        BAPs board  Seated, AP/ML 20 ea; CW/CCW 10 ea    Standing WB 30x ea Standing WB 30x ea      SLB     FT support 10x10\" 10x10\" 10x10\"                                               Ther Ex             KB soleus HR   Towel roll- 2x10 15# KB          TR seated   Rtb 2x10           Gastroc stretching  Towel 15\"x5            PF roller             Ankle inversion  Active 15 ea  Gtb 2x10 GTB 2x10 WB on valslide 20x       Ankle eversion  Active 15 ea  Gtb 2x10  GTB 2x10 WB on valslide 20x        SB gastroc stretch   4x30\" 4x30\" 4x30\" 4x30\"  4x30\"       SB soleus stretch   4x30\" 4x30\"  4x30\"        Bike   9' Lvl 4 8\" lvl 4' 8' lv 5-8 8' L8 8' L8      PF roll   3' 4'  3' 3'       GT ext stretch   3x10\"           Ther Activity             HR/TR  Seated 20 ea           TRX squats       2x10      SL Lunge " on bosu       2x10                   Eccentric heel lowering    2x10  2x10 On blue foam 2x10  On blue foam 2x10       Gait Training                                       Modalities                                         1:1 with PT from 1107-1150a

## 2024-03-12 ENCOUNTER — OFFICE VISIT (OUTPATIENT)
Dept: PHYSICAL THERAPY | Facility: CLINIC | Age: 64
End: 2024-03-12
Payer: COMMERCIAL

## 2024-03-12 ENCOUNTER — OFFICE VISIT (OUTPATIENT)
Dept: SPEECH THERAPY | Facility: CLINIC | Age: 64
End: 2024-03-12
Payer: COMMERCIAL

## 2024-03-12 DIAGNOSIS — R26.89 BALANCE DISORDER: ICD-10-CM

## 2024-03-12 DIAGNOSIS — G20.A1 PARKINSON'S DISEASE WITHOUT DYSKINESIA, UNSPECIFIED WHETHER MANIFESTATIONS FLUCTUATE: Primary | ICD-10-CM

## 2024-03-12 DIAGNOSIS — G20.A1 HYPOKINETIC PARKINSONIAN DYSPHONIA: ICD-10-CM

## 2024-03-12 DIAGNOSIS — R13.12 OROPHARYNGEAL DYSPHAGIA: Primary | ICD-10-CM

## 2024-03-12 DIAGNOSIS — R49.0 HYPOKINETIC PARKINSONIAN DYSPHONIA: ICD-10-CM

## 2024-03-12 PROCEDURE — 97112 NEUROMUSCULAR REEDUCATION: CPT | Performed by: PHYSICAL THERAPIST

## 2024-03-12 PROCEDURE — 92507 TX SP LANG VOICE COMM INDIV: CPT

## 2024-03-12 NOTE — PROGRESS NOTES
"Daily Speech Treatment Note    Today's date: 3/12/2024  Patient’s name: Bob Fernandes  : 1960  MRN: [id number]  Safety measures: PD  Referring provider: Sasha Gamble PA-C    Encounter Diagnosis     ICD-10-CM    1. Oropharyngeal dysphagia  R13.12       2. Hypokinetic Parkinsonian dysphonia  G20.A1     R49.0           Visit tracking:  Re-eval Due POC Expires Auth Expiration Date ST Visit Limit   3/15/24  3/15/24  12/31/24 BOMN   5/6/24 5/6/24 3/29/24 8                     Visit/Unit Tracking  AUTH Status:  Date 2/9/24 2/14/24 2/16 2/20 2/23 2/26 3/6  RE 3/12   No Auth Needed Used 1 2 3 4 5 6 1 2    Remaining  BOMN      7 6       Subjective/Behavioral: Patient spoke with INTENT today during our entire session!    Provided patient with ENT/referral information - plans to call [facility] (provided patient with note for ENT)      Objective/Assessment: [email or web address] completed paperwork during session today; will send DME order when insurance is confirmed as approval through [employer]    Short-term goals:  1. Patient and family will watch Parkinson's voice project informational session called \"Learn About Parkinson's\" in order to better understand the SpeakOUT! program and commitment -  [email or web address]/[email or web address]?v=[email or web address]  - Patient reported he watched the video     2. Patient will obtain a SpeakOUT! Workbook and complete exercises twice a day for 25 days, before transitioning to the LOUD CROWD. -Patient has his workbook     3. Patient will coordinate vocal and articulatory subsystems in hierarchal speech tasks by producing sounds with intention with minimal assistance     4. Patient will read phrases, sentences and paragraphs with intention, yielding improved vocal quality, loudness, articulatory precision, and endrance while maintaining a minimum of 75 db with min assistance.      5. Patient will generalize intentional speech to cognitive-linguistic exercises and conversational speech with improved vocal " "quality, loudness, articulatory precision, and endurance while maintaining a minimum of 75 dB with min assistance.    SPEAK OUT!® Treatment Note:   Session #: 6/25    The following exercises were targeted to facilitate connection of breath to speech, loudness, articulation, expression, and intent of speech:    Sound level meter-to-mouth distance: 50 cm throughout session    Task Data Level of Cueing Provided   Pre-treatment spontaneous conversation  dB SPL N/A         Warm-up (“may-me-my-maxine-moo”) 79 dB SPL minimal-none         Sustained “ah” 9.9 seconds minimal-none   Sustained “ah” 79 dB SPL minimal-none         Vocal glides 79 dB SPL minimal-none         Numerical sequences 80 dB SPL minimal-none        Oral reading (phrase level) 75 dB SPL minimal        Cognitive-linguistic tasks workbook (phrase level) 77 dB SPL minimal                  Post-treatment spontaneous conversation -- dB SPL N/A      Patient required minimal-none cues to utilize \"INTENT\" during spontaneous conversation on this date of service.      Plan:  -Continue with current plan of care.       "

## 2024-03-14 ENCOUNTER — OFFICE VISIT (OUTPATIENT)
Dept: SPEECH THERAPY | Facility: CLINIC | Age: 64
End: 2024-03-14
Payer: COMMERCIAL

## 2024-03-14 DIAGNOSIS — R49.0 HYPOKINETIC PARKINSONIAN DYSPHONIA: ICD-10-CM

## 2024-03-14 DIAGNOSIS — R13.12 OROPHARYNGEAL DYSPHAGIA: Primary | ICD-10-CM

## 2024-03-14 DIAGNOSIS — G20.A1 HYPOKINETIC PARKINSONIAN DYSPHONIA: ICD-10-CM

## 2024-03-14 PROCEDURE — 92507 TX SP LANG VOICE COMM INDIV: CPT

## 2024-03-14 NOTE — PROGRESS NOTES
"Daily Speech Treatment Note    Today's date: 3/14/2024  Patient’s name: Bob Fernandes  : 1960  MRN: [id number]  Safety measures: PD  Referring provider: Sasha Gamble PA-C    Encounter Diagnosis     ICD-10-CM    1. Oropharyngeal dysphagia  R13.12       2. Hypokinetic Parkinsonian dysphonia  G20.A1     R49.0           Visit tracking:  Re-eval Due POC Expires Auth Expiration Date ST Visit Limit   3/15/24  3/15/24  12/31/24 BOMN   5/6/24 5/6/24 3/29/24 8                     Visit/Unit Tracking  AUTH Status:  Date 2/9/24 2/14/24 2/16 2/20 2/23 2/26 3/6  RE 3/12   No Auth Needed Used 1 2 3 4 5 6 1 2    Remaining  BOMN      7 6       Subjective/Behavioral: Patient doing well today; he came in speaking with INTENT      Objective/Assessment: [email or web address] completed paperwork during session today; will send DME order when insurance is confirmed as approval through [employer]    Short-term goals:  1. Patient and family will watch Parkinson's voice project informational session called \"Learn About Parkinson's\" in order to better understand the SpeakOUT! program and commitment -  [email or web address]/[email or web address]?v=[email or web address]  - Patient reported he watched the video     2. Patient will obtain a SpeakOUT! Workbook and complete exercises twice a day for 25 days, before transitioning to the LOUD CROWD. -Patient has his workbook     3. Patient will coordinate vocal and articulatory subsystems in hierarchal speech tasks by producing sounds with intention with minimal assistance     4. Patient will read phrases, sentences and paragraphs with intention, yielding improved vocal quality, loudness, articulatory precision, and endrance while maintaining a minimum of 75 db with min assistance.      5. Patient will generalize intentional speech to cognitive-linguistic exercises and conversational speech with improved vocal quality, loudness, articulatory precision, and endurance while maintaining a minimum of 75 dB with min " "assistance.    SPEAK OUT!® Treatment Note:   Session #: 7/25    The following exercises were targeted to facilitate connection of breath to speech, loudness, articulation, expression, and intent of speech:    Sound level meter-to-mouth distance: 50 cm throughout session    Task Data Level of Cueing Provided   Pre-treatment spontaneous conversation  70 dB SPL N/A         Warm-up (“may-me-my-maxine-moo”) 76 dB SPL minimal-none         Sustained “ah” 9.6 seconds minimal-none   Sustained “ah” 79 dB SPL minimal-none         Vocal glides 79 dB SPL minimal-none         Numerical sequences 81 dB SPL minimal-none        Oral reading (phrase level) 75 dB SPL minimal        Cognitive-linguistic tasks workbook (phrase level) 76 dB SPL minimal                  Post-treatment spontaneous conversation 78 dB SPL N/A      Patient required minimal-none cues to utilize \"INTENT\" during spontaneous conversation on this date of service.      Plan:  -Continue with current plan of care.       "

## 2024-03-15 NOTE — PROGRESS NOTES
Daily Note     Today's date: 3/15/2024  Patient name: Bob Fernandes  : 1960  MRN: [id number]  Referring provider: David Hanna MD  Dx:   Encounter Diagnosis     ICD-10-CM    1. Parkinson's disease without dyskinesia, unspecified whether manifestations fluctuate  G20.A1       2. Balance disorder  R26.89                      Subjective: Patient reports no new changes since last session, foot has been feeling fine      Objective: See treatment diary below      Assessment: Completed outdoor ambulation up and down a grassy hill which pt had a few instances of loss of balance during as he had difficulty controlling descent tot he right but no difficulty ascending.       Plan: Continue per plan of care.      Precautions: Parkinsons, Mild CAD, HTN, H/O L TKA.  Short Term Goal Expiration Date:(24)  Long Term Goal Expiration Date: (24)  POC Expiration Date: (24)      POC expires Unit limit Auth Expiration date PT/OT/ST + Visit Limit?   24 3 3/29/24 3                           Visit/Unit Tracking  AUTH Status:  Date 3/13              Used 2              Remaining  6                          Manuals 3/13                                       Neuro Re-Ed         Side stepping incline 100 ft x 1 ea        Blaze pod Color catch - square x 2                                               Ther Ex                                                                        Ther Activity                        Gait Training        [place]  100 ft x 4 up and down                Modalities

## 2024-03-18 ENCOUNTER — RA CDI HCC (OUTPATIENT)
Dept: OTHER | Facility: HOSPITAL | Age: 64
End: 2024-03-18

## 2024-03-18 ENCOUNTER — OFFICE VISIT (OUTPATIENT)
Dept: PHYSICAL THERAPY | Facility: CLINIC | Age: 64
End: 2024-03-18
Payer: COMMERCIAL

## 2024-03-18 DIAGNOSIS — R26.89 BALANCE DISORDER: Primary | ICD-10-CM

## 2024-03-18 DIAGNOSIS — M72.2 PLANTAR FASCIITIS OF LEFT FOOT: ICD-10-CM

## 2024-03-18 DIAGNOSIS — S86.302A PERONEAL TENDON INJURY, LEFT, INITIAL ENCOUNTER: ICD-10-CM

## 2024-03-18 PROCEDURE — 97112 NEUROMUSCULAR REEDUCATION: CPT | Performed by: PHYSICAL THERAPIST

## 2024-03-18 PROCEDURE — 97110 THERAPEUTIC EXERCISES: CPT | Performed by: PHYSICAL THERAPIST

## 2024-03-18 PROCEDURE — 97140 MANUAL THERAPY 1/> REGIONS: CPT | Performed by: PHYSICAL THERAPIST

## 2024-03-18 NOTE — PROGRESS NOTES
"Daily Note     Today's date: 3/18/2024  Patient name: Bob Fernandes  : 1960  MRN: [id number]  Referring provider: Aydin Orr  Dx:   Encounter Diagnosis     ICD-10-CM    1. Balance disorder  R26.89       2. Plantar fasciitis of left foot  M72.2       3. Peroneal tendon injury, left, initial encounter  S86.302A                      Subjective: Pt reports mild lateral ankle discomfort after gardening. The walking on unstable surface irritated the ankle. Denies any increased discomfort from gait and balance training last week.     Objective: See treatment diary below      Assessment: Tolerated treatment well. Discomfort and mobility improving at involved ankle. Minimal mid foot swelling present. WB fatigue patient but no adverse effects noted. Patient would benefit from continued PT. Pt will be re-evaluated next session.       Plan: Continue per plan of care.      Precautions: Parkinsons, Mild CAD, HTN, H/O L TKA.  Manuals  2/19 2/22 2/27 3/1 3/4 3/11 3/18     PROM of the Left ankle  LM JK JK LM JK JK JK     Mid foot mobilizations   JK Gr III JK GrIII  GrIII Gr III Gr III     Great toe mobs   Gr III Gr III  GrIII Gr III Gr III                  Neuro Re-Ed             Toe yoga    20x 20x        Toe spreading    20x 20x        BAPs board  Seated, AP/ML 20 ea; CW/CCW 10 ea    Standing WB 30x ea Standing WB 30x ea Standing WB 30x ea     SLB     FT support 10x10\" 10x10\" 10x10\"                                               Ther Ex             KB soleus HR   Towel roll- 2x10 15# KB          TR seated   Rtb 2x10           Gastroc stretching  Towel 15\"x5            PF roller             Ankle inversion  Active 15 ea  Gtb 2x10 GTB 2x10 WB on valslide 20x       Ankle eversion  Active 15 ea  Gtb 2x10  GTB 2x10 WB on valslide 20x        SB gastroc stretch   4x30\" 4x30\" 4x30\" 4x30\"  4x30\"  4x30\"     SB soleus stretch   4x30\" 4x30\"  4x30\"        Bike   9' Lvl 4 8\" lvl 4' 8' lv 5-8 8' L8 8' L8 10\" L8     PF roll   3' " "4'  3' 3'       GT ext stretch   3x10\"           Ther Activity             HR/TR  Seated 20 ea           TRX squats       2x10      SL Lunge on bosu       2x10                   Eccentric heel lowering    2x10  2x10 On blue foam 2x10  On blue foam 2x10  On blue foam 3x10      Gait Training                                       Modalities                                         1:1 with PT from [id number]                     "

## 2024-03-19 ENCOUNTER — OFFICE VISIT (OUTPATIENT)
Dept: PHYSICAL THERAPY | Facility: CLINIC | Age: 64
End: 2024-03-19
Payer: COMMERCIAL

## 2024-03-19 ENCOUNTER — OFFICE VISIT (OUTPATIENT)
Dept: SPEECH THERAPY | Facility: CLINIC | Age: 64
End: 2024-03-19
Payer: COMMERCIAL

## 2024-03-19 DIAGNOSIS — R49.0 HYPOKINETIC PARKINSONIAN DYSPHONIA: ICD-10-CM

## 2024-03-19 DIAGNOSIS — R13.12 OROPHARYNGEAL DYSPHAGIA: Primary | ICD-10-CM

## 2024-03-19 DIAGNOSIS — G20.A1 HYPOKINETIC PARKINSONIAN DYSPHONIA: ICD-10-CM

## 2024-03-19 DIAGNOSIS — R26.2 AMBULATORY DYSFUNCTION: ICD-10-CM

## 2024-03-19 DIAGNOSIS — R26.89 BALANCE DISORDER: Primary | ICD-10-CM

## 2024-03-19 DIAGNOSIS — G20.A1 PARKINSON'S DISEASE WITHOUT DYSKINESIA, UNSPECIFIED WHETHER MANIFESTATIONS FLUCTUATE: ICD-10-CM

## 2024-03-19 PROCEDURE — 92507 TX SP LANG VOICE COMM INDIV: CPT

## 2024-03-19 PROCEDURE — 97112 NEUROMUSCULAR REEDUCATION: CPT | Performed by: PHYSICAL THERAPIST

## 2024-03-19 NOTE — PROGRESS NOTES
"Daily Note     Today's date: 3/19/2024  Patient name: Bob Fernandes  : 1960  MRN: [id number]  Referring provider: David Hanna MD  Dx:   Encounter Diagnosis     ICD-10-CM    1. Balance disorder  R26.89       2. Ambulatory dysfunction  R26.2       3. Parkinson's disease without dyskinesia, unspecified whether manifestations fluctuate  G20.A1                      Subjective: Feeling well today, no pain in his foot      Objective: See treatment diary below      Assessment: Focused on overall exertion and balance today. Pt strugled with 15# weights on ankles, did very well with sit to stands on airex pads with tidal tank. Plan next session to attempt boxing once again.       Plan: Continue per plan of care.            Precautions: Parkinsons, Mild CAD, HTN, H/O L TKA.  Short Term Goal Expiration Date:(24)  Long Term Goal Expiration Date: (24)  POC Expiration Date: (24)        POC expires Unit limit Auth Expiration date PT/OT/ST + Visit Limit?   24 3 3/29/24 3                                           Visit/Unit Tracking  AUTH Status:  Date 3/13  3/19                       Used 2  3                       Remaining  6  5                                 Manuals 3/13  3/19                                                                 Neuro Re-Ed              Side stepping incline 100 ft x 1 ea            Blaze pod Color catch - square x 2            Hurdles   Airex pad between 12\" hurdles  -15# aw 10 laps fwd, 5 laps lat min ue          STS   Airex pad tidal tank 3x10          karaoke   10 laps //bars no UE         Step ups   15# aw 8\" step fwd/lat 20x ea      [place]  Tidal tank varying levels of rocks - 10x //bars                                          Ther Ex                                                                                                                             Ther Activity                                         Gait Training             [facility]  100 ft " x 4 up and down                          Modalities

## 2024-03-19 NOTE — PROGRESS NOTES
"Daily Speech Treatment Note    Today's date: 3/19/2024  Patient’s name: Bob Fernandes  : 1960  MRN: [id number]  Safety measures: PD  Referring provider: Sasha Gamble PA-C    Encounter Diagnosis     ICD-10-CM    1. Oropharyngeal dysphagia  R13.12       2. Hypokinetic Parkinsonian dysphonia  G20.A1     R49.0           Visit tracking:  Re-eval Due POC Expires Auth Expiration Date ST Visit Limit   3/15/24  3/15/24  12/31/24 BOMN   5/6/24 5/6/24 3/29/24 8                     Visit/Unit Tracking  AUTH Status:  Date 2/9/24 2/14/24 2/16 2/20 2/23 2/26 3/6  RE 3/12 3/19   No Auth Needed Used 1 2 3 4 5 6 1 2 3    Remaining  BOMN      7 6 5       Subjective/Behavioral: Patient doing well today      Objective/Assessment: EMST75 - no updates    Short-term goals:  1. Patient and family will watch Parkinson's voice project informational session called \"Learn About Parkinson's\" in order to better understand the SpeakOUT! program and commitment -  https://[email or web address].com/watch?[email or web address]  - Patient reported he watched the video     2. Patient will obtain a SpeakOUT! Workbook and complete exercises twice a day for 25 days, before transitioning to the [place]OU[place]OW[place]. -Patient has his workbook     3. Patient will coordinate vocal and articulatory subsystems in hierarchal speech tasks by producing sounds with intention with minimal assistance     4. Patient will read phrases, sentences and paragraphs with intention, yielding improved vocal quality, loudness, articulatory precision, and endrance while maintaining a minimum of 75 db with min assistance.      5. Patient will generalize intentional speech to cognitive-linguistic exercises and conversational speech with improved vocal quality, loudness, articulatory precision, and endurance while maintaining a minimum of 75 dB with min assistance.    SPEAK OUT!® Treatment Note:   Session #:     The following exercises were targeted to facilitate connection of breath to " "speech, loudness, articulation, expression, and intent of speech:    Sound level meter-to-mouth distance: 50 cm throughout session    Task Data Level of Cueing Provided   Pre-treatment spontaneous conversation  68 dB SPL N/A         Warm-up (“may-me-my-maxine-moo”) 75 dB SPL minimal-none         Sustained “ah” 10.2 seconds minimal-none   Sustained “ah” 75 dB SPL minimal-none         Vocal glides 76 dB SPL minimal-none         Numerical sequences 75 dB SPL minimal-none        Oral reading (phrase level) 73 dB SPL minimal        Cognitive-linguistic tasks workbook (phrase level) 73 dB SPL minimal                  Post-treatment spontaneous conversation 77 dB SPL N/A      Patient required minimal-none cues to utilize \"INTENT\" during spontaneous conversation on this date of service.      Plan:  -Continue with current plan of care.       "

## 2024-03-22 ENCOUNTER — APPOINTMENT (OUTPATIENT)
Dept: SPEECH THERAPY | Facility: CLINIC | Age: 64
End: 2024-03-22
Payer: COMMERCIAL

## 2024-03-22 DIAGNOSIS — G20.A1 HYPOKINETIC PARKINSONIAN DYSPHONIA: ICD-10-CM

## 2024-03-22 DIAGNOSIS — R49.0 HYPOKINETIC PARKINSONIAN DYSPHONIA: ICD-10-CM

## 2024-03-22 DIAGNOSIS — R13.12 OROPHARYNGEAL DYSPHAGIA: Primary | ICD-10-CM

## 2024-03-22 NOTE — PROGRESS NOTES
"Daily Speech Treatment Note    Today's date: 3/22/2024  Patient’s name: Bob Fernandes  : 1960  MRN: [id number]  Safety measures: PD  Referring provider: Sasha Gamble PA-C    Encounter Diagnosis     ICD-10-CM    1. Oropharyngeal dysphagia  R13.12       2. Hypokinetic Parkinsonian dysphonia  G20.A1     R49.0           Visit tracking:  Re-eval Due POC Expires Auth Expiration Date ST Visit Limit   3/15/24  3/15/24  12/31/24 BOMN   5/6/24 5/6/24 3/29/24 8                     Visit/Unit Tracking  AUTH Status:  Date 2/9/24 2/14/24 2/16 2/20 2/23 2/26 3/6  RE 3/12 3/19 ***   No Auth Needed Used 1 2 3 4 5 6 1 2 3     Remaining  BOMN      7 6 5        Subjective/Behavioral: Patient doing well today***      Objective/Assessment: EMST75 - no updates    Short-term goals:  1. Patient and family will watch Parkinson's voice project informational session called \"Learn About Parkinson's\" in order to better understand the SpeakOUT! program and commitment -  https://www.youtube.com/watch?v=[email or web address]  - Patient reported he watched the video     2. Patient will obtain a SpeakOUT! Workbook and complete exercises twice a day for 25 days, before transitioning to the L[place]OWD. -Patient has his workbook     3. Patient will coordinate vocal and articulatory subsystems in hierarchal speech tasks by producing sounds with intention with minimal assistance     4. Patient will read phrases, sentences and paragraphs with intention, yielding improved vocal quality, loudness, articulatory precision, and endrance while maintaining a minimum of 75 db with min assistance.      5. Patient will generalize intentional speech to cognitive-linguistic exercises and conversational speech with improved vocal quality, loudness, articulatory precision, and endurance while maintaining a minimum of 75 dB with min assistance.    SPEAK OUT!® Treatment Note:   Session #: 8***    The following exercises were targeted to facilitate connection of " "breath to speech, loudness, articulation, expression, and intent of speech:    Sound level meter-to-mouth distance: 50 cm throughout session    Task Data Level of Cueing Provided   Pre-treatment spontaneous conversation  *** dB SPL N/A         Warm-up (“may-me-my-maxine-moo”) *** dB SPL minimal-none         Sustained “ah” *** seconds minimal-none   Sustained “ah” *** dB SPL minimal-none         Vocal glides *** dB SPL minimal-none         Numerical sequences *** dB SPL minimal-none        Oral reading (phrase level) *** dB SPL minimal        Cognitive-linguistic tasks workbook (phrase level) *** dB SPL minimal                  Post-treatment spontaneous conversation *** dB SPL N/A      Patient required minimal-none cues to utilize \"INTENT\" during spontaneous conversation on this date of service.      Plan:  -Continue with current plan of care.       "

## 2024-03-24 NOTE — PROGRESS NOTES
Name: Bob Fernandes      : 1960      MRN: [id number]  Encounter Provider: Nish Barajas MD  Encounter Date: 3/25/2024   Encounter department: [facility]    Assessment & Plan     1. Well adult exam    2. Essential hypertension  -     CBC and differential  -     Comprehensive metabolic panel    3. Pure hypercholesterolemia  -     Lipid panel    4. Impaired fasting glucose    5. Erectile dysfunction, unspecified erectile dysfunction type  -     sildenafil (Viagra) 100 mg tablet; Take one tablet by mouth on empty stomach one hour prior to sexual activity    6. Screening for prostate cancer  -     PSA, Total Screen; Future  -     PSA, Total Screen    Continue with current medications. Repeat labs. Follow up with Neurology/Cardiology    He is due for a colonoscopy    OV 1 year        Subjective     64-year-old male here for wellness exam. Medications reviewed. Hospitalizations/surgeries/FH/SH reviewed see note. SH he retired 2024 -HS in [place].  Non smoker.      Hyperlipidemia with high HDL. 2023 CT coronary Ca++ score 201.  2023 CTA cardiac Mild coronary artery atherosclerotic disease with no flow-limiting stenosis. 2023 Lipid profile cholesterol 177 decreased from  241. Triglycerides 68.  HDL 77.  LDL 85. LFTs normal.       Hypertension blood pressures have been stable on Losartan HCTZ 100/12.5 daily 2023 creatinine 0.98. GFR 87. Electrolytes normal. 2023 Hgb 14.5.     Impaired fasting glucose. 2023 FBS 86 decreased from 104. 2023  A1c 5.4      Lab Results   Component Value Date    CHOLESTEROL 177 2023    CHOLESTEROL 171 2023    CHOLESTEROL 241 (H) 2023     Lab Results   Component Value Date    HDL 77 2023    HDL 64 2023    HDL 78 2023     Lab Results   Component Value Date    TRIG 68 2023    TRIG 58 2023    TRIG 77 2023     Lab Results   Component Value Date    LDLCALC 85 2023     Lab Results    Component Value Date    WBC 5.8 03/14/2023    HGB 14.5 03/14/2023    HCT 43.7 03/14/2023    MCV 93.6 03/14/2023     03/14/2023     Lab Results   Component Value Date    SODIUM 139 06/29/2023    K 4.4 06/29/2023     06/29/2023    CO2 30 06/29/2023    BUN 21 06/29/2023    CREATININE 0.98 06/29/2023    GLUC 86 06/29/2023    CALCIUM 9.7 06/29/2023     Lab Results   Component Value Date    HGBA1C 5.4 03/14/2023          Review of Systems   Constitutional:  Negative for appetite change, chills, fever and unexpected weight change.   HENT:  Negative for congestion, ear pain, rhinorrhea, sore throat and trouble swallowing.    Eyes:  Negative for visual disturbance.   Respiratory:  Negative for cough, shortness of breath and wheezing.    Cardiovascular:  Negative for chest pain, palpitations and leg swelling.        See HPI.  5/2023 exercise stress test intermediate risk maximal exercise treadmill stress test with equivocal ST depressions in anterior leads.      10/2022 Echo Left Ventricle: Left ventricular cavity size is normal. Wall thickness is mildly increased. There is mild concentric hypertrophy. Systolic function is normal. Wall motion is normal. Diastolic function is normal. Right Ventricle: Right ventricular cavity size is mildly dilated. Systolic function is normal.  Right Atrium: The atrium is mildly dilated.     Gastrointestinal:  Negative for abdominal pain, blood in stool, constipation, diarrhea, nausea and vomiting.        History of colon polyps. 12/2020 colonoscopy.    Endocrine: Negative for polydipsia and polyuria.   Genitourinary:  Negative for difficulty urinating.        07/2023 . + ED. He uses PRN Viagra.  03/2021 testosterone level 564     Lab Results       Component                Value               Date                       PSA                      0.54                03/14/2023                 PSA                      0.51                03/18/2022                 PSA                       0.6                 03/17/2021                             Musculoskeletal:  Positive for gait problem. Negative for arthralgias and myalgias.        08/2022 s/p L TKR Balance problems secondary to Parkinson's.     Skin:  Negative for rash.   Allergic/Immunologic: Negative for environmental allergies.   Neurological:  Positive for tremors. Negative for dizziness and headaches.         Parkinson's disease diagnosed by Neurology 07/2020 +  tremor hands R > L. 09/2020 Nuclear medicine aden brain scan showed abnormal decreased radiotracer activity in the posterior basal ganglia bilaterally.  Asymmetrically decreased activity is also noted in the left anterior basal ganglia.  Radiotracer distribution is otherwise unremarkable. Abnormal examination most concerning for underlying parkinsonian syndrome. He is currently on Sinemet and Selegeline.  08/2021 RPR negative. MMA level normal. TSH 1.85. on B12 supplement     He is currently receiving PT/speech Rx     Lab Results       Component                Value               Date                       [id number]               354                 08/17/2021              Lab Results       Component                Value               Date                       FOLATE                   13.4                08/17/2021              .     Hematological:  Negative for adenopathy. Does not bruise/bleed easily.        Past history of mild thrombocytosis        Lab Results       Component                Value               Date                       WBC                      5.8                 03/14/2023                 HGB                      14.5                03/14/2023                 HCT                      43.7                03/14/2023                 MCV                      93.6                03/14/2023                 PLT                      316                 03/14/2023                                Platelet Count       Date                     Value                Ref Range           Status                02/07/2018               340                 140 - 400 Thou*     Final              Platelets       Date                     Value               Ref Range           Status                04/27/2020               420 (H)             149 - 390 Thou*     Final                 11/25/2014               304                 149 - 390 Thou*     Final                 Psychiatric/Behavioral:  Negative for behavioral problems, dysphoric mood and sleep disturbance.        Past Medical History:   Diagnosis Date    Hemorrhoids     Hypertension     Parkinson disease     Thrombocytosis 03/15/2021     Past Surgical History:   Procedure Laterality Date    KNEE SURGERY       Family History   Problem Relation Age of Onset    Diabetes Father         mellitus    Heart attack Father         acute myocardial infarction    Hyperlipidemia Mother     Multiple sclerosis Maternal Grandfather     Prostate cancer Paternal Grandfather      Social History     Socioeconomic History    Marital status: /Civil Union     Spouse name: None    Number of children: None    Years of education: None    Highest education level: None   Occupational History    None   Tobacco Use    Smoking status: Never    Smokeless tobacco: Never   Vaping Use    Vaping status: Former   Substance and Sexual Activity    Alcohol use: Yes     Alcohol/week: 10.0 - 14.0 standard drinks of alcohol     Types: 5 - 7 Cans of beer, 5 - 7 Standard drinks or equivalent per week     Comment: 1 small glass of scotch most nights    Drug use: No    Sexual activity: Not Currently     Partners: Female     Birth control/protection: Post-menopausal, Male Sterilization   Other Topics Concern    None   Social History Narrative    None     Social Determinants of Health     Financial Resource Strain: Not on file   Food Insecurity: Not on file   Transportation Needs: Not on file   Physical Activity: Not on file   Stress: Not on file   Social  Connections: Not on file   Intimate Partner Violence: Not on file   Housing Stability: Not on file     Current Outpatient Medications on File Prior to Visit   Medication Sig    acetaminophen (TYLENOL) 500 mg tablet Take 2 tablets (1,000 mg total) by mouth 2 (two) times a day    amoxicillin (AMOXIL) 500 MG tablet Take 4 tablets by mouth as needed    aspirin (Aspirin 81) 81 mg EC tablet Take 1 tablet (81 mg total) by mouth daily    carbidopa-levodopa (SINEMET CR)  mg TBCR per ER tablet TAKE 1 TABLET BY MOUTH EVERYDAY AT BEDTIME    carbidopa-levodopa (SINEMET)  mg per tablet TAKE 1 TABLET BY MOUTH FOUR TIMES A DAY    cholecalciferol (VITAMIN D3) 1,000 units tablet Take 5,000 Units by mouth daily    Collagen Hydrolysate POWD Use 1 Dose daily    Flowflex COVID-19 Ag Home Test KIT     fluticasone (FLONASE) 50 mcg/act nasal spray SPRAY 2 SPRAYS INTO EACH NOSTRIL EVERY DAY (Patient taking differently: into each nostril if needed)    losartan-hydrochlorothiazide (HYZAAR) 100-12.5 MG per tablet TAKE 1 TABLET BY MOUTH EVERY DAY    MELATONIN PO Take by mouth daily at bedtime    rosuvastatin (CRESTOR) 40 MG tablet TAKE 1 TABLET BY MOUTH EVERY DAY    selegiline (ELDEPRYL) 5 mg tablet TAKE 1 TABLET BY MOUTH 2 TIMES A DAY WITH MEALS.    [DISCONTINUED] atorvastatin (LIPITOR) 40 mg tablet Take 40 mg by mouth daily    [DISCONTINUED] sildenafil (Viagra) 100 mg tablet Take one tablet by mouth on empty stomach one hour prior to sexual activity    [DISCONTINUED] Mirabegron ER (Myrbetriq) 25 MG TB24 Take 25 mg by mouth daily    [DISCONTINUED] valACYclovir (VALTREX) 1,000 mg tablet Take 2 tablets (2,000 mg total) by mouth 2 (two) times a day for 1 day     No Known Allergies  Immunization History   Administered Date(s) Administered    Anthrax 11/11/2016, 11/09/2017, 10/14/2019, 10/09/2020    COVID-19 MODERNA VACC 0.5 ML IM 01/19/2021, 02/16/2021    INFLUENZA 09/26/2011, 10/08/2012, 10/03/2013, 11/11/2016, 11/09/2017, 11/08/2021,  "10/05/2022, 11/11/2023    Influenza Quadrivalent, 6-35 Months IM 10/21/2014, 11/20/2015, 11/11/2016, 11/09/2017    Influenza, injectable, quadrivalent, preservative free 0.5 mL 10/11/2018    Influenza, recombinant, quadrivalent,injectable, preservative free 10/14/2019, 10/09/2020    Influenza, seasonal, injectable 09/26/2011, 10/08/2012, 10/03/2013    Tdap 08/11/2020    Tetanus Toxoid, Unspecified 08/11/2020       Objective     /82 (BP Location: Left arm, Patient Position: Sitting, Cuff Size: Standard)   Pulse 84   Temp 97.9 °F (36.6 °C) (Temporal)   Resp 17   Ht 5' 11\" (1.803 m)   Wt 95.9 kg (211 lb 8 oz)   SpO2 98%   BMI 29.50 kg/m²      BP Readings from Last 3 Encounters:   03/25/24 148/82   02/14/24 149/84   02/13/24 (!) 174/89        Wt Readings from Last 3 Encounters:   03/25/24 95.9 kg (211 lb 8 oz)   02/13/24 97 kg (213 lb 13.5 oz)   11/22/23 98 kg (216 lb)        Physical Exam  Vitals and nursing note reviewed.   Constitutional:       General: He is not in acute distress.     Appearance: He is well-developed.   HENT:      Right Ear: Tympanic membrane and ear canal normal.      Left Ear: Tympanic membrane and ear canal normal.      Mouth/Throat:      Mouth: No oral lesions.      Pharynx: Oropharynx is clear.   Eyes:      General: No scleral icterus.     Extraocular Movements: Extraocular movements intact.      Conjunctiva/sclera: Conjunctivae normal.      Pupils: Pupils are equal, round, and reactive to light.   Neck:      Thyroid: No thyroid mass or thyromegaly.      Vascular: Normal carotid pulses. No carotid bruit or JVD.      Trachea: No tracheal deviation.   Cardiovascular:      Rate and Rhythm: Normal rate and regular rhythm.      Pulses:           Carotid pulses are 2+ on the right side and 2+ on the left side.     Heart sounds: Normal heart sounds. No murmur heard.     No gallop.   Pulmonary:      Effort: Pulmonary effort is normal. No respiratory distress.      Breath sounds: Normal " breath sounds. No wheezing or rales.   Abdominal:      General: Bowel sounds are normal. There is no distension or abdominal bruit.      Palpations: Abdomen is soft. There is no hepatomegaly, splenomegaly or mass.      Tenderness: There is no abdominal tenderness. There is no guarding or rebound.   Musculoskeletal:      Right lower leg: No edema.      Left lower leg: No edema.   Lymphadenopathy:      Cervical: No cervical adenopathy.      Upper Body:      Right upper body: No supraclavicular adenopathy.      Left upper body: No supraclavicular adenopathy.   Skin:     Findings: No rash.      Nails: There is no clubbing.   Neurological:      General: No focal deficit present.      Mental Status: He is alert and oriented to person, place, and time.      Motor: Motor function is intact. No tremor or abnormal muscle tone.      Gait: Gait is intact.   Psychiatric:         Mood and Affect: Mood normal.         Behavior: Behavior normal.       Nish Barajas MD

## 2024-03-24 NOTE — PROGRESS NOTES
Name: Bob Fernandes      : 1960      MRN: [id number]  Encounter Provider: Nish Barajas MD  Encounter Date: 3/25/2024   Encounter department: [facility]    Assessment & Plan     {There are no diagnoses linked to this encounter. (Refresh or delete this SmartLink)}       Subjective     64-year-old male here for wellness exam. Medications reviewed. Hospitalizations/surgeries/FH/SH reviewed see note.   . HS in [place].  3 children. Non smoker.      Hyperlipidemia with high HDL.  2023 Lipid profile cholesterol 241 increased from 229.  Triglycerides 77.  HDL 78.  . LFTs normal.       Hypertension blood pressures have been stable on Losartan HCTZ 100/25 daily 2023 creatinine 0.98. GFR 87. Electrolytes normal. Hgb 14.5.     Impaired fasting glucose. 2021 FBS 93.  A1c 5.4      Review of Systems   Constitutional:  Negative for appetite change, chills, fever and unexpected weight change.   HENT:  Negative for congestion, ear pain, rhinorrhea, sore throat and trouble swallowing.    Eyes:  Negative for visual disturbance.   Respiratory:  Negative for cough, shortness of breath and wheezing.    Cardiovascular:  Negative for chest pain, palpitations and leg swelling.         Echo Left Ventricle: Left ventricular cavity size is normal. Wall thickness is mildly increased. There is mild concentric hypertrophy. Systolic function is normal. Wall motion is normal. Diastolic function is normal. Right Ventricle: Right ventricular cavity size is mildly dilated. Systolic function is normal.  Right Atrium: The atrium is mildly dilated.     Gastrointestinal:  Negative for abdominal pain, blood in stool, constipation, diarrhea, nausea and vomiting.        2020 colonoscopy.    Endocrine: Negative for polydipsia and polyuria.   Genitourinary:  Negative for difficulty urinating.        + ED. He uses PRN Viagra.  2021 testosterone level 564     Lab Results        Component                Value               Date                       PSA                      0.54                03/14/2023                 PSA                      0.51                03/18/2022                 PSA                      0.6                 03/17/2021                    Musculoskeletal:  Positive for gait problem. Negative for arthralgias and myalgias.        08/2022 s/p L TKR Balance problems secondary to Parkinson's. He completed PT.    Skin:  Negative for rash.   Allergic/Immunologic: Negative for environmental allergies.   Neurological:  Positive for tremors. Negative for dizziness and headaches.         Parkinson's disease diagnosed by Neurology 07/2020 +  tremor hands R > L. 09/2020 Nuclear medicine aden brain scan showed abnormal decreased radiotracer activity in the posterior basal ganglia bilaterally.  Asymmetrically decreased activity is also noted in the left anterior basal ganglia.  Radiotracer distribution is otherwise unremarkable. Abnormal examination most concerning for underlying parkinsonian syndrome. He is currently on Sinemet and Selegeline. Mild cognitive issues  08/2021 RPR negative. MMA level normal. TSH 1.85. He is now on B12 supplement     Lab Results       Component                Value               Date                       [id number]               354                 08/17/2021              Lab Results       Component                Value               Date                       FOLATE                   13.4                08/17/2021              .     Hematological:  Negative for adenopathy. Does not bruise/bleed easily.        Past history of mild thrombocytosis        Lab Results       Component                Value               Date                       WBC                      5.8                 03/14/2023                 HGB                      14.5                03/14/2023                 HCT                      43.7                03/14/2023                  MCV                      93.6                03/14/2023                 PLT                      316                 03/14/2023                                Platelet Count       Date                     Value               Ref Range           Status                02/07/2018               340                 140 - 400 Thou*     Final              Platelets       Date                     Value               Ref Range           Status                04/27/2020               420 (H)             149 - 390 Thou*     Final                 11/25/2014               304                 149 - 390 Thou*     Final                 Psychiatric/Behavioral:  Negative for behavioral problems, dysphoric mood and sleep disturbance.        Past Medical History:   Diagnosis Date    Hemorrhoids     Parkinson disease     Thrombocytosis 3/15/2021     Past Surgical History:   Procedure Laterality Date    KNEE SURGERY       Family History   Problem Relation Age of Onset    Diabetes Father         mellitus    Heart attack Father         acute myocardial infarction    Hyperlipidemia Mother     Multiple sclerosis Maternal Grandfather     Prostate cancer Paternal Grandfather      Social History     Socioeconomic History    Marital status: /Civil Union     Spouse name: Not on file    Number of children: Not on file    Years of education: Not on file    Highest education level: Not on file   Occupational History    Not on file   Tobacco Use    Smoking status: Never    Smokeless tobacco: Never   Vaping Use    Vaping status: Former   Substance and Sexual Activity    Alcohol use: Yes     Comment: 1 small glass of scotch most nights    Drug use: No    Sexual activity: Not Currently   Other Topics Concern    Not on file   Social History Narrative    Not on file     Social Determinants of Health     Financial Resource Strain: Not on file   Food Insecurity: Not on file   Transportation Needs: Not on file   Physical Activity: Not on file    Stress: Not on file   Social Connections: Not on file   Intimate Partner Violence: Not on file   Housing Stability: Not on file     Current Outpatient Medications on File Prior to Visit   Medication Sig    acetaminophen (TYLENOL) 500 mg tablet Take 2 tablets (1,000 mg total) by mouth 2 (two) times a day    amoxicillin (AMOXIL) 500 MG tablet Take 4 tablets by mouth as needed    aspirin (Aspirin 81) 81 mg EC tablet Take 1 tablet (81 mg total) by mouth daily    atorvastatin (LIPITOR) 40 mg tablet Take 40 mg by mouth daily    carbidopa-levodopa (SINEMET CR)  mg TBCR per ER tablet TAKE 1 TABLET BY MOUTH EVERYDAY AT BEDTIME    carbidopa-levodopa (SINEMET)  mg per tablet TAKE 1 TABLET BY MOUTH FOUR TIMES A DAY    cholecalciferol (VITAMIN D3) 1,000 units tablet Take 5,000 Units by mouth daily    Collagen Hydrolysate POWD 1 Dose by Does not apply route daily (Patient not taking: Reported on 10/23/2023)    Flowflex COVID-19 Ag Home Test KIT  (Patient not taking: Reported on 4/10/2023)    fluticasone (FLONASE) 50 mcg/act nasal spray SPRAY 2 SPRAYS INTO EACH NOSTRIL EVERY DAY    losartan-hydrochlorothiazide (HYZAAR) 100-12.5 MG per tablet TAKE 1 TABLET BY MOUTH EVERY DAY    MELATONIN PO Take by mouth daily at bedtime    Mirabegron ER (Myrbetriq) 25 MG TB24 Take 25 mg by mouth daily    rosuvastatin (CRESTOR) 40 MG tablet TAKE 1 TABLET BY MOUTH EVERY DAY    selegiline (ELDEPRYL) 5 mg tablet TAKE 1 TABLET BY MOUTH 2 TIMES A DAY WITH MEALS.    sildenafil (Viagra) 100 mg tablet Take one tablet by mouth on empty stomach one hour prior to sexual activity    valACYclovir (VALTREX) 1,000 mg tablet Take 2 tablets (2,000 mg total) by mouth 2 (two) times a day for 1 day     No Known Allergies  Immunization History   Administered Date(s) Administered    Anthrax 11/11/2016, 11/09/2017, 10/14/2019, 10/09/2020    COVID-19 MODERNA VACC 0.5 ML IM 01/19/2021, 02/16/2021    INFLUENZA 09/26/2011, 10/08/2012, 10/03/2013, 11/11/2016,  11/09/2017, 10/05/2022    Influenza Quadrivalent, 6-35 Months IM 10/21/2014, 11/20/2015, 11/11/2016, 11/09/2017    Influenza, injectable, quadrivalent, preservative free 0.5 mL 10/11/2018    Influenza, recombinant, quadrivalent,injectable, preservative free 10/14/2019, 10/09/2020    Influenza, seasonal, injectable 09/26/2011, 10/08/2012, 10/03/2013    Tdap 08/11/2020    Tetanus Toxoid, Unspecified 08/11/2020       Objective     There were no vitals taken for this visit.    Physical Exam  Nish Barajas MD

## 2024-03-25 ENCOUNTER — EVALUATION (OUTPATIENT)
Dept: PHYSICAL THERAPY | Facility: CLINIC | Age: 64
End: 2024-03-25
Payer: COMMERCIAL

## 2024-03-25 ENCOUNTER — OFFICE VISIT (OUTPATIENT)
Dept: FAMILY MEDICINE CLINIC | Facility: CLINIC | Age: 64
End: 2024-03-25
Payer: COMMERCIAL

## 2024-03-25 VITALS
RESPIRATION RATE: 17 BRPM | HEIGHT: 71 IN | TEMPERATURE: 97.9 F | HEART RATE: 84 BPM | OXYGEN SATURATION: 98 % | WEIGHT: 211.5 LBS | DIASTOLIC BLOOD PRESSURE: 82 MMHG | BODY MASS INDEX: 29.61 KG/M2 | SYSTOLIC BLOOD PRESSURE: 148 MMHG

## 2024-03-25 DIAGNOSIS — N52.9 ERECTILE DYSFUNCTION, UNSPECIFIED ERECTILE DYSFUNCTION TYPE: ICD-10-CM

## 2024-03-25 DIAGNOSIS — Z00.00 WELL ADULT EXAM: Primary | ICD-10-CM

## 2024-03-25 DIAGNOSIS — Z12.5 SCREENING FOR PROSTATE CANCER: ICD-10-CM

## 2024-03-25 DIAGNOSIS — I10 ESSENTIAL HYPERTENSION: ICD-10-CM

## 2024-03-25 DIAGNOSIS — E78.00 PURE HYPERCHOLESTEROLEMIA: ICD-10-CM

## 2024-03-25 DIAGNOSIS — R73.01 IMPAIRED FASTING GLUCOSE: ICD-10-CM

## 2024-03-25 DIAGNOSIS — M72.2 PLANTAR FASCIITIS OF LEFT FOOT: Primary | ICD-10-CM

## 2024-03-25 DIAGNOSIS — S86.302A PERONEAL TENDON INJURY, LEFT, INITIAL ENCOUNTER: ICD-10-CM

## 2024-03-25 PROCEDURE — 97110 THERAPEUTIC EXERCISES: CPT | Performed by: PHYSICAL THERAPIST

## 2024-03-25 PROCEDURE — 97112 NEUROMUSCULAR REEDUCATION: CPT | Performed by: PHYSICAL THERAPIST

## 2024-03-25 PROCEDURE — 97140 MANUAL THERAPY 1/> REGIONS: CPT | Performed by: PHYSICAL THERAPIST

## 2024-03-25 PROCEDURE — 99396 PREV VISIT EST AGE 40-64: CPT | Performed by: FAMILY MEDICINE

## 2024-03-25 RX ORDER — SILDENAFIL 100 MG/1
TABLET, FILM COATED ORAL
Qty: 30 TABLET | Refills: 3 | Status: SHIPPED | OUTPATIENT
Start: 2024-03-25

## 2024-03-25 NOTE — PROGRESS NOTES
PT-Discharge    Today's date: 3/25/2024  Patient name: Bob Fernandes  : 1960  MRN: [id number]  Referring provider: Aydin Orr  Dx:   Encounter Diagnosis     ICD-10-CM    1. Plantar fasciitis of left foot  M72.2       2. Peroneal tendon injury, left, initial encounter  S86.302A           Start Time: 1530  Stop Time: 1608  Total time in clinic (min): 38 minutes    Assessment  Assessment details: Bob Fernandes is a 64 y.o. male who presents with signs and symptoms consistent of acute left foot and ankle pain. Pt has made excellent progress with PT over the course of the last 6 weeks. Minimal swelling present at involved LE. His ankle ROM has normalized in all planes. He no longer demonstrates weakness of medial and lateral stabilizers of involved ankle. Pt has returned to gait and balance training without limiting pain. Pt has achieved all goals and is ready for this discharge. Should symptoms worsen, he is welcome to schedule with me again.       Primary movement impairments:   1) Moderate swelling in ankle/foot-Improved   2) Decreased ankle ROM-Improved   3) Weakness of medial and lateral ankle stabilizers-Improved       Impairments: abnormal gait, abnormal muscle firing, abnormal or restricted ROM, abnormal movement, activity intolerance, impaired balance, impaired physical strength, lacks appropriate home exercise program, pain with function and weight-bearing intolerance  Understanding of Dx/Px/POC: good   Prognosis: good    Goals  Short Term Goals: to be achieved by 4 weeks  1) Patient to be independent with basic HEP.-MET  2) Decrease pain to 3/10 at its worst.-MET  3) Increase bilateral ankle ROM by 5-10 degrees -MET  4) Increase LE strength by 1/2 MMT grade in all deficient planes.-MET    Long Term Goals: to be achieved by discharge  1) FOTO equal to or greater than expected.-MET  2) Ambulation to improve to maximal level of function-MET  3) Stair negotiation will improve to  reciprocal.-MET  4) Sit to stand transfers will improve to maximal level of function -MET      Plan  D/C from PT       Subjective Evaluation    History of Present Illness  Mechanism of injury: History of Current Injury: Pt referred to PT for acute ankle pain after shoveling. He is currently being treated by Svetlana Whiteside for Parkinson's disease and was recommended to come to an ortho PT. Pt went to ED earlier this week and saw podiatry yesterday. Pt received XR of the foot which was normal. Pt reports feeling a pop when he was shoveling. Pt notes that he has been exercising more recently in PT and at [employer] [facility] So he has been noticing a mild increase of pain. However, symptoms drastically worsened after shoveling. Pt is currently ambulating with a SPC but not typically using it. Symptoms are gradually improving.   PMH significant for L TKA   Pain location/Descriptors: Pain in two locations (lateral ankle) and (plantar aspect of foot). Pain is dull but can be sharp in nature.   Aggravating factors: Walking, standing/walking, and weight bearing activities.   Easing factors: Gentle stretching, Tylenol  24 HR pattern: Worse in the AM during WB.   Imaging: foot XR- Normal   Special Questions: Pt denies any N&T in left LE.   Patient goals:  Resume daily activities.   Hobbies/Interest: PT and rock steady boxing.   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     Active Range of Motion   Left Ankle/Foot   Dorsiflexion (ke): 20 degrees   Plantar flexion: 45 degrees   Inversion: 40 degrees   Eversion: 20 degrees     Right Ankle/Foot   Dorsiflexion (ke): 28  "degrees   Plantar flexion: 50 degrees   Inversion: 50 degrees   Eversion: 15 degrees     Joint Play   Left Ankle/Foot  Hypomobile in the talocrural joint, subtalar joint and midfoot.     Strength/Myotome Testing     Left Ankle/Foot   Dorsiflexion: 5  Plantar flexion: 5  Inversion: 5  Eversion: 5    Right Ankle/Foot   Normal strength    Tests     Additional Tests Details  *mild restriction with GT mobility on L-Improved   *Restricted gastroc/soleus complex on L -Improved  *Hypomobile mid foot-Improved  *Tenderness along peroneal tendons on L improved     Swelling   Left Ankle/Foot   Metatarsal heads: 23 cm  Figure 8: 55 cm  Malleoli: 28 cm    Right Ankle/Foot   Metatarsal heads: 24.5 cm  Figure 8: 54 cm  Malleoli: 27.5 cm    Ambulation     Observational Gait   Gait: antalgic and asymmetric   Decreased walking speed and stride length.     Additional Observational Gait Details  Single point cane    General Comments:      Ankle/Foot Comments   Navicular: 23.5 cm L/23.5cmR     Precautions: Parkinsons, Mild CAD, HTN, H/O L TKA.  Manuals  2/19 2/22 2/27 3/1 3/4 3/11 3/18 3/25    PROM of the Left ankle  LM JK JK LM JK JK JK JK    Mid foot mobilizations   JK Gr III JK GrIII  GrIII Gr III Gr III Gr III    Great toe mobs   Gr III Gr III  GrIII Gr III Gr III  Gr III                 Neuro Re-Ed             Toe yoga    20x 20x        Toe spreading    20x 20x        BAPs board  Seated, AP/ML 20 ea; CW/CCW 10 ea    Standing WB 30x ea Standing WB 30x ea Standing WB 30x ea     SLB     FT support 10x10\" 10x10\" 10x10\"                                               Ther Ex             KB soleus HR   Towel roll- 2x10 15# KB          TR seated   Rtb 2x10           Gastroc stretching  Towel 15\"x5            PF roller             Ankle inversion  Active 15 ea  Gtb 2x10 GTB 2x10 WB on valslide 20x       Ankle eversion  Active 15 ea  Gtb 2x10  GTB 2x10 WB on valslide 20x        SB gastroc stretch   4x30\" 4x30\" 4x30\" 4x30\"  4x30\"  4x30\"     SB " "soleus stretch   4x30\" 4x30\"  4x30\"        Bike   9' Lvl 4 8\" lvl 4' 8' lv 5-8 8' L8 8' L8 10\" L8 6' L8    PF roll   3' 4'  3' 3'       GT ext stretch   3x10\"           Ther Activity             HR/TR  Seated 20 ea           TRX squats       2x10      SL Lunge on bosu       2x10                   Eccentric heel lowering    2x10  2x10 On blue foam 2x10  On blue foam 2x10  On blue foam 3x10      Gait Training                                       Modalities                                          Pt was re-evaluated today x 15 minutes   "

## 2024-03-27 ENCOUNTER — OFFICE VISIT (OUTPATIENT)
Dept: SPEECH THERAPY | Facility: CLINIC | Age: 64
End: 2024-03-27
Payer: COMMERCIAL

## 2024-03-27 DIAGNOSIS — G20.A1 HYPOKINETIC PARKINSONIAN DYSPHONIA: ICD-10-CM

## 2024-03-27 DIAGNOSIS — R13.12 OROPHARYNGEAL DYSPHAGIA: Primary | ICD-10-CM

## 2024-03-27 DIAGNOSIS — R49.0 HYPOKINETIC PARKINSONIAN DYSPHONIA: ICD-10-CM

## 2024-03-27 PROCEDURE — 92507 TX SP LANG VOICE COMM INDIV: CPT

## 2024-03-27 NOTE — PROGRESS NOTES
"Daily Speech Treatment Note    Today's date: 3/27/2024  Patient’s name: Bob Fernandes  : 1960  MRN: [id number]  Safety measures: PD  Referring provider: Sasha Gamble PA-C    Encounter Diagnosis     ICD-10-CM    1. Oropharyngeal dysphagia  R13.12       2. Hypokinetic Parkinsonian dysphonia  G20.A1     R49.0             Visit tracking:  Re-eval Due POC Expires Auth Expiration Date ST Visit Limit   3/15/24  3/15/24  12/31/24 BOMN   24 8                     Visit/Unit Tracking  AUTH Status:  Date 2/9/24 2/14/24 2/16 2/20 2/23 2/26 3/6  RE 3/12 3/19 3/27   8 visits approved Used 1 2 3 4 5 6 1 2 3 4    Remaining  BOMN      7 6 5 6       Subjective/Behavioral: Patient reports more instances of coughing lately, not sure if its allergies/sinus or if he is having more trouble with swallowing water.     4/3 - Dr. Haider      Objective/Assessment: EMST75 - no updates (told patient I will message his PCP to follow up)    Short-term goals:  1. Patient and family will watch Parkinson's voice project informational session called \"Learn About Parkinson's\" in order to better understand the SpeakOUT! program and commitment -  [email or web address]/[email or web address]  - Patient reported he watched the video     2. Patient will obtain a SpeakOUT! Workbook and complete exercises twice a day for 25 days, before transitioning to the LOUD CROWD. -Patient has his workbook     3. Patient will coordinate vocal and articulatory subsystems in hierarchal speech tasks by producing sounds with intention with minimal assistance     4. Patient will read phrases, sentences and paragraphs with intention, yielding improved vocal quality, loudness, articulatory precision, and endrance while maintaining a minimum of 75 db with min assistance.      5. Patient will generalize intentional speech to cognitive-linguistic exercises and conversational speech with improved vocal quality, loudness, articulatory precision, and " "endurance while maintaining a minimum of 75 dB with min assistance.    SPEAK OUT!® Treatment Note:   Session #: 9/25    The following exercises were targeted to facilitate connection of breath to speech, loudness, articulation, expression, and intent of speech:    Sound level meter-to-mouth distance: 50 cm throughout session    Task Data Level of Cueing Provided   Pre-treatment spontaneous conversation  69 dB SPL N/A         Warm-up (“may-me-my-maxine-moo”) 77 dB SPL minimal-none         Sustained “ah” 10 seconds minimal-none   Sustained “ah” 80 dB SPL minimal-none         Vocal glides 79 dB SPL minimal-none         Numerical sequences 79 dB SPL minimal-none        Oral reading (phrase level) 81 dB SPL minimal        Cognitive-linguistic tasks workbook (phrase level) 74 dB SPL minimal                  Post-treatment spontaneous conversation 70 dB SPL N/A      Patient required minimal-none cues to utilize \"INTENT\" during spontaneous conversation on this date of service.      Plan:  -Continue with current plan of care.       "

## 2024-04-02 ENCOUNTER — OFFICE VISIT (OUTPATIENT)
Dept: SPEECH THERAPY | Facility: CLINIC | Age: 64
End: 2024-04-02
Payer: COMMERCIAL

## 2024-04-02 ENCOUNTER — OFFICE VISIT (OUTPATIENT)
Dept: PHYSICAL THERAPY | Facility: CLINIC | Age: 64
End: 2024-04-02
Payer: COMMERCIAL

## 2024-04-02 DIAGNOSIS — G20.A1 HYPOKINETIC PARKINSONIAN DYSPHONIA: ICD-10-CM

## 2024-04-02 DIAGNOSIS — R49.0 HYPOKINETIC PARKINSONIAN DYSPHONIA: ICD-10-CM

## 2024-04-02 DIAGNOSIS — R13.12 OROPHARYNGEAL DYSPHAGIA: Primary | ICD-10-CM

## 2024-04-02 DIAGNOSIS — G20.A1 PARKINSON'S DISEASE WITHOUT DYSKINESIA, UNSPECIFIED WHETHER MANIFESTATIONS FLUCTUATE: Primary | ICD-10-CM

## 2024-04-02 DIAGNOSIS — R26.89 BALANCE DISORDER: ICD-10-CM

## 2024-04-02 LAB
ALBUMIN SERPL-MCNC: 4.5 G/DL (ref 3.6–5.1)
ALBUMIN/GLOB SERPL: 2 (CALC) (ref 1–2.5)
ALP SERPL-CCNC: 55 U/L (ref 35–144)
ALT SERPL-CCNC: 11 U/L (ref 9–46)
AST SERPL-CCNC: 20 U/L (ref 10–35)
BASOPHILS # BLD AUTO: 31 CELLS/UL (ref 0–200)
BASOPHILS NFR BLD AUTO: 0.5 %
BILIRUB SERPL-MCNC: 0.7 MG/DL (ref 0.2–1.2)
BUN SERPL-MCNC: 22 MG/DL (ref 7–25)
BUN/CREAT SERPL: NORMAL (CALC) (ref 6–22)
CALCIUM SERPL-MCNC: 9.8 MG/DL (ref 8.6–10.3)
CHLORIDE SERPL-SCNC: 103 MMOL/L (ref 98–110)
CHOLEST SERPL-MCNC: 158 MG/DL
CHOLEST/HDLC SERPL: 2.4 (CALC)
CO2 SERPL-SCNC: 30 MMOL/L (ref 20–32)
CREAT SERPL-MCNC: 0.96 MG/DL (ref 0.7–1.35)
EOSINOPHIL # BLD AUTO: 93 CELLS/UL (ref 15–500)
EOSINOPHIL NFR BLD AUTO: 1.5 %
ERYTHROCYTE [DISTWIDTH] IN BLOOD BY AUTOMATED COUNT: 11.8 % (ref 11–15)
GFR/BSA.PRED SERPLBLD CYS-BASED-ARV: 88 ML/MIN/1.73M2
GLOBULIN SER CALC-MCNC: 2.2 G/DL (CALC) (ref 1.9–3.7)
GLUCOSE SERPL-MCNC: 91 MG/DL (ref 65–99)
HCT VFR BLD AUTO: 42.6 % (ref 38.5–50)
HDLC SERPL-MCNC: 66 MG/DL
HGB BLD-MCNC: 14 G/DL (ref 13.2–17.1)
LDLC SERPL CALC-MCNC: 78 MG/DL (CALC)
LYMPHOCYTES # BLD AUTO: 1767 CELLS/UL (ref 850–3900)
LYMPHOCYTES NFR BLD AUTO: 28.5 %
MCH RBC QN AUTO: 30.8 PG (ref 27–33)
MCHC RBC AUTO-ENTMCNC: 32.9 G/DL (ref 32–36)
MCV RBC AUTO: 93.6 FL (ref 80–100)
MONOCYTES # BLD AUTO: 732 CELLS/UL (ref 200–950)
MONOCYTES NFR BLD AUTO: 11.8 %
NEUTROPHILS # BLD AUTO: 3577 CELLS/UL (ref 1500–7800)
NEUTROPHILS NFR BLD AUTO: 57.7 %
NONHDLC SERPL-MCNC: 92 MG/DL (CALC)
PLATELET # BLD AUTO: 315 THOUSAND/UL (ref 140–400)
PMV BLD REES-ECKER: 8.7 FL (ref 7.5–12.5)
POTASSIUM SERPL-SCNC: 4.7 MMOL/L (ref 3.5–5.3)
PROT SERPL-MCNC: 6.7 G/DL (ref 6.1–8.1)
PSA SERPL-MCNC: 0.69 NG/ML
RBC # BLD AUTO: 4.55 MILLION/UL (ref 4.2–5.8)
SODIUM SERPL-SCNC: 140 MMOL/L (ref 135–146)
TRIGL SERPL-MCNC: 61 MG/DL
WBC # BLD AUTO: 6.2 THOUSAND/UL (ref 3.8–10.8)

## 2024-04-02 PROCEDURE — 97112 NEUROMUSCULAR REEDUCATION: CPT | Performed by: PHYSICAL THERAPIST

## 2024-04-02 PROCEDURE — 92507 TX SP LANG VOICE COMM INDIV: CPT

## 2024-04-02 NOTE — PROGRESS NOTES
"Daily Note     Today's date: 2024  Patient name: Bob Fernandes  : 1960  MRN: [id number]  Referring provider: David Hanna MD  Dx:   Encounter Diagnosis     ICD-10-CM    1. Parkinson's disease without dyskinesia, unspecified whether manifestations fluctuate  G20.A1       2. Balance disorder  R26.89                      Subjective: Feeling well today. Was d/c from PT from ortho for his foot      Objective: See treatment diary below      Assessment: Pt tolerated session very well without foot pain. Attempted more dual tasking with fast balance challenges which he performed very well.       Plan: Continue per plan of care.         Precautions: Parkinsons, Mild CAD, HTN, H/O L TKA.  Short Term Goal Expiration Date:(24)  Long Term Goal Expiration Date: (24)  POC Expiration Date: (24)        POC expires Unit limit Auth Expiration date PT/OT/ST + Visit Limit?   24 3 3/29/24 3        24                                   Visit/Unit Tracking  AUTH Status:  Date 3/13  3/19  4/2                     Used 2  3  4                     Remaining  6  5  4                               Manuals 3/13  3/19  4/2                                                               Neuro Re-Ed              Side stepping incline 100 ft x 1 ea            Blaze pod Color catch - square x 2    colo rcatch laterally with pickle ball toss - 2 rounds    Trignle no pickle ball - 2 rounds        Hurdles   Airex pad between 12\" hurdles  -15# aw 10 laps fwd, 5 laps lat min ue          STS   Airex pad tidal tank 3x10          karaoke   10 laps //bars no UE         Step ups    15# aw 8\" step fwd/lat 20x ea  8\" with airex and pickle ball hit       River rocks   Tidal tank varying levels of rocks - 10x //bars                                                   Ther Ex                                                                                                                             Ther Activity                     "                     Gait Training             [facility]  100 ft x 4 up and down                          Modalities

## 2024-04-02 NOTE — PROGRESS NOTES
"Daily Speech Treatment Note    Today's date: 2024  Patient’s name: Bob Fernandes  : 1960  MRN: [id number]  Safety measures: PD  Referring provider: Sasha Gamble PA-C    Encounter Diagnosis     ICD-10-CM    1. Oropharyngeal dysphagia  R13.12       2. Hypokinetic Parkinsonian dysphonia  G20.A1     R49.0             Visit tracking:  Re-eval Due POC Expires Auth Expiration Date ST Visit Limit   3/15/24  3/15/24  12/31/24 BOMN   24 8                     Visit/Unit Tracking  AUTH Status:  Date 2/9/24 2/14/24 2/16 2/20 2/23 2/26 3/6  RE 3/12 3/14 3/19 3/27 4/2   8 visits approved Used 1 2 3 4 5 6 1 2 3 4 5 6    Remaining  BOMN      7 6 5 4 3 2       Subjective/Behavioral: Patient reporting more hoarseness lately.     4/3 - Dr. Haider      Objective/Assessment: EMST75 - gave DME order to patient to ask Dr. Haider to sign tomorrow    Short-term goals:  1. Patient and family will watch Parkinson's voice project informational session called \"Learn About Parkinson's\" in order to better understand the SpeakOUT! program and commitment -  [email or web address].com/watch?[email or web address]  - Patient reported he watched the video     2. Patient will obtain a SpeakOUT! Workbook and complete exercises twice a day for 25 days, before transitioning to the LOUD CROWD. -Patient has his workbook     3. Patient will coordinate vocal and articulatory subsystems in hierarchal speech tasks by producing sounds with intention with minimal assistance     4. Patient will read phrases, sentences and paragraphs with intention, yielding improved vocal quality, loudness, articulatory precision, and endrance while maintaining a minimum of 75 db with min assistance.      5. Patient will generalize intentional speech to cognitive-linguistic exercises and conversational speech with improved vocal quality, loudness, articulatory precision, and endurance while maintaining a minimum of 75 dB with min assistance.    SPEAK OUT!® " "Treatment Note:   Session #: 10/25    The following exercises were targeted to facilitate connection of breath to speech, loudness, articulation, expression, and intent of speech:    Sound level meter-to-mouth distance: 50 cm throughout session    Task Data Level of Cueing Provided   Pre-treatment spontaneous conversation  70 dB SPL N/A         Warm-up (“may-me-my-maxine-moo”) 78 dB SPL minimal-none         Sustained “ah” 10.2 seconds minimal-none   Sustained “ah” 75 dB SPL minimal-none         Vocal glides 76 dB SPL minimal-none         Numerical sequences 77 dB SPL minimal-none        Oral reading (phrase level) 75 dB SPL minimal        Cognitive-linguistic tasks workbook (phrase level) 72 dB SPL minimal                  Post-treatment spontaneous conversation 71 dB SPL N/A      Patient required minimal-none cues to utilize \"INTENT\" during spontaneous conversation on this date of service.      Plan:  -Continue with current plan of care.       "

## 2024-04-05 ENCOUNTER — OFFICE VISIT (OUTPATIENT)
Dept: PHYSICAL THERAPY | Facility: CLINIC | Age: 64
End: 2024-04-05
Payer: COMMERCIAL

## 2024-04-05 ENCOUNTER — OFFICE VISIT (OUTPATIENT)
Dept: SPEECH THERAPY | Facility: CLINIC | Age: 64
End: 2024-04-05
Payer: COMMERCIAL

## 2024-04-05 DIAGNOSIS — R13.12 OROPHARYNGEAL DYSPHAGIA: ICD-10-CM

## 2024-04-05 DIAGNOSIS — G20.A1 HYPOKINETIC PARKINSONIAN DYSPHONIA: Primary | ICD-10-CM

## 2024-04-05 DIAGNOSIS — R26.2 AMBULATORY DYSFUNCTION: ICD-10-CM

## 2024-04-05 DIAGNOSIS — R49.0 HYPOKINETIC PARKINSONIAN DYSPHONIA: Primary | ICD-10-CM

## 2024-04-05 DIAGNOSIS — R26.89 BALANCE DISORDER: ICD-10-CM

## 2024-04-05 DIAGNOSIS — G20.A1 PARKINSON'S DISEASE WITHOUT DYSKINESIA, UNSPECIFIED WHETHER MANIFESTATIONS FLUCTUATE: Primary | ICD-10-CM

## 2024-04-05 PROCEDURE — 97110 THERAPEUTIC EXERCISES: CPT | Performed by: PHYSICAL THERAPIST

## 2024-04-05 PROCEDURE — 97112 NEUROMUSCULAR REEDUCATION: CPT | Performed by: PHYSICAL THERAPIST

## 2024-04-05 PROCEDURE — 92507 TX SP LANG VOICE COMM INDIV: CPT

## 2024-04-05 NOTE — PROGRESS NOTES
"Daily Note     Today's date: 2024  Patient name: Bob Fernandes  : 1960  MRN: [id number]  Referring provider: David Hanna MD  Dx: No diagnosis found.               Subjective: Patient reports feeling well today      Objective: See treatment diary below      Assessment: Pt had noted anterior trunk lean on treadmill he has dfficulty correcting while keeping balance. Able to do several reptitions of walkign with 15# wts today with good success. Also did well with turns on treadmill maintaining balance while doing beach ball toss.       Plan: Continue per plan of care.         Precautions: Parkinsons, Mild CAD, HTN, H/O L TKA.  Short Term Goal Expiration Date:(24)  Long Term Goal Expiration Date: (24)  POC Expiration Date: (24)        POC expires Unit limit Auth Expiration date PT/OT/ST + Visit Limit?   24 3 3/29/24 3        24                                   Visit/Unit Tracking  AUTH Status:  Date 3/13  3/19  4/2  4/5                   Used 2  3  4  5                   Remaining  6  5  4  3                             Manuals 3/13  3/19  4/2  4/5                                                             Neuro Re-Ed              Side stepping incline 100 ft x 1 ea            Blaze pod Color catch - square x 2    colo rcatch laterally with pickle ball toss - 2 rounds    Trignle no pickle ball - 2 rounds        Hurdles   Airex pad between 12\" hurdles  -15# aw 10 laps fwd, 5 laps lat min ue          STS   Airex pad tidal tank 3x10    2x10      karaoke   10 laps //bars no UE         Step ups    15# aw 8\" step fwd/lat 20x ea  8\" with airex and pickle ball hit       River rocks   Tidal tank varying levels of rocks - 10x //bars          obstacle course       Incline wedge, bi, river rock, incline board - 3 repetitions of 5 laps - 1 repetition of 5 laps with tidal tank and 15# aw       treadmill turns        6 min 0.8 mph  fwd/bkwd varying inclines with ball toss                 "   Ther Ex              treadmill       3 min 2.0 mph                                                                                                       Ther Activity                                         Gait Training             [facility]  100 ft x 4 up and down                          Modalities

## 2024-04-05 NOTE — PROGRESS NOTES
"Daily Speech Treatment Note    Today's date: 2024  Patient’s name: Bob Fernandes  : 1960  MRN: [id number]  Safety measures: PD  Referring provider: Sasha Gamble PA-C    Encounter Diagnosis     ICD-10-CM    1. Oropharyngeal dysphagia  R13.12       2. Hypokinetic Parkinsonian dysphonia  G20.A1     R49.0             Visit tracking:  Re-eval Due POC Expires Auth Expiration Date ST Visit Limit   3/15/24  3/15/24  12/31/24 BOMN   24 8                     Visit/Unit Tracking  AUTH Status:  Date 3/6  RE 3/12 3/14 3/19 3/27 4/2 4/4   8 visits approved Used 1 2 3 4 5 6 7    Remaining  7 6 5 4 3 2 1       Subjective/Behavioral:     4/3 - Dr. Haider (reports normal vocal folds). Recommended VBSS.     Objective/Assessment:     Short-term goals:  1. Patient and family will watch Parkinson's voice project informational session called \"Learn About Parkinson's\" in order to better understand the SpeakOUT! program and commitment -  [email or web address]  - Patient reported he watched the video     2. Patient will obtain a SpeakOUT! Workbook and complete exercises twice a day for 25 days, before transitioning to the LOUD CROWD. -Patient has his workbook     3. Patient will coordinate vocal and articulatory subsystems in hierarchal speech tasks by producing sounds with intention with minimal assistance     4. Patient will read phrases, sentences and paragraphs with intention, yielding improved vocal quality, loudness, articulatory precision, and endrance while maintaining a minimum of 75 db with min assistance.      5. Patient will generalize intentional speech to cognitive-linguistic exercises and conversational speech with improved vocal quality, loudness, articulatory precision, and endurance while maintaining a minimum of 75 dB with min assistance.    SPEAK OUT!® Treatment Note:   Session #:     The following exercises were targeted to facilitate connection of breath to " "speech, loudness, articulation, expression, and intent of speech:    Sound level meter-to-mouth distance: 50 cm throughout session    Task Data Level of Cueing Provided   Pre-treatment spontaneous conversation  72 dB SPL N/A         Warm-up (“may-me-my-maxine-moo”) 80 dB SPL minimal-none         Sustained “ah” 10 seconds minimal-none   Sustained “ah” 77 dB SPL minimal-none         Vocal glides 81 dB SPL minimal-none         Numerical sequences 80 dB SPL minimal-none        Oral reading (phrase level) 76 dB SPL minimal        Cognitive-linguistic tasks workbook (phrase level) 71 dB SPL minimal                  Post-treatment spontaneous conversation 72 dB SPL N/A      Patient required minimal-none cues to utilize \"INTENT\" during spontaneous conversation on this date of service.      Plan:  -Continue with current plan of care.       "

## 2024-04-08 ENCOUNTER — OFFICE VISIT (OUTPATIENT)
Dept: PHYSICAL THERAPY | Facility: CLINIC | Age: 64
End: 2024-04-08
Payer: COMMERCIAL

## 2024-04-08 DIAGNOSIS — R26.2 AMBULATORY DYSFUNCTION: ICD-10-CM

## 2024-04-08 DIAGNOSIS — R26.89 BALANCE DISORDER: ICD-10-CM

## 2024-04-08 DIAGNOSIS — G20.A1 PARKINSON'S DISEASE WITHOUT DYSKINESIA, UNSPECIFIED WHETHER MANIFESTATIONS FLUCTUATE: Primary | ICD-10-CM

## 2024-04-08 PROCEDURE — 97112 NEUROMUSCULAR REEDUCATION: CPT | Performed by: PHYSICAL THERAPIST

## 2024-04-09 ENCOUNTER — APPOINTMENT (OUTPATIENT)
Dept: PHYSICAL THERAPY | Facility: CLINIC | Age: 64
End: 2024-04-09
Payer: COMMERCIAL

## 2024-04-09 ENCOUNTER — EVALUATION (OUTPATIENT)
Dept: SPEECH THERAPY | Facility: CLINIC | Age: 64
End: 2024-04-09
Payer: COMMERCIAL

## 2024-04-09 DIAGNOSIS — G20.A1 HYPOKINETIC PARKINSONIAN DYSPHONIA: Primary | ICD-10-CM

## 2024-04-09 DIAGNOSIS — R13.12 OROPHARYNGEAL DYSPHAGIA: ICD-10-CM

## 2024-04-09 DIAGNOSIS — R49.0 HYPOKINETIC PARKINSONIAN DYSPHONIA: Primary | ICD-10-CM

## 2024-04-09 PROCEDURE — 92508 TX SP LANG VOICE COMM GROUP: CPT

## 2024-04-09 NOTE — PROGRESS NOTES
"Speech-Language Pathology Re-Evaluation    Today's date: 2024   Patient’s name: Bob Fernandes  : 1960  MRN: [id number]  Safety measures: PD  Referring provider: Sasha Gamble PA-C    Encounter Diagnosis     ICD-10-CM    1. Hypokinetic Parkinsonian dysphonia  G20.A1     R49.0       2. Oropharyngeal dysphagia  R13.12           Assessment:  Patient presents with a mild voice disorder, characterized by reduced loudness, monotone pitch, and a hoarse vocal quality, which contributed to an overall decrease in speech intelligibility, intermittently. During conversation, speech intelligibility is reduced <5% of the time. Patient has been participating in the SPEAK OUT!® program and doing great. Today he is on day . Recommended continued skilled therapy to target goals listed below.     Patient scheduled for a Video Barium Swallow Study on  24 to further assess dysphagia.   He saw ENT; who reported vocal folds were normal  I am recommending EMST75 (expiratory muscle strength ) for speech and swallowing    Short term goals:  1. Patient and family will watch Parkinson's voice project informational session called \"Learn About Parkinson's\" in order to better understand the SpeakOUT! program and commitment -  https://www.youtube.com/watch?v=[email or web address]  - MET     2. Patient will obtain a SpeakOUT! Workbook and complete exercises twice a day for 25 days, before transitioning to the LOUD CROWD. -Patient on day  of SpeakOUT! program     3. Patient will coordinate vocal and articulatory subsystems in hierarchal speech tasks by producing sounds with intention with minimal assistance ONGOING     4. Patient will read phrases, sentences and paragraphs with intention, yielding improved vocal quality, loudness, articulatory precision, and endrance while maintaining a minimum of 75 db with min assistance.  ONGOING     5. Patient will generalize intentional speech to cognitive-linguistic exercises and " conversational speech with improved vocal quality, loudness, articulatory precision, and endurance while maintaining a minimum of 75 dB with min assistance.ONGOING    (NEW GOAL)  Patient will participate in expiratory muscle strength training (EMST) using a resistance based device to increase strength of respiratory muscles for improving cough, voice and swallow functions, to be achieved in 4-6 weeks.     Long-term goals:  1. Patient will establish a home exercise program independently or with appropriate supports.ONGOING     2. Patient will participate in the LOUD CROWD (online or in person) as able after completion of one on one SLP treatments.ONGOING     3. Patient will produce spontaneous conversation while using intent independently or with appropriate supports. ONGOING    Plan  Patient would benefit from outpatient skilled Speech Therapy services: Voice therapy and Dysphagia therapy    Frequency: 2x weekly  Duration: 6-8 weeks    Intervention certification from: 4/9/2024  Intervention certification to: 6/9/2024      Subjective  Patient's goal(s):  To be able to swallow and speak clearly    Objective    Voice Handicap Index (VHI):  The VHI is a list of 30 statements that many people have used to describe their voices and the effects of their voices on their lives. Patient indicated how frequently he has the same experience using a rating point scale (“never” = 0, “almost never” = 1, “sometimes” = 2, “almost always” = 3, and “always” = 4).  Results were as follows:    Subscale: 4/9/24 2/9/24 Self-Perceived Impairment Level: STATUS   Physical 24/40 24/40 SEVERE No change   Functional 21/40 22/40 SEVERE Improvement   Emotional 23/40 20/40 SEVERE Decline          TOTAL 68/120 66/120 SEVERE Decline         Treatment    SPEAK OUT!® Treatment Note:   Session #: 12/25    The following exercises were targeted to facilitate connection of breath to speech, loudness, articulation, expression, and intent of speech:    Sound  "level meter-to-mouth distance: 50 cm throughout session    Task 4/9/24 2/14/24 Level of Cueing Provided STATUS   Pre-treatment spontaneous conversation  70 dB SPL 64 dB SPL N/A IMPROVEMENT           Warm-up (“may-me-my-maxine-moo”) 78  dB SPL 77 dB SPL minimal-none IMPROVEMENT           Sustained “ah” 10 seconds 10 seconds minimal-none WNL   Sustained “ah” 78 dB SPL 77 dB SPL minimal-none IMPROVEMENT           Vocal glides 78 dB SPL 75 dB SPL minimal-none IMPROVEMENT           Numerical sequences 80 dB SPL 82 dB SPL minimal-none DECLINE          Oral reading (phrase level) 74 dB SPL 78 dB SPL minimal DECLINE          Cognitive-linguistic tasks workbook (phrase level) 74 dB SPL 73 dB SPL minimal IMPROVEMENT                        Post-treatment spontaneous conversation 72 dB SPL 66 dB SPL N/A IMPROVEMENT      Patient required minimal-none cues to utilize \"INTENT\" during spontaneous conversation on this date of service.        Visit tracking:  Re-eval Due POC Expires Auth Expiration Date ST Visit Limit   3/15/24  3/15/24  12/31/24 BOMN   5/6/24 5/6/24 9/2/24 8   6/9/24 6/9/24 pending pending               Visit/Unit Tracking  AUTH Status:  Date 3/6  RE 3/12 3/14 3/19 3/27 4/2 4/4 4/9   8 visits approved Used 1 2 3 4 5 6 7 8    Remaining  7 6 5 4 3 2 1 0         "

## 2024-04-11 NOTE — PROGRESS NOTES
"Daily Note     Today's date: 2024  Patient name: Bob Fernandes  : 1960  MRN: [id number]  Referring provider: David Hanna MD  Dx:   Encounter Diagnosis     ICD-10-CM    1. Parkinson's disease without dyskinesia, unspecified whether manifestations fluctuate  G20.A1       2. Balance disorder  R26.89       3. Ambulatory dysfunction  R26.2                      Subjective: Patient reports no new changes since last session      Objective: See treatment diary below      Assessment: Patient tolerated hill climb without loss of balance but did have difficulty controlling ankle up and down laterally on the uneven surface.       Plan: Continue per plan of care.         Precautions: Parkinsons, Mild CAD, HTN, H/O L TKA.  Short Term Goal Expiration Date:(24)  Long Term Goal Expiration Date: (24)  POC Expiration Date: (24)        POC expires Unit limit Auth Expiration date PT/OT/ST + Visit Limit?   24 3 3/29/24 3        24                                   Visit/Unit Tracking  AUTH Status:  Date 3/13  3/19  4/2  4/5  4/8                 Used 2  3  4  5  6                 Remaining  6  5  4  3  2                           Manuals 3/13  3/19  4/2  4/5  4/8                                                           Neuro Re-Ed              Side stepping incline 100 ft x 1 ea            Blaze pod Color catch - square x 2    colo rcatch laterally with pickle ball toss - 2 rounds    Trignle no pickle ball - 2 rounds        Hurdles   Airex pad between 12\" hurdles  -15# aw 10 laps fwd, 5 laps lat min ue          STS   Airex pad tidal tank 3x10    2x10  3x10 with tidal tank airex    karaoke   10 laps //bars no UE         Step ups    15# aw 8\" step fwd/lat 20x ea  8\" with airex and pickle ball hit       River rocks   Tidal tank varying levels of rocks - 10x //bars      Tidal tank varying levels of rocks - 10x //bars    obstacle course       Incline wedge, bi, river rock, incline board - 3 " repetitions of 5 laps - 1 repetition of 5 laps with tidal tank and 15# aw       treadmill turns        6 min 0.8 mph  fwd/bkwd varying inclines with ball toss                   Ther Ex              treadmill       3 min 2.0 mph                                                                                                       Ther Activity                                         Gait Training             [employer]  100 ft x 4 up and down         100 ft x 4 up and down fwd and lat                 Modalities

## 2024-04-12 ENCOUNTER — APPOINTMENT (OUTPATIENT)
Dept: SPEECH THERAPY | Facility: CLINIC | Age: 64
End: 2024-04-12
Payer: COMMERCIAL

## 2024-04-12 ENCOUNTER — OFFICE VISIT (OUTPATIENT)
Dept: PHYSICAL THERAPY | Facility: CLINIC | Age: 64
End: 2024-04-12
Payer: COMMERCIAL

## 2024-04-12 DIAGNOSIS — R26.89 BALANCE DISORDER: ICD-10-CM

## 2024-04-12 DIAGNOSIS — R26.2 AMBULATORY DYSFUNCTION: ICD-10-CM

## 2024-04-12 DIAGNOSIS — G20.A1 PARKINSON'S DISEASE WITHOUT DYSKINESIA, UNSPECIFIED WHETHER MANIFESTATIONS FLUCTUATE: Primary | ICD-10-CM

## 2024-04-12 PROCEDURE — 97112 NEUROMUSCULAR REEDUCATION: CPT | Performed by: PHYSICAL THERAPIST

## 2024-04-12 PROCEDURE — 97150 GROUP THERAPEUTIC PROCEDURES: CPT | Performed by: PHYSICAL THERAPIST

## 2024-04-12 NOTE — PROGRESS NOTES
"Daily Note     Today's date: 2024  Patient name: Bob Fernandes  : 1960  MRN: [id number]  Referring provider: David Hanna MD  Dx:   Encounter Diagnosis     ICD-10-CM    1. Parkinson's disease without dyskinesia, unspecified whether manifestations fluctuate  G20.A1       2. Balance disorder  R26.89       3. Ambulatory dysfunction  R26.2                      Subjective: Patient reports feeling well today.       Objective: See treatment diary below      Assessment: Struggled with balance going on foam beams with tall hurdles and aw for first few repetitions. Eventually he was able to do it. Unable to speed up karaoke steps when wearing weights but did improve with time and repetition as well. Plan to attempt boxing next session to increase UE and core involvement. Discussed HEP as well.       Plan: Continue per plan of care.         Precautions: Parkinsons, Mild CAD, HTN, H/O L TKA.  Short Term Goal Expiration Date:(24)  Long Term Goal Expiration Date: (24)  POC Expiration Date: (24)        POC expires Unit limit Auth Expiration date PT/OT/ST + Visit Limit?   24 3 3/29/24 3        24                                   Visit/Unit Tracking  AUTH Status:  Date 3/13  3/19  4/2  4/5  4/8  4/12               Used 2  3  4  5  6  7               Remaining  6  5  4  3  2  1                         Manuals                                                Neuro Re-Ed           Side stepping incline          Blaze pod           Hurdles           STS 3x10 with airex and tidal tank    2x10  3x10 with tidal tank airex    karaoke          Step ups  12\" step with airex pad 10x          River rocks Varying heights and narrowed base of support - 5 reps without tidal tank 5 reps with //bars      Tidal tank varying levels of rocks - 10x //bars    obstacle course Incline with foam beam and 12\" hrudless - 10 laps ea fwd/lat 10# aw b/l    Incline wedge, bi, river rock, incline board " - 3 repetitions of 5 laps - 1 repetition of 5 laps with tidal tank and 15# aw       treadmill turns     6 min 0.8 mph  fwd/bkwd varying inclines with ball toss                Ther Ex           treadmill 3.5 mph 10 min 10% incline with b/l UE to increase push off and step clearance   3 min 2.0 mph                                                                                  Ther Activity                                Gait Training          [facility]        100 ft x 4 up and down fwd and lat              Modalities

## 2024-04-16 ENCOUNTER — OFFICE VISIT (OUTPATIENT)
Dept: PHYSICAL THERAPY | Facility: CLINIC | Age: 64
End: 2024-04-16
Payer: COMMERCIAL

## 2024-04-16 ENCOUNTER — APPOINTMENT (OUTPATIENT)
Dept: SPEECH THERAPY | Facility: CLINIC | Age: 64
End: 2024-04-16
Payer: COMMERCIAL

## 2024-04-16 DIAGNOSIS — R26.2 AMBULATORY DYSFUNCTION: ICD-10-CM

## 2024-04-16 DIAGNOSIS — G20.A1 PARKINSON'S DISEASE WITHOUT DYSKINESIA, UNSPECIFIED WHETHER MANIFESTATIONS FLUCTUATE: Primary | ICD-10-CM

## 2024-04-16 DIAGNOSIS — R26.89 BALANCE DISORDER: ICD-10-CM

## 2024-04-16 DIAGNOSIS — R49.0 HYPOKINETIC PARKINSONIAN DYSPHONIA: ICD-10-CM

## 2024-04-16 DIAGNOSIS — R13.12 OROPHARYNGEAL DYSPHAGIA: Primary | ICD-10-CM

## 2024-04-16 DIAGNOSIS — G20.A1 HYPOKINETIC PARKINSONIAN DYSPHONIA: ICD-10-CM

## 2024-04-16 PROCEDURE — 97112 NEUROMUSCULAR REEDUCATION: CPT | Performed by: PHYSICAL THERAPIST

## 2024-04-16 NOTE — PROGRESS NOTES
"Daily Speech Treatment Note    Today's date: 2024  Patient’s name: Bob Fernandes  : 1960  MRN: [id number]  Safety measures: PD  Referring provider: Sasha Gamble PA-C    Encounter Diagnosis     ICD-10-CM    1. Oropharyngeal dysphagia  R13.12       2. Hypokinetic Parkinsonian dysphonia  G20.A1     R49.0             Visit tracking:  Re-eval Due POC Expires Auth Expiration Date ST Visit Limit   3/15/24  3/15/24  12/31/24 BOMN   24 8                     Visit/Unit Tracking  AUTH Status:  Date 3/6  RE 3/12 3/14 3/19 3/27 4/2 4/4   8 visits approved Used 1 2 3 4 5 6 7    Remaining  7 6 5 4 3 2 1       Subjective/Behavioral:     4/3 - Dr. Haider (reports normal vocal folds). Recommended VBSS.     Objective/Assessment:     Short-term goals:  1. Patient and family will watch Parkinson's voice project informational session called \"Learn About Parkinson's\" in order to better understand the SpeakOUT! program and commitment -  [email or web address]  - Patient reported he watched the video     2. Patient will obtain a SpeakOUT! Workbook and complete exercises twice a day for 25 days, before transitioning to the LOUD CROWD. -Patient has his workbook     3. Patient will coordinate vocal and articulatory subsystems in hierarchal speech tasks by producing sounds with intention with minimal assistance     4. Patient will read phrases, sentences and paragraphs with intention, yielding improved vocal quality, loudness, articulatory precision, and endrance while maintaining a minimum of 75 db with min assistance.      5. Patient will generalize intentional speech to cognitive-linguistic exercises and conversational speech with improved vocal quality, loudness, articulatory precision, and endurance while maintaining a minimum of 75 dB with min assistance.    SPEAK OUT!® Treatment Note:   Session #:     The following exercises were targeted to facilitate connection of breath to " "speech, loudness, articulation, expression, and intent of speech:    Sound level meter-to-mouth distance: 50 cm throughout session    Task Data Level of Cueing Provided   Pre-treatment spontaneous conversation  72 dB SPL N/A         Warm-up (“may-me-my-maxine-moo”) 80 dB SPL minimal-none         Sustained “ah” 10 seconds minimal-none   Sustained “ah” 77 dB SPL minimal-none         Vocal glides 81 dB SPL minimal-none         Numerical sequences 80 dB SPL minimal-none        Oral reading (phrase level) 76 dB SPL minimal        Cognitive-linguistic tasks workbook (phrase level) 71 dB SPL minimal                  Post-treatment spontaneous conversation 72 dB SPL N/A      Patient required minimal-none cues to utilize \"INTENT\" during spontaneous conversation on this date of service.      Plan:  -Continue with current plan of care.       "

## 2024-04-16 NOTE — PROGRESS NOTES
Progress Note     Today's date: 2024  Patient name: Bob Fernandes  : 1960  MRN: [id number]  Referring provider: David Hanna MD  Dx: No diagnosis found.               Subjective: Patient reports falling yesterday when vacuuming around the stairs.       Objective: See treatment diary below      Functional outcomes   6 minute walk test: 1525  Gait speed: 1.6 m/s  5x sit to stand: 13.85 (seconds)  TU.12 (seconds)  TUG cognitive: 9.78 (seconds)  FGA :   ABC: 75%    Functional Outcomes   6 mwt: 1750   Gait Speed: 1.8 m/s  FGA:  ABC: 69%      Assessment: Patient completed progress update this session with good success. He has improved with balance per FGA, gait speed and endurance and gait efficiency per 6 mwt. At this time patient will continue to benefit from skilled PT intervention as he had a fall yesterday and continues to report feeling he has decreased balance and will then transition fully to HEP.     Goals  STG:  Pt will improve ABC scale by 20% in 4 weeks - not met  Pt will complete 6 mwt in 1600 ft in 4 weeks - met   Pt will score 80% or greater on ABC scale in 4 weeks - not met  Pt will be independent with HEP within 4 weeks - met  Pt will complete FGA with a score of 27/30 in 4 weeks - met     LTG  Pt will complete ABC scale with a score of 90% in 8 weeks   Pt will transition to independent HEP consistently within 8 weeks  Pt will complete FGA with a score of 30/30 n 8 weeks    Plan: Continue per plan of care.         Precautions: Parkinsons, Mild CAD, HTN, H/O L TKA.  Short Term Goal Expiration Date:(24)  Long Term Goal Expiration Date: (24)  POC Expiration Date: (24)        POC expires Unit limit Auth Expiration date PT/OT/ST + Visit Limit?   24 3 3/29/24 3        24                                   Visit/Unit Tracking  AUTH Status:  Date 3/13  3/19  4/2  4             Used 2  3  4  5  6  7  8             Remaining  6  5  4  3   "2  1  0                       Manuals 4/12 4/16 4/5 4/8                                               Neuro Re-Ed           Side stepping incline          Blaze pod           Hurdles           STS 3x10 with airex and tidal tank 3x10 with airex and tidal tank   2x10  3x10 with tidal tank airex    karaoke          Step ups  12\" step with airex pad 10x          River rocks Varying heights and narrowed base of support - 5 reps without tidal tank 5 reps with //bars      Tidal tank varying levels of rocks - 10x //bars    obstacle course Incline with foam beam and 12\" hrudless - 10 laps ea fwd/lat 10# aw b/l    Incline wedge, bi, river rock, incline board - 3 repetitions of 5 laps - 1 repetition of 5 laps with tidal tank and 15# aw       treadmill turns     6 min 0.8 mph  fwd/bkwd varying inclines with ball toss                Ther Ex           treadmill 3.5 mph 10 min 10% incline with b/l UE to increase push off and step clearance   3 min 2.0 mph      lunges  5 laps mirror         bosu squats  30x                                                               Ther Activity                                Gait Training          [facility]        100 ft x 4 up and down fwd and lat              Modalities                                                     "

## 2024-04-19 ENCOUNTER — APPOINTMENT (OUTPATIENT)
Dept: SPEECH THERAPY | Facility: CLINIC | Age: 64
End: 2024-04-19
Payer: COMMERCIAL

## 2024-04-19 ENCOUNTER — HOSPITAL ENCOUNTER (OUTPATIENT)
Dept: RADIOLOGY | Facility: HOSPITAL | Age: 64
Discharge: HOME | End: 2024-04-19
Attending: OTOLARYNGOLOGY
Payer: COMMERCIAL

## 2024-04-19 ENCOUNTER — OFFICE VISIT (OUTPATIENT)
Dept: PHYSICAL THERAPY | Facility: CLINIC | Age: 64
End: 2024-04-19
Payer: COMMERCIAL

## 2024-04-19 DIAGNOSIS — R26.89 BALANCE DISORDER: ICD-10-CM

## 2024-04-19 DIAGNOSIS — G20.A1 PARKINSON'S DISEASE WITHOUT DYSKINESIA, UNSPECIFIED WHETHER MANIFESTATIONS FLUCTUATE: Primary | ICD-10-CM

## 2024-04-19 DIAGNOSIS — R26.2 AMBULATORY DYSFUNCTION: ICD-10-CM

## 2024-04-19 DIAGNOSIS — R13.10 DYSPHAGIA, UNSPECIFIED TYPE: ICD-10-CM

## 2024-04-19 PROCEDURE — 74230 X-RAY XM SWLNG FUNCJ C+: CPT

## 2024-04-19 PROCEDURE — 97112 NEUROMUSCULAR REEDUCATION: CPT | Performed by: PHYSICAL THERAPIST

## 2024-04-19 PROCEDURE — 92611 MOTION FLUOROSCOPY/SWALLOW: CPT

## 2024-04-19 PROCEDURE — 97110 THERAPEUTIC EXERCISES: CPT | Performed by: PHYSICAL THERAPIST

## 2024-04-19 NOTE — PROGRESS NOTES
"Daily Note     Today's date: 2024  Patient name: Bob Fernandes  : 1960  MRN: [id number]  Referring provider: David Hanna MD  Dx:   Encounter Diagnosis     ICD-10-CM    1. Parkinson's disease without dyskinesia, unspecified whether manifestations fluctuate  G20.A1       2. Balance disorder  R26.89       3. Ambulatory dysfunction  R26.2                      Subjective: patient reports to physical therapy this date with no new changes to report      Objective: See treatment diary below      Assessment:   Patient struggled with agility ladder repetitions with maintaining pattern and coordinating feet. Plan to attempt again next session to continue to progress upper level coordination training.     Plan: Continue per plan of care.         Precautions: Parkinsons, Mild CAD, HTN, H/O L TKA.  Short Term Goal Expiration Date:(24)  Long Term Goal Expiration Date: (24)  POC Expiration Date: (24)        POC expires Unit limit Auth Expiration date PT/OT/ST + Visit Limit?   24 3 3/29/24 3        24                                   Visit/Unit Tracking  AUTH Status:  Date 3/13  3/19  4/2  4/5  4/8  4/12  4/16  4/19           Used 2  3  4  5  6  7  8  1           Remaining  6  5  4  3  2  1  0  7                     Manuals                                                Neuro Re-Ed           Side stepping incline          [facility]   Holding tidal tank on foam kiley - 10 laps fwd                  STS 3x10 with airex and tidal tank 3x10 with airex and tidal tank   2x10  3x10 with tidal tank airex    karaoke          Step ups  12\" step with airex pad 10x          River rocks Varying heights and narrowed base of support - 5 reps without tidal tank 5 reps with //bars      Tidal tank varying levels of rocks - 10x //bars    obstacle course Incline with foam beam and 12\" hrudless - 10 laps ea fwd/lat 10# aw b/l    Incline wedge, bi, river rock, incline board - 3 " repetitions of 5 laps - 1 repetition of 5 laps with tidal tank and 15# aw       treadmill turns     6 min 0.8 mph  fwd/bkwd varying inclines with ball toss      agility ladder   3 different combinations - 10 trials each       Ther Ex           treadmill 3.5 mph 10 min 10% incline with b/l UE to increase push off and step clearance   3 min 2.0 mph      lunges  5 laps mirror         bosu squats  30x 30x                                                              Ther Activity                                Gait Training          [employer]        100 ft x 4 up and down fwd and lat              Modalities

## 2024-04-19 NOTE — PROCEDURES
"                                   Video Swallow Study      Patient Name: Bob Fernandes  Today's Date: 4/19/2024        Past Medical History  Past Medical History:   Diagnosis Date    Hemorrhoids     Hypertension     Parkinson disease     Thrombocytosis 03/15/2021        Past Surgical History  Past Surgical History:   Procedure Laterality Date    KNEE SURGERY         General Information:   63 yo male referred to [facility] for a VBS by Dr. Haider for dysphagia w/ c/o occasional coughing/choking when eating or drinking. Pt states \"once in a while I choke on water or food. The food sits in my throat\". Pt also states that the globus sensation has improved in the past week as he is \"more aware\" of it happening, and has been utilizing compensatory strategies during outpatient speech therapy at [address] [place] [address]. Pt reports taking pills whole with cup sips of water, and has no swallowing difficulties/issues with pills.    Cognition: WFL    Speech/Swallow Mech: Oral motor movements appeared  WNL; Dentition was WNL; Cough was strong. Respiratory Status: WNL;   Current diet: regular solids/thin liquids.  Prior VBS: NO.    Pt was seen in radiology for a Video Barium Swallow Study, standing in the upright position and viewed laterally with the following consistencies: puree, nectar, thin, regular.       Results are as follows:     **Images are available for review on PACS        Oral Stage:   Pt with adequate bolus retrieval via spoon/cup/straw and was able to self administer all PO trials. Pt with adequate bite strength on cookie, and adequate labial seal on nectar/thin liquids via spoon/cup/straw with no labial escape. Adequate overall oral process with adequate tongue/bolus control. No oral residue noted s/p swallow of all consistencies trialed. Mastication/manipulation of cookie noted to be timely. Premature spillage noted with puree and regular solid consistencies to the level of the valleculae.          "   Pharyngeal Stage:   Swallow initiation was timely with adequate laryngeal elevation and tongue base retraction with mild valecular retention s/p swallow and mildly decreased airway entrance closure with consecutive cup sips of thin liquids. Barium pill cleared with single cup sip of thin liquids.               Esophageal Stage:   Briefly assessed. Barium pill cleared the esophagus with single cup sip of thin liquids.      Assessment Summary:   Swallow functioning WNL. Pt states improvement in swallow functioning within last week likely d/t utilization of compensatory strategies learned in outpatient speech therapy. Pt states he used to eat with fast rate of intake, and take bigger bites/sips, but since being more aware of inconsistent choking/coughing incidents, pt has been utilizing strategies of small bites, slow rate of intake, with success.        Recommendations:   Continue Regular diet consistency w/ thin liquids  Utilize compensatory swallow strategies; small bites, slow rate of intake, alternate liquids-solids  Meds as tolerated  Continue with outpatient speech therapy        Liberty Julio MS, CCC-SLP  Speech Pathologist  Available via [employer]

## 2024-04-23 ENCOUNTER — OFFICE VISIT (OUTPATIENT)
Dept: NEUROLOGY | Facility: CLINIC | Age: 64
End: 2024-04-23
Payer: COMMERCIAL

## 2024-04-23 VITALS
TEMPERATURE: 98.3 F | BODY MASS INDEX: 29.48 KG/M2 | SYSTOLIC BLOOD PRESSURE: 143 MMHG | WEIGHT: 211.4 LBS | HEART RATE: 72 BPM | DIASTOLIC BLOOD PRESSURE: 84 MMHG

## 2024-04-23 DIAGNOSIS — R41.89 COGNITIVE CHANGES: ICD-10-CM

## 2024-04-23 DIAGNOSIS — G20.A1 PARKINSON'S DISEASE WITHOUT DYSKINESIA, UNSPECIFIED WHETHER MANIFESTATIONS FLUCTUATE: Primary | ICD-10-CM

## 2024-04-23 DIAGNOSIS — R20.2 PARESTHESIAS IN LEFT HAND: ICD-10-CM

## 2024-04-23 PROCEDURE — 99214 OFFICE O/P EST MOD 30 MIN: CPT | Performed by: PHYSICIAN ASSISTANT

## 2024-04-23 NOTE — PROGRESS NOTES
Patient ID: Bob Fernandes is a 64 y.o. male.    Assessment/Plan:    Parkinson disease (HCC)  Patient with Parkinson's disease.  Overall he is doing well since his last visit.  He denies any wearing off between doses of medication although he will notice if he is late for a dose.  He has been working with physical therapy which has been helpful for his mobility and balance.  Recently completed speech therapy which was very beneficial for her swallowing.  He is trying to keep up with exercises on his own as well.  On exam today he had some right greater than left slowness along with some intermittent action tremors in the hands.  Overall no significant progression since the last visit.  He also recently retired from work which has been very helpful in regards to reducing his overall stress and anxiety levels.    At this time we will have him remain on his current dosing of Sinemet along with selegiline.  He is now only taking Sinemet 3 times a day given he is no longer needing to wake up as early in the morning since being retired.      Cognitive changes  He has been noticing some increased difficulty with memory.  This can be bothersome for him at times.  In the office today he scored 27/30 on MoCA testing compared to 25/30 in 2022.  Discussed the importance of him keeping mentally stimulated especially now that he is retired. Patient was encouraged to increase mind stimulating activities such as reading, crosswords, word searches, puzzles, Soduku, solitaire, coloring and other brain games.  We also discussed the importance of staying physically active and eating a health diet such as the Mediterranean or MIND diet.        Paresthesias in left hand  Since his last visit he was also found to have left-sided carpal tunnel and underwent carpal tunnel release in January with improvement. He has some swelling along the thumb joint on the left hand, he is scheduled to follow up with the hand surgeon and will discuss  this with them.       Subjective:    Bob Fernandes is a right-handed  who presents in follow up for MARÍA-positive parkinsonism. To review, symptom onset around 2018 with right>left tremor.  On initial presentation the patient had a rest and postural tremor on the right, minimal if any kinetic tremor.  Also had mild rigidity on the right and slight bradykinesia bilaterally with decrement on the right.  His gait was fairly normal with good postural reflexes.  No typical non motor symptoms associated with Parkinson's disease.    At his last visit no changes were made to his medications. He had some left hand paresthesia and was sent for an EMG.     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 Severe ulnar neuropathy, localized best across the elbow with ongoing denervation. There is no evidence of a cervical radiculopathy.    INTERVAL HISTORY:  He was referred to a hand surgeon for his EMG results.   Underwent LEFT carpal tunnel release 1/2/24  He has recovered well from this surgery and he feels symptoms are better   Recently seen by [facility], Dr Hanna, no changes were made to his medications   He is now retired, it is nice to have time to itself   He is now only taking the Sinemet tid given he is not waking as early now that he is not working   Sleeping well with medication   He can still perform all of his ADLs on his own   Imbalance at times   He is going to PT which helps   Swallowing is better since speech therapy, he had a repeat swallow study last week   He is doing exercises on his own at home   Memory not so good  He is going to try and work on doing more mentally stimulation   No hallucinations   He will notice some off time if he misses a dose  No wearing off if taken on time   He may notice a tremor in the hands when drinking at times     Current  medications:  Selegiline 5mg bid   Sinemet 25/100mg 1.5 tab qid (7:30-8am, 11:30-noon, 4:30pm), extra 1/2 tab prior to presentation  Sinemet CR before bed  He is taking tylenol PM which helps with sleep      Prior medications:  Pramipexole ER 0.375mg 3tabs qhs - discontinued due to obsessive behaviors         I personally reviewed and updated the ROS.       Objective:    Blood pressure 143/84, pulse 72, temperature 98.3 °F (36.8 °C), weight 95.9 kg (211 lb 6.4 oz).    Physical Exam  Constitutional:       Appearance: Normal appearance.   HENT:      Right Ear: Hearing normal.      Left Ear: Hearing normal.   Eyes:      General: Lids are normal.      Extraocular Movements: Extraocular movements intact.      Pupils: Pupils are equal, round, and reactive to light.   Pulmonary:      Effort: Pulmonary effort is normal.   Neurological:      Mental Status: He is alert.      Motor: Motor strength is normal.  Psychiatric:         Speech: Speech normal.         Neurological Exam  Mental Status  Alert. Oriented to person, place and time. Unable to copy figure. Clock drawing is abnormal. Speech is normal. Able to name objects. Able to perform serial calculations.  MoCA 4/23/24 - 27/30     MoCA 10/11/22 - 25/30   MoCA 1/26/22 - 24/30   MoCA 24/30 - 8/17/21.    Cranial Nerves  CN III, IV, VI: Extraocular movements intact bilaterally. Normal lids and orbits bilaterally. Pupils equal round and reactive to light bilaterally.  CN V:  Right: Facial sensation is normal.  Left: Facial sensation is normal on the left.  CN VIII:  Right: Hearing is normal.  Left: Hearing is normal.  CN XI: Shoulder shrug strength is normal.    Motor   Strength is 5/5 throughout all four extremities.    Sensory  Light touch is normal in upper and lower extremities.     Coordination  Right: Finger-to-nose normal.Left: Finger-to-nose normal.    Gait    Arose from the chair without difficulty.  Overall good stride. Slight shuffling at times. .        UPDRS  motor:     4/23/24 10/23/23                              Time since last dose:        Speech  0 1   Facial Expression  1 1   Rigidity - Neck  0 0   Rigidity - Upper Extremity (Right)  1 1   Rigidity - Upper Extremity (Left)   1 0   Rigidity - Lower Extremity (Right)  0 0   Rigidity - Lower Extremity (Left)   0 0   Finger Taps (Right)   1 2   Finger Taps (Left)   1 1   Hand Movement (Right)  1 1   Hand Movement (Left)   0 1   Pronation/Supination (Right)  2 2   Pronation/Supination (Left)   1 2   Toe Tapping (Right) 0 0   Toe Tapping (Left) 0 1   Leg Agility (Right)  1 1   Leg Agility (Left)   1 1   Arising from Chair   0     Gait   1 1   Freezing of Gait 0 0   Postural Stability         Posture 0 0   Global spontaneity of movement 1 1   Postural Tremor (Right) 0 0   Postural Tremor (Left) 0 0   Kinetic Tremor (Right)  0 0   Kinetic Tremor (Left)  0 1   Rest tremor amplitude RUE 0 0   Rest tremor amplitude LUE 1 0   Rest tremor amplitude RLE 0 0   Reset tremor amplitude LLE 0 0   Lip/Jaw Tremor  0 0   Consistency of tremor 0 0   Motor Exam Total:            ROS:    Review of Systems   Constitutional:  Positive for fatigue. Negative for appetite change and fever.   HENT: Negative.  Negative for hearing loss, tinnitus, trouble swallowing and voice change.    Eyes: Negative.  Negative for photophobia, pain and visual disturbance.   Respiratory: Negative.  Negative for shortness of breath.    Cardiovascular: Negative.  Negative for palpitations.   Gastrointestinal: Negative.  Negative for nausea and vomiting.   Endocrine: Negative.  Negative for cold intolerance.   Genitourinary: Negative.  Negative for dysuria, frequency and urgency.   Musculoskeletal:  Positive for gait problem (A little bit of shuffling of feet and stumbles), myalgias (Hands cramp up) and neck stiffness. Negative for back pain and neck pain.        Balance issues at times but it getting better     Skin: Negative.  Negative for rash.    Allergic/Immunologic: Negative.    Neurological:  Positive for dizziness (Occasionally), tremors (Hands, has stayed about the same), speech difficulty (once in a while when tired) and weakness (Arms and Legs). Negative for seizures, syncope, facial asymmetry, light-headedness, numbness and headaches.   Hematological: Negative.  Does not bruise/bleed easily.   Psychiatric/Behavioral: Negative.  Negative for confusion, hallucinations and sleep disturbance.    All other systems reviewed and are negative.

## 2024-04-23 NOTE — ASSESSMENT & PLAN NOTE
Since his last visit he was also found to have left-sided carpal tunnel and underwent carpal tunnel release in January with improvement. He has some swelling along the thumb joint on the left hand, he is scheduled to follow up with the hand surgeon and will discuss this with them.

## 2024-04-23 NOTE — ASSESSMENT & PLAN NOTE
Patient with Parkinson's disease.  Overall he is doing well since his last visit.  He denies any wearing off between doses of medication although he will notice if he is late for a dose.  He has been working with physical therapy which has been helpful for his mobility and balance.  Recently completed speech therapy which was very beneficial for her swallowing.  He is trying to keep up with exercises on his own as well.  On exam today he had some right greater than left slowness along with some intermittent action tremors in the hands.  Overall no significant progression since the last visit.  He also recently retired from work which has been very helpful in regards to reducing his overall stress and anxiety levels.    At this time we will have him remain on his current dosing of Sinemet along with selegiline.  He is now only taking Sinemet 3 times a day given he is no longer needing to wake up as early in the morning since being retired.

## 2024-04-23 NOTE — PATIENT INSTRUCTIONS
Patient with Parkinson's disease.  Overall he is doing well since his last visit.  He denies any wearing off between doses of medication although he will notice if he is late for a dose.  He has been working with physical therapy which has been helpful for his mobility and balance.  Recently completed speech therapy which was very beneficial for her swallowing.  He is trying to keep up with exercises on his own as well.  On exam today he had some right greater than left slowness along with some intermittent action tremors in the hands.  Overall no significant progression since the last visit.  He also recently retired from work which has been very helpful in regards to reducing his overall stress and anxiety levels.    At this time we will have him remain on his current dosing of Sinemet along with selegiline.  He is now only taking Sinemet 3 times a day given he is no longer needing to wake up as early in the morning since being retired.    He has been noticing some increased difficulty with memory.  This can be bothersome for him at times.  In the office today he scored 27/30 on MoCA testing compared to 25/30 in 2022.  Discussed the importance of him keeping mentally stimulated especially now that he is retired. Patient was encouraged to increase mind stimulating activities such as reading, crosswords, word searches, puzzles, Soduku, solitaire, coloring and other brain games.  We also discussed the importance of staying physically active and eating a health diet such as the Mediterranean or MIND diet.      Since his last visit he was also found to have left-sided carpal tunnel and underwent carpal tunnel release in January with improvement.

## 2024-04-23 NOTE — ASSESSMENT & PLAN NOTE
He has been noticing some increased difficulty with memory.  This can be bothersome for him at times.  In the office today he scored 27/30 on MoCA testing compared to 25/30 in 2022.  Discussed the importance of him keeping mentally stimulated especially now that he is retired. Patient was encouraged to increase mind stimulating activities such as reading, crosswords, word searches, puzzles, Soduku, solitaire, coloring and other brain games.  We also discussed the importance of staying physically active and eating a health diet such as the Mediterranean or MIND diet.       Awake/Symptoms improved/Alert and oriented to person, place and time

## 2024-04-26 ENCOUNTER — APPOINTMENT (OUTPATIENT)
Dept: SPEECH THERAPY | Facility: CLINIC | Age: 64
End: 2024-04-26
Payer: COMMERCIAL

## 2024-04-26 ENCOUNTER — APPOINTMENT (OUTPATIENT)
Dept: PHYSICAL THERAPY | Facility: CLINIC | Age: 64
End: 2024-04-26
Payer: COMMERCIAL

## 2024-05-02 ENCOUNTER — APPOINTMENT (OUTPATIENT)
Dept: SPEECH THERAPY | Facility: CLINIC | Age: 64
End: 2024-05-02
Payer: COMMERCIAL

## 2024-05-03 ENCOUNTER — OFFICE VISIT (OUTPATIENT)
Dept: SPEECH THERAPY | Facility: CLINIC | Age: 64
End: 2024-05-03
Payer: COMMERCIAL

## 2024-05-03 DIAGNOSIS — G20.A1 HYPOKINETIC PARKINSONIAN DYSPHONIA: ICD-10-CM

## 2024-05-03 DIAGNOSIS — R13.12 OROPHARYNGEAL DYSPHAGIA: Primary | ICD-10-CM

## 2024-05-03 DIAGNOSIS — R49.0 HYPOKINETIC PARKINSONIAN DYSPHONIA: ICD-10-CM

## 2024-05-03 PROCEDURE — 92507 TX SP LANG VOICE COMM INDIV: CPT

## 2024-05-03 NOTE — PROGRESS NOTES
"Daily Speech Treatment Note    Today's date: 5/3/2024  Patient’s name: Bob Fernandes  : 1960  MRN: [id number]  Safety measures: PD  Referring provider: Sasha Gamble PA-C    Encounter Diagnosis     ICD-10-CM    1. Oropharyngeal dysphagia  R13.12       2. Hypokinetic Parkinsonian dysphonia  G20.A1     R49.0             Visit tracking:  Re-eval Due Auth Expiration Date ST Visit Limit   3/15/24  12/31/24 BOMN   24 8   6/9/24 10/7/24 12             Visit/Unit Tracking  AUTH Status:  Date 5/3/24         12 visits  Used 1          Remaining  11             Subjective/Behavioral:  Patient asked how we can work on his flat affect; discussed facial exercises and encouraged him to try and \"laugh\" when taking a photo as it often will give him a more natural smile.     Objective/Assessment:     Short-term goals:  1. Patient and family will watch Parkinson's voice project informational session called \"Learn About Parkinson's\" in order to better understand the SpeakOUT! program and commitment -  https://www.NatureWorks.com/watch?v=VaaNs0s8OqK  - Patient reported he watched the video     2. Patient will obtain a SpeakOUT! Workbook and complete exercises twice a day for 25 days, before transitioning to the LOUD CROWD. -Patient has his workbook     3. Patient will coordinate vocal and articulatory subsystems in hierarchal speech tasks by producing sounds with intention with minimal assistance     4. Patient will read phrases, sentences and paragraphs with intention, yielding improved vocal quality, loudness, articulatory precision, and endrance while maintaining a minimum of 75 db with min assistance.      5. Patient will generalize intentional speech to cognitive-linguistic exercises and conversational speech with improved vocal quality, loudness, articulatory precision, and endurance while maintaining a minimum of 75 dB with min assistance.    SPEAK OUT!® Treatment Note:   Session #:     The following " "exercises were targeted to facilitate connection of breath to speech, loudness, articulation, expression, and intent of speech:    Sound level meter-to-mouth distance: 50 cm throughout session    Task Data Level of Cueing Provided   Pre-treatment spontaneous conversation  68 dB SPL N/A         Warm-up (“may-me-my-maxine-moo”) 75 dB SPL minimal-none         Sustained “ah” 9.5 seconds minimal-none   Sustained “ah” 78 dB SPL minimal-none         Vocal glides 79 dB SPL minimal-none         Numerical sequences 82 dB SPL minimal-none        Oral reading (phrase level) 78 dB SPL minimal        Cognitive-linguistic tasks workbook (phrase level) 77 dB SPL minimal                  Post-treatment spontaneous conversation 74 dB SPL N/A      Patient required minimal-none cues to utilize \"INTENT\" during spontaneous conversation on this date of service.      Plan:  -Continue with current plan of care.       "

## 2024-05-07 ENCOUNTER — OFFICE VISIT (OUTPATIENT)
Dept: SPEECH THERAPY | Facility: CLINIC | Age: 64
End: 2024-05-07
Payer: COMMERCIAL

## 2024-05-07 ENCOUNTER — OFFICE VISIT (OUTPATIENT)
Dept: PHYSICAL THERAPY | Facility: CLINIC | Age: 64
End: 2024-05-07
Payer: COMMERCIAL

## 2024-05-07 DIAGNOSIS — R49.0 HYPOKINETIC PARKINSONIAN DYSPHONIA: ICD-10-CM

## 2024-05-07 DIAGNOSIS — R13.12 OROPHARYNGEAL DYSPHAGIA: Primary | ICD-10-CM

## 2024-05-07 DIAGNOSIS — G20.A1 PARKINSON'S DISEASE WITHOUT DYSKINESIA, UNSPECIFIED WHETHER MANIFESTATIONS FLUCTUATE: Primary | ICD-10-CM

## 2024-05-07 DIAGNOSIS — G20.A1 HYPOKINETIC PARKINSONIAN DYSPHONIA: ICD-10-CM

## 2024-05-07 DIAGNOSIS — R26.2 AMBULATORY DYSFUNCTION: ICD-10-CM

## 2024-05-07 DIAGNOSIS — R26.89 BALANCE DISORDER: ICD-10-CM

## 2024-05-07 PROCEDURE — 92507 TX SP LANG VOICE COMM INDIV: CPT

## 2024-05-07 PROCEDURE — 97164 PT RE-EVAL EST PLAN CARE: CPT | Performed by: PHYSICAL THERAPIST

## 2024-05-07 NOTE — PROGRESS NOTES
Discharge Note     Today's date: 2024  Patient name: Bob Fernandes  : 1960  MRN: [id number]  Referring provider: David Hanna MD  Dx: No diagnosis found.               Subjective: has been working hard on doing activities around house to prepare for daughter's graduation. Has not had any falls.       Objective: See treatment diary below    Functional outcomes   6 minute walk test: 1525  Gait speed: 1.6 m/s  5x sit to stand: 13.85 (seconds)  TU.12 (seconds)  TUG cognitive: 9.78 (seconds)  FGA :   ABC: 75%     Functional Outcomes   6 mwt: 1750   Gait Speed: 1.8 m/s  FGA:  ABC: 69%    Functional Outcomes 24  6 mwt: 1550  Gait Speed: 1.5 m/s  FGA:     Assessment:   Patient reports to physical therapy this date for re-evaluation. At this time patient has had 2-3 weeks from therapy and has largely remaiend the same with balance, endurance and gait efficiency. Largely patient has met most goals. Pt is agreeable to d/c. Pt will be transitioned to home exercise program with a re-evaluatoin in 6 months to assess status.     Plan: Continue per plan of care.         Precautions: Parkinsons, Mild CAD, HTN, H/O L TKA.  Short Term Goal Expiration Date:(24)  Long Term Goal Expiration Date: (24)  POC Expiration Date: (24)        POC expires Unit limit Auth Expiration date PT/OT/ST + Visit Limit?   24 3 3/29/24 3        24                                   Visit/Unit Tracking  AUTH Status:  Date 3/13  3/19  4/2  4/5  4/8  4/12  4/16  4/19           Used 2  3  4  5  6  7  8  1           Remaining  6  5  4  3  2  1  0  7                     Manuals                                                Neuro Re-Ed           Side stepping incline          [facility]one taps   Holding tidal tank on foam kiley - 10 laps fwd                  STS 3x10 with airex and tidal tank 3x10 with airex and tidal tank   2x10  3x10 with tidal tank airex    karaoke          Step  "ups  12\" step with airex pad 10x          River rocks Varying heights and narrowed base of support - 5 reps without tidal tank 5 reps with //bars      Tidal tank varying levels of rocks - 10x //bars    obstacle course Incline with foam beam and 12\" hrudless - 10 laps ea fwd/lat 10# aw b/l    Incline wedge, bi, river rock, incline board - 3 repetitions of 5 laps - 1 repetition of 5 laps with tidal tank and 15# aw       treadmill turns     6 min 0.8 mph  fwd/bkwd varying inclines with ball toss      agility ladder   3 different combinations - 10 trials each       Ther Ex           treadmill 3.5 mph 10 min 10% incline with b/l UE to increase push off and step clearance   3 min 2.0 mph      lunges  5 laps mirror         bosu squats  30x 30x                                                              Ther Activity                                Gait Training          [facility]        100 ft x 4 up and down fwd and lat              Modalities                                                         "

## 2024-05-07 NOTE — PROGRESS NOTES
"Daily Speech Treatment Note    Today's date: 2024  Patient’s name: Bob Fernandes  : 1960  MRN: [id number]  Safety measures: PD  Referring provider: Sasha Gamble PA-C    Encounter Diagnosis     ICD-10-CM    1. Oropharyngeal dysphagia  R13.12       2. Hypokinetic Parkinsonian dysphonia  G20.A1     R49.0             Visit tracking:  Re-eval Due Auth Expiration Date ST Visit Limit   3/15/24  12/31/24 BOMN   24 8   6/9/24 10/7/24 12             Visit/Unit Tracking  AUTH Status:  Date 5/3/24 5/7/24        12 visits  Used 1 2         Remaining  11 10            Subjective/Behavioral:  Patient reports he is having a bit of an off day    Objective/Assessment:     Short-term goals:  1. Patient and family will watch Parkinson's voice project informational session called \"Learn About Parkinson's\" in order to better understand the SpeakOUT! program and commitment -  https://www.Spotify.com/watch?v=UliVi7z1VzG  - Patient reported he watched the video     2. Patient will obtain a SpeakOUT! Workbook and complete exercises twice a day for 25 days, before transitioning to the LOUD CROWD. -Patient has his workbook     3. Patient will coordinate vocal and articulatory subsystems in hierarchal speech tasks by producing sounds with intention with minimal assistance     4. Patient will read phrases, sentences and paragraphs with intention, yielding improved vocal quality, loudness, articulatory precision, and endrance while maintaining a minimum of 75 db with min assistance.      5. Patient will generalize intentional speech to cognitive-linguistic exercises and conversational speech with improved vocal quality, loudness, articulatory precision, and endurance while maintaining a minimum of 75 dB with min assistance.    SPEAK OUT!® Treatment Note:   Session #:     The following exercises were targeted to facilitate connection of breath to speech, loudness, articulation, expression, and intent of " "speech:    Sound level meter-to-mouth distance: 50 cm throughout session    Task Data Level of Cueing Provided   Pre-treatment spontaneous conversation  64 dB SPL N/A         Warm-up (“may-me-my-maxine-moo”) 71 dB SPL minimal-none         Sustained “ah” 9.8 seconds minimal-none   Sustained “ah” 76 dB SPL minimal-none         Vocal glides 76 dB SPL minimal-none         Numerical sequences 80 dB SPL minimal-none        Oral reading (phrase level) 77 dB SPL minimal        Cognitive-linguistic tasks workbook (phrase level) 73 dB SPL minimal                  Post-treatment spontaneous conversation 70 dB SPL N/A      Patient required minimal-none cues to utilize \"INTENT\" during spontaneous conversation on this date of service.      Plan:  -Continue with current plan of care.       "

## 2024-05-09 ENCOUNTER — APPOINTMENT (OUTPATIENT)
Dept: SPEECH THERAPY | Facility: CLINIC | Age: 64
End: 2024-05-09
Payer: COMMERCIAL

## 2024-05-10 ENCOUNTER — OFFICE VISIT (OUTPATIENT)
Dept: SPEECH THERAPY | Facility: CLINIC | Age: 64
End: 2024-05-10
Payer: COMMERCIAL

## 2024-05-10 DIAGNOSIS — R49.0 HYPOKINETIC PARKINSONIAN DYSPHONIA: ICD-10-CM

## 2024-05-10 DIAGNOSIS — R13.12 OROPHARYNGEAL DYSPHAGIA: Primary | ICD-10-CM

## 2024-05-10 DIAGNOSIS — G20.A1 HYPOKINETIC PARKINSONIAN DYSPHONIA: ICD-10-CM

## 2024-05-10 PROCEDURE — 92507 TX SP LANG VOICE COMM INDIV: CPT

## 2024-05-10 NOTE — PROGRESS NOTES
"Daily Speech Treatment Note    Today's date: 5/10/2024  Patient’s name: Bob Fernandes  : 1960  MRN: [id number]  Safety measures: PD  Referring provider: Sasha Gamble PA-C    Encounter Diagnosis     ICD-10-CM    1. Oropharyngeal dysphagia  R13.12       2. Hypokinetic Parkinsonian dysphonia  G20.A1     R49.0             Visit tracking:  Re-eval Due Auth Expiration Date ST Visit Limit   3/15/24  12/31/24 BOMN   24 8   6/9/24 10/7/24 12             Visit/Unit Tracking  AUTH Status:  Date 5/3/24 5/7/24 5/10/24       12 visits  Used 1 2 3        Remaining  11 10 9           Subjective/Behavioral:  Patient doing well today    Objective/Assessment:     Short-term goals:  1. Patient and family will watch Parkinson's voice project informational session called \"Learn About Parkinson's\" in order to better understand the SpeakOUT! program and commitment -  [email or web address]/[email or web address]  - Patient reported he watched the video     2. Patient will obtain a SpeakOUT! Workbook and complete exercises twice a day for 25 days, before transitioning to the LOUD CROWD. -Patient has his workbook     3. Patient will coordinate vocal and articulatory subsystems in hierarchal speech tasks by producing sounds with intention with minimal assistance     4. Patient will read phrases, sentences and paragraphs with intention, yielding improved vocal quality, loudness, articulatory precision, and endrance while maintaining a minimum of 75 db with min assistance.      5. Patient will generalize intentional speech to cognitive-linguistic exercises and conversational speech with improved vocal quality, loudness, articulatory precision, and endurance while maintaining a minimum of 75 dB with min assistance.    SPEAK OUT!® Treatment Note:   Session #:     The following exercises were targeted to facilitate connection of breath to speech, loudness, articulation, expression, and intent of speech:    Sound level " "meter-to-mouth distance: 50 cm throughout session    Task Data Level of Cueing Provided   Pre-treatment spontaneous conversation  74 dB SPL N/A         Warm-up (“may-me-my-maxine-moo”) 79 dB SPL minimal-none         Sustained “ah” 9.8 seconds minimal-none   Sustained “ah” 75 dB SPL minimal-none         Vocal glides 74 dB SPL minimal-none         Numerical sequences 81 dB SPL minimal-none        Oral reading (phrase level) 73 dB SPL minimal        Cognitive-linguistic tasks workbook (phrase level) 75 dB SPL minimal                  Post-treatment spontaneous conversation 70 dB SPL N/A      Patient required minimal-none cues to utilize \"INTENT\" during spontaneous conversation on this date of service.      Plan:  -Continue with current plan of care.       "

## 2024-05-14 ENCOUNTER — OFFICE VISIT (OUTPATIENT)
Dept: SPEECH THERAPY | Facility: CLINIC | Age: 64
End: 2024-05-14
Payer: COMMERCIAL

## 2024-05-14 DIAGNOSIS — G20.A1 HYPOKINETIC PARKINSONIAN DYSPHONIA: ICD-10-CM

## 2024-05-14 DIAGNOSIS — R49.0 HYPOKINETIC PARKINSONIAN DYSPHONIA: ICD-10-CM

## 2024-05-14 DIAGNOSIS — R13.12 OROPHARYNGEAL DYSPHAGIA: Primary | ICD-10-CM

## 2024-05-14 PROCEDURE — 92507 TX SP LANG VOICE COMM INDIV: CPT

## 2024-05-14 NOTE — PROGRESS NOTES
"Daily Speech Treatment Note    Today's date: 2024  Patient’s name: Bob Fernandes  : 1960  MRN: [id number]  Safety measures: PD  Referring provider: Sasha Gamble PA-C    Encounter Diagnosis     ICD-10-CM    1. Oropharyngeal dysphagia  R13.12       2. Hypokinetic Parkinsonian dysphonia  G20.A1     R49.0             Visit tracking:  Re-eval Due Auth Expiration Date ST Visit Limit   3/15/24  12/31/24 BOMN   24 8   6/9/24 10/7/24 12             Visit/Unit Tracking  AUTH Status:  Date 5/3/24 5/7/24 5/10/24 5/14/24      12 visits  Used 1 2 3 4       Remaining  11 10 9 8          Subjective/Behavioral:  Patient reports he was doing his exercises on the car ride here.     Objective/Assessment:     Short-term goals:  1. Patient and family will watch Parkinson's voice project informational session called \"Learn About Parkinson's\" in order to better understand the SpeakOUT! program and commitment -  https://www.[employer].com/watch?[email or web address]  - Patient reported he watched the video     2. Patient will obtain a SpeakOUT! Workbook and complete exercises twice a day for 25 days, before transitioning to the LOUD CROWD. -Patient has his workbook     3. Patient will coordinate vocal and articulatory subsystems in hierarchal speech tasks by producing sounds with intention with minimal assistance     4. Patient will read phrases, sentences and paragraphs with intention, yielding improved vocal quality, loudness, articulatory precision, and endrance while maintaining a minimum of 75 db with min assistance.      5. Patient will generalize intentional speech to cognitive-linguistic exercises and conversational speech with improved vocal quality, loudness, articulatory precision, and endurance while maintaining a minimum of 75 dB with min assistance.    SPEAK OUT!® Treatment Note:   Session #: 15/25    The following exercises were targeted to facilitate connection of breath to speech, loudness, " "articulation, expression, and intent of speech:    Sound level meter-to-mouth distance: 50 cm throughout session    Task Data Level of Cueing Provided   Pre-treatment spontaneous conversation  68 dB SPL N/A         Warm-up (“may-me-my-maxine-moo”) 77 dB SPL minimal-none         Sustained “ah” 9.9 seconds minimal-none   Sustained “ah” 77 dB SPL minimal-none         Vocal glides 75 dB SPL minimal-none         Numerical sequences 75 dB SPL minimal-none        Oral reading (phrase level) 75 dB SPL minimal        Cognitive-linguistic tasks workbook (phrase level) 74 dB SPL minimal                  Post-treatment spontaneous conversation 68 dB SPL N/A      Patient required minimal-none cues to utilize \"INTENT\" during spontaneous conversation on this date of service.      Plan:  -Continue with current plan of care.       "

## 2024-05-16 ENCOUNTER — APPOINTMENT (OUTPATIENT)
Dept: SPEECH THERAPY | Facility: CLINIC | Age: 64
End: 2024-05-16
Payer: COMMERCIAL

## 2024-05-17 ENCOUNTER — OFFICE VISIT (OUTPATIENT)
Dept: SPEECH THERAPY | Facility: CLINIC | Age: 64
End: 2024-05-17
Payer: COMMERCIAL

## 2024-05-17 DIAGNOSIS — G20.A1 HYPOKINETIC PARKINSONIAN DYSPHONIA: ICD-10-CM

## 2024-05-17 DIAGNOSIS — R49.0 HYPOKINETIC PARKINSONIAN DYSPHONIA: ICD-10-CM

## 2024-05-17 DIAGNOSIS — R13.12 OROPHARYNGEAL DYSPHAGIA: Primary | ICD-10-CM

## 2024-05-17 PROCEDURE — 92526 ORAL FUNCTION THERAPY: CPT

## 2024-05-17 NOTE — PROGRESS NOTES
"Daily Speech Treatment Note    Today's date: 2024  Patient’s name: Bob Fernandes  : 1960  MRN: [id number]  Safety measures: PD  Referring provider: Sasha Gamble PA-C    Encounter Diagnosis     ICD-10-CM    1. Oropharyngeal dysphagia  R13.12       2. Hypokinetic Parkinsonian dysphonia  G20.A1     R49.0             Visit tracking:  Re-eval Due Auth Expiration Date ST Visit Limit   3/15/24  12/31/24 BOMN   24 8   6/9/24 10/7/24 12             Visit/Unit Tracking  AUTH Status:  Date 5/3/24 5/7/24 5/10/24 5/14/24 5/17/24     12 visits  Used 1 2 3 4 5      Remaining  11 10 9 8 7         Subjective/Behavioral: Patient was excited his boxing equipment came in.     Objective/Assessment:     Short-term goals:  1. Patient and family will watch Parkinson's voice project informational session called \"Learn About Parkinson's\" in order to better understand the SpeakOUT! program and commitment -  https://[email or web address].com/[email or web address]  - Patient reported he watched the video     2. Patient will obtain a SpeakOUT! Workbook and complete exercises twice a day for 25 days, before transitioning to the LOUD CROWD. -Patient has his workbook     3. Patient will coordinate vocal and articulatory subsystems in hierarchal speech tasks by producing sounds with intention with minimal assistance     4. Patient will read phrases, sentences and paragraphs with intention, yielding improved vocal quality, loudness, articulatory precision, and endrance while maintaining a minimum of 75 db with min assistance.      5. Patient will generalize intentional speech to cognitive-linguistic exercises and conversational speech with improved vocal quality, loudness, articulatory precision, and endurance while maintaining a minimum of 75 dB with min assistance.    SPEAK OUT!® Treatment Note:   Session #:     The following exercises were targeted to facilitate connection of breath to speech, loudness, articulation, " "expression, and intent of speech:    Sound level meter-to-mouth distance: 50 cm throughout session    Task Data Level of Cueing Provided   Pre-treatment spontaneous conversation  75 dB SPL N/A         Warm-up (“may-me-my-maxine-moo”) 80 dB SPL minimal-none         Sustained “ah” 10.1 seconds minimal-none   Sustained “ah” 77 dB SPL minimal-none         Vocal glides 80 dB SPL minimal-none         Numerical sequences 79 dB SPL minimal-none        Oral reading (phrase level) 78 dB SPL minimal        Cognitive-linguistic tasks workbook (phrase level) 74 dB SPL minimal                  Post-treatment spontaneous conversation 70 dB SPL N/A      Patient required minimal-none cues to utilize \"INTENT\" during spontaneous conversation on this date of service.      Plan:  -Continue with current plan of care.       "

## 2024-05-21 ENCOUNTER — APPOINTMENT (OUTPATIENT)
Dept: SPEECH THERAPY | Facility: CLINIC | Age: 64
End: 2024-05-21
Payer: COMMERCIAL

## 2024-05-24 ENCOUNTER — APPOINTMENT (OUTPATIENT)
Dept: SPEECH THERAPY | Facility: CLINIC | Age: 64
End: 2024-05-24
Payer: COMMERCIAL

## 2024-05-29 DIAGNOSIS — E78.2 MIXED HYPERLIPIDEMIA: ICD-10-CM

## 2024-05-31 RX ORDER — ROSUVASTATIN CALCIUM 40 MG/1
40 TABLET, COATED ORAL DAILY
Qty: 90 TABLET | Refills: 1 | Status: SHIPPED | OUTPATIENT
Start: 2024-05-31

## 2024-06-04 ENCOUNTER — OFFICE VISIT (OUTPATIENT)
Dept: SPEECH THERAPY | Facility: CLINIC | Age: 64
End: 2024-06-04
Payer: COMMERCIAL

## 2024-06-04 DIAGNOSIS — G20.A1 HYPOKINETIC PARKINSONIAN DYSPHONIA: ICD-10-CM

## 2024-06-04 DIAGNOSIS — R13.12 OROPHARYNGEAL DYSPHAGIA: Primary | ICD-10-CM

## 2024-06-04 DIAGNOSIS — R49.0 HYPOKINETIC PARKINSONIAN DYSPHONIA: ICD-10-CM

## 2024-06-04 PROCEDURE — 92507 TX SP LANG VOICE COMM INDIV: CPT

## 2024-06-04 NOTE — PROGRESS NOTES
"Daily Speech Treatment Note/DISCHARGE    Today's date: 2024  Patient’s name: Bob Fernandes  : 1960  MRN: [id number]  Safety measures: PD  Referring provider: Sasha Gamble PA-C    Encounter Diagnosis     ICD-10-CM    1. Oropharyngeal dysphagia  R13.12       2. Hypokinetic Parkinsonian dysphonia  G20.A1     R49.0             Visit tracking:  Re-eval Due Auth Expiration Date ST Visit Limit   3/15/24  12/31/24 BOMN   24 8   6/9/24 10/7/24 12             Visit/Unit Tracking  AUTH Status:  Date 5/3/24 5/7/24 5/10/24 5/14/24 5/17/24 6/4/24    12 visits  Used 1 2 3 4 5 6     Remaining  11 10 9 8 7 6        Subjective/Behavioral:Patient requesting to take a break from speech therapy at this time due to busy summer schedule. Follow up scheduled for September. Patient confident he will continue to use his INTENT when speaking.     Objective/Assessment:     Short-term goals:  1. Patient and family will watch Parkinson's voice project informational session called \"Learn About Parkinson's\" in order to better understand the SpeakOUT! program and commitment -  [email or web address]/[email or web address]  - Patient reported he watched the video     2. Patient will obtain a SpeakOUT! Workbook and complete exercises twice a day for 25 days, before transitioning to the LOUD CROWD. -Patient has his workbook     3. Patient will coordinate vocal and articulatory subsystems in hierarchal speech tasks by producing sounds with intention with minimal assistance     4. Patient will read phrases, sentences and paragraphs with intention, yielding improved vocal quality, loudness, articulatory precision, and endrance while maintaining a minimum of 75 db with min assistance.      5. Patient wll generalize intentional speech to cognitive-linguistic exercises and conversational speech with improved vocal quality, loudness, articulatory precision, and endurance while maintaining a minimum of 75 dB with min " "assistance.    SPEAK OUT!® Treatment Note:   Session #: 17/25    The following exercises were targeted to facilitate connection of breath to speech, loudness, articulation, expression, and intent of speech:    Sound level meter-to-mouth distance: 50 cm throughout session    Task Data Level of Cueing Provided   Pre-treatment spontaneous conversation  81 dB SPL N/A         Warm-up (“may-me-my-maxine-moo”) 76 dB SPL minimal-none         Sustained “ah” 10 seconds minimal-none   Sustained “ah” 75 dB SPL minimal-none         Vocal glides 78 dB SPL minimal-none         Numerical sequences 77 dB SPL minimal-none        Oral reading (paragraph level) 76 dB SPL minimal        Cognitive-linguistic tasks workbook (phrase level) 72 dB SPL minimal                  Post-treatment spontaneous conversation Did not measure N/A      Patient required minimal-none cues to utilize \"INTENT\" during spontaneous conversation on this date of service.      Plan:  -Discharge.  -3 month follow up scheduled        "

## 2024-06-06 ENCOUNTER — OFFICE VISIT (OUTPATIENT)
Dept: CARDIOLOGY CLINIC | Facility: CLINIC | Age: 64
End: 2024-06-06
Payer: COMMERCIAL

## 2024-06-06 VITALS
HEIGHT: 71 IN | OXYGEN SATURATION: 97 % | SYSTOLIC BLOOD PRESSURE: 130 MMHG | BODY MASS INDEX: 28.95 KG/M2 | DIASTOLIC BLOOD PRESSURE: 80 MMHG | HEART RATE: 71 BPM | WEIGHT: 206.8 LBS | TEMPERATURE: 97.6 F

## 2024-06-06 DIAGNOSIS — E78.2 MIXED HYPERLIPIDEMIA: ICD-10-CM

## 2024-06-06 DIAGNOSIS — I10 ESSENTIAL HYPERTENSION: ICD-10-CM

## 2024-06-06 DIAGNOSIS — I25.10 MILD CAD: Primary | ICD-10-CM

## 2024-06-06 PROCEDURE — 99214 OFFICE O/P EST MOD 30 MIN: CPT | Performed by: INTERNAL MEDICINE

## 2024-06-06 RX ORDER — KETOCONAZOLE 20 MG/ML
SHAMPOO TOPICAL
COMMUNITY
Start: 2024-04-12

## 2024-06-06 NOTE — PATIENT INSTRUCTIONS
Continue current cardiac medications.    Cardiac risk can be lowered with increase in physical activity, plant-based or Mediterranean style start diet, and avoidance of tobacco products.    Report any worsening cardiac symptoms (chest pain,shortness of breath with exertion or fainting).  Keep follow-up as planned.

## 2024-06-06 NOTE — PROGRESS NOTES
[facility] [place]  [facility] [address]  Phone#  [phone]  Fax#  [phone]  [facility] Cardiology Office Follow-up Visit             NAME: Bob Fernandes  AGE: 64 y.o. SEX: male   : 1960   MRN: [id number]    DATE: 2024  TIME: 2:28 PM    Cardiology Problem list:  Hypertension:  Stage II  Parkinson disease:  Mild tremor and cognitive changes  Dyslipidemia:  ASCVD score over 15%  Calcium score 201: Left main 86, LAD 97  : CTA coronaries: Mild plaque, <25% stenoses  : Stress test: ST depressions in V3 V4 at peak exercise, dyspnea but no angina at 7 minutes.  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  Historically abnormal stress test with anterior ST depressions but no clinical angina.  Stays active and walks his dog.  Also does stationary bike.  Continue medical therapy and risk factor modification.    Essential hypertension  BP Readings from Last 3 Encounters:   24 130/80   24 143/84   24 148/82   Continue current medications.  Lifestyle modification.  Close blood pressure monitoring.        Mixed hyperlipidemia  Lab Results   Component Value Date    LDLCALC 78 2024   No myopathy.  Continue current statin therapy.      HPI:    Bob Fernandes is a 64 y.o.-year-old male who presents to the cardiology clinic for follow up for the above-listed problems.  He comes in for follow-up of nonobstructive CAD noted on coronary CTA in .  Historically abnormal stress test at moderate workload.  Prior echo with preserved LV function.  He has a history of hypertension and dyslipidemia that are medically treated.  Currently on aspirin, losartan/HCT and rosuvastatin.  He also has a history of Parkinson's disease.  Recent primary care notes reviewed.  ECG in 10/23 showed  sinus rhythm.  He has had some tremors and bradykinesia with memory loss related to Parkinson's disease. He has retired.   Patient is doing well from a cardiovascular standpoint.    No recent cardiac hospitalizations.    Current medications reviewed.    Reports compliance to medicines.  No side effects reported.  Denies chest pain on exertion.  Denies worsening shortness of breath.  Denies sustained palpitations.    Reports facial redness in the afternoon after exertion.       Past history, family history, social history, current medications, vital signs, recent lab and imaging studies and  prior cardiology studies reviewed independently on this visit.    No Known Allergies    Current Outpatient Medications:     acetaminophen (TYLENOL) 500 mg tablet, Take 2 tablets (1,000 mg total) by mouth 2 (two) times a day (Patient taking differently: Take 1,000 mg by mouth 3 (three) times a day), Disp: , Rfl:     aspirin (Aspirin 81) 81 mg EC tablet, Take 1 tablet (81 mg total) by mouth daily, Disp: , Rfl:     carbidopa-levodopa (SINEMET CR)  mg TBCR per ER tablet, TAKE 1 TABLET BY MOUTH EVERYDAY AT BEDTIME, Disp: 90 tablet, Rfl: 4    carbidopa-levodopa (SINEMET)  mg per tablet, TAKE 1 TABLET BY MOUTH FOUR TIMES A DAY (Patient taking differently: Take 1.5 tablets by mouth 3 (three) times a day), Disp: 360 tablet, Rfl: 4    cholecalciferol (VITAMIN D3) 1,000 units tablet, Take 5,000 Units by mouth daily, Disp: , Rfl:     Collagen Hydrolysate POWD, Use 1 Dose daily, Disp: , Rfl:     fluticasone (FLONASE) 50 mcg/act nasal spray, SPRAY 2 SPRAYS INTO EACH NOSTRIL EVERY DAY (Patient taking differently: into each nostril if needed), Disp: 48 mL, Rfl: 2    ketoconazole (NIZORAL) 2 % shampoo, SHAMPOO SCALP AND FACE THREE TO FOUR TIMES PER WEEK, Disp: , Rfl:     losartan-hydrochlorothiazide (HYZAAR) 100-12.5 MG per tablet, TAKE 1 TABLET BY MOUTH EVERY DAY, Disp: 30 tablet, Rfl: 8    MELATONIN PO, Take by mouth daily at  bedtime, Disp: , Rfl:     rosuvastatin (CRESTOR) 40 MG tablet, Take 1 tablet (40 mg total) by mouth daily, Disp: 90 tablet, Rfl: 1    selegiline (ELDEPRYL) 5 mg tablet, TAKE 1 TABLET BY MOUTH 2 TIMES A DAY WITH MEALS., Disp: 180 tablet, Rfl: 4    sildenafil (Viagra) 100 mg tablet, Take one tablet by mouth on empty stomach one hour prior to sexual activity, Disp: 30 tablet, Rfl: 3    amoxicillin (AMOXIL) 500 MG tablet, Take 4 tablets by mouth as needed (Patient not taking: Reported on 6/6/2024), Disp: , Rfl:     Flowflex COVID-19 Ag Home Test KIT, , Disp: , Rfl:     Review of Systems   Constitutional:  Negative for fatigue.   HENT: Negative.     Eyes: Negative.    Respiratory:  Negative for shortness of breath.    Cardiovascular:  Negative for chest pain, palpitations and leg swelling.   Gastrointestinal: Negative.    Endocrine: Negative.    Neurological:  Positive for tremors. Negative for syncope.   Hematological: Negative.    Psychiatric/Behavioral:  Positive for decreased concentration.    All other systems reviewed and are negative.      Objective:     Vitals:    06/06/24 1418   BP: 130/80   Pulse: 71   Temp: 97.6 °F (36.4 °C)   SpO2: 97%     Wt Readings from Last 3 Encounters:   06/06/24 93.8 kg (206 lb 12.8 oz)   04/23/24 95.9 kg (211 lb 6.4 oz)   04/03/24 95.9 kg (211 lb 8 oz)     Pulse Readings from Last 3 Encounters:   06/06/24 71   04/23/24 72   03/25/24 84     BP Readings from Last 3 Encounters:   06/06/24 130/80   04/23/24 143/84   03/25/24 148/82     Physical Exam  Vitals reviewed.   Constitutional:       General: He is not in acute distress.  HENT:      Head: Normocephalic.   Eyes:      General: No scleral icterus.  Cardiovascular:      Rate and Rhythm: Normal rate and regular rhythm.      Heart sounds: No murmur heard.  Pulmonary:      Breath sounds: No wheezing or rales.   Musculoskeletal:         General: No swelling.   Skin:     General: Skin is warm.   Neurological:      Mental Status: He is  "alert.   Psychiatric:         Mood and Affect: Mood normal.         Pertinent Laboratory/Diagnostic Studies:    Laboratory studies reviewed personally by Yasir Welsh MD    BMP:   Lab Results   Component Value Date    SODIUM 140 04/02/2024    K 4.7 04/02/2024     04/02/2024    CO2 30 04/02/2024    BUN 22 04/02/2024    CREATININE 0.96 04/02/2024    GLUC 91 04/02/2024    CALCIUM 9.8 04/02/2024     CBC:  Lab Results   Component Value Date    WBC 6.2 04/02/2024    HGB 14.0 04/02/2024    HCT 42.6 04/02/2024    MCV 93.6 04/02/2024     04/02/2024     Lipid Profile:   Lab Results   Component Value Date    HDL 66 04/02/2024     Lab Results   Component Value Date    LDLCALC 78 04/02/2024     Lab Results   Component Value Date    TRIG 61 04/02/2024      Other labs:  No results found for: \"[id number]\", \"TSH\"  Lab Results   Component Value Date    ALT 11 04/02/2024    AST 20 04/02/2024     Lab Results   Component Value Date    HGBA1C 5.4 03/14/2023         Imaging Studies:     Pertinent imaging studies and cardiac studies were independently reviewed on this visit and findings summarized.    Yasir Welsh MD, [facility]    Portions of the record may have been created with voice recognition software.  Occasional wrong word or \"sound alike\" substitutions may have occurred due to the inherent limitations of voice recognition software.  Read the chart carefully and recognize, using context, where substitutions have occurred. Please reach out to me directly for any clarifications.  "

## 2024-06-06 NOTE — ASSESSMENT & PLAN NOTE
Mild nonobstructive CAD on cardiac CTA.  Historically abnormal stress test with anterior ST depressions but no clinical angina.  Stays active and walks his dog.  Also does stationary bike.  Continue medical therapy and risk factor modification.

## 2024-06-06 NOTE — ASSESSMENT & PLAN NOTE
Lab Results   Component Value Date    LDLCALC 78 04/02/2024   No myopathy.  Continue current statin therapy.

## 2024-06-06 NOTE — ASSESSMENT & PLAN NOTE
BP Readings from Last 3 Encounters:   06/06/24 130/80   04/23/24 143/84   03/25/24 148/82   Continue current medications.  Lifestyle modification.  Close blood pressure monitoring.

## 2024-06-07 ENCOUNTER — APPOINTMENT (OUTPATIENT)
Dept: SPEECH THERAPY | Facility: CLINIC | Age: 64
End: 2024-06-07
Payer: COMMERCIAL

## 2024-06-11 ENCOUNTER — APPOINTMENT (OUTPATIENT)
Dept: SPEECH THERAPY | Facility: CLINIC | Age: 64
End: 2024-06-11
Payer: COMMERCIAL

## 2024-06-14 ENCOUNTER — APPOINTMENT (OUTPATIENT)
Dept: SPEECH THERAPY | Facility: CLINIC | Age: 64
End: 2024-06-14
Payer: COMMERCIAL

## 2024-06-18 ENCOUNTER — APPOINTMENT (OUTPATIENT)
Dept: SPEECH THERAPY | Facility: CLINIC | Age: 64
End: 2024-06-18
Payer: COMMERCIAL

## 2024-06-21 ENCOUNTER — APPOINTMENT (OUTPATIENT)
Dept: SPEECH THERAPY | Facility: CLINIC | Age: 64
End: 2024-06-21
Payer: COMMERCIAL

## 2024-06-25 ENCOUNTER — APPOINTMENT (OUTPATIENT)
Dept: SPEECH THERAPY | Facility: CLINIC | Age: 64
End: 2024-06-25
Payer: COMMERCIAL

## 2024-08-28 DIAGNOSIS — E78.2 MIXED HYPERLIPIDEMIA: ICD-10-CM

## 2024-08-28 RX ORDER — ROSUVASTATIN CALCIUM 40 MG/1
40 TABLET, COATED ORAL DAILY
Qty: 90 TABLET | Refills: 1 | Status: SHIPPED | OUTPATIENT
Start: 2024-08-28

## 2024-09-04 ENCOUNTER — EVALUATION (OUTPATIENT)
Dept: SPEECH THERAPY | Facility: CLINIC | Age: 64
End: 2024-09-04
Payer: COMMERCIAL

## 2024-09-04 ENCOUNTER — TELEPHONE (OUTPATIENT)
Dept: NEUROLOGY | Facility: CLINIC | Age: 64
End: 2024-09-04

## 2024-09-04 ENCOUNTER — OFFICE VISIT (OUTPATIENT)
Dept: NEUROLOGY | Facility: CLINIC | Age: 64
End: 2024-09-04
Payer: COMMERCIAL

## 2024-09-04 VITALS
OXYGEN SATURATION: 96 % | HEART RATE: 77 BPM | WEIGHT: 204.5 LBS | DIASTOLIC BLOOD PRESSURE: 76 MMHG | BODY MASS INDEX: 28.52 KG/M2 | SYSTOLIC BLOOD PRESSURE: 130 MMHG | TEMPERATURE: 98.8 F

## 2024-09-04 DIAGNOSIS — G20.A1 PARKINSON'S DISEASE WITHOUT DYSKINESIA, UNSPECIFIED WHETHER MANIFESTATIONS FLUCTUATE: ICD-10-CM

## 2024-09-04 DIAGNOSIS — R47.1 HYPERKINETIC DYSARTHRIA: Primary | ICD-10-CM

## 2024-09-04 DIAGNOSIS — G20.A1 PARKINSON DISEASE: ICD-10-CM

## 2024-09-04 DIAGNOSIS — R41.89 COGNITIVE CHANGES: Primary | ICD-10-CM

## 2024-09-04 PROCEDURE — 92524 BEHAVRAL QUALIT ANALYS VOICE: CPT

## 2024-09-04 PROCEDURE — 99215 OFFICE O/P EST HI 40 MIN: CPT | Performed by: PHYSICIAN ASSISTANT

## 2024-09-04 PROCEDURE — 92507 TX SP LANG VOICE COMM INDIV: CPT

## 2024-09-04 RX ORDER — SELEGILINE HYDROCHLORIDE 5 MG/1
5 TABLET ORAL 2 TIMES DAILY WITH MEALS
Qty: 180 TABLET | Refills: 4 | Status: SHIPPED | OUTPATIENT
Start: 2024-09-04

## 2024-09-04 RX ORDER — TADALAFIL 5 MG/1
5 TABLET ORAL DAILY
COMMUNITY
Start: 2024-07-26 | End: 2025-07-26

## 2024-09-04 RX ORDER — CARBIDOPA AND LEVODOPA 25; 100 MG/1; MG/1
1 TABLET ORAL 4 TIMES DAILY
Start: 2024-09-04

## 2024-09-04 NOTE — PROGRESS NOTES
Patient ID: Bob Fernandes is a 64 y.o. adult.    Assessment/Plan:    Parkinson disease (HCC)  Patient with MARÍA positive Parkinson's disease.  Overall he feels that his Parkinson symptoms are adequately controlled.  He did stop taking Sinemet CR before bed given he did not feel was beneficial, feels better since stopping it.  Overall he continues to have improvement since retiring, he feels the reduce stress has been very beneficial for him.  He is now also able to focus more on himself and started going to the gym regularly.  Exam remained stable.  No clear evidence of progression noted.    He did number questions which I answered to the best my ability.  He was interested in some potential trials which he will look into.  He continues to feel some brain fog as well as difficulty with word finding and losing train of thought.  We discussed the option of cognitive therapy.  Will hold off for now.  He is currently doing speech therapy.    He continues to have some difficulty with sleep.  Did not yet start melatonin as discussed with Dr. Hanna.  We did once again discuss starting this at 3 or 5 mg before bed and this can be increased up to 15 mg.    At this time he will remain on Sinemet  mg 1.5 tabs for 5 times a day depending on how really he wakes.  He also remain on selegiline.    He continues to struggle with his diagnosis as well as the progression of symptoms.  I do feel that he would benefit greatly from a counselor, he recently made an appointment to start with someone recommended by Dr. Hanna.  We also discussed perhaps talking to them about joint sessions with both him and his wife.  I will also reach out to the  to see if there is any local support groups that they could look into joining.        Cognitive changes  Patient was encouraged to increase mind stimulating activities such as reading, crosswords, word searches, puzzles, Soduku, solitaire, coloring and other brain games.   "We also discussed the importance of staying physically active and eating a health diet such as the Mediterranean or MIND diet.          Subjective:    Bob Fernandes is a right-handed  who presents in follow up for MARÍA-positive parkinsonism and carpal tunnel s/p LEFT carpal tunnel release 1/2/24. To review, symptom onset around 2018 with right>left tremor.  On initial presentation the patient had a rest and postural tremor on the right, minimal if any kinetic tremor.  Also had mild rigidity on the right and slight bradykinesia bilaterally with decrement on the right.  His gait was fairly normal with good postural reflexes.  No typical non motor symptoms associated with Parkinson's disease. Now retired.      At his last visit no changes made to his medications, scored 27/30 on MoCA.      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 Severe ulnar neuropathy, localized best across the elbow with ongoing denervation. There is no evidence of a cervical radiculopathy.     INTERVAL HISTORY:  He had a follow up with Dr Hanna 7/31/24.  No changes made other than adding Melatinon for sleep.  Also discussed a trial of Remeron if not effective or Effexor for daytime fatigue.   He continues to struggle with sleep, has not started the Melatonin yet   He had been taking Tylenol PM   He had an episode when he felt \"out of it\" following a trip and then being exhausted   No similar events   He is scheduled to have an evaluation with a counselor   He can still perform all of his ADLs on his own   He may struggle with imbalance at times   He is now going to the gym almost daily, this has been helpful   He stopped the Sinemet CR before bed, did not feel that this was helping and feels better without it   He does repeat himself at times, may have trouble with completing a thought     Current " medications:  Selegiline 5mg bid   Sinemet 25/100mg 1.5 tab qid (7:30-8am, 11:30-noon, 4:30pm), extra 1/2 tab prior to presentation    Prior medications:  Pramipexole ER 0.375mg 3tabs qhs - discontinued due to obsessive behaviors   Sinemet CR before bed - stopped due to no benefit and now feels better       I personally reviewed and updated the ROS.    I have spent a total time of 45 minutes in caring for this patient on the day of the visit/encounter including Prognosis, Risks and benefits of tx options, Instructions for management, Impressions, Counseling / Coordination of care, Documenting in the medical record, and Obtaining or reviewing history  .       Objective:    Blood pressure 130/76, pulse 77, temperature 98.8 °F (37.1 °C), temperature source Temporal, weight 92.8 kg (204 lb 8 oz), SpO2 96%.    Physical Exam  Constitutional:       Appearance: Normal appearance.   HENT:      Right Ear: Hearing normal.      Left Ear: Hearing normal.   Eyes:      General: Lids are normal.      Extraocular Movements: Extraocular movements intact.      Pupils: Pupils are equal, round, and reactive to light.   Pulmonary:      Effort: Pulmonary effort is normal.   Neurological:      Mental Status: He is alert.      Motor: Motor strength is normal.  Psychiatric:         Speech: Speech normal.         Neurological Exam  Mental Status  Alert. Oriented to person, place and time. Speech is normal.  Tearful at times     MoCA 4/23/24 - 27/30   MoCA 10/11/22 - 25/30   MoCA 1/26/22 - 24/30   MoCA 24/30 - 8/17/21.    Cranial Nerves  CN III, IV, VI: Extraocular movements intact bilaterally. Normal lids and orbits bilaterally. Pupils equal round and reactive to light bilaterally.  CN V:  Right: Facial sensation is normal.  Left: Facial sensation is normal on the left.  CN VIII:  Right: Hearing is normal.  Left: Hearing is normal.  CN XI: Shoulder shrug strength is normal.    Motor   Strength is 5/5 throughout all four  extremities.    Sensory  Light touch is normal in upper and lower extremities.     Coordination  Right: Finger-to-nose normal.Left: Finger-to-nose normal.    Gait    Arose from the chair without difficulty.  Overall good stride. Slight shuffling at times. .        UPDRS motor:     4/23/24 9/4/24                              Time since last dose:        Speech  0 1   Facial Expression  1 1   Rigidity - Neck  0 0   Rigidity - Upper Extremity (Right)  1 1   Rigidity - Upper Extremity (Left)   1 0   Rigidity - Lower Extremity (Right)  0 0   Rigidity - Lower Extremity (Left)   0 0   Finger Taps (Right)   1 1   Finger Taps (Left)   1 1   Hand Movement (Right)  1 1   Hand Movement (Left)   0 1   Pronation/Supination (Right)  2 2   Pronation/Supination (Left)   1 2   Toe Tapping (Right) 0 0   Toe Tapping (Left) 0 0   Leg Agility (Right)  1 1   Leg Agility (Left)   1 1   Arising from Chair   0     Gait   1 1   Freezing of Gait 0 0   Postural Stability         Posture 0 0   Global spontaneity of movement 1 1   Postural Tremor (Right) 0 0   Postural Tremor (Left) 0 0   Kinetic Tremor (Right)  0 0   Kinetic Tremor (Left)  0 0   Rest tremor amplitude RUE 0 0   Rest tremor amplitude LUE 1 1   Rest tremor amplitude RLE 0 0   Reset tremor amplitude LLE 0 0   Lip/Jaw Tremor  0 0   Consistency of tremor 0 0   Motor Exam Total:            ROS:    Review of Systems

## 2024-09-04 NOTE — Clinical Note
Hi!  Patient was just wondering if he could get some information on local PD support groups for him and his wife, I do believe [facility] may offer this.  Thank you

## 2024-09-04 NOTE — ASSESSMENT & PLAN NOTE
Patient with MARÍA positive Parkinson's disease.  Overall he feels that his Parkinson symptoms are adequately controlled.  He did stop taking Sinemet CR before bed given he did not feel was beneficial, feels better since stopping it.  Overall he continues to have improvement since retiring, he feels the reduce stress has been very beneficial for him.  He is now also able to focus more on himself and started going to the gym regularly.  Exam remained stable.  No clear evidence of progression noted.    He did number questions which I answered to the best my ability.  He was interested in some potential trials which he will look into.  He continues to feel some brain fog as well as difficulty with word finding and losing train of thought.  We discussed the option of cognitive therapy.  Will hold off for now.  He is currently doing speech therapy.    He continues to have some difficulty with sleep.  Did not yet start melatonin as discussed with Dr. Hanna.  We did once again discuss starting this at 3 or 5 mg before bed and this can be increased up to 15 mg.    At this time he will remain on Sinemet  mg 1.5 tabs for 5 times a day depending on how really he wakes.  He also remain on selegiline.    He continues to struggle with his diagnosis as well as the progression of symptoms.  I do feel that he would benefit greatly from a counselor, he recently made an appointment to start with someone recommended by Dr. Hanna.  We also discussed perhaps talking to them about joint sessions with both him and his wife.  I will also reach out to the  to see if there is any local support groups that they could look into joining.

## 2024-09-04 NOTE — ASSESSMENT & PLAN NOTE
Patient was encouraged to increase mind stimulating activities such as reading, crosswords, word searches, puzzles, Soduku, solitaire, coloring and other brain games.  We also discussed the importance of staying physically active and eating a health diet such as the Mediterranean or MIND diet.

## 2024-09-04 NOTE — PROGRESS NOTES
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 Parkinson's disease without dyskinesia, unspecified whether manifestations fluctuate  G20.A1             Assessment:  Patient returns today for a 3 month check in. He continues presents with a mild voice disorder, characterized by reduced loudness, monotone pitch, and a hoarse vocal quality, which contributed to an overall decrease in speech intelligibility, intermittently. During conversation, speech intelligibility is reduced <5% of the time. Testing today reveal decreased volume (dB) with speak out exercises; and I recommended return to OP SLP services once a week; with a transition to a PD group class for skilled maintenance.     Short term goals:    Patient will coordinate vocal and articulatory subsystems in hierarchal speech tasks by producing sounds with intention with minimal assistance ONGOING - patient required moderate assistance today     Patient will read phrases, sentences and paragraphs with intention, yielding improved vocal quality, loudness, articulatory precision, and endrance while maintaining a minimum of 75 db with min assistance.  ONGOING - patient required moderate assistance today     5. Patient will generalize intentional speech to cognitive-linguistic exercises and conversational speech with improved vocal quality, loudness, articulatory precision, and endurance while maintaining a minimum of 75 dB with min assistance.ONGOING -patient required moderate assistance today    Patient will participate in expiratory muscle strength training (EMST) using a resistance based device to increase strength of respiratory muscles for improving cough, voice and swallow functions, to be achieved in 4-6 weeks. NOT MET/did not  discuss today    Long-term goals:  1. Patient will establish a home exercise program independently or with appropriate supports.ONGOING     2. Patient will participate in the LOUD CROWD (online or in person) as able after completion of one on one SLP treatments.ONGOING     3. Patient will produce spontaneous conversation while using intent independently or with appropriate supports. ONGOING    Plan  Patient would benefit from outpatient skilled Speech Therapy services: Voice therapy and Dysphagia therapy    Frequency: 1x weekly  Duration: 2 months    Intervention certification from: 9/4/2024  Intervention certification to:11/4/2024      Subjective  Patient's goal(s):  To be able to  speak clearly    Objective    Voice Handicap Index (VHI):  The VHI is a list of 30 statements that many people have used to describe their voices and the effects of their voices on their lives. Patient indicated how frequently he has the same experience using a rating point scale (“never” = 0, “almost never” = 1, “sometimes” = 2, “almost always” = 3, and “always” = 4).  Results were as follows:    Subscale: 9/4/24 4/9/24 2/9/24 Self-Perceived Impairment Level: STATUS   Physical 20/40 24/40 24/40 SEVERE improvement   Functional 24/40 21/40 22/40 SEVERE decline   Emotional 24/40 23/40 20/40 SEVERE decline           TOTAL 68/120 68/120 66/120 SEVERE No change         Treatment    SPEAK OUT!® Treatment Note/Re-assessment:     The following exercises were targeted to facilitate connection of breath to speech, loudness, articulation, expression, and intent of speech:    Sound level meter-to-mouth distance: 50 cm throughout session    Task 9/4/24 4/9/24 2/14/24 Level of Cueing Provided STATUS   Pre-treatment spontaneous conversation 67 dB  70 dB SPL 64 dB SPL N/A IMPROVEMENT            Warm-up (“may-me-my-maxine-moo”) 76 dB 78  dB SPL 77 dB SPL minimal-none IMPROVEMENT            Sustained “ah” 10 sec 10 seconds 10 seconds minimal-none WNL  "  Sustained “ah” 77.5 dB 78 dB SPL 77 dB SPL minimal-none IMPROVEMENT            Vocal glides 76 dB 78 dB SPL 75 dB SPL minimal-none IMPROVEMENT            Numerical sequences 76.5 dB 80 dB SPL 82 dB SPL minimal-none DECLINE           Oral reading (phrase level) 71 dB 74 dB SPL 78 dB SPL minimal DECLINE           Cognitive-linguistic tasks workbook (phrase level) N/a 74 dB SPL 73 dB SPL minimal IMPROVEMENT                           Post-treatment spontaneous conversation 68 dB 72 dB SPL 66 dB SPL N/A IMPROVEMENT      Patient required MODERATE cues to utilize \"INTENT\" during spontaneous conversation on this date of service.    Patient utilized SOVT (cup bubbling) prior to exercises to improve vocal quality      Visit tracking:  Re-eval Due Auth Expiration Date ST Visit Limit   3/15/24  12/31/24 BOMN   5/6/24 9/2/24 8   6/9/24 10/7/24 12   11/4/24 TBD pending        Visit/Unit Tracking  AUTH Status:  Date 5/3/24 5/7/24 5/10/24 5/14/24 5/17/24 6/4/24 9/4/24  RE   12 visits  Used 1 2 3 4 5 6 1    Remaining  11 10 9 8 7 6      Intervention comments:  Voice eval with patient x 20 min  Treatment/exercises x 25 min      " Take over the counter pain medication

## 2024-09-04 NOTE — PROGRESS NOTES
Review of Systems   Constitutional:  Negative for appetite change, fatigue and fever.   HENT: Negative.  Negative for hearing loss, tinnitus, trouble swallowing and voice change.    Eyes: Negative.  Negative for photophobia, pain and visual disturbance.   Respiratory: Negative.  Negative for shortness of breath.    Cardiovascular: Negative.  Negative for palpitations.   Gastrointestinal: Negative.  Negative for nausea and vomiting.   Endocrine: Negative.  Negative for cold intolerance.   Genitourinary: Negative.  Negative for dysuria, frequency and urgency.   Musculoskeletal:  Negative for back pain, gait problem, myalgias, neck pain and neck stiffness.   Skin: Negative.  Negative for rash.   Allergic/Immunologic: Negative.    Neurological:  Positive for tremors and weakness. Negative for dizziness, seizures, syncope, facial asymmetry, speech difficulty, light-headedness, numbness and headaches.   Hematological: Negative.  Does not bruise/bleed easily.   Psychiatric/Behavioral: Negative.  Negative for confusion, hallucinations and sleep disturbance.    All other systems reviewed and are negative.

## 2024-09-04 NOTE — TELEPHONE ENCOUNTER
----- Message from Sasha Gamble PA-C sent at 9/4/2024 11:35 AM EDT -----  Hi!  Patient was just wondering if he could get some information on local PD support groups for him and his wife, I do believe [facility] may offer this.  Thank you

## 2024-09-06 NOTE — TELEPHONE ENCOUNTER
SW called patient at [phone].  Patient got [employer] message but didn't look at the PD resources yet.  Patient has SW contact information and he will contact this writer should he have questions.  Expressed no needs.  SW remains available.

## 2024-09-19 ENCOUNTER — OFFICE VISIT (OUTPATIENT)
Dept: SPEECH THERAPY | Facility: CLINIC | Age: 64
End: 2024-09-19
Payer: COMMERCIAL

## 2024-09-19 DIAGNOSIS — R47.1 HYPERKINETIC DYSARTHRIA: Primary | ICD-10-CM

## 2024-09-19 DIAGNOSIS — G20.A1 PARKINSON'S DISEASE WITHOUT DYSKINESIA, UNSPECIFIED WHETHER MANIFESTATIONS FLUCTUATE (HCC): ICD-10-CM

## 2024-09-19 PROCEDURE — 92507 TX SP LANG VOICE COMM INDIV: CPT

## 2024-09-19 NOTE — PROGRESS NOTES
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 Parkinson's disease without dyskinesia, unspecified whether manifestations fluctuate  G20.A1             Visit tracking:  Re-eval Due Auth Expiration Date ST Visit Limit   3/15/24  12/31/24 BOMN   24 8   6/9/24 10/7/24 12   24 TBD pending        Visit/Unit Tracking  AUTH Status:  Date 24  RE 24   12 visits  Used 7 8    Remaining  5 4         Subjective/Behavioral:patient reports he feels his worse is worst in the morning and end of day; once he gets his voice \"warmed\" up he does good during the day.     Objective/Assessment:     Short-term goals:  1. Patient and family will watch Parkinson's voice project informational session called \"Learn About Parkinson's\" in order to better understand the SpeakOUT! program and commitment -  https://www.youtube.com/watch?v=[email or web address]  - Patient reported he watched the video     2. Patient will obtain a SpeakOUT! Workbook and complete exercises twice a day for 25 days, before transitioning to the LOUD CROWD. -Patient has his workbook     3. Patient will coordinate vocal and articulatory subsystems in hierarchal speech tasks by producing sounds with intention with minimal assistance     4. Patient will read phrases, sentences and paragraphs with intention, yielding improved vocal quality, loudness, articulatory precision, and endrance while maintaining a minimum of 75 db with min assistance.      5. Patient wll generalize intentional speech to cognitive-linguistic exercises and conversational speech with improved vocal quality, loudness, articulatory precision, and endurance while maintaining a minimum of 75 dB with min assistance.    SPEAK OUT!® Treatment Note:   Session #:     The " "following exercises were targeted to facilitate connection of breath to speech, loudness, articulation, expression, and intent of speech:    Sound level meter-to-mouth distance: 50 cm throughout session    Task Data Level of Cueing Provided   Pre-treatment spontaneous conversation  67 dB SPL N/A         Warm-up (“may-me-my-maxine-moo”) 76 dB SPL minimal-none         Sustained “ah” 10.4 seconds minimal-none   Sustained “ah” 76 dB SPL minimal-none         Vocal glides 74 dB SPL minimal-none         Numerical sequences 75 dB SPL minimal-none        Oral reading (paragraph level) 70 dB SPL minimal        Cognitive-linguistic tasks workbook (phrase level) 71 dB SPL minimal                  Post-treatment spontaneous conversation 66 N/A      Patient required moderate-maximum cues to utilize \"INTENT\" during spontaneous conversation on this date of service.      Plan:  -Patient was provided with home exercises/activities to target goals in plan of care at the end of today's session.       "

## 2024-09-25 ENCOUNTER — OFFICE VISIT (OUTPATIENT)
Dept: SPEECH THERAPY | Facility: CLINIC | Age: 64
End: 2024-09-25
Payer: COMMERCIAL

## 2024-09-25 DIAGNOSIS — R47.1 HYPERKINETIC DYSARTHRIA: Primary | ICD-10-CM

## 2024-09-25 DIAGNOSIS — G20.A1 PARKINSON'S DISEASE WITHOUT DYSKINESIA, UNSPECIFIED WHETHER MANIFESTATIONS FLUCTUATE (HCC): ICD-10-CM

## 2024-09-25 PROCEDURE — 92507 TX SP LANG VOICE COMM INDIV: CPT

## 2024-09-25 NOTE — PROGRESS NOTES
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 Parkinson's disease without dyskinesia, unspecified whether manifestations fluctuate  G20.A1           Visit tracking:  Re-eval Due Auth Expiration Date ST Visit Limit   3/15/24  12/31/24 BOMN   24 8   6/9/24 10/7/24 12   24 TBD pending        Visit/Unit Tracking  AUTH Status:  Date 24  RE 24   12 visits  Used 7 8 9    Remaining  5 4 3     Subjective/Behavioral:Patient reports difficulty being heard in his exercise class. He feels he needs to work on his conversational loudness.     Objective/Assessment:     Short-term goals:  1. Patient and family will watch Parkinson's voice project informational session called \"Learn About Parkinson's\" in order to better understand the SpeakOUT! program and commitment -  https://www.youZauberube.com/watch?[email or web address]  - Patient reported he watched the video     2. Patient will obtain a SpeakOUT! Workbook and complete exercises twice a day for 25 days, before transitioning to the LOUD CROWD. -Patient has his workbook     3. Patient will coordinate vocal and articulatory subsystems in hierarchal speech tasks by producing sounds with intention with minimal assistance     4. Patient will read phrases, sentences and paragraphs with intention, yielding improved vocal quality, loudness, articulatory precision, and endrance while maintaining a minimum of 75 db with min assistance.      5. Patient wll generalize intentional speech to cognitive-linguistic exercises and conversational speech with improved vocal quality, loudness, articulatory precision, and endurance while maintaining a minimum of 75 dB with min assistance.    SPEAK OUT!® Treatment Note:   Session #:     The following " "exercises were targeted to facilitate connection of breath to speech, loudness, articulation, expression, and intent of speech:    Sound level meter-to-mouth distance: 50 cm throughout session    Task Data Level of Cueing Provided   Pre-treatment spontaneous conversation 67 dB SPL N/A         Warm-up (“may-me-my-maxine-moo”) 76 dB SPL minimal-none         Sustained “ah” 10.5 seconds minimal-none   Sustained “ah” 76.5 dB SPL minimal-none         Vocal glides 77 dB SPL minimal-none         Numerical sequences 75 dB SPL minimal-none        Oral reading (paragraph level) 69 dB SPL minimal        Cognitive-linguistic tasks workbook (phrase level) 68 dB SPL minimal                  Post-treatment spontaneous conversation 67 dB SPL N/A      Patient required moderate-maximum cues to utilize \"INTENT\" during spontaneous conversation on this date of service.      Plan:  -Patient was provided with home exercises/activities to target goals in plan of care at the end of today's session.       "

## 2024-10-04 ENCOUNTER — EVALUATION (OUTPATIENT)
Dept: SPEECH THERAPY | Facility: CLINIC | Age: 64
End: 2024-10-04
Payer: COMMERCIAL

## 2024-10-04 DIAGNOSIS — R47.1 HYPERKINETIC DYSARTHRIA: Primary | ICD-10-CM

## 2024-10-04 DIAGNOSIS — G20.A1 PARKINSON'S DISEASE WITHOUT DYSKINESIA, UNSPECIFIED WHETHER MANIFESTATIONS FLUCTUATE (HCC): ICD-10-CM

## 2024-10-04 PROCEDURE — 92507 TX SP LANG VOICE COMM INDIV: CPT

## 2024-10-04 NOTE — PROGRESS NOTES
Speech-Language Pathology Re-Evaluation    Today's date: 10/4/2024   Patient’s name: Bob Fernandes  : 1960  MRN: [id number]  Safety measures: PD  Referring provider: Sasha Gamble PA-C    Encounter Diagnosis     ICD-10-CM    1. 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 Parkinson's disease without dyskinesia, unspecified whether manifestations fluctuate (HCC)  G20.A1             Assessment:  Patient continues presents with a mild voice disorder, characterized by reduced loudness, monotone pitch, and a hoarse vocal quality, which contributed to an overall decrease in speech intelligibility, intermittently. During conversation, speech intelligibility is reduced <5% of the time. Testing today reveal decreased volume (dB) with speak out exercises; and I recommended continue OP SLP services once a week; with a transition to a PD group class for skilled maintenance.     Short term goals:    Patient will coordinate vocal and articulatory subsystems in hierarchal speech tasks by producing sounds with intention with minimal assistance ONGOING - patient required min assistance today     Patient will read phrases, sentences and paragraphs with intention, yielding improved vocal quality, loudness, articulatory precision, and endrance while maintaining a minimum of 75 db with min assistance.  ONGOING - patient required min assistance today     5. Patient will generalize intentional speech to cognitive-linguistic exercises and conversational speech with improved vocal quality, loudness, articulatory precision, and endurance while maintaining a minimum of 75 dB with min assistance.ONGOING -patient required min assistance today    Patient will participate in expiratory muscle strength training (EMST) using a resistance based device to increase strength of respiratory muscles for improving cough, voice and swallow functions, to be achieved in 4-6 weeks. NOT MET/did not discuss today    Long-term goals:  1. Patient will  establish a home exercise program independently or with appropriate supports.ONGOING     2. Patient will participate in the LOUD CROWD (online or in person) as able after completion of one on one SLP treatments.ONGOING     3. Patient will produce spontaneous conversation while using intent independently or with appropriate supports. ONGOING    Plan  Patient would benefit from outpatient skilled Speech Therapy services: Voice therapy    Frequency: 1x weekly  Duration: 2 months    Intervention certification from: 10/4/2024  Intervention certification to:12/4/24      Subjective  Patient's goal(s):  To be able to  speak clearly    Objective    Voice Handicap Index (VHI):  The VHI is a list of 30 statements that many people have used to describe their voices and the effects of their voices on their lives. Patient indicated how frequently he has the same experience using a rating point scale (“never” = 0, “almost never” = 1, “sometimes” = 2, “almost always” = 3, and “always” = 4).  Results were as follows:    Subscale: 9/4/24 4/9/24 2/9/24 Self-Perceived Impairment Level: STATUS   Physical 20/40 24/40 24/40 SEVERE improvement   Functional 24/40 21/40 22/40 SEVERE decline   Emotional 24/40 23/40 20/40 SEVERE decline           TOTAL 68/120 68/120 66/120 SEVERE No change         Treatment    SPEAK OUT!® Treatment Note/Re-assessment:     The following exercises were targeted to facilitate connection of breath to speech, loudness, articulation, expression, and intent of speech:    Sound level meter-to-mouth distance: 50 cm throughout session    Task 10/4/24 9/4/24 4/9/24 2/14/24 Level of Cueing Provided STATUS   Pre-treatment spontaneous conversation 67 dB 67 dB  70 dB SPL 64 dB SPL N/A NO CHANGE             Warm-up (“may-me-my-maxine-moo”) 74 dB 76 dB 78  dB SPL 77 dB SPL minimal-none DECLINE             Sustained “ah” 10 sec 10 sec 10 seconds 10 seconds minimal-none WNL   Sustained “ah” 73 dB 77.5 dB 78 dB SPL 77 dB SPL  "minimal-none DECLINE             Vocal glides 73 dB 76 dB 78 dB SPL 75 dB SPL minimal-none DECLINE             Numerical sequences 74 dB 76.5 dB 80 dB SPL 82 dB SPL minimal-none DECLINE            Oral reading (phrase level) 71 dB 71 dB 74 dB SPL 78 dB SPL minimal DECLINE            Cognitive-linguistic tasks workbook (phrase level)  N/a 74 dB SPL 73 dB SPL minimal                               Post-treatment spontaneous conversation 70 dB 68 dB 72 dB SPL 66 dB SPL N/A IMPROVEMENT      Patient required MODERATE cues to utilize \"INTENT\" during spontaneous conversation on this date of service.    Patient utilized SOVT (cup bubbling) prior to exercises to improve vocal quality      Visit tracking:  Re-eval Due Auth Expiration Date ST Visit Limit   3/15/24  12/31/24 BOMN   5/6/24 9/2/24 8   6/9/24 10/7/24 12   11/4/24 TBD pending        Visit/Unit Tracking  AUTH Status:  Date 9/4/24  RE 9/19/24 9/25/24   12 visits  Used 7 8 9    Remaining  5 4 3             "

## 2024-10-11 ENCOUNTER — OFFICE VISIT (OUTPATIENT)
Dept: SPEECH THERAPY | Facility: CLINIC | Age: 64
End: 2024-10-11
Payer: COMMERCIAL

## 2024-10-11 DIAGNOSIS — G20.A1 PARKINSON'S DISEASE WITHOUT DYSKINESIA, UNSPECIFIED WHETHER MANIFESTATIONS FLUCTUATE (HCC): ICD-10-CM

## 2024-10-11 DIAGNOSIS — R47.1 HYPERKINETIC DYSARTHRIA: Primary | ICD-10-CM

## 2024-10-11 PROCEDURE — 92507 TX SP LANG VOICE COMM INDIV: CPT | Performed by: SPEECH-LANGUAGE PATHOLOGIST

## 2024-10-11 NOTE — PROGRESS NOTES
"Daily Speech Treatment Note    Today's date: 10/11/2024  Patient’s name: Bob Fernandes  : 1960  MRN: [id number]  Safety measures: PD  Referring provider: Sasha Gamble PA-C    Encounter Diagnosis     ICD-10-CM    1. 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 Parkinson's disease without dyskinesia, unspecified whether manifestations fluctuate (HCC)  G20.A1           Visit tracking:  Re-eval Due Auth Expiration Date ST Visit Limit   3/15/24  12/31/24 BOMN   24 8   6/9/24 10/7/24 12   24 TBD pending        Visit/Unit Tracking  AUTH Status:  Date 24  RE 9/19/24 9/25/24 10/4/24 10/11/24   12 visits  Used 7 8 9 10 11    Remaining  5 4 3 2 1     Subjective/Behavioral:Patient reports difficulty being heard in his in background noise. He feels he needs to work on his conversational loudness, he notes he slurs if he forgets.    Objective/Assessment:     Short-term goals:  1. Patient and family will watch Parkinson's voice project informational session called \"Learn About Parkinson's\" in order to better understand the SpeakOUT! program and commitment      2. Patient will obtain a SpeakOUT! Workbook and complete exercises twice a day for 25 days, before transitioning to the LOUD CROWD.      3. Patient will coordinate vocal and articulatory subsystems in hierarchal speech tasks by producing sounds with intention with minimal assistance.  Provided cues throughout hierarchy below to exaggerate articulatory movements, provided education re: PD and its impact on one's perception of voice and movement.     4. Patient will read phrases, sentences and paragraphs with intention, yielding improved vocal quality, loudness, articulatory precision, and endrance while maintaining a minimum of 75 db with min assistance.   See below    5. Patient wll generalize intentional speech to cognitive-linguistic exercises and conversational speech with improved vocal quality, loudness, articulatory precision, and " "endurance while maintaining a minimum of 75 dB with min assistance.      The following exercises were targeted to facilitate connection of breath to speech, loudness, articulation, expression, and intent of speech:    Sound level meter-to-mouth distance: 50 cm throughout session, estimated    Task Data Level of Cueing Provided   Pre-treatment spontaneous conversation 64 dB SPL N/A         Warm-up (“may-me-my-maxine-moo”), M BTG 76 dB SPL moderate         Sustained “ah” 10.5 seconds minimal-none   Sustained “ah” 76.5 dB SPL minimal-none         Vocal glides 77 dB SPL minimal-none         Numerical sequences 73 dB SPL minimal-none        Oral reading (paragraph level) 76 dB SPL moderate        Cognitive-linguistic tasks workbook (phrase level) 72 dB SPL minimal                  Post-treatment spontaneous conversation 68 dB SPL N/A      Patient required minimal-moderate cues to utilize \"INTENT\" during spontaneous conversation on this date of service. SPEAK OUT tasks were modified slightly today to reorient patient to basic voice tasks and exercises in preparation for full program.      Plan:  -Patient was provided with home exercises/activities to target goals in plan of care at the end of today's session.       "

## 2024-10-14 DIAGNOSIS — I10 ESSENTIAL HYPERTENSION: ICD-10-CM

## 2024-10-15 RX ORDER — LOSARTAN POTASSIUM AND HYDROCHLOROTHIAZIDE 12.5; 1 MG/1; MG/1
TABLET ORAL
Qty: 90 TABLET | Refills: 1 | Status: SHIPPED | OUTPATIENT
Start: 2024-10-15

## 2024-10-18 ENCOUNTER — EVALUATION (OUTPATIENT)
Dept: SPEECH THERAPY | Facility: CLINIC | Age: 64
End: 2024-10-18
Payer: COMMERCIAL

## 2024-10-18 DIAGNOSIS — G20.A1 PARKINSON'S DISEASE WITHOUT DYSKINESIA, UNSPECIFIED WHETHER MANIFESTATIONS FLUCTUATE (HCC): ICD-10-CM

## 2024-10-18 DIAGNOSIS — R47.1 HYPERKINETIC DYSARTHRIA: Primary | ICD-10-CM

## 2024-10-18 PROCEDURE — 92507 TX SP LANG VOICE COMM INDIV: CPT

## 2024-10-18 NOTE — PROGRESS NOTES
"Daily Speech Treatment Note/ Progress UPDATE    Today's date: 10/18/2024  Patient’s name: Bob Fernandes  : 1960  MRN: [id number]  Safety measures: PD  Referring provider: Sasha Gamble PA-C    Encounter Diagnosis     ICD-10-CM    1. 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 Parkinson's disease without dyskinesia, unspecified whether manifestations fluctuate (HCC)  G20.A1           Visit tracking:  Re-eval Due Auth Expiration Date ST Visit Limit   3/15/24  12/31/24 BOMN   24 8   6/9/24 10/7/24 12   24 TBD pending        Visit/Unit Tracking  AUTH Status:  Date 24  RE 9/19/24 9/25/24 10/4/24 10/11/24 10/18/24   12 visits  Used 7 8 9 10 11 12    Remaining  5 4 3 2 1 0     Subjective/Behavioral:Patient participated well today - made more appts in NOV and discussed transitioning to a group (PD voice)    Objective/Assessment:     Short-term goals:  1. Patient and family will watch Parkinson's voice project informational session called \"Learn About Parkinson's\" in order to better understand the SpeakOUT! program and commitment      2. Patient will obtain a SpeakOUT! Workbook and complete exercises twice a day for 25 days, before transitioning to the LOUD CROWD.      3. Patient will coordinate vocal and articulatory subsystems in hierarchal speech tasks by producing sounds with intention with minimal assistance.  Provided cues throughout hierarchy below to exaggerate articulatory movements, provided education re: PD and its impact on one's perception of voice and movement.     4. Patient will read phrases, sentences and paragraphs with intention, yielding improved vocal quality, loudness, articulatory precision, and endrance while maintaining a minimum of 75 db with min assistance.   See below    5. Patient wll generalize intentional speech to cognitive-linguistic exercises and conversational speech with improved vocal quality, loudness, articulatory precision, and endurance while maintaining " "a minimum of 75 dB with min assistance.      The following exercises were targeted to facilitate connection of breath to speech, loudness, articulation, expression, and intent of speech:    Sound level meter-to-mouth distance: 50 cm throughout session, estimated    Task Data Level of Cueing Provided   Pre-treatment spontaneous conversation 68 dB SPL N/A         Warm-up (“may-me-my-maxine-moo”), M BTG 75 dB SPL moderate         Sustained “ah” 9.5 seconds minimal-none   Sustained “ah” 74 dB SPL minimal-none         Vocal glides 75 dB SPL minimal-none         Numerical sequences  minimal-none        Oral reading (paragraph level)  moderate        Cognitive-linguistic tasks workbook (phrase level)  minimal                  Post-treatment spontaneous conversation  N/A      Patient required minimal-moderate cues to utilize \"INTENT\" during spontaneous conversation on this date of service. SPEAK OUT tasks were modified slightly today to reorient patient to basic voice tasks and exercises in preparation for full program.      Plan  Patient would benefit from outpatient skilled Speech Therapy services: Voice therapy    Frequency: 1x weekly  Duration: 2 months    Intervention certification from: 10/18/24  Intervention certification to:12/4/24      "

## 2024-10-24 NOTE — PROGRESS NOTES
"Daily Speech Treatment Note    Today's date: 10/25/2024  Patient’s name: Bob Fernandes  : 1960  MRN: [id number]  Safety measures: PD  Referring provider: Sasha Gamble PA-C    Encounter Diagnosis     ICD-10-CM    1. 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 Parkinson's disease without dyskinesia, unspecified whether manifestations fluctuate (HCC)  G20.A1           Visit tracking:  Re-eval Due Auth Expiration Date ST Visit Limit   3/15/24  12/31/24 BOMN   24 8   6/9/24 10/7/24 12   24 12        Visit/Unit Tracking  AUTH Status:  Date 10/25/24       12 visits  Used 3        Remaining  9         Subjective/Behavioral: Patient reports he had a bad day yesterday, his brain felt foggy and he was not very clear with his speech    Patient c/o dizziness during reading tasks    BP: 141/84    Objective/Assessment:     Short-term goals:  1. Patient and family will watch Parkinson's voice project informational session called \"Learn About Parkinson's\" in order to better understand the SpeakOUT! program and commitment      2. Patient will obtain a SpeakOUT! Workbook and complete exercises twice a day for 25 days, before transitioning to the LOU[facility] CROWD.      3. Patient will coordinate vocal and articulatory subsystems in hierarchal speech tasks by producing sounds with intention with minimal assistance.  Provided cues throughout hierarchy below to exaggerate articulatory movements, provided education re: PD and its impact on one's perception of voice and movement.     4. Patient will read phrases, sentences and paragraphs with intention, yielding improved vocal quality, loudness, articulatory precision, and endrance while maintaining a minimum of 75 db with min assistance.   See below    5. Patient wll generalize intentional speech to cognitive-linguistic exercises and conversational speech with improved vocal quality, loudness, articulatory precision, and endurance while maintaining a minimum of " "75 dB with min assistance.      The following exercises were targeted to facilitate connection of breath to speech, loudness, articulation, expression, and intent of speech: 23/25    Sound level meter-to-mouth distance: 50 cm throughout session, estimated    Task Data Level of Cueing Provided   Pre-treatment spontaneous conversation 66 dB SPL N/A         Warm-up (“may-me-my-maxine-moo”), M BTG 73 dB SPL moderate         Sustained “ah” 74 seconds minimal-none   Sustained “ah” 10.4 dB SPL minimal-none         Vocal glides 74 dB SPL minimal-none         Numerical sequences 75 dB SPL minimal-none        Oral reading (paragraph level) 71 dB SPL moderate        Cognitive-linguistic tasks workbook (phrase level) 67 dB SPL minimal                  Post-treatment spontaneous conversation 66 dB SPL N/A      Patient required minimal-moderate cues to utilize \"INTENT\" during spontaneous conversation on this date of service. SPEAK OUT tasks were modified slightly today to reorient patient to basic voice tasks and exercises in preparation for full program.      Plan:  -Patient was provided with home exercises/activities to target goals in plan of care at the end of today's session.       "

## 2024-10-25 ENCOUNTER — OFFICE VISIT (OUTPATIENT)
Dept: SPEECH THERAPY | Facility: CLINIC | Age: 64
End: 2024-10-25
Payer: COMMERCIAL

## 2024-10-25 DIAGNOSIS — G20.A1 PARKINSON'S DISEASE WITHOUT DYSKINESIA, UNSPECIFIED WHETHER MANIFESTATIONS FLUCTUATE (HCC): ICD-10-CM

## 2024-10-25 DIAGNOSIS — R47.1 HYPERKINETIC DYSARTHRIA: Primary | ICD-10-CM

## 2024-10-25 PROCEDURE — 92507 TX SP LANG VOICE COMM INDIV: CPT

## 2024-11-01 ENCOUNTER — OFFICE VISIT (OUTPATIENT)
Dept: SPEECH THERAPY | Facility: CLINIC | Age: 64
End: 2024-11-01
Payer: COMMERCIAL

## 2024-11-01 DIAGNOSIS — G20.A1 PARKINSON'S DISEASE WITHOUT DYSKINESIA, UNSPECIFIED WHETHER MANIFESTATIONS FLUCTUATE (HCC): ICD-10-CM

## 2024-11-01 DIAGNOSIS — R47.1 HYPERKINETIC DYSARTHRIA: Primary | ICD-10-CM

## 2024-11-01 PROCEDURE — 92507 TX SP LANG VOICE COMM INDIV: CPT

## 2024-11-01 NOTE — PROGRESS NOTES
"Daily Speech Treatment Note    Today's date: 2024  Patient’s name: Bob Fernandes  : 1960  MRN: [id number]  Safety measures: PD  Referring provider: Sasha Gamble PA-C    Encounter Diagnosis     ICD-10-CM    1. 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 Parkinson's disease without dyskinesia, unspecified whether manifestations fluctuate (HCC)  G20.A1           Visit tracking:  Re-eval Due Auth Expiration Date ST Visit Limit   3/15/24  12/31/24 BOMN   24 8   6/9/24 10/7/24 12   24 12        Visit/Unit Tracking  AUTH Status:  Date 10/25/24 11/1/24      12 visits  Used 3 4       Remaining  9 8        Subjective/Behavioral: Patient arrived 15 min late today. He and SLP had a conversation about transitioning him to a PD voice group. He is in agreement. We worked on getting a schedule together. He was noted to use INTENT today with only min cues needed.     He reports he has been more conscious of his voice and using INTENT    Next session he should know more about [place]    Objective/Assessment:     Short-term goals:  1. Patient and family will watch Parkinson's voice project informational session called \"Learn About Parkinson's\" in order to better understand the SpeakOUT! program and commitment      2. Patient will obtain a SpeakOUT! Workbook and complete exercises twice a day for 25 days, before transitioning to the LOUD CROWD.      3. Patient will coordinate vocal and articulatory subsystems in hierarchal speech tasks by producing sounds with intention with minimal assistance.  Provided cues throughout hierarchy below to exaggerate articulatory movements, provided education re: PD and its impact on one's perception of voice and movement.     4. Patient will read phrases, sentences and paragraphs with intention, yielding improved vocal quality, loudness, articulatory precision, and endrance while maintaining a minimum of 75 db with min assistance. "   See below    5. Patient wll generalize intentional speech to cognitive-linguistic exercises and conversational speech with improved vocal quality, loudness, articulatory precision, and endurance while maintaining a minimum of 75 dB with min assistance.      Plan:  -Patient was provided with home exercises/activities to target goals in plan of care at the end of today's session.

## 2024-11-05 ENCOUNTER — OFFICE VISIT (OUTPATIENT)
Dept: SPEECH THERAPY | Facility: CLINIC | Age: 64
End: 2024-11-05
Payer: COMMERCIAL

## 2024-11-05 DIAGNOSIS — R47.1 HYPERKINETIC DYSARTHRIA: Primary | ICD-10-CM

## 2024-11-05 DIAGNOSIS — G20.A1 PARKINSON'S DISEASE WITHOUT DYSKINESIA, UNSPECIFIED WHETHER MANIFESTATIONS FLUCTUATE (HCC): ICD-10-CM

## 2024-11-05 PROCEDURE — 92507 TX SP LANG VOICE COMM INDIV: CPT

## 2024-11-05 NOTE — PROGRESS NOTES
"Daily Speech Treatment Note    Today's date: 2024  Patient’s name: Bob Fernandes  : 1960  MRN: [id number]  Safety measures: PD  Referring provider: Sasha Gamble PA-C    Encounter Diagnosis     ICD-10-CM    1. 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 Parkinson's disease without dyskinesia, unspecified whether manifestations fluctuate (HCC)  G20.A1           Visit tracking:  Re-eval Due Auth Expiration Date ST Visit Limit   3/15/24  12/31/24 BOMN   24 8   6/9/24 10/7/24 12   24 12        Visit/Unit Tracking  AUTH Status:  Date 10/25/24 11/1/24 11/5/24     12 visits  Used 3 4 5      Remaining  9 8 7       Subjective/Behavioral: Patient doing well today    Discussed transition to PD voice group in Dec    Objective/Assessment:     Short-term goals:  1. Patient and family will watch Parkinson's voice project informational session called \"Learn About Parkinson's\" in order to better understand the SpeakOUT! program and commitment      2. Patient will obtain a SpeakOUT! Workbook and complete exercises twice a day for 25 days, before transitioning to the LOUD CROWD.      3. Patient will coordinate vocal and articulatory subsystems in hierarchal speech tasks by producing sounds with intention with minimal assistance.     4. Patient will read phrases, sentences and paragraphs with intention, yielding improved vocal quality, loudness, articulatory precision, and endrance while maintaining a minimum of 75 db with min assistance.     5. Patient wll generalize intentional speech to cognitive-linguistic exercises and conversational speech with improved vocal quality, loudness, articulatory precision, and endurance while maintaining a minimum of 75 dB with min assistance.    The following exercises were targeted to facilitate connection of breath to speech, loudness, articulation, expression, and intent of speech:    Sound level meter-to-mouth distance: 50 cm throughout session, " "estimated    Task Data Level of Cueing Provided   Pre-treatment spontaneous conversation 67 dB SPL N/A         Warm-up (“may-me-my-maxine-moo”), M BTG 74 dB SPL moderate         Sustained “ah” 10 seconds minimal-none   Sustained “ah” 74 dB SPL minimal-none         Vocal glides 74.5 dB SPL minimal-none         Numerical sequences 74 minimal-none        Oral reading (paragraph level) 71 moderate        Cognitive-linguistic tasks workbook (phrase level)                    Post-treatment spontaneous conversation  N/A      Patient required minimal-moderate cues to utilize \"INTENT\" during spontaneous conversation on this date of service    Plan:  -Patient was provided with home exercises/activities to target goals in plan of care at the end of today's session.       "

## 2024-11-12 ENCOUNTER — EVALUATION (OUTPATIENT)
Dept: PHYSICAL THERAPY | Facility: CLINIC | Age: 64
End: 2024-11-12
Payer: COMMERCIAL

## 2024-11-12 DIAGNOSIS — G20.A1 PARKINSON'S DISEASE WITHOUT DYSKINESIA, UNSPECIFIED WHETHER MANIFESTATIONS FLUCTUATE (HCC): Primary | ICD-10-CM

## 2024-11-12 PROCEDURE — 97164 PT RE-EVAL EST PLAN CARE: CPT | Performed by: PHYSICAL THERAPIST

## 2024-11-12 NOTE — PROGRESS NOTES
Re-evaluation Note     Today's date: 2024  Patient name: Bob Fernandes  : 1960  MRN: [id number]  Referring provider: David Hanna MD  Dx: No diagnosis found.               Subjective: Patient reports feeling dizzy when turnign head fast or when walkin gin a crowd. He has not fallen but feels unsteady when that happens. He goes to the gym 3 days a week.       Objective: See treatment diary below    Functional outcomes   6 minute walk test: 1525  Gait speed: 1.6 m/s  5x sit to stand: 13.85 (seconds)  TU.12 (seconds)  TUG cognitive: 9.78 (seconds)  FGA :   ABC: 75%     Functional Outcomes   6 mwt: 1750   Gait Speed: 1.8 m/s  FGA:  ABC: 69%     Functional Outcomes 24  6 mwt: 1550  Gait Speed: 1.5 m/s  FGA:     Functional Outcomes 24  6 mwt: 1550  Gait Speed: 1.57 m/s  FGA:  ABC:       Assessment: Patient reports to physical therapy for reassessment this date. At this time patient has maintained functional status when comapred to last re-evaluation of walking speed, endurance and balance. Reviewed importance of continuing with aerobic exercise program in addition of strengthening program. Pt voiced understanding. Completed vestibular and ocular exam today and discussed that patient may struggle to continually adjust to having prisms and then not having prisms when he wears and then doesn't wear glasses as far as his balance and overall diziness is concerned. Pt voiced understanding and will trial wearing glasses especially when going for  a walk and to see if that decreases his overall dizziness. Encouraged pt to call to come back to PT of anything changes but at this time he is not appropriate for skilled PT as he is overall independent with maintenance of his balance, strength, overall functional mobilty at this time.       Plan: Continue per plan of care.         Precautions: Parkinsons, Mild CAD, HTN, H/O L TKA.  Short Term Goal Expiration  "Date:(4/6/24)  Long Term Goal Expiration Date: (5/6/24)  POC Expiration Date: (5/6/24)        POC expires Unit limit Auth Expiration date PT/OT/ST + Visit Limit?   5/6/24 3 3/29/24 3        6/4/24                                   Visit/Unit Tracking  AUTH Status:  Date 3/13  3/19  4/2 4/5 4/8 4/12 4/16 4/19           Used 2  3  4  5  6  7  8  1           Remaining  6  5  4  3  2  1  0  7                     Manuals 4/12 4/16 4/19 4/5 4/8                                               Neuro Re-Ed           Side stepping incline          [facility] taps   Holding tidal tank on foam kiley - 10 laps fwd                  STS 3x10 with airex and tidal tank 3x10 with airex and tidal tank   2x10  3x10 with tidal tank airex    karaoke          Step ups  12\" step with airex pad 10x          River rocks Varying heights and narrowed base of support - 5 reps without tidal tank 5 reps with //bars      Tidal tank varying levels of rocks - 10x //bars    obstacle course Incline with foam beam and 12\" hrudless - 10 laps ea fwd/lat 10# aw b/l    Incline wedge, bi, river rock, incline board - 3 repetitions of 5 laps - 1 repetition of 5 laps with tidal tank and 15# aw       treadmill turns     6 min 0.8 mph  fwd/bkwd varying inclines with ball toss      agility ladder   3 different combinations - 10 trials each       Ther Ex           treadmill 3.5 mph 10 min 10% incline with b/l UE to increase push off and step clearance   3 min 2.0 mph      lunges  5 laps mirror         bosu squats  30x 30x                                                              Ther Activity                                Gait Training          [facility]        100 ft x 4 up and down fwd and lat              Modalities                                                           "

## 2024-11-14 ENCOUNTER — OFFICE VISIT (OUTPATIENT)
Dept: SPEECH THERAPY | Facility: CLINIC | Age: 64
End: 2024-11-14
Payer: COMMERCIAL

## 2024-11-14 DIAGNOSIS — G20.A1 PARKINSON'S DISEASE WITHOUT DYSKINESIA, UNSPECIFIED WHETHER MANIFESTATIONS FLUCTUATE (HCC): ICD-10-CM

## 2024-11-14 DIAGNOSIS — R47.1 HYPERKINETIC DYSARTHRIA: Primary | ICD-10-CM

## 2024-11-14 PROCEDURE — 92507 TX SP LANG VOICE COMM INDIV: CPT

## 2024-11-14 NOTE — PROGRESS NOTES
"Daily Speech Treatment Note    Today's date: 2024  Patient’s name: Bob Fernandes  : 1960  MRN: [id number]  Safety measures: PD  Referring provider: Sasha Gamble PA-C    Encounter Diagnosis     ICD-10-CM    1. 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 Parkinson's disease without dyskinesia, unspecified whether manifestations fluctuate (HCC)  G20.A1           Visit tracking:  Re-eval Due Auth Expiration Date ST Visit Limit   3/15/24  12/31/24 BOMN   24 8   6/9/24 10/7/24 12   24 12        Visit/Unit Tracking  AUTH Status:  Date 10/25/24 11/1/24 11/5/24 11/14/24    12 visits  Used 3 4 5 6     Remaining  9 8 7 3      Subjective/Behavioral: Patient doing well today    Discussed transition to PD voice group in Dec    Objective/Assessment:     Short-term goals:  1. Patient and family will watch Parkinson's voice project informational session called \"Learn About Parkinson's\" in order to better understand the SpeakOUT! program and commitment      2. Patient will obtain a SpeakOUT! Workbook and complete exercises twice a day for 25 days, before transitioning to the [place].      3. Patient will coordinate vocal and articulatory subsystems in hierarchal speech tasks by producing sounds with intention with minimal assistance.     4. Patient will read phrases, sentences and paragraphs with intention, yielding improved vocal quality, loudness, articulatory precision, and endrance while maintaining a minimum of 75 db with min assistance.     5. Patient wll generalize intentional speech to cognitive-linguistic exercises and conversational speech with improved vocal quality, loudness, articulatory precision, and endurance while maintaining a minimum of 75 dB with min assistance.    The following exercises were targeted to facilitate connection of breath to speech, loudness, articulation, expression, and intent of speech:    Sound level meter-to-mouth distance: 50 cm throughout " "session    Task Data Level of Cueing Provided   Pre-treatment spontaneous conversation 71 dB SPL N/A         Warm-up (“may-me-my-maxine-moo”), M BTG 75 dB SPL moderate         Sustained “ah” 8 seconds minimal-none   Sustained “ah” 75 dB SPL minimal-none         Vocal glides 72 dB SPL minimal-none         Numerical sequences  minimal-none        Oral reading (paragraph level)  moderate        Cognitive-linguistic tasks workbook (phrase level)                    Post-treatment spontaneous conversation  N/A      Patient required minimal-moderate cues to utilize \"INTENT\" during spontaneous conversation on this date of service    Plan:  -Patient was provided with home exercises/activities to target goals in plan of care at the end of today's session.       "

## 2024-11-19 ENCOUNTER — APPOINTMENT (OUTPATIENT)
Dept: SPEECH THERAPY | Facility: CLINIC | Age: 64
End: 2024-11-19
Payer: COMMERCIAL

## 2024-11-19 DIAGNOSIS — G20.A1 PARKINSON'S DISEASE WITHOUT DYSKINESIA, UNSPECIFIED WHETHER MANIFESTATIONS FLUCTUATE (HCC): ICD-10-CM

## 2024-11-19 DIAGNOSIS — R47.1 HYPERKINETIC DYSARTHRIA: Primary | ICD-10-CM

## 2024-11-19 NOTE — PROGRESS NOTES
"Daily Speech Treatment Note    Today's date: 2024  Patient’s name: Bob Fernandes  : 1960  MRN: [id number]  Safety measures: PD  Referring provider: Sasha Gamble PA-C    Encounter Diagnosis     ICD-10-CM    1. 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 Parkinson's disease without dyskinesia, unspecified whether manifestations fluctuate (HCC)  G20.A1           Visit tracking:  Re-eval Due Auth Expiration Date ST Visit Limit   3/15/24  12/31/24 BOMN   24 8   6/9/24 10/7/24 12   24 12        Visit/Unit Tracking  AUTH Status:  Date 10/25/24 11/1/24 11/5/24 11/14/24 ***   12 visits  Used 3 4 5 6     Remaining  9 8 7 3      Subjective/Behavioral: Patient doing well today    Discussed transition to PD voice group in Dec***    Objective/Assessment:     Short-term goals:  1. Patient and family will watch Parkinson's voice project informational session called \"Learn About Parkinson's\" in order to better understand the SpeakOUT! program and commitment      2. Patient will obtain a SpeakOUT! Workbook and complete exercises twice a day for 25 days, before transitioning to the [place].      3. Patient will coordinate vocal and articulatory subsystems in hierarchal speech tasks by producing sounds with intention with minimal assistance.     4. Patient will read phrases, sentences and paragraphs with intention, yielding improved vocal quality, loudness, articulatory precision, and endrance while maintaining a minimum of 75 db with min assistance.     5. Patient wll generalize intentional speech to cognitive-linguistic exercises and conversational speech with improved vocal quality, loudness, articulatory precision, and endurance while maintaining a minimum of 75 dB with min assistance.    The following exercises were targeted to facilitate connection of breath to speech, loudness, articulation, expression, and intent of speech:    Sound level meter-to-mouth distance: 50 cm throughout " "session    Task Data Level of Cueing Provided   Pre-treatment spontaneous conversation *** dB SPL N/A         Warm-up (“may-me-my-maxine-moo”), M BTG *** dB SPL moderate         Sustained “ah” *** seconds minimal-none   Sustained “ah” *** dB SPL minimal-none         Vocal glides *** dB SPL minimal-none         Numerical sequences  minimal-none        Oral reading (paragraph level)  moderate        Cognitive-linguistic tasks workbook (phrase level)                    Post-treatment spontaneous conversation  N/A      Patient required minimal-moderate cues to utilize \"INTENT\" during spontaneous conversation on this date of service    Plan:  -Patient was provided with home exercises/activities to target goals in plan of care at the end of today's session.       "

## 2024-11-26 ENCOUNTER — EVALUATION (OUTPATIENT)
Dept: SPEECH THERAPY | Facility: CLINIC | Age: 64
End: 2024-11-26
Payer: COMMERCIAL

## 2024-11-26 DIAGNOSIS — R47.1 HYPERKINETIC DYSARTHRIA: Primary | ICD-10-CM

## 2024-11-26 DIAGNOSIS — G20.A1 PARKINSON'S DISEASE WITHOUT DYSKINESIA, UNSPECIFIED WHETHER MANIFESTATIONS FLUCTUATE (HCC): ICD-10-CM

## 2024-11-26 PROCEDURE — 92507 TX SP LANG VOICE COMM INDIV: CPT

## 2024-11-26 NOTE — PROGRESS NOTES
Speech-Language Pathology Re-Evaluation  Skilled Maintenance - PD Voice group    Today's date: 2024   Patient’s name: Bob Fernandes  : 1960  MRN: [id number]  Safety measures: PD  Referring provider: Sasha Gamble PA-C    Encounter Diagnosis     ICD-10-CM    1. 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 Parkinson's disease without dyskinesia, unspecified whether manifestations fluctuate ([facility])  G20.A1           Assessment:   Patient continues to present with intermittent vocal hoarseness/strain. During treatment sessions, patient was noted to benefit from verbal cues from SLPs throughout the program. SOVT exercises were also incorporated during treatment sessions to target relaxed laryngeal posture and coordination between phonation and respiration. He will transition to PD voice group class next week for skilled maintenance. Patient will require a therapist's skill to carry out these interventions to maintain his current level of voice/speech intelligibility. Patient has a known diagnosis of Parkinson's disease. Without the skills of a clinician providing these necessary services, patient's status is suspected to decline (e.g., decreased vocal intensity, resonance, and clarity of speech, reduced communication success, socially withdrawn, reduced quality of life, decreased safety). Frequency of therapy to be one visit/week for 3 months. Patient in agreement with this plan.       Short-Term goals:    Patient will complete structured exercises targeting vocal loudness and respiratory & laryngeal coordination while maintaining functional loudness with no with more than 2 cues per session to target the maintenance of her voice/speech intelligibility (to be achieved in 12 weeks).     Patient will maintain functional loudness during various cognitive-linguistic activities with no more than 2 cues per session to target the maintenance of her voice/speech intelligibility for increased communication success  "with familiar and unfamiliar listeners (to be achieved in 12 weeks).      Patient will maintain functional loudness during conversational speech with no more than 2 cues to increase loudness to target the maintenance of her voice/speech intelligibility for increased communication success with both familiar and unfamiliar listeners (to be achieved in 12 weeks).      Long-term goals:     Patient will maintain their highest level of independence with her voice/speech intelligibility to meet their needs with the implementation of compensatory strategies to promote positive communication interactions and socialization, participation in meaningful activities, safety, and quality of life (to be achieved by discharge).      Plan:  Patient would benefit from outpatient skilled Speech Therapy services: Voice therapy    Frequency: 1x weekly (PD Voice group)  Duration: 3 months (progress update every 10 visits)    Intervention certification from: 11/26/2024  Intervention certification to: 02/26/2025      Subjective: Patient returned today after being [place] next [place]     Patient's goal(s):  To be able to  speak clearly    Pain: Absent (scale: 0)      Objective:  SpeakOUT! Follow Up   Sound Level Meter-to-Mouth Distance: 50 cm throughout testing      Task 11/14/24 10/04/24 09/04/24 04/09/24 03/06/24 02/09/24 11/08/22   Spontaneous conversation at (dB) 71 dB 67 dB 67 dB 70 dB 72 dB 70 dB 60 dB   Sustained \"ah\" for (sec) 8 sec 10 sec 10 sec 10 sec 10.2 sec 16.4 sec 20 sec   Sustained \"ah\" at (dB) 75 dB 73 dB 77.5 dB 78 dB 78 dB 70 dB 54 dB         Treatment:  Reviewed patient's HEP and discussed transition into PD voice group      Visit Tracking:  Re-eval Due Auth Expiration Date ST Visit Limit   3/15/24  12/31/24 BOMN   5/6/24 9/2/24 8   6/9/24 10/7/24 12   12/4/24 4/9/25 12   02/26/25 04/09/2025      Visit/Unit Tracking  AUTH Status:  Date 10/25/24 11/1/24 11/5/24 11/14/24 11/26/24 "   12 visits  Used 3 4 5 6 7    Remaining  9 8 7 3 2       Intervention comments:  N/a

## 2024-12-05 ENCOUNTER — OFFICE VISIT (OUTPATIENT)
Dept: SPEECH THERAPY | Facility: CLINIC | Age: 64
End: 2024-12-05
Payer: COMMERCIAL

## 2024-12-05 DIAGNOSIS — R47.1 HYPERKINETIC DYSARTHRIA: Primary | ICD-10-CM

## 2024-12-05 DIAGNOSIS — G20.A1 PARKINSON'S DISEASE WITHOUT DYSKINESIA, UNSPECIFIED WHETHER MANIFESTATIONS FLUCTUATE (HCC): ICD-10-CM

## 2024-12-05 PROCEDURE — 92508 TX SP LANG VOICE COMM GROUP: CPT

## 2024-12-05 PROCEDURE — 92507 TX SP LANG VOICE COMM INDIV: CPT

## 2024-12-05 NOTE — PROGRESS NOTES
Daily Speech Treatment Note    Today's date: 2024   Patient’s name: Bob Fernandes  : 1960  MRN: [id number]  Safety measures: PD  Referring provider: Sasha Gamble PA-C    Encounter Diagnosis     ICD-10-CM    1. 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 Parkinson's disease without dyskinesia, unspecified whether manifestations fluctuate (HCC)  G20.A1         Visit Tracking:  Re-eval Due Auth Expiration Date ST Visit Limit   3/15/24  12/31/24 BOMN   24 8   6/9/24 10/7/24 12   24 12   25      Visit/Unit Tracking  AUTH Status:  Date 10/25/24 11/1/24 11/5/24 11/14/24 11/26/24  RE 24   12 visits  Used 3 4 5 6 7 8    Remaining  9 8 7 6 5 4       Subjective/Behavioral:  -Patient participated well today in his first group PD session. He was noted to speak with INTENT when asking the other patient about himself.     Objective/Assessment:      Short-term goals:  Patient will complete structured exercises targeting vocal loudness and respiratory & laryngeal coordination while maintaining functional loudness with no with more than 2 cues per session to target the maintenance of her voice/speech intelligibility (to be achieved in 12 weeks).     Patient will maintain functional loudness during various cognitive-linguistic activities with no more than 2 cues per session to target the maintenance of her voice/speech intelligibility for increased communication success with familiar and unfamiliar listeners (to be achieved in 12 weeks).      Patient will maintain functional loudness during conversational speech with no more than 2 cues to increase loudness to target the maintenance of her voice/speech intelligibility for increased communication success with both familiar and unfamiliar listeners (to be achieved in 12 weeks).       Patient participated in the following exercises in a group setting:   Warm up:   may-me-my-maxine-moo x 5  Ah's x 3  Pitch glides: x 5  Numbers x  3  Cognitive task (did not complete today - time constraints)     Patient required mild cues to increase vocal intensity  Patient was asked to repeat himself by another patient x 0     Patient participated in the following exercises one on one with SLP:   Reading aloud (December facts)  Conversation tasks     Patient required min-moderate cues to increase vocal intensity      Plan:  -Patient was provided with home exercises/activities to target goals in plan of care at the end of today's session.  -Continue with current plan of care.    Group therapy x 30 minutes  One on one with patient/SLP x 15 min

## 2024-12-12 ENCOUNTER — OFFICE VISIT (OUTPATIENT)
Dept: SPEECH THERAPY | Facility: CLINIC | Age: 64
End: 2024-12-12
Payer: COMMERCIAL

## 2024-12-12 DIAGNOSIS — G20.A1 PARKINSON'S DISEASE WITHOUT DYSKINESIA, UNSPECIFIED WHETHER MANIFESTATIONS FLUCTUATE (HCC): ICD-10-CM

## 2024-12-12 DIAGNOSIS — R47.1 HYPERKINETIC DYSARTHRIA: Primary | ICD-10-CM

## 2024-12-12 PROCEDURE — 92508 TX SP LANG VOICE COMM GROUP: CPT

## 2024-12-12 PROCEDURE — 92507 TX SP LANG VOICE COMM INDIV: CPT

## 2024-12-12 NOTE — PROGRESS NOTES
Daily Speech Treatment Note    Today's date: 2024   Patient’s name: Bob Fernandes  : 1960  MRN: [id number]  Safety measures: PD  Referring provider: Sasha Gamble PA-C    Encounter Diagnosis     ICD-10-CM    1. 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 Parkinson's disease without dyskinesia, unspecified whether manifestations fluctuate (HCC)  G20.A1         Visit Tracking:  Re-eval Due Auth Expiration Date ST Visit Limit   3/15/24  12/31/24 BOMN   24 8   6/9/24 10/7/24 12   24 12   25      Visit/Unit Tracking  AUTH Status:  Date 10/25/24 11/1/24 11/5/24 11/14/24 11/26/24  RE 24   12 visits  Used 3 4 5 6 7 8 9    Remaining  9 8 7 6 5 4 3       Subjective/Behavioral:  Patient doing well today; reports he is working on selecting his insurance plan.      Objective/Assessment:      Short-term goals:  Patient will complete structured exercises targeting vocal loudness and respiratory & laryngeal coordination while maintaining functional loudness with no with more than 2 cues per session to target the maintenance of her voice/speech intelligibility (to be achieved in 12 weeks).     Patient will maintain functional loudness during various cognitive-linguistic activities with no more than 2 cues per session to target the maintenance of her voice/speech intelligibility for increased communication success with familiar and unfamiliar listeners (to be achieved in 12 weeks).      Patient will maintain functional loudness during conversational speech with no more than 2 cues to increase loudness to target the maintenance of her voice/speech intelligibility for increased communication success with both familiar and unfamiliar listeners (to be achieved in 12 weeks).       Patient participated in the following exercises in a group setting:   Warm up:   may-me-my-maxine-moo x 5   Ah's x 4  Pitch glides: x 5  Numbers x 3  Cognitive task ([place])     Patient required  mild cues to increase vocal intensity  Patient was asked to repeat himself by another patient x 0     Patient participated in the following exercises one on one with SLP:   Reading aloud ([place] facts)  Conversation tasks     Patient required min-moderate cues to increase vocal intensity      Plan:  -Patient was provided with home exercises/activities to target goals in plan of care at the end of today's session.  -Continue with current plan of care.    Group therapy x 30 minutes  One on one with patient/SLP x 15 min

## 2024-12-19 ENCOUNTER — OFFICE VISIT (OUTPATIENT)
Dept: SPEECH THERAPY | Facility: CLINIC | Age: 64
End: 2024-12-19
Payer: COMMERCIAL

## 2024-12-19 DIAGNOSIS — R47.1 HYPERKINETIC DYSARTHRIA: Primary | ICD-10-CM

## 2024-12-19 DIAGNOSIS — G20.A1 PARKINSON'S DISEASE WITHOUT DYSKINESIA, UNSPECIFIED WHETHER MANIFESTATIONS FLUCTUATE (HCC): ICD-10-CM

## 2024-12-19 PROCEDURE — 92508 TX SP LANG VOICE COMM GROUP: CPT

## 2024-12-19 PROCEDURE — 92507 TX SP LANG VOICE COMM INDIV: CPT

## 2024-12-19 NOTE — PROGRESS NOTES
Daily Speech Treatment Note    Today's date: 2024   Patient’s name: Bob Fernandes  : 1960  MRN: [id number]  Safety measures: PD  Referring provider: Sasha Gamble PA-C    No diagnosis found.    Visit Tracking:  Re-eval Due Auth Expiration Date ST Visit Limit   3/15/24  12/31/24 BOMN   24 8   6/9/24 10/7/24 12   24 12   25      Visit/Unit Tracking  AUTH Status:  Date 10/25/24 11/1/24 11/5/24 11/14/24 11/26/24  RE 24   12 visits  Used 3 4 5 6 7 8 9 10    Remaining  9 8 7 6 5 4 3 2       Subjective/Behavioral:  Patient doing well today    Objective/Assessment:      Short-term goals:  Patient will complete structured exercises targeting vocal loudness and respiratory & laryngeal coordination while maintaining functional loudness with no with more than 2 cues per session to target the maintenance of her voice/speech intelligibility (to be achieved in 12 weeks).     Patient will maintain functional loudness during various cognitive-linguistic activities with no more than 2 cues per session to target the maintenance of her voice/speech intelligibility for increased communication success with familiar and unfamiliar listeners (to be achieved in 12 weeks).      Patient will maintain functional loudness during conversational speech with no more than 2 cues to increase loudness to target the maintenance of her voice/speech intelligibility for increased communication success with both familiar and unfamiliar listeners (to be achieved in 12 weeks).       Patient participated in the following exercises in a group setting:   Warm up:   may-me-my-maxine-moo x 5   Ah's x 5  Pitch glides: x 5  Numbers x 3  Cognitive task ([employer], name 3/category)     Patient required mild cues to increase vocal intensity  Patient was asked to repeat himself by another patient x 0     Patient participated in the following exercises one on one with SLP:   Reading  aloud ([place] facts)  Conversation tasks     Patient required min-moderate cues to increase vocal intensity      Plan:  -Patient was provided with home exercises/activities to target goals in plan of care at the end of today's session.  -Continue with current plan of care.    Group therapy   One on one with patient/SLP

## 2024-12-26 ENCOUNTER — OFFICE VISIT (OUTPATIENT)
Dept: SPEECH THERAPY | Facility: CLINIC | Age: 64
End: 2024-12-26
Payer: COMMERCIAL

## 2024-12-26 DIAGNOSIS — G20.A1 PARKINSON'S DISEASE WITHOUT DYSKINESIA, UNSPECIFIED WHETHER MANIFESTATIONS FLUCTUATE (HCC): ICD-10-CM

## 2024-12-26 DIAGNOSIS — R47.1 HYPERKINETIC DYSARTHRIA: Primary | ICD-10-CM

## 2024-12-26 PROCEDURE — 92507 TX SP LANG VOICE COMM INDIV: CPT

## 2024-12-26 PROCEDURE — 92508 TX SP LANG VOICE COMM GROUP: CPT

## 2024-12-26 NOTE — PROGRESS NOTES
Daily Speech Treatment Note  PD Voice Group    Today's date: 2024   Patient’s name: Bob Fernandes  : 1960  MRN: [id number]  Safety measures: PD  Referring provider: Sasha Gamble PA-C    Encounter Diagnosis     ICD-10-CM    1. 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 Parkinson's disease without dyskinesia, unspecified whether manifestations fluctuate (HCC)  G20.A1           Visit Tracking:  Re-eval Due Auth Expiration Date ST Visit Limit   3/15/24  12/31/24 BOMN   24 8   6/9/24 10/7/24 12   24 12   25      Visit/Unit Tracking  AUTH Status:  Date 10/25/24 11/1/24 11/5/24 11/14/24 11/26/24  RE 24   12 visits  Used 3 4 5 6 7 8 9 10 11    Remaining  9 8 7 6 5 4 3 2 1       Subjective/Behavioral:  Patient doing well today    Objective/Assessment:      Short-term goals:  Patient will complete structured exercises targeting vocal loudness and respiratory & laryngeal coordination while maintaining functional loudness with no with more than 2 cues per session to target the maintenance of her voice/speech intelligibility (to be achieved in 12 weeks).     Patient will maintain functional loudness during various cognitive-linguistic activities with no more than 2 cues per session to target the maintenance of her voice/speech intelligibility for increased communication success with familiar and unfamiliar listeners (to be achieved in 12 weeks).      Patient will maintain functional loudness during conversational speech with no more than 2 cues to increase loudness to target the maintenance of her voice/speech intelligibility for increased communication success with both familiar and unfamiliar listeners (to be achieved in 12 weeks).       Patient participated in the following exercises in a group setting:   Warm up:   may-me-my-maxine-moo x 5   Ah's x 5  Pitch glides: x 5  Numbers x 3  Cognitive task ([place] traditions discussion)      Patient required mild cues to increase vocal intensity  Patient was asked to repeat himself by another patient x 0     Patient participated in the following exercises one on one with SLP:   Reading aloud ([employer] [facility])  Conversation tasks     Patient required min-moderate cues to increase vocal intensity      Plan:  -Patient was provided with home exercises/activities to target goals in plan of care at the end of today's session.  -Continue with current plan of care.    Group therapy   One on one with patient/SLP

## 2024-12-30 ENCOUNTER — OFFICE VISIT (OUTPATIENT)
Dept: CARDIOLOGY CLINIC | Facility: CLINIC | Age: 64
End: 2024-12-30
Payer: COMMERCIAL

## 2024-12-30 VITALS
DIASTOLIC BLOOD PRESSURE: 88 MMHG | BODY MASS INDEX: 28.7 KG/M2 | OXYGEN SATURATION: 97 % | HEIGHT: 71 IN | HEART RATE: 75 BPM | SYSTOLIC BLOOD PRESSURE: 148 MMHG | TEMPERATURE: 98.1 F | WEIGHT: 205 LBS

## 2024-12-30 DIAGNOSIS — I10 ESSENTIAL HYPERTENSION: ICD-10-CM

## 2024-12-30 DIAGNOSIS — R01.1 CARDIAC MURMUR: ICD-10-CM

## 2024-12-30 DIAGNOSIS — I25.10 MILD CAD: Primary | ICD-10-CM

## 2024-12-30 DIAGNOSIS — E78.2 MIXED HYPERLIPIDEMIA: ICD-10-CM

## 2024-12-30 PROBLEM — E08.49 DIABETES DUE TO UNDRL CONDITION W OTH DIABETIC NEURO COMP (HCC): Status: ACTIVE | Noted: 2024-12-30

## 2024-12-30 PROBLEM — E08.49 DIABETES DUE TO UNDRL CONDITION W OTH DIABETIC NEURO COMP (HCC): Status: RESOLVED | Noted: 2024-12-30 | Resolved: 2024-12-30

## 2024-12-30 PROCEDURE — 99214 OFFICE O/P EST MOD 30 MIN: CPT | Performed by: INTERNAL MEDICINE

## 2024-12-30 PROCEDURE — 93000 ELECTROCARDIOGRAM COMPLETE: CPT | Performed by: INTERNAL MEDICINE

## 2024-12-30 RX ORDER — KETOCONAZOLE 20 MG/G
CREAM TOPICAL
COMMUNITY
Start: 2024-10-24

## 2024-12-30 NOTE — ASSESSMENT & PLAN NOTE
Grade 3 cardiac murmur on exam noted.  Echocardiogram requested.  Orders:    Echo complete w/ contrast if indicated; Future

## 2024-12-30 NOTE — ASSESSMENT & PLAN NOTE
Continue lifestyle modification.   Medical therapy reviewed.  Close blood pressure monitoring.

## 2024-12-30 NOTE — ASSESSMENT & PLAN NOTE
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  Walked 5 miles today with his dog.   Remains physically active with moderate exercise program with no limiting angina or dyspnea.  Continue medical therapy and risk factor modification.    Orders:    POCT ECG

## 2024-12-30 NOTE — PATIENT INSTRUCTIONS
Home BP checks.  Can decrease rosuvastatin dose to 20 mg daily.  Echocardiogram planned to evaluate heart function and rule out significant valvular heart disease.    Please call central scheduling at [phone] to schedule your test.  Please contact our office if you have any difficulty in scheduling your tests.

## 2024-12-30 NOTE — PROGRESS NOTES
[facility] [place]  [facility] [address]  Phone#  [phone]  Fax#  [phone]  [facility] Cardiology Office Follow-up Visit             NAME: Bob Fernandes  AGE: 64 y.o. SEX: male   : 1960   MRN: [id number]    DATE: 2024  TIME: 4:21 PM    Cardiology Problem list:  Hypertension:  Stage II  Parkinson disease:  Mild tremor and cognitive changes  Dyslipidemia:  ASCVD score over 15%  Calcium score 201: Left main 86, LAD 97  : CTA coronaries: Mild plaque, <25% stenoses  : Stress test: ST depressions in V3 V4 at peak exercise, dyspnea but no angina at 7 minutes.  10/22:Echo:  EF normal, diastolic function normal, RV mildly dilated, mild LVH  Ascending aorta 4cm on CTA (BSA is 2)    Assessment & Plan  Mild CAD  Mild nonobstructive CAD on cardiac CTA performed in  after abnormal stress test showing anterior ST depressions.  Walked 5 miles today with his dog.   Remains physically active with moderate exercise program with no limiting angina or dyspnea.  Continue medical therapy and risk factor modification.    Orders:    POCT ECG    Essential hypertension  Continue lifestyle modification.   Medical therapy reviewed.  Close blood pressure monitoring.           Mixed hyperlipidemia  Last lipids from  reviewed: LDL 78, HDL 66.  Triglycerides normal.  Continue high intensity statin therapy.  LFTs were normal.  Repeat labs next year.  Discussed lowering rosuvastatin to 20 mg daily if hip pain worsens.         Cardiac murmur  Grade 3 cardiac murmur on exam noted.  Echocardiogram requested.  Orders:    Echo complete w/ contrast if indicated; Future           HPI:    Bob Fernandes is a 64 y.o.-year-old male who presents to the cardiology clinic for follow up for the above-listed problems.  He has a history of nonobstructive CAD based on CT chest in .  Historically abnormal stress echocardiogram.  No interim cardiac hospitalizations.  Current  medical therapy includes aspirin, statins and losartan.  Additional comorbidities includes Parkinson's disease with resulting tremors, bradykinesia and memory loss.  Recent neurology notes reviewed.  He is slightly improved from a neurological standpoint.  He is able to walk long distances now.  Today he walked his dog for about 5 miles.  He has no exertional chest pain or shortness of breath.  No syncope reported.  He has a cardiac murmur auscultated on exam today which was not previously heard.  Prior echo showed no structural heart disease over 2 years ago.  Does not check his blood pressures at home.  Reports mild hip pain at times but no particular myopathy.      Past history, family history, social history, current medications, vital signs, recent lab and imaging studies and  prior cardiology studies reviewed independently on this visit.    No Known Allergies    Current Outpatient Medications:     acetaminophen (TYLENOL) 500 mg tablet, Take 2 tablets (1,000 mg total) by mouth 2 (two) times a day (Patient taking differently: Take 1,000 mg by mouth daily), Disp: , Rfl:     aspirin (Aspirin 81) 81 mg EC tablet, Take 1 tablet (81 mg total) by mouth daily, Disp: , Rfl:     carbidopa-levodopa (SINEMET)  mg per tablet, Take 1 tablet by mouth 4 (four) times a day, Disp: , Rfl:     cholecalciferol (VITAMIN D3) 1,000 units tablet, Take 5,000 Units by mouth daily, Disp: , Rfl:     Collagen Hydrolysate POWD, Use 1 Dose daily, Disp: , Rfl:     fluticasone (FLONASE) 50 mcg/act nasal spray, SPRAY 2 SPRAYS INTO EACH NOSTRIL EVERY DAY (Patient taking differently: As needed), Disp: 48 mL, Rfl: 2    ketoconazole (NIZORAL) 2 % shampoo, SHAMPOO SCALP AND FACE THREE TO FOUR TIMES PER WEEK, Disp: , Rfl:     losartan-hydrochlorothiazide (HYZAAR) 100-12.5 MG per tablet, TAKE 1 TABLET BY MOUTH EVERY DAY, Disp: 90 tablet, Rfl: 1    rosuvastatin (CRESTOR) 40 MG tablet, TAKE 1 TABLET BY MOUTH EVERY DAY, Disp: 90 tablet, Rfl: 1     selegiline (ELDEPRYL) 5 mg tablet, Take 1 tablet (5 mg total) by mouth 2 (two) times a day with meals, Disp: 180 tablet, Rfl: 4    amoxicillin (AMOXIL) 500 MG tablet, Take 4 tablets by mouth as needed (Patient not taking: Reported on 12/30/2024), Disp: , Rfl:     ketoconazole (NIZORAL) 2 % cream, Apply topically As needed (Patient not taking: Reported on 12/30/2024), Disp: , Rfl:     MELATONIN PO, Take by mouth daily at bedtime (Patient not taking: Reported on 9/4/2024), Disp: , Rfl:     sildenafil (Viagra) 100 mg tablet, Take one tablet by mouth on empty stomach one hour prior to sexual activity (Patient not taking: Reported on 12/30/2024), Disp: 30 tablet, Rfl: 3    tadalafil (CIALIS) 5 MG tablet, Take 5 mg by mouth daily (Patient not taking: Reported on 12/30/2024), Disp: , Rfl:     Review of Systems   Constitutional:  Negative for fatigue.   HENT: Negative.     Eyes: Negative.    Respiratory:  Negative for shortness of breath.    Cardiovascular:  Negative for chest pain, palpitations and leg swelling.   Gastrointestinal: Negative.    Endocrine: Negative.    Neurological:  Positive for tremors. Negative for syncope.   Hematological: Negative.    Psychiatric/Behavioral:  Positive for decreased concentration. Negative for sleep disturbance.    All other systems reviewed and are negative.      Objective:     Vitals:    12/30/24 1552   BP: 148/88   Pulse: 75   Temp: 98.1 °F (36.7 °C)   SpO2: 97%     Wt Readings from Last 3 Encounters:   12/30/24 93 kg (205 lb)   09/04/24 92.8 kg (204 lb 8 oz)   06/06/24 93.8 kg (206 lb 12.8 oz)     Pulse Readings from Last 3 Encounters:   12/30/24 75   09/04/24 77   06/06/24 71     BP Readings from Last 3 Encounters:   12/30/24 148/88   09/04/24 130/76   06/06/24 130/80     Physical Exam  Vitals reviewed.   Constitutional:       General: He is not in acute distress.  HENT:      Head: Normocephalic.   Neck:      Vascular: No carotid bruit.   Cardiovascular:      Rate and Rhythm:  "Normal rate and regular rhythm.      Heart sounds: S1 normal and S2 normal. Murmur heard.      Crescendo systolic murmur is present with a grade of 3/6.   Pulmonary:      Breath sounds: No wheezing or rhonchi.   Musculoskeletal:      Right lower leg: No edema.      Left lower leg: No edema.   Skin:     General: Skin is warm.   Neurological:      Mental Status: He is alert. Mental status is at baseline.   Psychiatric:         Mood and Affect: Mood normal.         Pertinent Laboratory/Diagnostic Studies:    Laboratory studies reviewed personally by Yasir Welsh MD    BMP:   Lab Results   Component Value Date    SODIUM 140 04/02/2024    K 4.7 04/02/2024     04/02/2024    CO2 30 04/02/2024    BUN 22 04/02/2024    CREATININE 0.96 04/02/2024    GLUC 91 04/02/2024    CALCIUM 9.8 04/02/2024     CBC:  Lab Results   Component Value Date    WBC 6.2 04/02/2024    HGB 14.0 04/02/2024    HCT 42.6 04/02/2024    MCV 93.6 04/02/2024     04/02/2024     Lipid Profile:   Lab Results   Component Value Date    HDL 66 04/02/2024     Lab Results   Component Value Date    LDLCALC 78 04/02/2024     Lab Results   Component Value Date    TRIG 61 04/02/2024      Other labs:  No results found for: \"[id number]\", \"TSH\"  Lab Results   Component Value Date    ALT 11 04/02/2024    AST 20 04/02/2024     Lab Results   Component Value Date    HGBA1C 5.4 03/14/2023         Imaging Studies:     Pertinent imaging studies and cardiac studies were independently reviewed on this visit and findings summarized.    Yasir Welsh MD, [facility]    Portions of the record may have been created with voice recognition software.  Occasional wrong word or \"sound alike\" substitutions may have occurred due to the inherent limitations of voice recognition software.  Read the chart carefully and recognize, using context, where substitutions have occurred. Please reach out to me directly for any clarifications.  "

## 2024-12-30 NOTE — ASSESSMENT & PLAN NOTE
Last lipids from 4/24 reviewed: LDL 78, HDL 66.  Triglycerides normal.  Continue high intensity statin therapy.  LFTs were normal.  Repeat labs next year.  Discussed lowering rosuvastatin to 20 mg daily if hip pain worsens.

## 2025-01-02 ENCOUNTER — APPOINTMENT (OUTPATIENT)
Dept: SPEECH THERAPY | Facility: CLINIC | Age: 65
End: 2025-01-02
Payer: COMMERCIAL

## 2025-01-09 ENCOUNTER — EVALUATION (OUTPATIENT)
Dept: SPEECH THERAPY | Facility: CLINIC | Age: 65
End: 2025-01-09
Payer: COMMERCIAL

## 2025-01-09 DIAGNOSIS — G20.A1 PARKINSON'S DISEASE WITHOUT DYSKINESIA, UNSPECIFIED WHETHER MANIFESTATIONS FLUCTUATE (HCC): ICD-10-CM

## 2025-01-09 DIAGNOSIS — R47.1 HYPERKINETIC DYSARTHRIA: Primary | ICD-10-CM

## 2025-01-09 PROCEDURE — 92508 TX SP LANG VOICE COMM GROUP: CPT | Performed by: SPEECH-LANGUAGE PATHOLOGIST

## 2025-01-09 PROCEDURE — 92507 TX SP LANG VOICE COMM INDIV: CPT | Performed by: SPEECH-LANGUAGE PATHOLOGIST

## 2025-01-09 NOTE — PROGRESS NOTES
Speech-Language Pathology Re-Evaluation  Skilled Maintenance - PD Voice group    Today's date: 2025   Patient’s name: Bob Fernandes  : 1960  MRN: [id number]  Safety measures: PD  Referring provider: Sasha Gamble PA-C    Encounter Diagnosis     ICD-10-CM    1. 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 Parkinson's disease without dyskinesia, unspecified whether manifestations fluctuate ([facility])  G20.A1           Assessment:    Patient presents with a mild motor speech disorder characterized by decreased vocal intensity, imprecise articulation, and decreased fluency in speech secondary to PD. Patient has been participating in group therapy using SPEAK OUT! model to maintain the progress that was made during one on one therapy. Over the past certification period, Patient has demonstrated maintenance between individual therapy activities and during conversational tasks. Patient is engaged in all activities presented to him and continues to practice exercises at home between sessions. At this time, it is recommended that Patient continue to be seen for skilled outpatient Speech Therapy to target the goals mentioned below to promote maintenance of skills.      Short-Term goals:     Patient will complete structured exercises targeting vocal loudness and respiratory & laryngeal coordination while maintaining functional loudness with no with more than 2 cues per session to target the maintenance of her voice/speech intelligibility (to be achieved in 12 weeks). ONGOING     Patient will maintain functional loudness during various cognitive-linguistic activities with no more than 2 cues per session to target the maintenance of her voice/speech intelligibility for increased communication success with familiar and unfamiliar listeners (to be achieved in 12 weeks). ONGOING     Patient will maintain functional loudness during conversational speech with no more than 2 cues to increase loudness to target the maintenance  of her voice/speech intelligibility for increased communication success with both familiar and unfamiliar listeners (to be achieved in 12 weeks). ONGOING     Long-term goals:     Patient will maintain their highest level of independence with her voice/speech intelligibility to meet their needs with the implementation of compensatory strategies to promote positive communication interactions and socialization, participation in meaningful activities, safety, and quality of life (to be achieved by discharge). CONTINUE         Plan:  Patient would benefit from outpatient skilled Speech Therapy services: Maintenance therapy program    Frequency: 1x weekly  Duration: 3 months    Intervention certification from: 1/9/2025  Intervention certification to: 4/9/2025      Subjective:  Patient was in good spirits, actively participating and engaging with his group partner and the clinician. He reports that he is seeing his neurologist tomorrow. He had chosen to take himself down to 1 Levadopa pill per day (He had been previously prescribed a 1 pill and a half when he needed to speak for school.). He felt that since he wasn't working anymore, he may not need the other half. He was advised to discuss this with his neurologist tomorrow, so that she is aware of the change he made. He reports feeling more unsteady lately.     Patient's goal(s): Not reported    Pain: Not Reported      Objective (testing):  No formal testing conducted during today's re-evaluation. See treatment exercises below.        Treatment:  Patient participated in the following exercises in a group setting:   Warm up:   may-me-my-maxine-moo x 5   Ah's x 5  Pitch glides: x 5  Numbers x 3  Reading Aloud ([employer])  Cognitive task (Po[employer])     Patient required min cues to increase vocal intensity  Patient was asked to repeat himself by another patient x 0     Clinician discussed the benefits of the EMST 75 lite resistance based, expiratory muscle  "strength training device with both the patient and the other patient in his group therapy session and they both have an interest in improving their breath support and swallow function. This may be a good group activity to do with the patients in addition to SPEAK OUT! exercises. Patient is concerned about his swallow function.    Discussed the concept of doing everything in life \"with intent\", including swallowing with intent. Patient appreciated that concept, as he had not thought of that before. Discussed the need for consistent practice of exercises to maintain a strong voice long term.         Visit Tracking:  Re-eval Due Auth Expiration Date ST Visit Limit   02/26/25 04/09/2025 04/09/25 PENDING       Visit/Unit Tracking  AUTH Status:  Date 10/25/24 11/1/24 11/5/24 11/14/24 11/26/24  RE 12/5/24 12/12/24 12/19/24 12/26/24 1/9/25  RE   12 visits  Used 3 4 5 6 7 8 9 10 11 12     Remaining  9 8 7 6 5 4 3 2 1 0         "

## 2025-01-10 ENCOUNTER — OFFICE VISIT (OUTPATIENT)
Dept: NEUROLOGY | Facility: CLINIC | Age: 65
End: 2025-01-10
Payer: COMMERCIAL

## 2025-01-10 VITALS
WEIGHT: 212 LBS | SYSTOLIC BLOOD PRESSURE: 110 MMHG | HEIGHT: 71 IN | DIASTOLIC BLOOD PRESSURE: 70 MMHG | BODY MASS INDEX: 29.68 KG/M2 | HEART RATE: 70 BPM | OXYGEN SATURATION: 98 %

## 2025-01-10 DIAGNOSIS — G20.A1 PARKINSON DISEASE (HCC): ICD-10-CM

## 2025-01-10 PROCEDURE — 99215 OFFICE O/P EST HI 40 MIN: CPT | Performed by: PHYSICIAN ASSISTANT

## 2025-01-10 RX ORDER — CARBIDOPA AND LEVODOPA 25; 100 MG/1; MG/1
TABLET ORAL
Start: 2025-01-10

## 2025-01-10 NOTE — ASSESSMENT & PLAN NOTE
Patient with MARÍA positive Parkinson's disease.  Since his last visit he tried reducing his Sinemet dose on his own with significant worsening of his Parkinson symptoms.  Since that time he is increased back to 1.5 tabs 4 times a day with improvement.  Overall he feels that he is doing well.  No clear evidence of wearing off between doses.  He is happy with his current symptom control.  Overall his exam appears stable.    At this time we will have him remain on Sinemet 1.5 tabs 4 times a day.  He will watch however for any evidence to suggest wearing off between doses at which time we may consider adjusting his medications.    We discussed the importance of regular exercise and activity.  He had been going to the gym however his children are now at his home for the holidays and he has not been going as regularly.  He would like to try and get back into this.    We also once again had a very long discussion in regards to therapy and counseling.  He did try going to some therapy in [place] however he did not have a very good experience.  Once again I have asked that he look into therapists or counselors locally that both he and his wife could perhaps start going to.    Orders:    carbidopa-levodopa (SINEMET)  mg per tablet; Take 1.5tabs qid

## 2025-01-10 NOTE — PROGRESS NOTES
Name: Bob Fernandes      : 1960      MRN: [id number]  Encounter Provider: Sasha Gamble PA-C  Encounter Date: 1/10/2025   Encounter department: [facility] [place]  :  Assessment & Plan  Parkinson disease (HCC)  Patient with MARÍA positive Parkinson's disease.  Since his last visit he tried reducing his Sinemet dose on his own with significant worsening of his Parkinson symptoms.  Since that time he is increased back to 1.5 tabs 4 times a day with improvement.  Overall he feels that he is doing well.  No clear evidence of wearing off between doses.  He is happy with his current symptom control.  Overall his exam appears stable.    At this time we will have him remain on Sinemet 1.5 tabs 4 times a day.  He will watch however for any evidence to suggest wearing off between doses at which time we may consider adjusting his medications.    We discussed the importance of regular exercise and activity.  He had been going to the gym however his children are now at his home for the holidays and he has not been going as regularly.  He would like to try and get back into this.    We also once again had a very long discussion in regards to therapy and counseling.  He did try going to some therapy in [place] however he did not have a very good experience.  Once again I have asked that he look into therapists or counselors locally that both he and his wife could perhaps start going to.    Orders:    carbidopa-levodopa (SINEMET)  mg per tablet; Take 1.5tabs qid          History of Present Illness   Bob Fernandes is a right-handed  who presents in follow up for MARÍA-positive parkinsonism and carpal tunnel s/p LEFT carpal tunnel release 24. To review, symptom onset around  with right>left tremor.  On initial presentation the patient had a rest and postural tremor on the right, minimal if any kinetic tremor.  Also had mild rigidity on the right and  slight bradykinesia bilaterally with decrement on the right.  His gait was fairly normal with good postural reflexes.  No typical non motor symptoms associated with Parkinson's disease. Now retired.      At his last visit no changes made to his medications.     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 Severe ulnar neuropathy, localized best across the elbow with ongoing denervation. There is no evidence of a cervical radiculopathy.     INTERVAL HISTORY:  He tried to cut the dose to 1tab qid and found that his symptoms were worse, he increased the dose back up with improvement   He is doing speech therapy   Feeling good overall   He did go to a therapist in [place], had his car towed and he does not want to return  He had been working out at the gym, has taken some time off with his kids being home   He naps daily   He can perform his ADLs on his own   No falls, close calls at times   He completed PT  Memory is still foggy at times, struggles with word finding at times   Managing medications without issues   No hallucinations   Occasional issues with swallowing, has talked to the speech therapist about this     Current medications:  Selegiline 5mg bid   Sinemet 25/100mg 1.5 tab qid (7:30-8am, 11:30-noon, 4:30pm), extra 1/2 tab prior to presentation     Prior medications:  Pramipexole ER 0.375mg 3tabs qhs - discontinued due to obsessive behaviors   Sinemet CR before bed - stopped due to no benefit and now feels better         Review of Systems I have personally reviewed the MA's review of systems and made changes as necessary.         Objective   There were no vitals taken for this visit.    Physical Exam  Constitutional:       Appearance: Normal appearance.   HENT:      Right Ear: Hearing normal.      Left Ear: Hearing normal.   Eyes:      General: Lids are normal.      Extraocular Movements:  Extraocular movements intact.      Pupils: Pupils are equal, round, and reactive to light.   Pulmonary:      Effort: Pulmonary effort is normal.   Neurological:      Mental Status: He is alert.      Motor: Motor strength is normal.  Psychiatric:         Speech: Speech normal.       Neurological Exam  Mental Status  Alert. Oriented to person, place and time. Speech is normal.  MoCA 4/23/24 - 27/30   MoCA 10/11/22 - 25/30   MoCA 1/26/22 - 24/30   MoCA 24/30 - 8/17/21.    Cranial Nerves  CN III, IV, VI: Extraocular movements intact bilaterally. Normal lids and orbits bilaterally. Pupils equal round and reactive to light bilaterally.  CN V:  Right: Facial sensation is normal.  Left: Facial sensation is normal on the left.  CN VIII:  Right: Hearing is normal.  Left: Hearing is normal.  CN XI: Shoulder shrug strength is normal.    Motor   Strength is 5/5 throughout all four extremities.    Sensory  Light touch is normal in upper and lower extremities.     Coordination  Right: Finger-to-nose normal.Left: Finger-to-nose normal.    Gait    Arose from the chair without difficulty.  Overall good stride. Slight shuffling at times. .      UPDRS motor:     1/10/25 9/4/24                              Time since last dose:        Speech  0 1   Facial Expression  1 1   Rigidity - Neck  0 0   Rigidity - Upper Extremity (Right)  1 1   Rigidity - Upper Extremity (Left)   1 0   Rigidity - Lower Extremity (Right)  0 0   Rigidity - Lower Extremity (Left)   0 0   Finger Taps (Right)   1 1   Finger Taps (Left)   1 1   Hand Movement (Right)  1 1   Hand Movement (Left)   0 1   Pronation/Supination (Right)  2 2   Pronation/Supination (Left)   1 2   Toe Tapping (Right) 0 0   Toe Tapping (Left) 0 0   Leg Agility (Right)  1 1   Leg Agility (Left)   1 1   Arising from Chair   0     Gait   1 1   Freezing of Gait 0 0   Postural Stability         Posture 0 0   Global spontaneity of movement 1 1   Postural Tremor (Right) 0 0   Postural Tremor (Left) 0  0   Kinetic Tremor (Right)  0 0   Kinetic Tremor (Left)  0 0   Rest tremor amplitude RUE 0 0   Rest tremor amplitude LUE 1 1   Rest tremor amplitude RLE 0 0   Reset tremor amplitude LLE 0 0   Lip/Jaw Tremor  0 0   Consistency of tremor 0 0   Motor Exam Total:                Administrative Statements   I have spent a total time of 55 minutes in caring for this patient on the day of the visit/encounter including Risks and benefits of tx options, Instructions for management, Patient and family education, Impressions, Counseling / Coordination of care, Documenting in the medical record, and Obtaining or reviewing history  . Topics discussed with the patient / family include symptom assessment and management, medication adjustment, psychosocial support, and supportive listening.

## 2025-01-13 ENCOUNTER — HOSPITAL ENCOUNTER (OUTPATIENT)
Dept: NON INVASIVE DIAGNOSTICS | Facility: HOSPITAL | Age: 65
Discharge: HOME | End: 2025-01-13
Attending: INTERNAL MEDICINE
Payer: COMMERCIAL

## 2025-01-13 VITALS
HEART RATE: 95 BPM | BODY MASS INDEX: 29.68 KG/M2 | WEIGHT: 212 LBS | DIASTOLIC BLOOD PRESSURE: 70 MMHG | HEIGHT: 71 IN | SYSTOLIC BLOOD PRESSURE: 110 MMHG

## 2025-01-13 DIAGNOSIS — R01.1 CARDIAC MURMUR: ICD-10-CM

## 2025-01-13 LAB
AORTIC ROOT: 3.8 CM
ASCENDING AORTA: 3.7 CM
AV LVOT MEAN GRADIENT: 4 MMHG
AV LVOT PEAK GRADIENT: 8 MMHG
BSA FOR ECHO PROCEDURE: 2.16 M2
DOP CALC LVOT PEAK VEL VTI: 29.89 CM
DOP CALC LVOT PEAK VEL: 1.44 M/S
E WAVE DECELERATION TIME: 176 MS
E/A RATIO: 0.99
FRACTIONAL SHORTENING: 47 (ref 28–44)
INTERVENTRICULAR SEPTUM IN DIASTOLE (PARASTERNAL SHORT AXIS VIEW): 1.2 CM
INTERVENTRICULAR SEPTUM: 1.2 CM (ref 0.6–1.1)
LAAS-AP2: 25.3 CM2
LAAS-AP4: 21.7 CM2
LEFT ATRIUM SIZE: 4.6 CM
LEFT ATRIUM VOLUME (MOD BIPLANE): 76 ML
LEFT ATRIUM VOLUME INDEX (MOD BIPLANE): 35.2 ML/M2
LEFT INTERNAL DIMENSION IN SYSTOLE: 2.6 CM (ref 2.1–4)
LEFT VENTRICULAR INTERNAL DIMENSION IN DIASTOLE: 4.9 CM (ref 3.5–6)
LEFT VENTRICULAR POSTERIOR WALL IN END DIASTOLE: 1.1 CM
LEFT VENTRICULAR STROKE VOLUME: 85 ML
LV EF US.2D.A4C+ESTIMATED: 70 %
LVSV (TEICH): 85 ML
MV E'TISSUE VEL-SEP: 10 CM/S
MV PEAK A VEL: 0.7 M/S
MV PEAK E VEL: 69 CM/S
MV STENOSIS PRESSURE HALF TIME: 51 MS
MV VALVE AREA P 1/2 METHOD: 4.31
RA PRESSURE ESTIMATED: 3 MMHG
RIGHT ATRIUM AREA SYSTOLE A4C: 18.9 CM2
RIGHT VENTRICLE ID DIMENSION: 3.9 CM
RV PSP: 30 MMHG
SL CV LEFT ATRIUM LENGTH A2C: 6 CM
SL CV LV EF: 70
SL CV PED ECHO LEFT VENTRICLE DIASTOLIC VOLUME (MOD BIPLANE) 2D: 110 ML
SL CV PED ECHO LEFT VENTRICLE SYSTOLIC VOLUME (MOD BIPLANE) 2D: 25 ML
TR MAX PG: 27 MMHG
TR PEAK VELOCITY: 2.6 M/S
TRICUSPID ANNULAR PLANE SYSTOLIC EXCURSION: 2.6 CM
TRICUSPID VALVE PEAK REGURGITATION VELOCITY: 2.62 M/S

## 2025-01-13 PROCEDURE — 93306 TTE W/DOPPLER COMPLETE: CPT

## 2025-01-13 PROCEDURE — 93306 TTE W/DOPPLER COMPLETE: CPT | Performed by: INTERNAL MEDICINE

## 2025-01-16 ENCOUNTER — OFFICE VISIT (OUTPATIENT)
Dept: SPEECH THERAPY | Facility: CLINIC | Age: 65
End: 2025-01-16
Payer: COMMERCIAL

## 2025-01-16 DIAGNOSIS — G20.A1 PARKINSON'S DISEASE WITHOUT DYSKINESIA, UNSPECIFIED WHETHER MANIFESTATIONS FLUCTUATE (HCC): ICD-10-CM

## 2025-01-16 DIAGNOSIS — R47.1 HYPERKINETIC DYSARTHRIA: Primary | ICD-10-CM

## 2025-01-16 PROCEDURE — 92507 TX SP LANG VOICE COMM INDIV: CPT

## 2025-01-16 PROCEDURE — 92508 TX SP LANG VOICE COMM GROUP: CPT

## 2025-01-16 NOTE — PROGRESS NOTES
Daily Speech Treatment Note  PD Voice Group    Today's date: 2025   Patient’s name: Bob Fernandes  : 1960  MRN: [id number]  Safety measures: PD  Referring provider: Sasha Gamble PA-C    Encounter Diagnosis     ICD-10-CM    1. 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 Parkinson's disease without dyskinesia, unspecified whether manifestations fluctuate (HCC)  G20.A1           Visit Tracking:  Re-eval Due Auth Expiration Date ST Visit Limit   3/15/24  12/31/24 BOMN   24 8   6/9/24 10/7/24 12   25      Visit/Unit Tracking  AUTH Status:  Date 25           PENDING Used 1            Remaining  pending               Subjective/Behavioral:  Patient doing well today    Objective/Assessment:      Short-term goals:  Patient will complete structured exercises targeting vocal loudness and respiratory & laryngeal coordination while maintaining functional loudness with no with more than 2 cues per session to target the maintenance of his voice/speech intelligibility (to be achieved in 12 weeks).     Patient will maintain functional loudness during various cognitive-linguistic activities with no more than 2 cues per session to target the maintenance of his voice/speech intelligibility for increased communication success with familiar and unfamiliar listeners (to be achieved in 12 weeks).      Patient will maintain functional loudness during conversational speech with no more than 2 cues to increase loudness to target the maintenance of his voice/speech intelligibility for increased communication success with both familiar and unfamiliar listeners (to be achieved in 12 weeks).       Patient participated in the following exercises in a group setting:   Warm up:   may-me-my-maxine-moo x 5   Ah's x 5  Pitch glides: x 5  Numbers x 3  Cognitive task (Anomia level 2 cards)     Patient required mild cues to increase vocal intensity  Patient was asked to repeat himself by  another patient x 0     Patient participated in the following exercises one on one with SLP:   Reading aloud (Home organization)  Conversation tasks     Patient required min-moderate cues to increase vocal intensity      Plan:  -Patient was provided with home exercises/activities to target goals in plan of care at the end of today's session.  -Continue with current plan of care.    Group therapy   One on one with patient/SLP

## 2025-01-17 ENCOUNTER — RESULTS FOLLOW-UP (OUTPATIENT)
Dept: CARDIOLOGY CLINIC | Facility: CLINIC | Age: 65
End: 2025-01-17

## 2025-01-17 NOTE — RESULT ENCOUNTER NOTE
ECHO reviewed.  Pumping strength (ejection fraction) is normal  Valves: mild back flow across the mitral and tricuspid valves that is not concerning. This explains the cardiac murmur.  Please call patient with test results.

## 2025-01-21 DIAGNOSIS — J31.0 CHRONIC RHINITIS: ICD-10-CM

## 2025-01-21 RX ORDER — FLUTICASONE PROPIONATE 50 MCG
SPRAY, SUSPENSION (ML) NASAL
Qty: 16 ML | Refills: 2 | Status: SHIPPED | OUTPATIENT
Start: 2025-01-21

## 2025-01-22 NOTE — PROGRESS NOTES
"Daily Speech Treatment Note  [facility]    Today's date: 2025   Patient’s name: Bob Fernandes  : 1960  MRN: [id number]  Safety measures: PD  Referring provider: Sasha Gamble PA-C    Encounter Diagnosis     ICD-10-CM    1. 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 Parkinson's disease without dyskinesia, unspecified whether manifestations fluctuate (HCC)  G20.A1           Visit Tracking:  Re-eval Due Auth Expiration Date ST Visit Limit   3/15/24  12/31/24 BOMN   24 8   6/9/24 10/7/24 12   24 12   25      Visit/Unit Tracking  AUTH Status:  Date 25          PENDING Used 1 2           Remaining  pending pending              Subjective/Behavioral:  Patient reports that yesterday he couldn't talk at all, \"It wasn't working.\" He tried doing Speak Out exercises and his voice wouldn't come out. He is feeling better this morning.     Objective/Assessment:  Recommended to both the patient and his group member that warming his voice first thing in the morning before speaking to anyone may help his voice. Also discussed mid afternoon voice exercises (SOVT/RVT or Speak Out) as well as vocal cool downs at night.     Short-term goals:  Patient will complete structured exercises targeting vocal loudness and respiratory & laryngeal coordination while maintaining functional loudness with no with more than 2 cues per session to target the maintenance of his voice/speech intelligibility (to be achieved in 12 weeks).     Patient will maintain functional loudness during various cognitive-linguistic activities with no more than 2 cues per session to target the maintenance of his voice/speech intelligibility for increased communication success with familiar and unfamiliar listeners (to be achieved in 12 weeks).      Patient will maintain functional loudness during conversational speech with no more than 2 cues to increase loudness to target the maintenance of his " voice/speech intelligibility for increased communication success with both familiar and unfamiliar listeners (to be achieved in 12 weeks).       Patient participated in the following exercises in a group setting:   Warm up:   may-me-my-maxine-moo x 5   Ah's x 5  Pitch glides: x 5  Numbers x 3  Cognitive task (Quictionary)     Patient required min cues to increase vocal intensity  Patient was asked to repeat himself by clinician x 2     Patient participated in the following exercises one on one with SLP:   Reading aloud (SFU)  Conversation tasks     Patient required min cues to increase vocal intensity      Plan:  -Patient was provided with home exercises/activities to target goals in plan of care at the end of today's session.  -Continue with current plan of care.    Group therapy   One on one with patient/SLP

## 2025-01-23 ENCOUNTER — OFFICE VISIT (OUTPATIENT)
Dept: SPEECH THERAPY | Facility: CLINIC | Age: 65
End: 2025-01-23
Payer: COMMERCIAL

## 2025-01-23 DIAGNOSIS — G20.A1 PARKINSON'S DISEASE WITHOUT DYSKINESIA, UNSPECIFIED WHETHER MANIFESTATIONS FLUCTUATE (HCC): ICD-10-CM

## 2025-01-23 DIAGNOSIS — R47.1 HYPERKINETIC DYSARTHRIA: Primary | ICD-10-CM

## 2025-01-23 PROCEDURE — 92507 TX SP LANG VOICE COMM INDIV: CPT | Performed by: SPEECH-LANGUAGE PATHOLOGIST

## 2025-01-23 PROCEDURE — 92508 TX SP LANG VOICE COMM GROUP: CPT | Performed by: SPEECH-LANGUAGE PATHOLOGIST

## 2025-01-30 ENCOUNTER — OFFICE VISIT (OUTPATIENT)
Dept: SPEECH THERAPY | Facility: CLINIC | Age: 65
End: 2025-01-30
Payer: COMMERCIAL

## 2025-01-30 DIAGNOSIS — R47.1 HYPERKINETIC DYSARTHRIA: Primary | ICD-10-CM

## 2025-01-30 DIAGNOSIS — G20.A1 PARKINSON'S DISEASE WITHOUT DYSKINESIA, UNSPECIFIED WHETHER MANIFESTATIONS FLUCTUATE (HCC): ICD-10-CM

## 2025-01-30 PROCEDURE — 92507 TX SP LANG VOICE COMM INDIV: CPT

## 2025-01-30 PROCEDURE — 92508 TX SP LANG VOICE COMM GROUP: CPT

## 2025-01-30 NOTE — PROGRESS NOTES
Daily Speech Treatment Note  [facility]    Today's date: 2025   Patient’s name: Bob Fernandes  : 1960  MRN: [id number]  Safety measures: PD  Referring provider: Sasha Gamble PA-C    Encounter Diagnosis     ICD-10-CM    1. 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 Parkinson's disease without dyskinesia, unspecified whether manifestations fluctuate (HCC)  G20.A1           Visit Tracking:  Re-eval Due Auth Expiration Date ST Visit Limit   3/15/24  12/31/24 BOMN   24 8   6/9/24 10/7/24 12   24 12   25 12     Visit/Unit Tracking  AUTH Status:  Date 25         12 visits approved Used 1 2 3          Remaining  11 10 9             Subjective/Behavioral:  Patient arrived late due to traffic    Medicare (new insurance) starts in 2025    Objective/Assessment:      Short-term goals:  Patient will complete structured exercises targeting vocal loudness and respiratory & laryngeal coordination while maintaining functional loudness with no with more than 2 cues per session to target the maintenance of his voice/speech intelligibility (to be achieved in 12 weeks).     Patient will maintain functional loudness during various cognitive-linguistic activities with no more than 2 cues per session to target the maintenance of his voice/speech intelligibility for increased communication success with familiar and unfamiliar listeners (to be achieved in 12 weeks).      Patient will maintain functional loudness during conversational speech with no more than 2 cues to increase loudness to target the maintenance of his voice/speech intelligibility for increased communication success with both familiar and unfamiliar listeners (to be achieved in 12 weeks).       Patient participated in the following exercises in a group setting:   Warm up:   may-me-my-maxine-moo x 5   Ah's x 5  Pitch glides: x 5  Numbers x 2  Cognitive task (Speedy Words)     Patient required min  cues to increase vocal intensity  Patient was asked to repeat himself by clinician x 2     Patient participated in the following exercises one on one with SLP:   Reading aloud (Chinese New Year)  Conversation tasks     Patient required min cues to increase vocal intensity      Plan:  -Patient was provided with home exercises/activities to target goals in plan of care at the end of today's session.  -Continue with current plan of care.    Group therapy   One on one with patient/SLP

## 2025-02-06 ENCOUNTER — OFFICE VISIT (OUTPATIENT)
Dept: SPEECH THERAPY | Facility: CLINIC | Age: 65
End: 2025-02-06
Payer: COMMERCIAL

## 2025-02-06 DIAGNOSIS — R47.1 HYPERKINETIC DYSARTHRIA: Primary | ICD-10-CM

## 2025-02-06 DIAGNOSIS — G20.A1 PARKINSON'S DISEASE WITHOUT DYSKINESIA, UNSPECIFIED WHETHER MANIFESTATIONS FLUCTUATE (HCC): ICD-10-CM

## 2025-02-06 PROCEDURE — 92508 TX SP LANG VOICE COMM GROUP: CPT

## 2025-02-06 PROCEDURE — 92507 TX SP LANG VOICE COMM INDIV: CPT

## 2025-02-06 NOTE — PROGRESS NOTES
Daily Speech Treatment Note  [facility]    Today's date: 2025   Patient’s name: Bob Fernandes  : 1960  MRN: [id number]  Safety measures: PD  Referring provider: Sasha Gamble PA-C    Encounter Diagnosis     ICD-10-CM    1. 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 Parkinson's disease without dyskinesia, unspecified whether manifestations fluctuate (HCC)  G20.A1           Visit Tracking:  Re-eval Due Auth Expiration Date ST Visit Limit   3/15/24  12/31/24 BOMN   24 8   6/9/24 10/7/24 12   25     Visit/Unit Tracking  AUTH Status:  Date 25  NEW INSURANCE        Medicare as of 25 Used 1 2 3 1         Remaining  11 10 9 9            Subjective/Behavioral:  Patient doing well today    Objective/Assessment:      Short-term goals:  Patient will complete structured exercises targeting vocal loudness and respiratory & laryngeal coordination while maintaining functional loudness with no with more than 2 cues per session to target the maintenance of his voice/speech intelligibility (to be achieved in 12 weeks).     Patient will maintain functional loudness during various cognitive-linguistic activities with no more than 2 cues per session to target the maintenance of his voice/speech intelligibility for increased communication success with familiar and unfamiliar listeners (to be achieved in 12 weeks).      Patient will maintain functional loudness during conversational speech with no more than 2 cues to increase loudness to target the maintenance of his voice/speech intelligibility for increased communication success with both familiar and unfamiliar listeners (to be achieved in 12 weeks).       Patient participated in the following exercises in a group setting:   Warm up:   may-me-my-maxine-moo x 5   Ah's x 5  Pitch glides: x 5  Numbers x 2  Cognitive task (Story telling)     Patient required min cues to increase vocal  intensity  Patient was asked to repeat himself by clinician x 2     Patient participated in the following exercises one on one with SLP:   Reading aloud ([employer])  Conversation tasks     Patient required min cues to increase vocal intensity      Plan:  -Patient was provided with home exercises/activities to target goals in plan of care at the end of today's session.  -Continue with current plan of care.    Group therapy   One on one with patient/SLP

## 2025-02-20 ENCOUNTER — OFFICE VISIT (OUTPATIENT)
Dept: SPEECH THERAPY | Facility: CLINIC | Age: 65
End: 2025-02-20
Payer: COMMERCIAL

## 2025-02-20 DIAGNOSIS — R47.1 HYPERKINETIC DYSARTHRIA: Primary | ICD-10-CM

## 2025-02-20 DIAGNOSIS — G20.A1 PARKINSON'S DISEASE WITHOUT DYSKINESIA, UNSPECIFIED WHETHER MANIFESTATIONS FLUCTUATE (HCC): ICD-10-CM

## 2025-02-20 PROCEDURE — 92507 TX SP LANG VOICE COMM INDIV: CPT

## 2025-02-20 PROCEDURE — 92508 TX SP LANG VOICE COMM GROUP: CPT

## 2025-02-20 NOTE — PROGRESS NOTES
Daily Speech Treatment Note  [facility]    Today's date: 2025   Patient’s name: Bob Fernandes  : 1960  MRN: [id number]  Safety measures: PD  Referring provider: Sasha Gamble PA-C    Encounter Diagnosis     ICD-10-CM    1. 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 Parkinson's disease without dyskinesia, unspecified whether manifestations fluctuate (HCC)  G20.A1           Visit Tracking:  Re-eval Due Auth Expiration Date ST Visit Limit   3/15/24  12/31/24 BOMN   24 8   6/9/24 10/7/24 12   25     Visit/Unit Tracking  AUTH Status:  Date 25  NEW INSURANCE 25       Medicare as of 25 Used 1 2 3 1 2        Remaining  11 10 9 9 8           Subjective/Behavioral:  Patient doing well today    Objective/Assessment:      Short-term goals:  Patient will complete structured exercises targeting vocal loudness and respiratory & laryngeal coordination while maintaining functional loudness with no with more than 2 cues per session to target the maintenance of his voice/speech intelligibility (to be achieved in 12 weeks).     Patient will maintain functional loudness during various cognitive-linguistic activities with no more than 2 cues per session to target the maintenance of his voice/speech intelligibility for increased communication success with familiar and unfamiliar listeners (to be achieved in 12 weeks).      Patient will maintain functional loudness during conversational speech with no more than 2 cues to increase loudness to target the maintenance of his voice/speech intelligibility for increased communication success with both familiar and unfamiliar listeners (to be achieved in 12 weeks).       Patient participated in the following exercises in a group setting:   Warm up:   may-me-my-maxine-moo x 5   Ah's x 5  Pitch glides: x 5  Numbers x 2  Cognitive task (story telling)     Patient required min cues to increase  vocal intensity  Patient was asked to repeat himself by clinician x 2     Patient participated in the following exercises one on one with SLP:   Reading aloud (President's day)  Conversation tasks     Patient required min cues to increase vocal intensity      Plan:  -Patient was provided with home exercises/activities to target goals in plan of care at the end of today's session.  -Continue with current plan of care.    Group therapy   One on one with patient/SLP

## 2025-02-27 ENCOUNTER — OFFICE VISIT (OUTPATIENT)
Dept: SPEECH THERAPY | Facility: CLINIC | Age: 65
End: 2025-02-27
Payer: COMMERCIAL

## 2025-02-27 DIAGNOSIS — G20.A1 PARKINSON'S DISEASE WITHOUT DYSKINESIA, UNSPECIFIED WHETHER MANIFESTATIONS FLUCTUATE (HCC): ICD-10-CM

## 2025-02-27 DIAGNOSIS — R47.1 HYPERKINETIC DYSARTHRIA: Primary | ICD-10-CM

## 2025-02-27 PROCEDURE — 92507 TX SP LANG VOICE COMM INDIV: CPT

## 2025-02-27 PROCEDURE — 92508 TX SP LANG VOICE COMM GROUP: CPT

## 2025-02-27 NOTE — PROGRESS NOTES
"Daily Speech Treatment Note  [facility]    Today's date: 2025   Patient’s name: Bob Fernandes  : 1960  MRN: [id number]  Safety measures: PD  Referring provider: Sasha Gamble PA-C    Encounter Diagnosis     ICD-10-CM    1. 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 Parkinson's disease without dyskinesia, unspecified whether manifestations fluctuate (HCC)  G20.A1             Visit Tracking:  Re-eval Due Auth Expiration Date ST Visit Limit   3/15/24  12/31/24 BOMN   24 8   6/9/24 10/7/24 12   25     Visit/Unit Tracking  AUTH Status:  Date 25  NEW INSURANCE 25     Medicare as of 25 Used 1 2 3 1 2 3 4      Remaining  11 10 9 9 8 7 6         Subjective/Behavioral:  Pt reported he has been practicing his home exercises when his voice falls into a \"funk\".  Discussed submitting paperwork for EMST 75.     Objective/Assessment:      Short-term goals:  Patient will complete structured exercises targeting vocal loudness and respiratory & laryngeal coordination while maintaining functional loudness with no with more than 2 cues per session to target the maintenance of his voice/speech intelligibility (to be achieved in 12 weeks).     Patient will maintain functional loudness during various cognitive-linguistic activities with no more than 2 cues per session to target the maintenance of his voice/speech intelligibility for increased communication success with familiar and unfamiliar listeners (to be achieved in 12 weeks).      Patient will maintain functional loudness during conversational speech with no more than 2 cues to increase loudness to target the maintenance of his voice/speech intelligibility for increased communication success with both familiar and unfamiliar listeners (to be achieved in 12 weeks).       Patient participated in the following exercises in a group setting:   Warm up: "   may-me-my-maxine-moo x 5   Ah's x 5  Pitch glides: x 5  Numbers x 2  Cognitive task (story telling)     Patient required minimal cues to increase vocal intensity  Patient was asked to repeat himself by clinician x 2     Patient participated in the following exercises one on one with SLP:   Reading aloud ([employer])   Conversation tasks     Patient required minimal cues to increase vocal intensity      Plan:  -Patient was provided with home exercises/activities to target goals in plan of care at the end of today's session.  -Continue with current plan of care.    Group therapy   One on one with patient/SLP

## 2025-03-06 ENCOUNTER — APPOINTMENT (OUTPATIENT)
Dept: SPEECH THERAPY | Facility: CLINIC | Age: 65
End: 2025-03-06
Payer: COMMERCIAL

## 2025-03-13 ENCOUNTER — APPOINTMENT (OUTPATIENT)
Dept: SPEECH THERAPY | Facility: CLINIC | Age: 65
End: 2025-03-13
Payer: COMMERCIAL

## 2025-03-13 DIAGNOSIS — I10 ESSENTIAL HYPERTENSION: ICD-10-CM

## 2025-03-13 DIAGNOSIS — E78.2 MIXED HYPERLIPIDEMIA: ICD-10-CM

## 2025-03-13 RX ORDER — ROSUVASTATIN CALCIUM 40 MG/1
40 TABLET, COATED ORAL DAILY
Qty: 90 TABLET | Refills: 1 | Status: SHIPPED | OUTPATIENT
Start: 2025-03-13

## 2025-03-13 RX ORDER — LOSARTAN POTASSIUM AND HYDROCHLOROTHIAZIDE 12.5; 1 MG/1; MG/1
1 TABLET ORAL DAILY
Qty: 90 TABLET | Refills: 1 | Status: SHIPPED | OUTPATIENT
Start: 2025-03-13

## 2025-03-18 ENCOUNTER — TELEPHONE (OUTPATIENT)
Age: 65
End: 2025-03-18

## 2025-03-18 NOTE — TELEPHONE ENCOUNTER
Patient would like labs done prior to visit on 3/28/25. Are there any labs that need to be done prior and if so, please order and let patient know.  Thank you.

## 2025-03-20 ENCOUNTER — OFFICE VISIT (OUTPATIENT)
Dept: SPEECH THERAPY | Facility: CLINIC | Age: 65
End: 2025-03-20
Payer: COMMERCIAL

## 2025-03-20 DIAGNOSIS — E78.2 MIXED HYPERLIPIDEMIA: ICD-10-CM

## 2025-03-20 DIAGNOSIS — R47.1 HYPERKINETIC DYSARTHRIA: Primary | ICD-10-CM

## 2025-03-20 DIAGNOSIS — Z12.5 SCREENING FOR PROSTATE CANCER: ICD-10-CM

## 2025-03-20 DIAGNOSIS — I10 ESSENTIAL HYPERTENSION: Primary | ICD-10-CM

## 2025-03-20 DIAGNOSIS — G20.A1 PARKINSON'S DISEASE WITHOUT DYSKINESIA, UNSPECIFIED WHETHER MANIFESTATIONS FLUCTUATE (HCC): ICD-10-CM

## 2025-03-20 PROCEDURE — 92507 TX SP LANG VOICE COMM INDIV: CPT

## 2025-03-20 PROCEDURE — 92508 TX SP LANG VOICE COMM GROUP: CPT

## 2025-03-20 NOTE — PROGRESS NOTES
"Daily Speech Treatment Note  [facility]    Today's date: 3/20/2025   Patient’s name: Bob Fernandes  : 1960  MRN: [id number]  Safety measures: PD  Referring provider: Sasha Gamble PA-C    Encounter Diagnosis     ICD-10-CM    1. 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 Parkinson's disease without dyskinesia, unspecified whether manifestations fluctuate (HCC)  G20.A1           Visit Tracking:  Re-eval Due Auth Expiration Date ST Visit Limit   3/15/24  12/31/24 BOMN   24 8   6/9/24 10/7/24 12   25     Visit/Unit Tracking  AUTH Status:  Date 25  NEW INSURANCE 25    Medicare as of 25 Used 1 2 3 1 2 3 4 5     Remaining  11 10 9 9 8 7 6 5        Subjective/Behavioral:  Pt reports his voice \"not there\" today; returned from a trip and stated he lost his voice.     Objective/Assessment:  Reviewed EMST use with pt; pt verbalized understanding.     Short-term goals:  Patient will complete structured exercises targeting vocal loudness and respiratory & laryngeal coordination while maintaining functional loudness with no with more than 2 cues per session to target the maintenance of his voice/speech intelligibility (to be achieved in 12 weeks).     Patient will maintain functional loudness during various cognitive-linguistic activities with no more than 2 cues per session to target the maintenance of his voice/speech intelligibility for increased communication success with familiar and unfamiliar listeners (to be achieved in 12 weeks).      Patient will maintain functional loudness during conversational speech with no more than 2 cues to increase loudness to target the maintenance of his voice/speech intelligibility for increased communication success with both familiar and unfamiliar listeners (to be achieved in 12 weeks).       Patient participated in the following exercises in a group setting: "   Warm up:   may-me-my-maxine-moo x 5   Ah's x 5   Pitch glides: x 5  Numbers x 2  Reading aloud ([employer] Anniversary)   Conversation tasks      Patient required minimal to moderate cues to increase vocal intensity  Patient was asked to repeat himself by clinician x 2      Plan:  -Patient was provided with home exercises/activities to target goals in plan of care at the end of today's session.  -Continue with current plan of care.

## 2025-03-21 ENCOUNTER — RA CDI HCC (OUTPATIENT)
Dept: OTHER | Facility: HOSPITAL | Age: 65
End: 2025-03-21

## 2025-03-25 LAB
ALBUMIN SERPL-MCNC: 4.5 G/DL (ref 3.6–5.1)
ALBUMIN/GLOB SERPL: 1.9 (CALC) (ref 1–2.5)
ALP SERPL-CCNC: 51 U/L (ref 35–144)
ALT SERPL-CCNC: 10 U/L (ref 9–46)
AST SERPL-CCNC: 16 U/L (ref 10–35)
BILIRUB SERPL-MCNC: 0.8 MG/DL (ref 0.2–1.2)
BUN SERPL-MCNC: 22 MG/DL (ref 7–25)
BUN/CREAT SERPL: NORMAL (CALC) (ref 6–22)
CALCIUM SERPL-MCNC: 10 MG/DL (ref 8.6–10.3)
CHLORIDE SERPL-SCNC: 103 MMOL/L (ref 98–110)
CHOLEST SERPL-MCNC: 160 MG/DL
CHOLEST/HDLC SERPL: 2.1 (CALC)
CO2 SERPL-SCNC: 29 MMOL/L (ref 20–32)
CREAT SERPL-MCNC: 1.12 MG/DL (ref 0.7–1.35)
ERYTHROCYTE [DISTWIDTH] IN BLOOD BY AUTOMATED COUNT: 12 % (ref 11–15)
GFR/BSA.PRED SERPLBLD CYS-BASED-ARV: 73 ML/MIN/1.73M2
GLOBULIN SER CALC-MCNC: 2.4 G/DL (CALC) (ref 1.9–3.7)
GLUCOSE SERPL-MCNC: 92 MG/DL (ref 65–99)
HCT VFR BLD AUTO: 44.9 % (ref 38.5–50)
HDLC SERPL-MCNC: 76 MG/DL
HGB BLD-MCNC: 15.1 G/DL (ref 13.2–17.1)
LDLC SERPL CALC-MCNC: 69 MG/DL (CALC)
MCH RBC QN AUTO: 31.5 PG (ref 27–33)
MCHC RBC AUTO-ENTMCNC: 33.6 G/DL (ref 32–36)
MCV RBC AUTO: 93.5 FL (ref 80–100)
NONHDLC SERPL-MCNC: 84 MG/DL (CALC)
PLATELET # BLD AUTO: 324 THOUSAND/UL (ref 140–400)
PMV BLD REES-ECKER: 8.8 FL (ref 7.5–12.5)
POTASSIUM SERPL-SCNC: 4.4 MMOL/L (ref 3.5–5.3)
PROT SERPL-MCNC: 6.9 G/DL (ref 6.1–8.1)
PSA SERPL-MCNC: 0.7 NG/ML
RBC # BLD AUTO: 4.8 MILLION/UL (ref 4.2–5.8)
SODIUM SERPL-SCNC: 141 MMOL/L (ref 135–146)
TRIGL SERPL-MCNC: 66 MG/DL
WBC # BLD AUTO: 7.1 THOUSAND/UL (ref 3.8–10.8)

## 2025-03-27 ENCOUNTER — EVALUATION (OUTPATIENT)
Dept: SPEECH THERAPY | Facility: CLINIC | Age: 65
End: 2025-03-27
Payer: COMMERCIAL

## 2025-03-27 DIAGNOSIS — R47.1 HYPERKINETIC DYSARTHRIA: Primary | ICD-10-CM

## 2025-03-27 DIAGNOSIS — G20.A1 PARKINSON'S DISEASE WITHOUT DYSKINESIA, UNSPECIFIED WHETHER MANIFESTATIONS FLUCTUATE (HCC): ICD-10-CM

## 2025-03-27 PROCEDURE — 92507 TX SP LANG VOICE COMM INDIV: CPT

## 2025-03-27 NOTE — PROGRESS NOTES
Speech-Language Pathology Re-Evaluation  Skilled Maintenance - PD Voice group    Today's date: 3/27/2025   Patient’s name: Bob Fernandes  : 1960  MRN: [id number]  Safety measures: PD  Referring provider: Sasha Gamble PA-C    Encounter Diagnosis     ICD-10-CM    1. 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 Parkinson's disease without dyskinesia, unspecified whether manifestations fluctuate ([facility])  G20.A1           Assessment:  Patient presents with a mild motor speech disorder characterized by decreased vocal intensity, imprecise articulation, and decreased fluency in speech secondary to PD. Patient has been participating in group therapy using SPEAK OUT! model to maintain the progress that was made during one on one therapy. Over the past certification period, Patient has demonstrated maintenance between individual therapy activities and during conversational tasks. Patient is engaged in all activities presented to him and continues to practice exercises at home between sessions. At this time, it is recommended that Patient continue to be seen for skilled outpatient Speech Therapy to target the goals mentioned below to promote maintenance of skills.      Short-Term goals:     Patient will complete structured exercises targeting vocal loudness and respiratory & laryngeal coordination while maintaining functional loudness with no with more than 2 cues per session to target the maintenance of her voice/speech intelligibility (to be achieved in 12 weeks). ONGOING     Patient will maintain functional loudness during various cognitive-linguistic activities with no more than 2 cues per session to target the maintenance of her voice/speech intelligibility for increased communication success with familiar and unfamiliar listeners (to be achieved in 12 weeks). ONGOING     Patient will maintain functional loudness during conversational speech with no more than 2 cues to increase loudness to target the maintenance  "of her voice/speech intelligibility for increased communication success with both familiar and unfamiliar listeners (to be achieved in 12 weeks). ONGOING     Long-term goals:     Patient will maintain their highest level of independence with her voice/speech intelligibility to meet their needs with the implementation of compensatory strategies to promote positive communication interactions and socialization, participation in meaningful activities, safety, and quality of life (to be achieved by discharge). CONTINUE         Plan:  Patient would benefit from outpatient skilled Speech Therapy services: Maintenance therapy program    Frequency: 1x weekly  Duration: 3 months    Intervention certification from: 3/27/2025  Intervention certification to: 06/27/25      Subjective:  Pt participated well throughout today's session.    Patient's goal(s): Not reported    Pain: Not Reported      Objective   SPEAK OUT!® UPDATE    The following exercises were targeted to facilitate connection of breath to speech, loudness, articulation, expression, and intent of speech:    Sound level meter-to-mouth distance: 50 cm throughout session    Task Data Level of Cueing Provided   Pre-treatment spontaneous conversation N/A N/A         Warm-up (“may-me-my-maxine-moo”) 74 dB SPL minimal         Sustained “ah” 74 seconds minimal   Sustained “ah” 11.02 dB SPL minimal-none         Vocal glides 74 dB SPL minimal-none         Numerical sequences 77 dB SPL minimal        Oral reading (paragraph level) 73 dB SPL minimal-none        Post-treatment spontaneous conversation 68 dB SPL N/A      Patient required minimal cues to utilize \"INTENT\" during spontaneous conversation on this date of service.    *Patient demonstrated the following improvements with cueing: vocal intensity and vocal quality    *Patient's response to using \"INTENT\": good      Voice Handicap Index (VHI):  The VHI is a list of 30 statements that many people have used to describe their " voices and the effects of their voices on their lives. Patient indicated how frequently he has the same experience using a rating point scale (“never” = 0, “almost never” = 1, “sometimes” = 2, “almost always” = 3, and “always” = 4).  Results were as follows:    Subscale: Score: Self-Perceived Impairment Level:   Physical 25/40 Severe   Functional 23/40 Severe   Emotional 20/40 Severe        TOTAL 68/120 Severe             Treatment:  See data above        Visit Tracking:  Re-eval Due Auth Expiration Date ST Visit Limit   02/26/25 04/09/2025 04/09/25 PENDING    06/27/25 12/31/25 BOMN      Visit/Unit Tracking  AUTH Status:   Progress update needed at 10th visit Date 02/06/25  NEW INSURANCE 2/20/25 2/25/25 2/27/25 03/20/25 03/27/25  RE   Medicare as of 2/1/25 Used 1 2 3 4 5 6    Remaining  9 8 7 6 5 4

## 2025-03-28 ENCOUNTER — OFFICE VISIT (OUTPATIENT)
Dept: FAMILY MEDICINE CLINIC | Facility: CLINIC | Age: 65
End: 2025-03-28
Payer: COMMERCIAL

## 2025-03-28 VITALS
HEIGHT: 71 IN | HEART RATE: 74 BPM | TEMPERATURE: 98.1 F | SYSTOLIC BLOOD PRESSURE: 118 MMHG | BODY MASS INDEX: 31.01 KG/M2 | DIASTOLIC BLOOD PRESSURE: 76 MMHG | WEIGHT: 221.5 LBS | OXYGEN SATURATION: 100 % | RESPIRATION RATE: 16 BRPM

## 2025-03-28 DIAGNOSIS — I10 ESSENTIAL HYPERTENSION: ICD-10-CM

## 2025-03-28 DIAGNOSIS — Z12.5 SCREENING FOR PROSTATE CANCER: ICD-10-CM

## 2025-03-28 DIAGNOSIS — Z12.12 SCREENING FOR COLORECTAL CANCER: ICD-10-CM

## 2025-03-28 DIAGNOSIS — Z12.11 SCREENING FOR COLORECTAL CANCER: ICD-10-CM

## 2025-03-28 DIAGNOSIS — E78.2 MIXED HYPERLIPIDEMIA: ICD-10-CM

## 2025-03-28 DIAGNOSIS — Z00.00 WELCOME TO MEDICARE PREVENTIVE VISIT: Primary | ICD-10-CM

## 2025-03-28 DIAGNOSIS — G20.A1 PARKINSON'S DISEASE WITHOUT DYSKINESIA, UNSPECIFIED WHETHER MANIFESTATIONS FLUCTUATE (HCC): ICD-10-CM

## 2025-03-28 PROCEDURE — G0442 ANNUAL ALCOHOL SCREEN 15 MIN: HCPCS | Performed by: FAMILY MEDICINE

## 2025-03-28 PROCEDURE — 99497 ADVNCD CARE PLAN 30 MIN: CPT | Performed by: FAMILY MEDICINE

## 2025-03-28 PROCEDURE — G0402 INITIAL PREVENTIVE EXAM: HCPCS | Performed by: FAMILY MEDICINE

## 2025-03-28 RX ORDER — TAMSULOSIN HYDROCHLORIDE 0.4 MG/1
0.4 CAPSULE ORAL
COMMUNITY
Start: 2025-03-04 | End: 2026-03-04

## 2025-03-28 RX ORDER — VARDENAFIL HYDROCHLORIDE 20 MG/1
20 TABLET ORAL DAILY PRN
COMMUNITY
Start: 2025-02-04

## 2025-03-28 NOTE — PATIENT INSTRUCTIONS
Medicare Preventive Visit Patient Instructions  Thank you for completing your Welcome to Medicare Visit or Medicare Annual Wellness Visit today. Your next wellness visit will be due in one year (3/29/2026).  The screening/preventive services that you may require over the next 5-10 years are detailed below. Some tests may not apply to you based off risk factors and/or age. Screening tests ordered at today's visit but not completed yet may show as past due. Also, please note that scanned in results may not display below.  Preventive Screenings:  Service Recommendations Previous Testing/Comments   Colorectal Cancer Screening  Colonoscopy    Fecal Occult Blood Test (FOBT)/Fecal Immunochemical Test (FIT)  Fecal DNA/Cologuard Test  Flexible Sigmoidoscopy Age: 45-75 years old   Colonoscopy: every 10 years (May be performed more frequently if at higher risk)  OR  FOBT/FIT: every 1 year  OR  Cologuard: every 3 years  OR  Sigmoidoscopy: every 5 years  Screening may be recommended earlier than age 45 if at higher risk for colorectal cancer. Also, an individualized decision between you and your healthcare provider will decide whether screening between the ages of 76-85 would be appropriate. Colonoscopy: 12/15/2020  FOBT/FIT: Not on file  Cologuard: Not on file  Sigmoidoscopy: Not on file    Screening Current     Prostate Cancer Screening Individualized decision between patient and health care provider in men between ages of 55-69   Medicare will cover every 12 months beginning on the day after your 50th birthday PSA: 0.70 ng/mL     Screening Current     Hepatitis C Screening Once for adults born between 1945 and 1965  More frequently in patients at high risk for Hepatitis C Hep C Antibody: Not on file        Diabetes Screening 1-2 times per year if you're at risk for diabetes or have pre-diabetes Fasting glucose: No results in last 5 years (No results in last 5 years)  A1C: 5.4 % of total Hgb (3/14/2023)  Screening Current    Cholesterol Screening Once every 5 years if you don't have a lipid disorder. May order more often based on risk factors. Lipid panel: 03/24/2025  Screening Not Indicated  History Lipid Disorder      Other Preventive Screenings Covered by Medicare:  Abdominal Aortic Aneurysm (AAA) Screening: covered once if your at risk. You're considered to be at risk if you have a family history of AAA or a male between the age of 65-75 who smoking at least 100 cigarettes in your lifetime.  Lung Cancer Screening: covers low dose CT scan once per year if you meet all of the following conditions: (1) Age 55-77; (2) No signs or symptoms of lung cancer; (3) Current smoker or have quit smoking within the last 15 years; (4) You have a tobacco smoking history of at least 20 pack years (packs per day x number of years you smoked); (5) You get a written order from a healthcare provider.  Glaucoma Screening: covered annually if you're considered high risk: (1) You have diabetes OR (2) Family history of glaucoma OR (3)  aged 50 and older OR (4)  American aged 65 and older  Osteoporosis Screening: covered every 2 years if you meet one of the following conditions: (1) Have a vertebral abnormality; (2) On glucocorticoid therapy for more than 3 months; (3) Have primary hyperparathyroidism; (4) On osteoporosis medications and need to assess response to drug therapy.  HIV Screening: covered annually if you're between the age of 15-65. Also covered annually if you are younger than 15 and older than 65 with risk factors for HIV infection. For pregnant patients, it is covered up to 3 times per pregnancy.    Immunizations:  Immunization Recommendations   Influenza Vaccine Annual influenza vaccination during flu season is recommended for all persons aged >= 6 months who do not have contraindications   Pneumococcal Vaccine   * Pneumococcal conjugate vaccine = PCV13 (Prevnar 13), PCV15 (Vaxneuvance), PCV20 (Prevnar 20)  *  Pneumococcal polysaccharide vaccine = PPSV23 (Pneumovax) Adults 19-65 yo with certain risk factors or if 65+ yo  If never received any pneumonia vaccine: recommend Prevnar 20 (PCV20)  Give PCV20 if previously received 1 dose of PCV13 or PPSV23   Hepatitis B Vaccine 3 dose series if at intermediate or high risk (ex: diabetes, end stage renal disease, liver disease)   Respiratory syncytial virus (RSV) Vaccine - COVERED BY MEDICARE PART D  * RSVPreF3 (Arexvy) CDC recommends that adults 60 years of age and older may receive a single dose of RSV vaccine using shared clinical decision-making (SCDM)   Tetanus (Td) Vaccine - COST NOT COVERED BY MEDICARE PART B Following completion of primary series, a booster dose should be given every 10 years to maintain immunity against tetanus. Td may also be given as tetanus wound prophylaxis.   Tdap Vaccine - COST NOT COVERED BY MEDICARE PART B Recommended at least once for all adults. For pregnant patients, recommended with each pregnancy.   Shingles Vaccine (Shingrix) - COST NOT COVERED BY MEDICARE PART B  2 shot series recommended in those 19 years and older who have or will have weakened immune systems or those 50 years and older     Health Maintenance Due:      Topic Date Due   • Hepatitis C Screening  Never done   • HIV Screening  Never done   • Colorectal Cancer Screening  12/15/2023     Immunizations Due:      Topic Date Due   • Pneumococcal Vaccine: 65+ Years (1 of 1 - PCV) Never done   • Influenza Vaccine (1) 09/01/2024   • COVID-19 Vaccine (3 - 2024-25 season) 09/01/2024     Advance Directives   What are advance directives?  Advance directives are legal documents that state your wishes and plans for medical care. These plans are made ahead of time in case you lose your ability to make decisions for yourself. Advance directives can apply to any medical decision, such as the treatments you want, and if you want to donate organs.   What are the types of advance directives?   There are many types of advance directives, and each state has rules about how to use them. You may choose a combination of any of the following:  Living will:  This is a written record of the treatment you want. You can also choose which treatments you do not want, which to limit, and which to stop at a certain time. This includes surgery, medicine, IV fluid, and tube feedings.   Durable power of  for healthcare (DPAHC):  This is a written record that states who you want to make healthcare choices for you when you are unable to make them for yourself. This person, called a proxy, is usually a family member or a friend. You may choose more than 1 proxy.  Do not resuscitate (DNR) order:  A DNR order is used in case your heart stops beating or you stop breathing. It is a request not to have certain forms of treatment, such as CPR. A DNR order may be included in other types of advance directives.  Medical directive:  This covers the care that you want if you are in a coma, near death, or unable to make decisions for yourself. You can list the treatments you want for each condition. Treatment may include pain medicine, surgery, blood transfusions, dialysis, IV or tube feedings, and a ventilator (breathing machine).  Values history:  This document has questions about your views, beliefs, and how you feel and think about life. This information can help others choose the care that you would choose.  Why are advance directives important?  An advance directive helps you control your care. Although spoken wishes may be used, it is better to have your wishes written down. Spoken wishes can be misunderstood, or not followed. Treatments may be given even if you do not want them. An advance directive may make it easier for your family to make difficult choices about your care.   Weight Management   Why it is important to manage your weight:  Being overweight increases your risk of health conditions such as heart disease,  high blood pressure, type 2 diabetes, and certain types of cancer. It can also increase your risk for osteoarthritis, sleep apnea, and other respiratory problems. Aim for a slow, steady weight loss. Even a small amount of weight loss can lower your risk of health problems.  How to lose weight safely:  A safe and healthy way to lose weight is to eat fewer calories and get regular exercise. You can lose up about 1 pound a week by decreasing the number of calories you eat by 500 calories each day.   Healthy meal plan for weight management:  A healthy meal plan includes a variety of foods, contains fewer calories, and helps you stay healthy. A healthy meal plan includes the following:  Eat whole-grain foods more often.  A healthy meal plan should contain fiber. Fiber is the part of grains, fruits, and vegetables that is not broken down by your body. Whole-grain foods are healthy and provide extra fiber in your diet. Some examples of whole-grain foods are whole-wheat breads and pastas, oatmeal, brown rice, and bulgur.  Eat a variety of vegetables every day.  Include dark, leafy greens such as spinach, kale, skip greens, and mustard greens. Eat yellow and orange vegetables such as carrots, sweet potatoes, and winter squash.   Eat a variety of fruits every day.  Choose fresh or canned fruit (canned in its own juice or light syrup) instead of juice. Fruit juice has very little or no fiber.  Eat low-fat dairy foods.  Drink fat-free (skim) milk or 1% milk. Eat fat-free yogurt and low-fat cottage cheese. Try low-fat cheeses such as mozzarella and other reduced-fat cheeses.  Choose meat and other protein foods that are low in fat.  Choose beans or other legumes such as split peas or lentils. Choose fish, skinless poultry (chicken or turkey), or lean cuts of red meat (beef or pork). Before you cook meat or poultry, cut off any visible fat.   Use less fat and oil.  Try baking foods instead of frying them. Add less fat, such as  margarine, sour cream, regular salad dressing and mayonnaise to foods. Eat fewer high-fat foods. Some examples of high-fat foods include french fries, doughnuts, ice cream, and cakes.  Eat fewer sweets.  Limit foods and drinks that are high in sugar. This includes candy, cookies, regular soda, and sweetened drinks.  Exercise:  Exercise at least 30 minutes per day on most days of the week. Some examples of exercise include walking, biking, dancing, and swimming. You can also fit in more physical activity by taking the stairs instead of the elevator or parking farther away from stores. Ask your healthcare provider about the best exercise plan for you.      © Copyright [employer] 2018 Information is for End User's use only and may not be sold, redistributed or otherwise used for commercial purposes. All illustrations and images included in CareNotes® are the copyrighted property of [employer].A.M., Inc. or [employer]

## 2025-03-28 NOTE — PROGRESS NOTES
Name: Bob Fernandes      : 1960      MRN: [id number]  Encounter Provider: Payton Rudd MD  Encounter Date: 3/28/2025   Encounter department: [facility]    Assessment & Plan  Welcome to Medicare preventive visit         Screening for colorectal cancer    Orders:    Ambulatory Referral to Gastroenterology; Future    Essential hypertension  BP Readings from Last 3 Encounters:   25 118/76   25 110/70   01/10/25 110/70     Blood pressure isreal well-controlled.  Losartan-hydrochlorothiazide 100-12.5 mg daily    Orders:    Comprehensive metabolic panel; Future    CBC; Future    Parkinson's disease without dyskinesia, unspecified whether manifestations fluctuate (HCC)  Continue following with neurology       Mixed hyperlipidemia  The 10-year ASCVD risk score (Dheeraj LAWTON, et al., 2019) is: 8.7%    Values used to calculate the score:      Age: 65 years      Sex: Male      Is Non- : No      Diabetic: No      Tobacco smoker: No      Systolic Blood Pressure: 118 mmHg      Is BP treated: Yes      HDL Cholesterol: 76 mg/dL      Total Cholesterol: 160 mg/dL      Orders:    Lipid panel; Future    Screening for prostate cancer    Orders:    PSA, Total Screen; Future      Depression Screening and Follow-up Plan: Patient was screened for depression during today's encounter. They screened negative with a PHQ-2 score of 0.      Shingles vaccination up-to-date completed at [facility]  He is due for pneumococcal.  Preventive health issues were discussed with patient, and age appropriate screening tests were ordered as noted in patient's After Visit Summary. Personalized health advice and appropriate referrals for health education or preventive services given if needed, as noted in patient's After Visit Summary.    History of Present Illness     Patient presents with:  Medicare Wellness Visit           Patient Care Team:  Nish Barajas MD as PCP -  General    Review of Systems   Constitutional:  Negative for activity change, fatigue and fever.   Eyes:  Negative for visual disturbance.   Respiratory:  Negative for shortness of breath.    Cardiovascular:  Negative for chest pain.   Gastrointestinal:  Negative for abdominal pain, constipation, diarrhea and nausea.   Endocrine: Negative for cold intolerance and heat intolerance.   Musculoskeletal:  Negative for back pain.   Skin:  Negative for rash.   Neurological:  Negative for headaches.   Psychiatric/Behavioral:  Negative for confusion.      Medical History Reviewed by provider this encounter:  Tobacco  Allergies  Meds  Problems  Med Hx  Surg Hx  Fam Hx       Annual Wellness Visit Questionnaire   Bob is here for his Welcome to Medicare visit.     Health Risk Assessment:   Patient rates overall health as good. Patient feels that their physical health rating is slightly worse. Patient is satisfied with their life. Eyesight was rated as same. Hearing was rated as same. Patient feels that their emotional and mental health rating is same. Patients states they are sometimes angry. Patient states they are sometimes unusually tired/fatigued. Pain experienced in the last 7 days has been none. Patient states that he has experienced no weight loss or gain in last 6 months.     Depression Screening:   PHQ-2 Score: 0      Fall Risk Screening:   In the past year, patient has experienced: no history of falling in past year      Home Safety:  Patient does not have trouble with stairs inside or outside of their home. Patient has working smoke alarms and has working carbon monoxide detector. Home safety hazards include: none.     Nutrition:   Current diet is Regular.     Medications:   Patient is currently taking over-the-counter supplements. OTC medications include: see medication list. Patient is able to manage medications.     Activities of Daily Living (ADLs)/Instrumental Activities of Daily Living (IADLs):   Walk  and transfer into and out of bed and chair?: Yes  Dress and groom yourself?: Yes    Bathe or shower yourself?: Yes    Feed yourself? Yes  Do your laundry/housekeeping?: Yes  Manage your money, pay your bills and track your expenses?: Yes  Make your own meals?: Yes    Do your own shopping?: Yes    Previous Hospitalizations:   Any hospitalizations or ED visits within the last 12 months?: No      Advance Care Planning:   Living will: No    Durable POA for healthcare: No    Advanced directive: No      PREVENTIVE SCREENINGS      Cardiovascular Screening:    General: Screening Not Indicated and History Lipid Disorder      Diabetes Screening:     General: Screening Current      Colorectal Cancer Screening:     General: Screening Current      Prostate Cancer Screening:    General: Screening Current      Abdominal Aortic Aneurysm (AAA) Screening:    Risk factors include: age between 65-76 yo        Lung Cancer Screening:     General: Screening Not Indicated    Screening, Brief Intervention, and Referral to Treatment (SBIRT)     Screening  Typical number of drinks in a day: 1  Typical number of drinks in a week: 7  Interpretation: Low risk drinking behavior.    Single Item Drug Screening:  How often have you used an illegal drug (including marijuana) or a prescription medication for non-medical reasons in the past year? never    Single Item Drug Screen Score: 0  Interpretation: Negative screen for possible drug use disorder    Brief Intervention  Alcohol & drug use screenings were reviewed. No concerns regarding substance use disorder identified. Healthy alcohol use/limits discussed.     Voluntary Advance directive counseling completed today.  [facility] ACP document given to patient.   Discussed obtaining Living Will, Power of  and Advanced Directive.    Reviewed end-of-life decisions today with patient. Reviewed Code status option.      Time Spent: 15 minutes      Time Spent  Time spent screening/evaluating the patient  "for alcohol misuse: 5 minutes.     Other Counseling Topics:   Car/seat belt/driving safety, skin self-exam, sunscreen and calcium and vitamin D intake and regular weightbearing exercise.     Social Drivers of Health     Food Insecurity: No Food Insecurity (3/28/2025)    Hunger Vital Sign     Worried About Running Out of Food in the Last Year: Never true     Ran Out of Food in the Last Year: Never true   Transportation Needs: No Transportation Needs (3/28/2025)    PRAPARE - Transportation     Lack of Transportation (Medical): No     Lack of Transportation (Non-Medical): No   Housing Stability: Low Risk  (3/28/2025)    Housing Stability Vital Sign     Unable to Pay for Housing in the Last Year: No     Number of Times Moved in the Last Year: 0     Homeless in the Last Year: No   Utilities: Not At Risk (3/28/2025)    [facility] Utilities     Threatened with loss of utilities: No     Hearing Screening    125Hz 500Hz 1000Hz 2000Hz 4000Hz   Right ear 2 20 20 20 70   Left ear  20 20 20 80     Vision Screening    Right eye Left eye Both eyes   Without correction      With correction 20/20 20/20 20/15       Objective   /76 (BP Location: Left arm, Patient Position: Sitting, Cuff Size: Large)   Pulse 74   Temp 98.1 °F (36.7 °C) (Temporal)   Resp 16   Ht 5' 11\" (1.803 m)   Wt 100 kg (221 lb 8 oz)   SpO2 100%   BMI 30.89 kg/m²     Physical Exam  Vitals and nursing note reviewed.   Constitutional:       Appearance: Normal appearance. He is well-developed.   HENT:      Head: Normocephalic and atraumatic.   Cardiovascular:      Rate and Rhythm: Normal rate and regular rhythm.   Pulmonary:      Effort: Pulmonary effort is normal.      Breath sounds: Normal breath sounds.   Abdominal:      General: Bowel sounds are normal.      Palpations: Abdomen is soft.   Musculoskeletal:      Cervical back: Normal range of motion.   Skin:     General: Skin is warm.   Neurological:      General: No focal deficit present.      Mental Status: " He is alert.   Psychiatric:         Mood and Affect: Mood normal.         Speech: Speech normal.

## 2025-03-28 NOTE — ASSESSMENT & PLAN NOTE
BP Readings from Last 3 Encounters:   03/28/25 118/76   01/13/25 110/70   01/10/25 110/70     Blood pressure isreal well-controlled.  Losartan-hydrochlorothiazide 100-12.5 mg daily    Orders:    Comprehensive metabolic panel; Future    CBC; Future

## 2025-03-28 NOTE — ASSESSMENT & PLAN NOTE
The 10-year ASCVD risk score (Dheeraj LAWTON, et al., 2019) is: 8.7%    Values used to calculate the score:      Age: 65 years      Sex: Male      Is Non- : No      Diabetic: No      Tobacco smoker: No      Systolic Blood Pressure: 118 mmHg      Is BP treated: Yes      HDL Cholesterol: 76 mg/dL      Total Cholesterol: 160 mg/dL      Orders:    Lipid panel; Future

## 2025-04-02 NOTE — PROGRESS NOTES
Daily Speech Treatment Note  [facility]    Today's date: 2025   Patient’s name: Bob Fernandes  : 1960  MRN: [id number]  Safety measures: PD  Referring provider: Sasha Gamble PA-C    Encounter Diagnosis     ICD-10-CM    1. 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 Parkinson's disease without dyskinesia, unspecified whether manifestations fluctuate (HCC)  G20.A1             Visit Tracking:  Re-eval Due Auth Expiration Date ST Visit Limit   3/15/24  12/31/24 BOMN   24 8   6/9/24 10/7/24 12   24 12   25     Visit/Unit Tracking  AUTH Status:  Date 25  NEW INSURANCE 25  RE 25   Medicare as of 25 Used 1 2 3 1 2 3 4 5 6 7    Remaining  11 10 9 9 8 7 6 5 4 3       Subjective/Behavioral:  Pt participated well throughout today's session. Discussed use of EMST; EMST device not covered by pt's insurance, pt would have to purchase on own. Pt provided with information regarding purchase of device.     Objective/Assessment:    Short-term goals:  Patient will complete structured exercises targeting vocal loudness and respiratory & laryngeal coordination while maintaining functional loudness with no with more than 2 cues per session to target the maintenance of her voice/speech intelligibility (to be achieved in 12 weeks). ONGOING     Patient will maintain functional loudness during various cognitive-linguistic activities with no more than 2 cues per session to target the maintenance of her voice/speech intelligibility for increased communication success with familiar and unfamiliar listeners (to be achieved in 12 weeks). ONGOING     Patient will maintain functional loudness during conversational speech with no more than 2 cues to increase loudness to target the maintenance of her voice/speech intelligibility for increased communication success with both familiar and unfamiliar listeners (to  be achieved in 12 weeks). ONGOING      Patient participated in the following exercises in a one-on-one setting:   Warm up:   may-me-my-maxine-moo x 5   Ah's x 5   Pitch glides: x 5  Numbers x 2  Reading aloud (April Fools' Day)   Conversation tasks      Patient required minimal cues to increase vocal intensity  Patient was asked to repeat himself by clinician x 1      Plan:  -Patient was provided with home exercises/activities to target goals in plan of care at the end of today's session.  -Continue with current plan of care.

## 2025-04-03 ENCOUNTER — OFFICE VISIT (OUTPATIENT)
Dept: SPEECH THERAPY | Facility: CLINIC | Age: 65
End: 2025-04-03
Payer: COMMERCIAL

## 2025-04-03 DIAGNOSIS — R47.1 HYPERKINETIC DYSARTHRIA: Primary | ICD-10-CM

## 2025-04-03 DIAGNOSIS — G20.A1 PARKINSON'S DISEASE WITHOUT DYSKINESIA, UNSPECIFIED WHETHER MANIFESTATIONS FLUCTUATE (HCC): ICD-10-CM

## 2025-04-03 PROCEDURE — 92507 TX SP LANG VOICE COMM INDIV: CPT

## 2025-04-10 ENCOUNTER — OFFICE VISIT (OUTPATIENT)
Dept: SPEECH THERAPY | Facility: CLINIC | Age: 65
End: 2025-04-10
Payer: COMMERCIAL

## 2025-04-10 DIAGNOSIS — R47.1 HYPERKINETIC DYSARTHRIA: Primary | ICD-10-CM

## 2025-04-10 DIAGNOSIS — G20.A1 PARKINSON'S DISEASE WITHOUT DYSKINESIA, UNSPECIFIED WHETHER MANIFESTATIONS FLUCTUATE (HCC): ICD-10-CM

## 2025-04-10 PROCEDURE — 92508 TX SP LANG VOICE COMM GROUP: CPT

## 2025-04-10 PROCEDURE — 92507 TX SP LANG VOICE COMM INDIV: CPT

## 2025-04-10 NOTE — PROGRESS NOTES
Daily Speech Treatment Note  [facility]    Today's date: 4/10/2025   Patient’s name: Bob Fernandes  : 1960  MRN: [id number]  Safety measures: PD  Referring provider: Sasha Gamble PA-C    Encounter Diagnosis     ICD-10-CM    1. 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 Parkinson's disease without dyskinesia, unspecified whether manifestations fluctuate (HCC)  G20.A1         Visit Tracking:  Re-eval Due Auth Expiration Date ST Visit Limit   3/15/24  12/31/24 BOMN   24 8   6/9/24 10/7/24 12   25     Visit/Unit Tracking  AUTH Status:  Date 25  NEW INSURANCE 25  RE 04/02/25 04/10/25   Medicare as of 25 Used 1 2 3 1 2 3 4 5 6 7 8    Remaining  11 10 9 9 8 7 6 5 4 3 2     Subjective/Behavioral:  Pt participated well throughout session.     Objective/Assessment:    Short-term goals:  Patient will complete structured exercises targeting vocal loudness and respiratory & laryngeal coordination while maintaining functional loudness with no with more than 2 cues per session to target the maintenance of her voice/speech intelligibility (to be achieved in 12 weeks). ONGOING     Patient will maintain functional loudness during various cognitive-linguistic activities with no more than 2 cues per session to target the maintenance of her voice/speech intelligibility for increased communication success with familiar and unfamiliar listeners (to be achieved in 12 weeks). ONGOING     Patient will maintain functional loudness during conversational speech with no more than 2 cues to increase loudness to target the maintenance of her voice/speech intelligibility for increased communication success with both familiar and unfamiliar listeners (to be achieved in 12 weeks). ONGOING    Patient participated in the following exercises in a group setting:   Warm up:   may-me-my-maxine-moo x 5   Ah's x 5   Pitch  glides: x 5  Numbers x 2  Reading aloud ([place])   Conversation tasks      Patient required minimal to no cues to increase vocal intensity  Patient was not asked to repeat himself by clinician throughout session    Next Session: Plan to incorporate a word finding/cognitive activity in addition to exercises    Plan:  -Patient was provided with home exercises/activities to target goals in plan of care at the end of today's session.  -Continue with current plan of care.

## 2025-04-17 ENCOUNTER — APPOINTMENT (OUTPATIENT)
Dept: SPEECH THERAPY | Facility: CLINIC | Age: 65
End: 2025-04-17
Payer: COMMERCIAL

## 2025-04-24 ENCOUNTER — OFFICE VISIT (OUTPATIENT)
Dept: SPEECH THERAPY | Facility: CLINIC | Age: 65
End: 2025-04-24
Payer: COMMERCIAL

## 2025-04-24 DIAGNOSIS — G20.A1 PARKINSON'S DISEASE WITHOUT DYSKINESIA, UNSPECIFIED WHETHER MANIFESTATIONS FLUCTUATE (HCC): ICD-10-CM

## 2025-04-24 DIAGNOSIS — R47.1 HYPERKINETIC DYSARTHRIA: Primary | ICD-10-CM

## 2025-04-24 PROCEDURE — 92507 TX SP LANG VOICE COMM INDIV: CPT

## 2025-04-24 PROCEDURE — 92508 TX SP LANG VOICE COMM GROUP: CPT

## 2025-04-24 NOTE — PROGRESS NOTES
Daily Speech Treatment Note  [facility]    Today's date: 2025   Patient’s name: Bob Fernandes  : 1960  MRN: [id number]  Safety measures: PD  Referring provider: Sasah Gamble PA-C    Encounter Diagnosis     ICD-10-CM    1. 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 Parkinson's disease without dyskinesia, unspecified whether manifestations fluctuate (HCC)  G20.A1         Visit Tracking:  Re-eval Due Auth Expiration Date ST Visit Limit   3/15/24  12/31/24 BOMN   24 8   6/9/24 10/7/24 12   25     Visit/Unit Tracking  AUTH Status:  Date 25  NEW INSURANCE 25  RE 04/02/25 04/10/25 04/24/25   Medicare as of 25 Used 1 2 3 1 2 3 4 5 6 7 8 9    Remaining  11 10 9 9 8 7 6 5 4 3 2 1     Subjective/Behavioral:  Pt participated well throughout today's session.     Objective/Assessment:    Short-term goals:  Patient will complete structured exercises targeting vocal loudness and respiratory & laryngeal coordination while maintaining functional loudness with no with more than 2 cues per session to target the maintenance of her voice/speech intelligibility (to be achieved in 12 weeks). ONGOING     Patient will maintain functional loudness during various cognitive-linguistic activities with no more than 2 cues per session to target the maintenance of her voice/speech intelligibility for increased communication success with familiar and unfamiliar listeners (to be achieved in 12 weeks). ONGOING     Patient will maintain functional loudness during conversational speech with no more than 2 cues to increase loudness to target the maintenance of her voice/speech intelligibility for increased communication success with both familiar and unfamiliar listeners (to be achieved in 12 weeks). ONGOING    Patient participated in the following exercises in a group setting:   Warm up:   may-me-my-maxine-moo x 5    Ah's x 5   Pitch glides: x 5  Numbers x 2  Reading aloud ([employer])   Conversation tasks      Patient required minimal cues to increase vocal intensity  Patient was not asked to repeat himself by clinician throughout session    Plan:  -Patient was provided with home exercises/activities to target goals in plan of care at the end of today's session.  -Continue with current plan of care.

## 2025-05-01 ENCOUNTER — OFFICE VISIT (OUTPATIENT)
Dept: SPEECH THERAPY | Facility: CLINIC | Age: 65
End: 2025-05-01
Attending: PHYSICIAN ASSISTANT
Payer: COMMERCIAL

## 2025-05-01 ENCOUNTER — OFFICE VISIT (OUTPATIENT)
Dept: URGENT CARE | Facility: MEDICAL CENTER | Age: 65
End: 2025-05-01
Payer: COMMERCIAL

## 2025-05-01 VITALS
DIASTOLIC BLOOD PRESSURE: 70 MMHG | WEIGHT: 206 LBS | TEMPERATURE: 98.2 F | HEART RATE: 72 BPM | OXYGEN SATURATION: 96 % | BODY MASS INDEX: 28.73 KG/M2 | RESPIRATION RATE: 18 BRPM | SYSTOLIC BLOOD PRESSURE: 130 MMHG

## 2025-05-01 DIAGNOSIS — L03.116 CELLULITIS OF LEFT LOWER LEG: ICD-10-CM

## 2025-05-01 DIAGNOSIS — T14.8XXA ABRASION: Primary | ICD-10-CM

## 2025-05-01 DIAGNOSIS — G20.A1 PARKINSON'S DISEASE WITHOUT DYSKINESIA, UNSPECIFIED WHETHER MANIFESTATIONS FLUCTUATE (HCC): ICD-10-CM

## 2025-05-01 DIAGNOSIS — R47.1 HYPERKINETIC DYSARTHRIA: Primary | ICD-10-CM

## 2025-05-01 PROCEDURE — 99214 OFFICE O/P EST MOD 30 MIN: CPT | Performed by: PHYSICIAN ASSISTANT

## 2025-05-01 PROCEDURE — 92508 TX SP LANG VOICE COMM GROUP: CPT

## 2025-05-01 PROCEDURE — S9083 URGENT CARE CENTER GLOBAL: HCPCS | Performed by: PHYSICIAN ASSISTANT

## 2025-05-01 PROCEDURE — 92507 TX SP LANG VOICE COMM INDIV: CPT

## 2025-05-01 RX ORDER — CEPHALEXIN 500 MG/1
500 CAPSULE ORAL EVERY 8 HOURS SCHEDULED
Qty: 21 CAPSULE | Refills: 0 | Status: SHIPPED | OUTPATIENT
Start: 2025-05-01 | End: 2025-05-08

## 2025-05-01 NOTE — PATIENT INSTRUCTIONS
Keep skin clean and dry  Apply topical antibiotics daily until healed  Take Keflex 500mg 3x daily x 7 days  Follow-up with PCP if symptoms worsen or persist.

## 2025-05-01 NOTE — PROGRESS NOTES
Daily Speech Treatment Note  [facility]    Today's date: 2025   Patient’s name: Bob Fernandes  : 1960  MRN: [id number]  Safety measures: PD  Referring provider: Sasha Gamble PA-C    Encounter Diagnosis     ICD-10-CM    1. 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 Parkinson's disease without dyskinesia, unspecified whether manifestations fluctuate (HCC)  G20.A1         Visit Tracking:  Re-eval Due Auth Expiration Date ST Visit Limit   3/15/24  12/31/24 BOMN   24 8   6/9/24 10/7/24 12   24 12   25 12   25 BOMN     Visit/Unit Tracking  AUTH Status:  Date 25  RE 04/02/25 04/10/25 04/24/25 05/01/25   Re-eval every 10th visit or by 25 Used 1 2 3 4 5    Remaining  9 8 7 6 5     Subjective/Behavioral:  Pt participated well throughout today's session.     Patient reports he experiences stiffness and weakness in his hands (opening bottles)    Objective/Assessment:    Short-term goals:  Patient will complete structured exercises targeting vocal loudness and respiratory & laryngeal coordination while maintaining functional loudness with no with more than 2 cues per session to target the maintenance of her voice/speech intelligibility (to be achieved in 12 weeks). ONGOING     Patient will maintain functional loudness during various cognitive-linguistic activities with no more than 2 cues per session to target the maintenance of her voice/speech intelligibility for increased communication success with familiar and unfamiliar listeners (to be achieved in 12 weeks). ONGOING     Patient will maintain functional loudness during conversational speech with no more than 2 cues to increase loudness to target the maintenance of her voice/speech intelligibility for increased communication success with both familiar and unfamiliar listeners (to be achieved in 12 weeks). ONGOING    Patient participated in the following exercises in a group setting:   Warm up:    may-me-my-maxine-moo x 5   Ah's x 5   Pitch glides: x 5  Numbers x 2    Patient participated in the following exercises one - on - one with SLP  Reading aloud (Parkinson's facts)   Conversation tasks      Patient required minimal cues to increase vocal intensity  Patient was not asked to repeat himself by clinician throughout session    Plan:  -Patient was provided with home exercises/activities to target goals in plan of care at the end of today's session.  -Continue with current plan of care.

## 2025-05-01 NOTE — PROGRESS NOTES
[facility]        NAME: Bob Fernandes is a 65 y.o. male  : 1960    MRN: [id number]  DATE: May 1, 2025  TIME: 4:59 PM    Assessment and Plan   Abrasion [T14.8XXA]  1. Abrasion        2. Cellulitis of left lower leg  cephalexin (KEFLEX) 500 mg capsule            Patient Instructions     Keep skin clean and dry  Apply topical antibiotics daily until healed  Take Keflex 500mg 3x daily x 7 days  Follow-up with PCP if symptoms worsen or persist.       If tests have been performed at [facility], our office will contact you with results if changes need to be made to the care plan discussed with you at the visit.  [facility]    Chief Complaint     Chief Complaint   Patient presents with    Abrasion     Pt. With was clearing brush when a piece of wood hit him in the left lower leg. He has an abrasion that is becoming reddened.          History of Present Illness       Bob is a 65-year-old male who presents with an abrasion and redness to his left lower leg after an incident that occurred 1 day prior.  Patient reports he was cleaning up some brush in his yard when he was scratched by a stick in his left lower leg.  He has been applying topical bacitracin to the area but is now become red and slightly uncomfortable.  He has had no skin drainage or fever.  Patient is concerned because he has a history of a left knee replacement.        Review of Systems   Review of Systems   Musculoskeletal: Negative.    Skin:  Positive for color change and wound.         Current Medications       Current Outpatient Medications:     acetaminophen (TYLENOL) 500 mg tablet, Take 2 tablets (1,000 mg total) by mouth 2 (two) times a day, Disp: , Rfl:     aspirin (Aspirin 81) 81 mg EC tablet, Take 1 tablet (81 mg total) by mouth daily, Disp: , Rfl:     carbidopa-levodopa (SINEMET)  mg per tablet, Take 1.5tabs qid, Disp: , Rfl:     cephalexin (KEFLEX) 500 mg capsule, Take 1  capsule (500 mg total) by mouth every 8 (eight) hours for 7 days, Disp: 21 capsule, Rfl: 0    cholecalciferol (VITAMIN D3) 1,000 units tablet, Take 5,000 Units by mouth daily, Disp: , Rfl:     Collagen Hydrolysate POWD, Use 1 Dose daily, Disp: , Rfl:     fluticasone (FLONASE) 50 mcg/act nasal spray, SPRAY 2 SPRAYS INTO EACH NOSTRIL EVERY DAY, Disp: 16 mL, Rfl: 2    ketoconazole (NIZORAL) 2 % shampoo, SHAMPOO SCALP AND FACE THREE TO FOUR TIMES PER WEEK, Disp: , Rfl:     losartan-hydrochlorothiazide (HYZAAR) 100-12.5 MG per tablet, TAKE 1 TABLET BY MOUTH EVERY DAY, Disp: 90 tablet, Rfl: 1    rosuvastatin (CRESTOR) 40 MG tablet, TAKE 1 TABLET BY MOUTH EVERY DAY, Disp: 90 tablet, Rfl: 1    selegiline (ELDEPRYL) 5 mg tablet, Take 1 tablet (5 mg total) by mouth 2 (two) times a day with meals, Disp: 180 tablet, Rfl: 4    tamsulosin (FLOMAX) 0.4 mg, Take 0.4 mg by mouth, Disp: , Rfl:     vardenafil (LEVITRA) 20 MG tablet, Take 20 mg by mouth daily as needed, Disp: , Rfl:     ketoconazole (NIZORAL) 2 % cream, Apply topically As needed (Patient not taking: Reported on 3/28/2025), Disp: , Rfl:     MELATONIN PO, Take by mouth daily at bedtime (Patient not taking: Reported on 3/28/2025), Disp: , Rfl:     Current Allergies     Allergies as of 05/01/2025    (No Known Allergies)            The following portions of the patient's history were reviewed and updated as appropriate: allergies, current medications, past family history, past medical history, past social history, past surgical history and problem list.     Past Medical History:   Diagnosis Date    Erectile dysfunction     Hemorrhoids     Hypertension     Parkinson disease (HCC)     Thrombocytosis 03/15/2021       Past Surgical History:   Procedure Laterality Date    CARPAL TUNNEL RELEASE Left 01/02/2024    KNEE SURGERY  August 2023    ULNAR NERVE REPAIR Left 01/02/2024       Family History   Problem Relation Age of Onset    Diabetes Father         mellitus    Heart attack  Father         acute myocardial infarction    Hyperlipidemia Mother     Multiple sclerosis Maternal Grandfather     Prostate cancer Paternal Grandfather          Medications have been verified.        Objective   /70   Pulse 72   Temp 98.2 °F (36.8 °C)   Resp 18   Wt 93.4 kg (206 lb)   SpO2 96%   BMI 28.73 kg/m²   No LMP for male patient.       Physical Exam     Physical Exam  Constitutional:       General: He is not in acute distress.     Appearance: Normal appearance. He is not ill-appearing.   Cardiovascular:      Rate and Rhythm: Normal rate and regular rhythm.      Heart sounds: Normal heart sounds. No murmur heard.  Pulmonary:      Effort: Pulmonary effort is normal.      Breath sounds: Normal breath sounds.   Skin:     Comments: There is a 2 to 3 cm erythematous patch noted on the medial aspect of the left lower leg.  No skin drainage, slightly warm to touch.   Neurological:      Mental Status: He is alert.

## 2025-05-06 ENCOUNTER — TELEPHONE (OUTPATIENT)
Age: 65
End: 2025-05-06

## 2025-05-06 NOTE — TELEPHONE ENCOUNTER
Patient is unable to get to the office on   at 5/7/2025 , due to  Ill Puppy at home recovering from surgery     Patient was offered a Virtual Visit     Patient asked if they will be in the state the providers  license is in  and if they had any new or worsening symptom    Patient stated they will be in the state and no new or worsening  neurological symptoms     Appointment changed to a Virtual Visit to ensure timely care

## 2025-05-07 ENCOUNTER — TELEMEDICINE (OUTPATIENT)
Dept: NEUROLOGY | Facility: CLINIC | Age: 65
End: 2025-05-07
Payer: COMMERCIAL

## 2025-05-07 DIAGNOSIS — G20.A1 PARKINSON DISEASE (HCC): Primary | ICD-10-CM

## 2025-05-07 PROCEDURE — 99214 OFFICE O/P EST MOD 30 MIN: CPT | Performed by: PSYCHIATRY & NEUROLOGY

## 2025-05-07 NOTE — PATIENT INSTRUCTIONS
- Please continue Selegiline and Sinemet as prescribed  - Please call office for any questions or concerns  - Please follow up with neurology (Sasha Gamble) in 5 months

## 2025-05-07 NOTE — PROGRESS NOTES
Virtual Regular VisitName: Bob Fernandes      : 1960      MRN: [id number]  Encounter Provider: Blanka Ferreira MD  Encounter Date: 2025   Encounter department: [facility] [place]  Assessment & Plan  Parkinson disease (HCC)  Assessment:  Patient is a 65-year-old male with past medical history including hyperlipidemia, hypertension, coronary artery disease that presented to the neurology clinic for follow-up visit in terms of Parkinson disease.  Patient had MARÍA scan completed which was positive, and currently maintained on carbidopa-levodopa, and selegiline.  Patient reported that he did not have any significant changes to his medical history median nerve and ulnar nerve release surgeries, and that he wants to discuss hide a focused ultrasound in terms of tremor control.  Patient reported that other than feeling of cognitive decline, and losing his voice, that his tremor has been about the same.  Attending physician discussed-ultrasound and deep brain surgery for tremor control, patient reported that as of now he would prefer to maintain medical management and consider surgical options in the future.  Patient was able to demonstrate basic gait, rapid alternating movements, finger-to-nose and extended bilateral upper extremities during virtual physical examination.  Full physical examination to complete during next office visit. Follow up with neurology in 5 months.    Plan:  - Case discussed with attending neurologist Dr. Castro  - Please continue Selegiline and Sinemet as prescribed  - Please call office for any questions or concerns  - Please follow up with neurology (Sasha Gamble) in 5 months         History of Present Illness {?Quick Links Encounters * My Last Note * Last Note in Specialty * Snapshot * Since Last Visit * History :[id number]}    - The patient reported that he has been about the same since the last office visit and reported that his memory has been declining  and that there have been more instances where he has lost his voice, otherwise his tremors have been about the same.  - The patient is tolerating his prescribed selegiline and carbidopa-levodopa well and denied any side effects as of right now.  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  - The patient reported that he is interested in discussing HFU for tremor control.   - The patient denied any further questions or concerns at this time.       Review of Systems   Constitutional: Negative.  Negative for appetite change, fatigue and fever.   HENT:  Positive for voice change (in speech therapy). Negative for hearing loss, tinnitus and trouble swallowing.    Eyes: Negative.  Negative for photophobia, pain and visual disturbance.   Respiratory: Negative.  Negative for shortness of breath.    Cardiovascular: Negative.  Negative for palpitations.   Gastrointestinal: Negative.  Negative for nausea and vomiting.   Endocrine: Negative.  Negative for cold intolerance.   Genitourinary: Negative.  Negative for dysuria, frequency and urgency.   Musculoskeletal:  Positive for gait problem (Shuffles Feet, Stumbles, Freezing, gets worse by the end of the day). Negative for back pain, myalgias, neck pain and neck stiffness.   Skin: Negative.  Negative for rash.   Allergic/Immunologic: Negative.    Neurological:  Positive for tremors (Hands, Has stayed the same), speech difficulty (At times has issues with Slurring Words, Stuttering, and Mumbles) and weakness (Hands). Negative for dizziness, seizures, syncope, facial asymmetry, light-headedness, numbness and headaches.   Hematological: Negative.  Does not bruise/bleed easily.   Psychiatric/Behavioral: Negative.  Negative for confusion, hallucinations and sleep disturbance.    All other systems reviewed and are negative.    Objective {?Quick Links Trend Vitals * Enter New Vitals * Results Review * Timeline  (Adult) * Labs * Imaging * Cardiology * Procedures * Lung Cancer Screening * Surgical eConsent :[id number]}  There were no vitals taken for this visit.    Physical Exam  Vitals reviewed: VIRTUAL VISIT.   HENT:      Head: Normocephalic.   Pulmonary:      Effort: No respiratory distress.   Neurological:      Mental Status: He is alert. Mental status is at baseline.      Comments: Patient is able to demonstrate finger tapping in bilateral upper extremities, with extent of tremor was minimal, gait examination was slowed-no abnormal posture noted.  Normal arm swing noted.   rapid alternating movements bilaterally abnormal.       Administrative Statements   Encounter provider Blanka Ferreira MD    The Patient is located at Home and in the following state in which I hold an active license [place].    The patient was identified by name and date of birth. Bob HERMELINDA Fernandes was informed that this is a telemedicine visit and that the visit is being conducted through the Epic Embedded platform. He agrees to proceed..  My office door was closed. The patient was notified the following individuals were present in the room attending physician Dr. Castro.  He acknowledged consent and understanding of privacy and security of the video platform. The patient has agreed to participate and understands they can discontinue the visit at any time.    I have spent a total time of 45 minutes in caring for this patient on the day of the visit/encounter including Diagnostic results, Risks and benefits of tx options, Instructions for management, Impressions, Documenting in the medical record, and Obtaining or reviewing history  , not including the time spent for establishing the audio/video connection.

## 2025-05-07 NOTE — TELEPHONE ENCOUNTER
Patient has called in to check in his virtual . I tied reaching out to the office but had no answer. Advised patient to log in to his portal. Call then disconnected.

## 2025-05-07 NOTE — ASSESSMENT & PLAN NOTE
Assessment:  Patient is a 65-year-old male with past medical history including hyperlipidemia, hypertension, coronary artery disease that presented to the neurology clinic for follow-up visit in terms of Parkinson disease.  Patient had MARÍA scan completed which was positive, and currently maintained on carbidopa-levodopa, and selegiline.  Patient reported that he did not have any significant changes to his medical history median nerve and ulnar nerve release surgeries, and that he wants to discuss hide a focused ultrasound in terms of tremor control.  Patient reported that other than feeling of cognitive decline, and losing his voice, that his tremor has been about the same.  Attending physician discussed-ultrasound and deep brain surgery for tremor control, patient reported that as of now he would prefer to maintain medical management and consider surgical options in the future.  Patient was able to demonstrate basic gait, rapid alternating movements, finger-to-nose and extended bilateral upper extremities during virtual physical examination.  Full physical examination to complete during next office visit. Follow up with neurology in 5 months.    Plan:  - Case discussed with attending neurologist Dr. Castro  - Please continue Selegiline and Sinemet as prescribed  - Please call office for any questions or concerns  - Please follow up with neurology (Sasha Gamble) in 5 months

## 2025-05-08 ENCOUNTER — OFFICE VISIT (OUTPATIENT)
Dept: SPEECH THERAPY | Facility: CLINIC | Age: 65
End: 2025-05-08
Payer: COMMERCIAL

## 2025-05-08 DIAGNOSIS — R47.1 HYPERKINETIC DYSARTHRIA: Primary | ICD-10-CM

## 2025-05-08 DIAGNOSIS — G20.A1 PARKINSON'S DISEASE WITHOUT DYSKINESIA, UNSPECIFIED WHETHER MANIFESTATIONS FLUCTUATE (HCC): ICD-10-CM

## 2025-05-08 PROCEDURE — 92507 TX SP LANG VOICE COMM INDIV: CPT

## 2025-05-08 PROCEDURE — 92508 TX SP LANG VOICE COMM GROUP: CPT

## 2025-05-08 NOTE — PROGRESS NOTES
Daily Speech Treatment Note  [facility]    Today's date: 2025   Patient’s name: Bob Fernandes  : 1960  MRN: [id number]  Safety measures: PD  Referring provider: Sasha Gamble PA-C    Encounter Diagnosis     ICD-10-CM    1. 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 Parkinson's disease without dyskinesia, unspecified whether manifestations fluctuate (HCC)  G20.A1         Visit Tracking:  Re-eval Due Auth Expiration Date ST Visit Limit   3/15/24  12/31/24 BOMN   24 8   6/9/24 10/7/24 12   25 12   25 BOMN     Visit/Unit Tracking  AUTH Status:  Date 25  RE 04/02/25 04/10/25 04/24/25 05/01/25 05/08/25   Re-eval every 10th visit or by 25 Used 1 2 3 4 5 6    Remaining  9 8 7 6 5 4     Subjective/Behavioral:  Pt participated well throughout today's session.     Patient has his f/u with Dr. Castro (no changes made to medication)    Objective/Assessment:    Short-term goals:  Patient will complete structured exercises targeting vocal loudness and respiratory & laryngeal coordination while maintaining functional loudness with no with more than 2 cues per session to target the maintenance of her voice/speech intelligibility (to be achieved in 12 weeks).      Patient will maintain functional loudness during various cognitive-linguistic activities with no more than 2 cues per session to target the maintenance of her voice/speech intelligibility for increased communication success with familiar and unfamiliar listeners (to be achieved in 12 weeks).     Patient will maintain functional loudness during conversational speech with no more than 2 cues to increase loudness to target the maintenance of her voice/speech intelligibility for increased communication success with both familiar and unfamiliar listeners (to be achieved in 12 weeks).     Patient participated in the following exercises in a group setting:   Warm up:   may-me-my-maxine-moo x 5    Ah's x 5   Pitch glides: x 5  Numbers x 2  Cog task (start the chain - words/recall)    Patient participated in the following exercises one - on - one with SLP  Reading aloud ([employer])   Conversation tasks      Patient required minimal cues to increase vocal intensity  Patient was not asked to repeat himself by clinician throughout session    Plan:  -Patient was provided with home exercises/activities to target goals in plan of care at the end of today's session.  -Continue with current plan of care.

## 2025-05-09 ENCOUNTER — OFFICE VISIT (OUTPATIENT)
Dept: URGENT CARE | Facility: MEDICAL CENTER | Age: 65
End: 2025-05-09
Payer: COMMERCIAL

## 2025-05-09 VITALS
SYSTOLIC BLOOD PRESSURE: 133 MMHG | OXYGEN SATURATION: 98 % | TEMPERATURE: 97.5 F | DIASTOLIC BLOOD PRESSURE: 71 MMHG | BODY MASS INDEX: 29.01 KG/M2 | WEIGHT: 208 LBS | HEART RATE: 72 BPM | RESPIRATION RATE: 18 BRPM

## 2025-05-09 DIAGNOSIS — L03.116 CELLULITIS OF LEFT LOWER EXTREMITY: Primary | ICD-10-CM

## 2025-05-09 PROCEDURE — 99214 OFFICE O/P EST MOD 30 MIN: CPT | Performed by: PHYSICIAN ASSISTANT

## 2025-05-09 PROCEDURE — 87205 SMEAR GRAM STAIN: CPT | Performed by: PHYSICIAN ASSISTANT

## 2025-05-09 PROCEDURE — 87070 CULTURE OTHR SPECIMN AEROBIC: CPT | Performed by: PHYSICIAN ASSISTANT

## 2025-05-09 PROCEDURE — S9083 URGENT CARE CENTER GLOBAL: HCPCS | Performed by: PHYSICIAN ASSISTANT

## 2025-05-09 RX ORDER — SULFAMETHOXAZOLE AND TRIMETHOPRIM 800; 160 MG/1; MG/1
1 TABLET ORAL EVERY 12 HOURS SCHEDULED
Qty: 14 TABLET | Refills: 0 | Status: SHIPPED | OUTPATIENT
Start: 2025-05-09 | End: 2025-05-16

## 2025-05-09 NOTE — PROGRESS NOTES
[facility]        NAME: Bob Fernandes is a 65 y.o. male  : 1960    MRN: [id number]  DATE: May 9, 2025  TIME: 1:26 PM    Assessment and Plan   Cellulitis of left lower extremity [L03.116]  1. Cellulitis of left lower extremity  sulfamethoxazole-trimethoprim (BACTRIM DS) 800-160 mg per tablet    Wound culture and Gram stain            Patient Instructions   Culture collected, will call with results  Start Bactrim as directed  Go to ED if redness extends up leg or fever/chills develop    Follow up with PCP in 3-5 days.  Proceed to  ER if symptoms worsen.    If tests have been performed at [facility], our office will contact you with results if changes need to be made to the care plan discussed with you at the visit.  You can review your full results on [facility] MyChart.    Chief Complaint     Chief Complaint   Patient presents with    Cellulitis     Left lower leg. Was here  for the same. Completed antibiotics, but area continues to be red.         History of Present Illness       HPI  64 y/o male presents for re-evaluation of LLE cellulitis.  He scratched his leg 25 while cleaning up some brush in his yard.  He was seen at [facility] the following day and started on Keflex.  He states he finished the medication yesterday.  Overall the wound did not improve and yesterday he started to note some redness streaking up towards his knee.  He tried covering the wound with a band aid and applying antibiotic ointment which he felt made it worse.    This morning he found Hibiclens which he used and believes provided some improvement.  He denies fever/chills/malaise  He reports h/o L TKA performed by a surgeon in [place], [place]  Review of Systems   Review of Systems   Constitutional:  Negative for chills and fever.   HENT:  Negative for ear pain and sore throat.    Eyes:  Negative for pain and visual disturbance.   Respiratory:  Negative for cough and shortness of breath.    Cardiovascular:   Negative for chest pain and palpitations.   Gastrointestinal:  Negative for abdominal pain and vomiting.   Genitourinary:  Negative for dysuria and hematuria.   Musculoskeletal:  Negative for arthralgias and back pain.   Skin:  Positive for color change and wound. Negative for rash.   Neurological:  Negative for seizures and syncope.   All other systems reviewed and are negative.        Current Medications       Current Outpatient Medications:     acetaminophen (TYLENOL) 500 mg tablet, Take 2 tablets (1,000 mg total) by mouth 2 (two) times a day, Disp: , Rfl:     aspirin (Aspirin 81) 81 mg EC tablet, Take 1 tablet (81 mg total) by mouth daily, Disp: , Rfl:     carbidopa-levodopa (SINEMET)  mg per tablet, Take 1.5tabs qid, Disp: , Rfl:     cholecalciferol (VITAMIN D3) 1,000 units tablet, Take 5,000 Units by mouth daily, Disp: , Rfl:     Collagen Hydrolysate POWD, Use 1 Dose daily, Disp: , Rfl:     fluticasone (FLONASE) 50 mcg/act nasal spray, SPRAY 2 SPRAYS INTO EACH NOSTRIL EVERY DAY, Disp: 16 mL, Rfl: 2    ketoconazole (NIZORAL) 2 % shampoo, SHAMPOO SCALP AND FACE THREE TO FOUR TIMES PER WEEK, Disp: , Rfl:     losartan-hydrochlorothiazide (HYZAAR) 100-12.5 MG per tablet, TAKE 1 TABLET BY MOUTH EVERY DAY, Disp: 90 tablet, Rfl: 1    rosuvastatin (CRESTOR) 40 MG tablet, TAKE 1 TABLET BY MOUTH EVERY DAY, Disp: 90 tablet, Rfl: 1    selegiline (ELDEPRYL) 5 mg tablet, Take 1 tablet (5 mg total) by mouth 2 (two) times a day with meals, Disp: 180 tablet, Rfl: 4    sulfamethoxazole-trimethoprim (BACTRIM DS) 800-160 mg per tablet, Take 1 tablet by mouth every 12 (twelve) hours for 7 days, Disp: 14 tablet, Rfl: 0    tamsulosin (FLOMAX) 0.4 mg, Take 0.4 mg by mouth, Disp: , Rfl:     ketoconazole (NIZORAL) 2 % cream, Apply topically As needed (Patient not taking: Reported on 3/28/2025), Disp: , Rfl:     MELATONIN PO, Take by mouth daily at bedtime (Patient not taking: Reported on 3/28/2025), Disp: , Rfl:     vardenafil  (LEVITRA) 20 MG tablet, Take 20 mg by mouth daily as needed, Disp: , Rfl:     Current Allergies     Allergies as of 05/09/2025    (No Known Allergies)            The following portions of the patient's history were reviewed and updated as appropriate: allergies, current medications, past family history, past medical history, past social history, past surgical history and problem list.     Past Medical History:   Diagnosis Date    Erectile dysfunction     Hemorrhoids     Hypertension     Parkinson disease (HCC)     Thrombocytosis 03/15/2021       Past Surgical History:   Procedure Laterality Date    CARPAL TUNNEL RELEASE Left 01/02/2024    KNEE SURGERY  August 2023    ULNAR NERVE REPAIR Left 01/02/2024       Family History   Problem Relation Age of Onset    Diabetes Father         mellitus    Heart attack Father         acute myocardial infarction    Hyperlipidemia Mother     Multiple sclerosis Maternal Grandfather     Prostate cancer Paternal Grandfather          Medications have been verified.        Objective   /71   Pulse 72   Temp 97.5 °F (36.4 °C)   Resp 18   Wt 94.3 kg (208 lb)   SpO2 98%   BMI 29.01 kg/m²   No LMP for male patient.       Physical Exam     Physical Exam  Vitals and nursing note reviewed.   Constitutional:       Appearance: Normal appearance. He is not ill-appearing.   HENT:      Head: Normocephalic and atraumatic.      Nose: Nose normal.   Cardiovascular:      Rate and Rhythm: Normal rate and regular rhythm.      Pulses: Normal pulses.      Heart sounds: Normal heart sounds.   Pulmonary:      Effort: Pulmonary effort is normal.      Breath sounds: Normal breath sounds.   Skin:     General: Skin is warm and dry.      Comments: LLE: 1.5 cm diameter superficial abrasion with thin brown eschar is in place with a surrounding ring of erythema measuring 2-3 cm with mild streaking proximally.  The area is warm and tender to touch with TR pitting edema.       Neurological:      Mental  Status: He is alert and oriented to person, place, and time.   Psychiatric:         Mood and Affect: Mood normal.         Behavior: Behavior normal.       Eschar was unroofed and culture obtained from bleeding wound.

## 2025-05-11 LAB
BACTERIA WND AEROBE CULT: NO GROWTH
GRAM STN SPEC: NORMAL

## 2025-05-12 LAB
BACTERIA WND AEROBE CULT: NO GROWTH
GRAM STN SPEC: NORMAL

## 2025-05-15 ENCOUNTER — APPOINTMENT (OUTPATIENT)
Dept: SPEECH THERAPY | Facility: CLINIC | Age: 65
End: 2025-05-15
Attending: PHYSICIAN ASSISTANT
Payer: COMMERCIAL

## 2025-05-28 ENCOUNTER — PREP FOR PROCEDURE (OUTPATIENT)
Age: 65
End: 2025-05-28

## 2025-05-28 ENCOUNTER — TELEPHONE (OUTPATIENT)
Age: 65
End: 2025-05-28

## 2025-05-28 DIAGNOSIS — Z86.0100 HISTORY OF COLON POLYPS: Primary | ICD-10-CM

## 2025-05-28 NOTE — TELEPHONE ENCOUNTER
05/28/25  Screened by: Ankita Harden MA    Referring Provider     Pre- Screening:     There is no height or weight on file to calculate BMI.  Has patient been referred for a routine screening Colonoscopy? yes  Is the patient between 45-75 years old? yes      Previous Colonoscopy yes   If yes:    Date: 12/15/2020    Facility:     Reason:         Does the patient want to see a Gastroenterologist prior to their procedure OR are they having any GI symptoms? no    Has the patient been hospitalized or had abdominal surgery in the past 6 months? no    Does the patient use supplemental oxygen? no    Does the patient take Coumadin, Lovenox, Plavix, Elliquis, Xarelto, or other blood thinning medication? no    Has the patient had a stroke, cardiac event, or stent placed in the past year? no      If patient is between 45yrs - 49yrs, please advise patient that we will have to confirm benefits & coverage with their insurance company for a routine screening colonoscopy.

## 2025-05-28 NOTE — TELEPHONE ENCOUNTER
Scheduled date of colonoscopy (as of today):07/31/2025  Physician performing colonoscopy:  Location of colonoscopy:[facility]  Bowel prep reviewed with patient:jose/tara sent via [employer]  Instructions reviewed with patient by:ABBY  Clearances: N/A

## 2025-05-28 NOTE — LETTER
Abdi Rojas,    Attached are your instructions for your upcoming procedure on 07/31/2025. If you have any questions, please give us a call at [phone].    Thank you,    [facility]!

## 2025-05-29 ENCOUNTER — OFFICE VISIT (OUTPATIENT)
Dept: SPEECH THERAPY | Facility: CLINIC | Age: 65
End: 2025-05-29
Attending: PHYSICIAN ASSISTANT
Payer: COMMERCIAL

## 2025-05-29 DIAGNOSIS — R47.1 HYPERKINETIC DYSARTHRIA: Primary | ICD-10-CM

## 2025-05-29 DIAGNOSIS — G20.A1 PARKINSON'S DISEASE WITHOUT DYSKINESIA, UNSPECIFIED WHETHER MANIFESTATIONS FLUCTUATE (HCC): ICD-10-CM

## 2025-05-29 PROCEDURE — 92507 TX SP LANG VOICE COMM INDIV: CPT

## 2025-05-29 PROCEDURE — 92508 TX SP LANG VOICE COMM GROUP: CPT

## 2025-05-29 NOTE — PROGRESS NOTES
Daily Speech Treatment Note  [facility]    Today's date: 2025   Patient’s name: Bob Fernandes  : 1960  MRN: [id number]  Safety measures: PD  Referring provider: Sasha Gamble PA-C    Encounter Diagnosis     ICD-10-CM    1. 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 Parkinson's disease without dyskinesia, unspecified whether manifestations fluctuate (HCC)  G20.A1         Visit Tracking:  Re-eval Due Auth Expiration Date ST Visit Limit   3/15/24  12/31/24 BOMN   24 8   6/9/24 10/7/24 12   25 BOMN     Visit/Unit Tracking  AUTH Status:  Date 25  RE 04/02/25 04/10/25 04/24/25 05/01/25 05/08/25 05/29/25   Re-eval every 10th visit or by 25 Used 1 2 3 4 5 6 7    Remaining  9 8 7 6 5 4 3     Subjective/Behavioral:  Pt participated well throughout today's session.     Patient reports he will not be able to attend group the month of , due to his dog (sickness); Will return in July. Placing therapy on HOLD at this time.     Objective/Assessment:    Short-term goals:  Patient will complete structured exercises targeting vocal loudness and respiratory & laryngeal coordination while maintaining functional loudness with no with more than 2 cues per session to target the maintenance of her voice/speech intelligibility (to be achieved in 12 weeks).      Patient will maintain functional loudness during various cognitive-linguistic activities with no more than 2 cues per session to target the maintenance of her voice/speech intelligibility for increased communication success with familiar and unfamiliar listeners (to be achieved in 12 weeks).     Patient will maintain functional loudness during conversational speech with no more than 2 cues to increase loudness to target the maintenance of her voice/speech intelligibility for increased communication success with both familiar and unfamiliar listeners (to be achieved in 12 weeks).      Patient participated in the following exercises in a group setting:   Warm up:   may-me-my-maxine-moo x 5   Ah's x 5   Pitch glides: x 5  Numbers x 2      Patient participated in the following exercises one - on - one with SLP  Reading aloud ([employer])   Conversation tasks      Patient required minimal cues to increase vocal intensity  Patient was not asked to repeat himself by clinician throughout session    Plan:  -Patient was provided with home exercises/activities to target goals in plan of care at the end of today's session.  -Continue with current plan of care.

## 2025-06-11 ENCOUNTER — OFFICE VISIT (OUTPATIENT)
Dept: FAMILY MEDICINE CLINIC | Facility: CLINIC | Age: 65
End: 2025-06-11
Payer: COMMERCIAL

## 2025-06-11 VITALS
BODY MASS INDEX: 28.77 KG/M2 | HEART RATE: 74 BPM | OXYGEN SATURATION: 97 % | SYSTOLIC BLOOD PRESSURE: 133 MMHG | TEMPERATURE: 98 F | RESPIRATION RATE: 18 BRPM | DIASTOLIC BLOOD PRESSURE: 74 MMHG | WEIGHT: 205.5 LBS | HEIGHT: 71 IN

## 2025-06-11 DIAGNOSIS — S01.81XA LACERATION OF FOREHEAD, INITIAL ENCOUNTER: ICD-10-CM

## 2025-06-11 DIAGNOSIS — M19.049 CMC ARTHRITIS: ICD-10-CM

## 2025-06-11 DIAGNOSIS — I87.2 VENOUS INSUFFICIENCY OF BOTH LOWER EXTREMITIES: Primary | ICD-10-CM

## 2025-06-11 PROCEDURE — 99214 OFFICE O/P EST MOD 30 MIN: CPT | Performed by: FAMILY MEDICINE

## 2025-06-11 PROCEDURE — G2211 COMPLEX E/M VISIT ADD ON: HCPCS | Performed by: FAMILY MEDICINE

## 2025-06-11 NOTE — PROGRESS NOTES
"Name: Bob Fernandes      : 1960      MRN: [id number]  Encounter Provider: Payton Rudd MD  Encounter Date: 2025   Encounter department: [facility]  :  Assessment & Plan  Venous insufficiency of both lower extremities  Suspect venous insufficiency of bilateral lower extremities.  He had a wound on his left lower extremity while chopping wood.  Has had some difficulty healing does not appear to have an ulcer.  Legs are not cool to touch.  He does have varicose veins.  Had an ultrasound of his lower extremities concern for peripheral artery disease in the past which was unremarkable.  Obtain venous duplex.  Discontinue topical antibiotic ointment.  Recommend compression socks  Orders:     VAS VENOUS DUPLEX - LOWER LIMB BILATERAL; Future    Laceration of forehead, initial encounter  Small laceration above his forehead.  Cleaned with alcohol and a bandage applied today.  No indication for stitches.              History of Present Illness   Patient presents with:  Follow-up: Wound on left leg from 1 month ago, not healing    Seen in urgent care and diagnosed with cellulitis. Completed 2rounds of of antibiotics.  Continues to have some redness in his lower extremity.  He has been applying antibiotic ointment for the past month.  There is no streaking of the leg.  There is no discharge from the lower extremity.      Review of Systems   Skin:  Positive for color change and wound.       Objective   /74 (BP Location: Left arm, Patient Position: Sitting, Cuff Size: Standard)   Pulse 74   Temp 98 °F (36.7 °C) (Temporal)   Resp 18   Ht 5' 11\" (1.803 m)   Wt 93.2 kg (205 lb 8 oz)   SpO2 97%   BMI 28.66 kg/m²      Physical Exam  Constitutional:       Appearance: Normal appearance.   HENT:      Head: Normocephalic and atraumatic.     Skin:     Findings: Erythema and lesion present. No rash.     Neurological:      Mental Status: He is alert.       Administrative Statements "   I have spent a total time of 30 minutes in caring for this patient on the day of the visit/encounter including Diagnostic results, Prognosis, Risks and benefits of tx options, Instructions for management, Impressions, Counseling / Coordination of care, Documenting in the medical record, Reviewing/placing orders in the medical record (including tests, medications, and/or procedures), and Obtaining or reviewing history  .

## 2025-06-16 ENCOUNTER — RESULTS FOLLOW-UP (OUTPATIENT)
Dept: FAMILY MEDICINE CLINIC | Facility: CLINIC | Age: 65
End: 2025-06-16

## 2025-06-16 ENCOUNTER — HOSPITAL ENCOUNTER (OUTPATIENT)
Dept: RADIOLOGY | Facility: HOSPITAL | Age: 65
Discharge: HOME | End: 2025-06-16
Payer: COMMERCIAL

## 2025-06-16 DIAGNOSIS — I87.2 VENOUS INSUFFICIENCY OF BOTH LOWER EXTREMITIES: ICD-10-CM

## 2025-06-16 PROCEDURE — 93970 EXTREMITY STUDY: CPT

## 2025-06-16 PROCEDURE — 93970 EXTREMITY STUDY: CPT | Performed by: SURGERY

## 2025-06-17 ENCOUNTER — APPOINTMENT (OUTPATIENT)
Dept: RADIOLOGY | Facility: AMBULARY SURGERY CENTER | Age: 65
End: 2025-06-17
Attending: STUDENT IN AN ORGANIZED HEALTH CARE EDUCATION/TRAINING PROGRAM
Payer: COMMERCIAL

## 2025-06-17 ENCOUNTER — OFFICE VISIT (OUTPATIENT)
Dept: OBGYN CLINIC | Facility: CLINIC | Age: 65
End: 2025-06-17
Payer: COMMERCIAL

## 2025-06-17 VITALS — BODY MASS INDEX: 28.77 KG/M2 | WEIGHT: 205.47 LBS | HEIGHT: 71 IN

## 2025-06-17 DIAGNOSIS — M79.641 BILATERAL HAND PAIN: ICD-10-CM

## 2025-06-17 DIAGNOSIS — M19.132 SCAPHOLUNATE ADVANCED COLLAPSE OF LEFT WRIST: ICD-10-CM

## 2025-06-17 DIAGNOSIS — M79.642 BILATERAL HAND PAIN: ICD-10-CM

## 2025-06-17 DIAGNOSIS — M18.0 ARTHRITIS OF CARPOMETACARPAL (CMC) JOINT OF BOTH THUMBS: Primary | ICD-10-CM

## 2025-06-17 PROCEDURE — 99204 OFFICE O/P NEW MOD 45 MIN: CPT | Performed by: STUDENT IN AN ORGANIZED HEALTH CARE EDUCATION/TRAINING PROGRAM

## 2025-06-17 PROCEDURE — 20600 DRAIN/INJ JOINT/BURSA W/O US: CPT | Performed by: STUDENT IN AN ORGANIZED HEALTH CARE EDUCATION/TRAINING PROGRAM

## 2025-06-17 PROCEDURE — 20605 DRAIN/INJ JOINT/BURSA W/O US: CPT | Performed by: STUDENT IN AN ORGANIZED HEALTH CARE EDUCATION/TRAINING PROGRAM

## 2025-06-17 PROCEDURE — 73130 X-RAY EXAM OF HAND: CPT

## 2025-06-17 RX ADMIN — LIDOCAINE HYDROCHLORIDE 1 ML: 10 INJECTION, SOLUTION INFILTRATION; PERINEURAL at 14:45

## 2025-06-17 RX ADMIN — BETAMETHASONE SODIUM PHOSPHATE AND BETAMETHASONE ACETATE 6 MG: 3; 3 INJECTION, SUSPENSION INTRA-ARTICULAR; INTRALESIONAL; INTRAMUSCULAR; SOFT TISSUE at 14:45

## 2025-06-17 NOTE — PATIENT INSTRUCTIONS
"Hand Arthritis    Some things that can be beneficial in relieving your pain are:  Voltaren Gel - An antiinflammatory gel you can apply topically on the area of pain rather than taking pills by mouth  Lidocaine Cream - A numbing agent you can apply topically - just make sure to use something to help apply this as it will make any skin it touches go numb!  Comfort Cool Brace - you can buy this off [employer] and wear as needed.  Push Metagrip Brace - you can buy this off [employer] and wear as needed.  Heat/Ice - Use whatever works. If you notice your joints don't do well with the cold, make sure to wear gloves and keep fingers warm.  Paraffin Wax - These machines can typically be found at physical therapy offices or sometimes even nail salons. They use the idea of \"moist heat\" to help with pain. If this is something that works well for you, you can buy your own machine to use at home offline.   Therapy - Some patients find therapy to be helpful to strengthen the muscles around the joint. If you are interested, we can always place an order for you.  Steroid Injections - These injections do not get rid of the arthritis, but help with the inflammation caused by the arthritis. You can receive these injections every 3 months or longer as needed. If one steroid doesn't seem to give you the best results, we do have other steroids we can try to see if you get a better response. It's like the typical Coke vs Pepsi debate... for some reason, some people just like one better than the other!    "

## 2025-06-17 NOTE — PROGRESS NOTES
ORTHOPAEDIC HAND, WRIST, AND ELBOW OFFICE  VISIT      ASSESSMENT/PLAN:      Assessment & Plan  Scapholunate advanced collapse of left wrist  X-rays were reviewed in the office today. Treatment options were discussed in the form of heat, topical creams, and a possible steroid injection. The patient consented and underwent a left radiocarpal joint aspiration and injection in the office today without any complications. He is aware we can repeat these injections every 3 months if needed.   Orders:    Ambulatory Referral to Orthopedic Surgery    Medium joint arthrocentesis: L radiocarpal    Arthritis of carpometacarpal (CMC) joint of both thumbs  Treatment options were discussed in the form of heat, topical creams, bracing, and a possible steroid injection. The patient elected to proceed with a right thumb CMC injection in the office today without any complications. Hand OA AVS provided. He is aware we can repeat these injections every 3 months if needed. He was fitted and provided with a left comfort cool brace he can wear as needed.   Orders:    XR hand 3+ vw right; Future    XR hand 3+ vw left; Future    Small joint arthrocentesis: R thumb CMC    Thumb Cude comf/Cool        Follow Up:  3 months       To Do Next Visit:  Re-evaluation of current issue      Discussions:  Thumb CMC Arthritis: The anatomy and physiology of carpometacarpal joint arthritis was discussed with the patient today in the office.  Deterioration of the articular cartilage eventually leads to hypermobility at the thumb CMC joint, resulting in joint subluxation, osteophyte formation, cystic changes within the trapezium and base of the first metacarpal, as well as subchondral sclerosis.  Eventually, pain, limited mobility, and compensatory hyperextension at the metacarpophalangeal joint may develop.  While normal activity and usage of the thumb joint may provide a painful experience to the patient, this typically does not result in damage to the thumb  "or hand.  Treatment options include resting thumb spica splints to decreased joint edema, pain, and inflammation.  Therapy exercises to strengthen the thenar musculature may relieve pain, but do not alter the overall continued development of osteoarthritis.  Oral medications, topical medications, corticosteroid injections may decrease pain and increase overall function.  Eventually, approximately 5% of patients may require surgical intervention.       Zenon Taveras MD  Attending, Orthopaedic Surgery  Hand, Wrist, and Elbow Surgery  [facility]    ______________________________________________________________________________________________    CHIEF COMPLAINT:  Chief Complaint   Patient presents with    Left Hand - Pain     Surgery about two years ago for carpal tunnel and ulnar nerve, hand is painful with \"bump' above thumb, hand is weak, pain score 5    Right Hand - Pain     Joints are painful, pain score 5       SUBJECTIVE:  Patient is a 65 y.o. RHD male who presents today for evaluation and treatment of bilateral hand pain. The patient states this has been ongoing for years and has been getting worse. He states his left is worse than his right. He notes diffuse hand pain and a pain and prominence at the base of his thumbs. He notes increased pain with  and pinch. He also notes weakness. He will take Tylenol as needed for pain. He has a history of left carpal tunnel release. The patient is referred by Payton Rudd MD.      Occupation: retired      I have personally reviewed all the relevant PMH, PSH, SH, FH, Medications and allergies      PAST MEDICAL HISTORY:  Past Medical History[1]    PAST SURGICAL HISTORY:  Past Surgical History[2]    FAMILY HISTORY:  Family History[3]    SOCIAL HISTORY:  Social History[4]    MEDICATIONS:  Current Medications[5]    ALLERGIES:  Allergies[6]        REVIEW OF SYSTEMS:  Musculoskeletal:        As noted in HPI.   All other systems reviewed " and are negative.    VITALS:  There were no vitals filed for this visit.    LABS:  HgA1c:   Lab Results   Component Value Date    HGBA1C 5.4 03/14/2023     BMP:   Lab Results   Component Value Date    GLUCOSE 103 11/25/2014    CALCIUM 10.0 03/24/2025     11/25/2014    K 4.4 03/24/2025    CO2 29 03/24/2025     03/24/2025    BUN 22 03/24/2025    CREATININE 1.12 03/24/2025       _____________________________________________________  PHYSICAL EXAMINATION:  General: Well developed and well nourished, alert & oriented x 3, appears comfortable  Psychiatric: Normal  HEENT: Normocephalic, Atraumatic Trachea Midline, No torticollis  Pulmonary: No audible wheezing or respiratory distress   Abdomen/GI: Non tender, non distended   Cardiovascular: No pitting edema, 2+ radial pulse   Skin: No masses, erythema, lacerations, fluctation, ulcerations  Neurovascular: Sensation Intact to the Median, Ulnar, Radial Nerve, Motor Intact to the Median, Ulnar, Radial Nerve, and Pulses Intact  Musculoskeletal: Normal, except as noted in detailed exam and in HPI.      MUSCULOSKELETAL EXAMINATION:  Left hand  Significant radiocarpal effusion   Flex 65  Ext 55  Full fist  Full digit ext  Mild TTP CMC joint  - CMC grind  Crepitus with wrist ROM  No pain with wrist motion     Right hand  Flex 70  Ext 70  Full fist  Full digit extension     ___________________________________________________  STUDIES REVIEWED:  Xrays of the bilateral hand were reviewed and independently interpreted in PACS by Dr. Taveras and demonstrate left SLAC wrist, left thumb CMC arthritis and right thumb CMC arthritis.          PROCEDURES PERFORMED:  Medium joint arthrocentesis: L radiocarpal    Performed by: Zenon Taveras MD  Authorized by: Zenon Taveras MD    [place] Protocol:  procedure performed by consultantConsent: Verbal consent obtained  Consent given by: patient  Patient identity confirmed: verbally with patient  Supporting  Documentation  Indications: pain   Procedure Details  Location: wrist - L radiocarpal  Needle size: 25 G  Ultrasound guidance: no  Medications administered: 1 mL lidocaine 1 %; 6 mg betamethasone acetate-betamethasone sodium phosphate 6 (3-3) mg/mL    Aspirate amount (ml): 1.5CC.  Patient tolerance: patient tolerated the procedure well with no immediate complications  Dressing:  Sterile dressing applied      Small joint arthrocentesis: R thumb CMC    Performed by: Zenon Taveras MD  Authorized by: Zenon Taveras MD    [place] Protocol:  procedure performed by consultantConsent: Verbal consent obtained  Consent given by: patient  Patient identity confirmed: verbally with patient  Supporting Documentation  Indications: pain   Procedure Details  Location: thumb - R thumb CMC  Needle size: 25 G  Ultrasound guidance: no  Medications administered: 1 mL lidocaine 1 %; 6 mg betamethasone acetate-betamethasone sodium phosphate 6 (3-3) mg/mL    Patient tolerance: patient tolerated the procedure well with no immediate complications  Dressing:  Sterile dressing applied        -    _____________________________________________________      Scribe Attestation      I,:  Mary Ann Stevens MA am acting as a scribe while in the presence of the attending physician.:       I,:  Zenon Taveras MD personally performed the services described in this documentation    as scribed in my presence.:                [1]   Past Medical History:  Diagnosis Date    Erectile dysfunction     Hemorrhoids     Hypertension     Parkinson disease (HCC)     Thrombocytosis 03/15/2021   [2]   Past Surgical History:  Procedure Laterality Date    CARPAL TUNNEL RELEASE Left 01/02/2024    KNEE SURGERY  August 2023    ULNAR NERVE REPAIR Left 01/02/2024   [3]   Family History  Problem Relation Name Age of Onset    Diabetes Father Father         mellitus    Heart attack Father Father         acute myocardial infarction    Hyperlipidemia Mother       Multiple sclerosis Maternal Grandfather      Prostate cancer Paternal Grandfather Pappy    [4]   Social History  Tobacco Use    Smoking status: Never    Smokeless tobacco: Never   Vaping Use    Vaping status: Former   Substance Use Topics    Alcohol use: Yes     Alcohol/week: 10.0 - 14.0 standard drinks of alcohol     Types: 5 - 7 Cans of beer, 5 - 7 Standard drinks or equivalent per week     Comment: 1 small glass of scotch most nights    Drug use: No   [5]   Current Outpatient Medications:     acetaminophen (TYLENOL) 500 mg tablet, Take 2 tablets (1,000 mg total) by mouth 2 (two) times a day, Disp: , Rfl:     aspirin (Aspirin 81) 81 mg EC tablet, Take 1 tablet (81 mg total) by mouth daily, Disp: , Rfl:     carbidopa-levodopa (SINEMET)  mg per tablet, Take 1.5tabs qid, Disp: , Rfl:     cholecalciferol (VITAMIN D3) 1,000 units tablet, Take 5,000 Units by mouth in the morning., Disp: , Rfl:     Collagen Hydrolysate POWD, Use 1 Dose in the morning., Disp: , Rfl:     fluticasone (FLONASE) 50 mcg/act nasal spray, SPRAY 2 SPRAYS INTO EACH NOSTRIL EVERY DAY, Disp: 16 mL, Rfl: 2    ketoconazole (NIZORAL) 2 % cream, Apply topically As needed, Disp: , Rfl:     ketoconazole (NIZORAL) 2 % shampoo, , Disp: , Rfl:     losartan-hydrochlorothiazide (HYZAAR) 100-12.5 MG per tablet, TAKE 1 TABLET BY MOUTH EVERY DAY, Disp: 90 tablet, Rfl: 1    MELATONIN PO, Take by mouth daily at bedtime, Disp: , Rfl:     rosuvastatin (CRESTOR) 40 MG tablet, TAKE 1 TABLET BY MOUTH EVERY DAY, Disp: 90 tablet, Rfl: 1    selegiline (ELDEPRYL) 5 mg tablet, Take 1 tablet (5 mg total) by mouth 2 (two) times a day with meals, Disp: 180 tablet, Rfl: 4    tamsulosin (FLOMAX) 0.4 mg, Take 0.4 mg by mouth, Disp: , Rfl:     vardenafil (LEVITRA) 20 MG tablet, Take 20 mg by mouth daily as needed, Disp: , Rfl:   [6] No Known Allergies

## 2025-06-17 NOTE — ASSESSMENT & PLAN NOTE
Treatment options were discussed in the form of heat, topical creams, bracing, and a possible steroid injection. The patient elected to proceed with a right thumb CMC injection in the office today without any complications. Hand OA AVS provided. He is aware we can repeat these injections every 3 months if needed. He was fitted and provided with a left comfort cool brace he can wear as needed.   Orders:    XR hand 3+ vw right; Future    XR hand 3+ vw left; Future    Small joint arthrocentesis: R thumb CMC    Thumb Cude comf/Cool

## 2025-06-18 RX ORDER — BETAMETHASONE SODIUM PHOSPHATE AND BETAMETHASONE ACETATE 3; 3 MG/ML; MG/ML
6 INJECTION, SUSPENSION INTRA-ARTICULAR; INTRALESIONAL; INTRAMUSCULAR; SOFT TISSUE
Status: COMPLETED | OUTPATIENT
Start: 2025-06-17 | End: 2025-06-17

## 2025-06-18 RX ORDER — LIDOCAINE HYDROCHLORIDE 10 MG/ML
1 INJECTION, SOLUTION INFILTRATION; PERINEURAL
Status: COMPLETED | OUTPATIENT
Start: 2025-06-17 | End: 2025-06-17

## 2025-07-07 ENCOUNTER — PATIENT MESSAGE (OUTPATIENT)
Dept: GASTROENTEROLOGY | Facility: CLINIC | Age: 65
End: 2025-07-07

## 2025-07-10 ENCOUNTER — TELEPHONE (OUTPATIENT)
Age: 65
End: 2025-07-10

## 2025-07-10 NOTE — TELEPHONE ENCOUNTER
Please call and inform patient it is okay to travel by plane as long as he is having no issues after the procedure.

## 2025-07-10 NOTE — TELEPHONE ENCOUNTER
As per communication form I left patient a detailed message that it is okay to travel after colonoscopy. I advised if there was any further questions to please contact office. No further action needed at this time.

## 2025-07-10 NOTE — TELEPHONE ENCOUNTER
Patients GI provider:       Number to return call: [phone]    Reason for call: Pt called asking if he would be ok to travel by plane day after colonoscopy.     Scheduled procedure/appointment date if applicable: 8/20/2025

## 2025-08-06 ENCOUNTER — ANESTHESIA EVENT (OUTPATIENT)
Dept: ANESTHESIOLOGY | Facility: HOSPITAL | Age: 65
End: 2025-08-06

## 2025-08-06 ENCOUNTER — ANESTHESIA (OUTPATIENT)
Dept: ANESTHESIOLOGY | Facility: HOSPITAL | Age: 65
End: 2025-08-06